# Patient Record
Sex: MALE | Race: WHITE | NOT HISPANIC OR LATINO | Employment: FULL TIME | URBAN - METROPOLITAN AREA
[De-identification: names, ages, dates, MRNs, and addresses within clinical notes are randomized per-mention and may not be internally consistent; named-entity substitution may affect disease eponyms.]

---

## 2018-04-10 ENCOUNTER — HISTORICAL (OUTPATIENT)
Dept: ADMINISTRATIVE | Facility: HOSPITAL | Age: 58
End: 2018-04-10

## 2018-04-10 LAB
ABS NEUT (OLG): 1.93 X10(3)/MCL (ref 2.1–9.2)
APTT PPP: 36.6 SECOND(S) (ref 24.8–36.9)
BASOPHILS # BLD AUTO: 0 X10(3)/MCL (ref 0–0.2)
BASOPHILS NFR BLD AUTO: 1 %
BUN SERPL-MCNC: 15 MG/DL (ref 7–18)
CALCIUM SERPL-MCNC: 9.2 MG/DL (ref 8.5–10.1)
CHLORIDE SERPL-SCNC: 108 MMOL/L (ref 98–107)
CO2 SERPL-SCNC: 27 MMOL/L (ref 21–32)
CREAT SERPL-MCNC: 1.03 MG/DL (ref 0.7–1.3)
CREAT/UREA NIT SERPL: 14.6
EOSINOPHIL # BLD AUTO: 0.2 X10(3)/MCL (ref 0–0.9)
EOSINOPHIL NFR BLD AUTO: 4 %
ERYTHROCYTE [DISTWIDTH] IN BLOOD BY AUTOMATED COUNT: 12.8 % (ref 11.5–17)
GLUCOSE SERPL-MCNC: 110 MG/DL (ref 74–106)
HCT VFR BLD AUTO: 37.3 % (ref 42–52)
HGB BLD-MCNC: 13.1 GM/DL (ref 14–18)
INR PPP: 0.98 (ref 0–1.27)
LYMPHOCYTES # BLD AUTO: 1.7 X10(3)/MCL (ref 0.6–4.6)
LYMPHOCYTES NFR BLD AUTO: 40 %
MCH RBC QN AUTO: 30.2 PG (ref 27–31)
MCHC RBC AUTO-ENTMCNC: 35.1 GM/DL (ref 33–36)
MCV RBC AUTO: 85.9 FL (ref 80–94)
MONOCYTES # BLD AUTO: 0.4 X10(3)/MCL (ref 0.1–1.3)
MONOCYTES NFR BLD AUTO: 8 %
NEUTROPHILS # BLD AUTO: 1.93 X10(3)/MCL (ref 2.1–9.2)
NEUTROPHILS NFR BLD AUTO: 46 %
PLATELET # BLD AUTO: 137 X10(3)/MCL (ref 130–400)
PMV BLD AUTO: 10.2 FL (ref 9.4–12.4)
POTASSIUM SERPL-SCNC: 4.2 MMOL/L (ref 3.5–5.1)
PROTHROMBIN TIME: 13.3 SECOND(S) (ref 12.2–14.7)
RBC # BLD AUTO: 4.34 X10(6)/MCL (ref 4.7–6.1)
SODIUM SERPL-SCNC: 140 MMOL/L (ref 136–145)
WBC # SPEC AUTO: 4.2 X10(3)/MCL (ref 4.5–11.5)

## 2018-05-17 ENCOUNTER — HISTORICAL (OUTPATIENT)
Dept: PREADMISSION TESTING | Facility: HOSPITAL | Age: 58
End: 2018-05-17

## 2018-05-17 LAB
ABS NEUT (OLG): 2.47 X10(3)/MCL (ref 2.1–9.2)
ALBUMIN SERPL-MCNC: 3.8 GM/DL (ref 3.4–5)
ALBUMIN/GLOB SERPL: 1.2 {RATIO}
ALP SERPL-CCNC: 80 UNIT/L (ref 50–136)
ALT SERPL-CCNC: 29 UNIT/L (ref 12–78)
APTT PPP: 39.1 SECOND(S) (ref 24.8–36.9)
AST SERPL-CCNC: 22 UNIT/L (ref 15–37)
BASOPHILS # BLD AUTO: 0 X10(3)/MCL (ref 0–0.2)
BASOPHILS NFR BLD AUTO: 1 %
BILIRUB SERPL-MCNC: 0.5 MG/DL (ref 0.2–1)
BILIRUBIN DIRECT+TOT PNL SERPL-MCNC: 0.1 MG/DL (ref 0–0.2)
BILIRUBIN DIRECT+TOT PNL SERPL-MCNC: 0.4 MG/DL (ref 0–0.8)
BUN SERPL-MCNC: 14 MG/DL (ref 7–18)
CALCIUM SERPL-MCNC: 9.5 MG/DL (ref 8.5–10.1)
CHLORIDE SERPL-SCNC: 104 MMOL/L (ref 98–107)
CO2 SERPL-SCNC: 30 MMOL/L (ref 21–32)
CREAT SERPL-MCNC: 0.94 MG/DL (ref 0.7–1.3)
EOSINOPHIL # BLD AUTO: 0.2 X10(3)/MCL (ref 0–0.9)
EOSINOPHIL NFR BLD AUTO: 4 %
ERYTHROCYTE [DISTWIDTH] IN BLOOD BY AUTOMATED COUNT: 13.4 % (ref 11.5–17)
GLOBULIN SER-MCNC: 3.2 GM/DL (ref 2.4–3.5)
GLUCOSE SERPL-MCNC: 76 MG/DL (ref 74–106)
HCT VFR BLD AUTO: 39.6 % (ref 42–52)
HGB BLD-MCNC: 13.2 GM/DL (ref 14–18)
INR PPP: 1.03 (ref 0–1.27)
LYMPHOCYTES # BLD AUTO: 1.7 X10(3)/MCL (ref 0.6–4.6)
LYMPHOCYTES NFR BLD AUTO: 35 %
MCH RBC QN AUTO: 29.7 PG (ref 27–31)
MCHC RBC AUTO-ENTMCNC: 33.3 GM/DL (ref 33–36)
MCV RBC AUTO: 89.2 FL (ref 80–94)
MONOCYTES # BLD AUTO: 0.4 X10(3)/MCL (ref 0.1–1.3)
MONOCYTES NFR BLD AUTO: 8 %
NEUTROPHILS # BLD AUTO: 2.47 X10(3)/MCL (ref 2.1–9.2)
NEUTROPHILS NFR BLD AUTO: 52 %
PLATELET # BLD AUTO: 136 X10(3)/MCL (ref 130–400)
PMV BLD AUTO: 10.2 FL (ref 9.4–12.4)
POTASSIUM SERPL-SCNC: 4.5 MMOL/L (ref 3.5–5.1)
PROT SERPL-MCNC: 7 GM/DL (ref 6.4–8.2)
PROTHROMBIN TIME: 13.8 SECOND(S) (ref 12.2–14.7)
RBC # BLD AUTO: 4.44 X10(6)/MCL (ref 4.7–6.1)
SODIUM SERPL-SCNC: 143 MMOL/L (ref 136–145)
WBC # SPEC AUTO: 4.8 X10(3)/MCL (ref 4.5–11.5)

## 2018-06-05 ENCOUNTER — HISTORICAL (OUTPATIENT)
Dept: SURGERY | Facility: HOSPITAL | Age: 58
End: 2018-06-05

## 2018-11-07 ENCOUNTER — HISTORICAL (OUTPATIENT)
Dept: RADIOLOGY | Facility: HOSPITAL | Age: 58
End: 2018-11-07

## 2019-04-05 ENCOUNTER — HISTORICAL (OUTPATIENT)
Dept: ADMINISTRATIVE | Facility: HOSPITAL | Age: 59
End: 2019-04-05

## 2020-09-29 ENCOUNTER — HISTORICAL (OUTPATIENT)
Dept: OCCUPATIONAL THERAPY | Facility: HOSPITAL | Age: 60
End: 2020-09-29

## 2020-09-30 ENCOUNTER — HISTORICAL (OUTPATIENT)
Dept: PHYSICAL THERAPY | Facility: HOSPITAL | Age: 60
End: 2020-09-30

## 2020-10-06 ENCOUNTER — HISTORICAL (OUTPATIENT)
Dept: OCCUPATIONAL THERAPY | Facility: HOSPITAL | Age: 60
End: 2020-10-06

## 2020-10-08 ENCOUNTER — HISTORICAL (OUTPATIENT)
Dept: OCCUPATIONAL THERAPY | Facility: HOSPITAL | Age: 60
End: 2020-10-08

## 2020-10-13 ENCOUNTER — HISTORICAL (OUTPATIENT)
Dept: OCCUPATIONAL THERAPY | Facility: HOSPITAL | Age: 60
End: 2020-10-13

## 2020-10-15 ENCOUNTER — HISTORICAL (OUTPATIENT)
Dept: OCCUPATIONAL THERAPY | Facility: HOSPITAL | Age: 60
End: 2020-10-15

## 2020-10-16 ENCOUNTER — HISTORICAL (OUTPATIENT)
Dept: OCCUPATIONAL THERAPY | Facility: HOSPITAL | Age: 60
End: 2020-10-16

## 2020-10-22 ENCOUNTER — HISTORICAL (OUTPATIENT)
Dept: OCCUPATIONAL THERAPY | Facility: HOSPITAL | Age: 60
End: 2020-10-22

## 2020-10-23 ENCOUNTER — HISTORICAL (OUTPATIENT)
Dept: OCCUPATIONAL THERAPY | Facility: HOSPITAL | Age: 60
End: 2020-10-23

## 2020-10-27 ENCOUNTER — HISTORICAL (OUTPATIENT)
Dept: OCCUPATIONAL THERAPY | Facility: HOSPITAL | Age: 60
End: 2020-10-27

## 2020-11-12 ENCOUNTER — HISTORICAL (OUTPATIENT)
Dept: SPEECH THERAPY | Facility: HOSPITAL | Age: 60
End: 2020-11-12

## 2020-11-13 ENCOUNTER — HISTORICAL (OUTPATIENT)
Dept: SPEECH THERAPY | Facility: HOSPITAL | Age: 60
End: 2020-11-13

## 2020-11-17 ENCOUNTER — HISTORICAL (OUTPATIENT)
Dept: SPEECH THERAPY | Facility: HOSPITAL | Age: 60
End: 2020-11-17

## 2020-11-19 ENCOUNTER — HISTORICAL (OUTPATIENT)
Dept: OCCUPATIONAL THERAPY | Facility: HOSPITAL | Age: 60
End: 2020-11-19

## 2020-11-20 ENCOUNTER — HISTORICAL (OUTPATIENT)
Dept: SPEECH THERAPY | Facility: HOSPITAL | Age: 60
End: 2020-11-20

## 2020-11-24 ENCOUNTER — HISTORICAL (OUTPATIENT)
Dept: PHYSICAL THERAPY | Facility: HOSPITAL | Age: 60
End: 2020-11-24

## 2020-11-27 ENCOUNTER — HISTORICAL (OUTPATIENT)
Dept: SPEECH THERAPY | Facility: HOSPITAL | Age: 60
End: 2020-11-27

## 2020-12-01 ENCOUNTER — HISTORICAL (OUTPATIENT)
Dept: PHYSICAL THERAPY | Facility: HOSPITAL | Age: 60
End: 2020-12-01

## 2020-12-04 ENCOUNTER — HISTORICAL (OUTPATIENT)
Dept: PHYSICAL THERAPY | Facility: HOSPITAL | Age: 60
End: 2020-12-04

## 2020-12-08 ENCOUNTER — HISTORICAL (OUTPATIENT)
Dept: SPEECH THERAPY | Facility: HOSPITAL | Age: 60
End: 2020-12-08

## 2020-12-11 ENCOUNTER — HISTORICAL (OUTPATIENT)
Dept: SPEECH THERAPY | Facility: HOSPITAL | Age: 60
End: 2020-12-11

## 2020-12-15 ENCOUNTER — HISTORICAL (OUTPATIENT)
Dept: SPEECH THERAPY | Facility: HOSPITAL | Age: 60
End: 2020-12-15

## 2020-12-18 ENCOUNTER — HISTORICAL (OUTPATIENT)
Dept: SPEECH THERAPY | Facility: HOSPITAL | Age: 60
End: 2020-12-18

## 2020-12-22 ENCOUNTER — HISTORICAL (OUTPATIENT)
Dept: SPEECH THERAPY | Facility: HOSPITAL | Age: 60
End: 2020-12-22

## 2020-12-29 ENCOUNTER — HISTORICAL (OUTPATIENT)
Dept: SPEECH THERAPY | Facility: HOSPITAL | Age: 60
End: 2020-12-29

## 2021-01-05 ENCOUNTER — HISTORICAL (OUTPATIENT)
Dept: SPEECH THERAPY | Facility: HOSPITAL | Age: 61
End: 2021-01-05

## 2021-01-08 ENCOUNTER — HISTORICAL (OUTPATIENT)
Dept: SPEECH THERAPY | Facility: HOSPITAL | Age: 61
End: 2021-01-08

## 2021-01-12 ENCOUNTER — HISTORICAL (OUTPATIENT)
Dept: OCCUPATIONAL THERAPY | Facility: HOSPITAL | Age: 61
End: 2021-01-12

## 2021-01-15 ENCOUNTER — HISTORICAL (OUTPATIENT)
Dept: OCCUPATIONAL THERAPY | Facility: HOSPITAL | Age: 61
End: 2021-01-15

## 2021-01-19 ENCOUNTER — HISTORICAL (OUTPATIENT)
Dept: SPEECH THERAPY | Facility: HOSPITAL | Age: 61
End: 2021-01-19

## 2021-01-22 ENCOUNTER — HISTORICAL (OUTPATIENT)
Dept: OCCUPATIONAL THERAPY | Facility: HOSPITAL | Age: 61
End: 2021-01-22

## 2021-01-26 ENCOUNTER — HISTORICAL (OUTPATIENT)
Dept: OCCUPATIONAL THERAPY | Facility: HOSPITAL | Age: 61
End: 2021-01-26

## 2021-01-29 ENCOUNTER — HISTORICAL (OUTPATIENT)
Dept: OCCUPATIONAL THERAPY | Facility: HOSPITAL | Age: 61
End: 2021-01-29

## 2021-02-05 ENCOUNTER — HISTORICAL (OUTPATIENT)
Dept: SPEECH THERAPY | Facility: HOSPITAL | Age: 61
End: 2021-02-05

## 2021-02-12 ENCOUNTER — HISTORICAL (OUTPATIENT)
Dept: SPEECH THERAPY | Facility: HOSPITAL | Age: 61
End: 2021-02-12

## 2021-02-19 ENCOUNTER — HISTORICAL (OUTPATIENT)
Dept: SPEECH THERAPY | Facility: HOSPITAL | Age: 61
End: 2021-02-19

## 2021-02-26 ENCOUNTER — HISTORICAL (OUTPATIENT)
Dept: OCCUPATIONAL THERAPY | Facility: HOSPITAL | Age: 61
End: 2021-02-26

## 2021-03-26 ENCOUNTER — HISTORICAL (OUTPATIENT)
Dept: OCCUPATIONAL THERAPY | Facility: HOSPITAL | Age: 61
End: 2021-03-26

## 2021-04-01 ENCOUNTER — HISTORICAL (OUTPATIENT)
Dept: OCCUPATIONAL THERAPY | Facility: HOSPITAL | Age: 61
End: 2021-04-01

## 2021-04-09 ENCOUNTER — HISTORICAL (OUTPATIENT)
Dept: OCCUPATIONAL THERAPY | Facility: HOSPITAL | Age: 61
End: 2021-04-09

## 2021-04-16 ENCOUNTER — HISTORICAL (OUTPATIENT)
Dept: OCCUPATIONAL THERAPY | Facility: HOSPITAL | Age: 61
End: 2021-04-16

## 2021-04-22 ENCOUNTER — HISTORICAL (OUTPATIENT)
Dept: OCCUPATIONAL THERAPY | Facility: HOSPITAL | Age: 61
End: 2021-04-22

## 2021-04-29 ENCOUNTER — HISTORICAL (OUTPATIENT)
Dept: OCCUPATIONAL THERAPY | Facility: HOSPITAL | Age: 61
End: 2021-04-29

## 2021-05-14 ENCOUNTER — HISTORICAL (OUTPATIENT)
Dept: OCCUPATIONAL THERAPY | Facility: HOSPITAL | Age: 61
End: 2021-05-14

## 2021-10-20 ENCOUNTER — HISTORICAL (OUTPATIENT)
Dept: OCCUPATIONAL THERAPY | Facility: HOSPITAL | Age: 61
End: 2021-10-20

## 2021-10-28 ENCOUNTER — HISTORICAL (OUTPATIENT)
Dept: OCCUPATIONAL THERAPY | Facility: HOSPITAL | Age: 61
End: 2021-10-28

## 2021-11-02 ENCOUNTER — HISTORICAL (OUTPATIENT)
Dept: OCCUPATIONAL THERAPY | Facility: HOSPITAL | Age: 61
End: 2021-11-02

## 2021-11-04 ENCOUNTER — HISTORICAL (OUTPATIENT)
Dept: OCCUPATIONAL THERAPY | Facility: HOSPITAL | Age: 61
End: 2021-11-04

## 2021-11-09 ENCOUNTER — HISTORICAL (OUTPATIENT)
Dept: OCCUPATIONAL THERAPY | Facility: HOSPITAL | Age: 61
End: 2021-11-09

## 2021-11-11 ENCOUNTER — HISTORICAL (OUTPATIENT)
Dept: OCCUPATIONAL THERAPY | Facility: HOSPITAL | Age: 61
End: 2021-11-11

## 2021-11-16 ENCOUNTER — HISTORICAL (OUTPATIENT)
Dept: OCCUPATIONAL THERAPY | Facility: HOSPITAL | Age: 61
End: 2021-11-16

## 2021-11-23 ENCOUNTER — HISTORICAL (OUTPATIENT)
Dept: OCCUPATIONAL THERAPY | Facility: HOSPITAL | Age: 61
End: 2021-11-23

## 2021-11-30 ENCOUNTER — HISTORICAL (OUTPATIENT)
Dept: PHYSICAL THERAPY | Facility: HOSPITAL | Age: 61
End: 2021-11-30

## 2021-12-06 ENCOUNTER — HISTORICAL (OUTPATIENT)
Dept: RADIOLOGY | Facility: HOSPITAL | Age: 61
End: 2021-12-06

## 2022-01-01 ENCOUNTER — HOSPITAL ENCOUNTER (OUTPATIENT)
Dept: RADIOLOGY | Facility: HOSPITAL | Age: 62
Discharge: HOME | End: 2022-12-07
Attending: PHYSICAL MEDICINE & REHABILITATION
Payer: MEDICARE

## 2022-01-01 ENCOUNTER — CLINICAL SUPPORT (OUTPATIENT)
Dept: REHABILITATION | Facility: HOSPITAL | Age: 62
End: 2022-01-01
Payer: MEDICARE

## 2022-01-01 ENCOUNTER — CLINICAL SUPPORT (OUTPATIENT)
Dept: REHABILITATION | Facility: HOSPITAL | Age: 62
End: 2022-01-01
Attending: PHYSICAL MEDICINE & REHABILITATION
Payer: MEDICARE

## 2022-01-01 ENCOUNTER — DOCUMENTATION ONLY (OUTPATIENT)
Dept: REHABILITATION | Facility: HOSPITAL | Age: 62
End: 2022-01-01

## 2022-01-01 DIAGNOSIS — I67.9 CEREBROVASCULAR DISEASE, UNSPECIFIED: ICD-10-CM

## 2022-01-01 DIAGNOSIS — G81.11 RIGHT SPASTIC HEMIPLEGIA: Primary | ICD-10-CM

## 2022-01-01 DIAGNOSIS — M53.3 SACROILIAC JOINT DYSFUNCTION: Primary | ICD-10-CM

## 2022-01-01 DIAGNOSIS — M70.61 TROCHANTERIC BURSITIS OF RIGHT HIP: ICD-10-CM

## 2022-01-01 DIAGNOSIS — M53.3 SACROILIAC JOINT DYSFUNCTION: ICD-10-CM

## 2022-01-01 PROCEDURE — 97110 THERAPEUTIC EXERCISES: CPT

## 2022-01-01 PROCEDURE — 97112 NEUROMUSCULAR REEDUCATION: CPT

## 2022-01-01 PROCEDURE — 72100 X-RAY EXAM L-S SPINE 2/3 VWS: CPT | Mod: TC

## 2022-01-21 ENCOUNTER — HISTORICAL (OUTPATIENT)
Dept: OCCUPATIONAL THERAPY | Facility: HOSPITAL | Age: 62
End: 2022-01-21

## 2022-01-28 ENCOUNTER — HISTORICAL (OUTPATIENT)
Dept: OCCUPATIONAL THERAPY | Facility: HOSPITAL | Age: 62
End: 2022-01-28

## 2022-02-17 ENCOUNTER — HISTORICAL (OUTPATIENT)
Dept: OCCUPATIONAL THERAPY | Facility: HOSPITAL | Age: 62
End: 2022-02-17

## 2022-02-22 ENCOUNTER — HISTORICAL (OUTPATIENT)
Dept: OCCUPATIONAL THERAPY | Facility: HOSPITAL | Age: 62
End: 2022-02-22

## 2022-02-23 ENCOUNTER — HISTORICAL (OUTPATIENT)
Dept: OCCUPATIONAL THERAPY | Facility: HOSPITAL | Age: 62
End: 2022-02-23

## 2022-03-03 ENCOUNTER — HISTORICAL (OUTPATIENT)
Dept: OCCUPATIONAL THERAPY | Facility: HOSPITAL | Age: 62
End: 2022-03-03

## 2022-03-08 ENCOUNTER — HISTORICAL (OUTPATIENT)
Dept: OCCUPATIONAL THERAPY | Facility: HOSPITAL | Age: 62
End: 2022-03-08

## 2022-03-10 ENCOUNTER — HISTORICAL (OUTPATIENT)
Dept: OCCUPATIONAL THERAPY | Facility: HOSPITAL | Age: 62
End: 2022-03-10

## 2022-03-24 ENCOUNTER — HISTORICAL (OUTPATIENT)
Dept: OCCUPATIONAL THERAPY | Facility: HOSPITAL | Age: 62
End: 2022-03-24

## 2022-03-29 ENCOUNTER — HISTORICAL (OUTPATIENT)
Dept: OCCUPATIONAL THERAPY | Facility: HOSPITAL | Age: 62
End: 2022-03-29

## 2022-03-31 ENCOUNTER — HISTORICAL (OUTPATIENT)
Dept: OCCUPATIONAL THERAPY | Facility: HOSPITAL | Age: 62
End: 2022-03-31

## 2022-04-05 ENCOUNTER — HISTORICAL (OUTPATIENT)
Dept: OCCUPATIONAL THERAPY | Facility: HOSPITAL | Age: 62
End: 2022-04-05

## 2022-04-07 ENCOUNTER — HISTORICAL (OUTPATIENT)
Dept: ADMINISTRATIVE | Facility: HOSPITAL | Age: 62
End: 2022-04-07
Payer: MEDICARE

## 2022-04-07 ENCOUNTER — HISTORICAL (OUTPATIENT)
Dept: OCCUPATIONAL THERAPY | Facility: HOSPITAL | Age: 62
End: 2022-04-07

## 2022-04-12 ENCOUNTER — HISTORICAL (OUTPATIENT)
Dept: OCCUPATIONAL THERAPY | Facility: HOSPITAL | Age: 62
End: 2022-04-12

## 2022-04-14 ENCOUNTER — HISTORICAL (OUTPATIENT)
Dept: OCCUPATIONAL THERAPY | Facility: HOSPITAL | Age: 62
End: 2022-04-14
Payer: MEDICARE

## 2022-04-19 ENCOUNTER — HISTORICAL (OUTPATIENT)
Dept: OCCUPATIONAL THERAPY | Facility: HOSPITAL | Age: 62
End: 2022-04-19
Payer: MEDICARE

## 2022-04-21 ENCOUNTER — HISTORICAL (OUTPATIENT)
Dept: OCCUPATIONAL THERAPY | Facility: HOSPITAL | Age: 62
End: 2022-04-21
Payer: MEDICARE

## 2022-04-24 VITALS
WEIGHT: 196.19 LBS | SYSTOLIC BLOOD PRESSURE: 136 MMHG | HEIGHT: 70 IN | BODY MASS INDEX: 28.09 KG/M2 | DIASTOLIC BLOOD PRESSURE: 88 MMHG

## 2022-04-26 ENCOUNTER — HISTORICAL (OUTPATIENT)
Dept: OCCUPATIONAL THERAPY | Facility: HOSPITAL | Age: 62
End: 2022-04-26
Payer: MEDICARE

## 2022-04-28 NOTE — OP NOTE
"        DATE OF SURGERY:    04/10/2018    SURGEON:  Ihsan Montez MD    PREOPERATIVE DIAGNOSIS:  Cervical and lumbar radiculopathies.    POSTOPERATIVE DIAGNOSIS:  Cervical and lumbar radiculopathies.    PROCEDURE:  Fluoroscopically guided placement of two spinal cord stimulator leads under anesthesia for trial.     Equipment used:  [employer] Bridger[employer] spinal cord stimulator kit.    PROCEDURE IN DETAIL:  Following informed consent, and with the patient under general endotracheal anesthesia, he was prepped and draped in the usual sterile fashion.  I infiltrated the tissue overlying L3-4 with local anesthetic.  Under fluoroscopic guidance, I advanced a 22-gauge 3.5 inch BD spinal needle into the epidural margin just right of midline at L2-3.  I administered more local anesthetic and then exchanged this needle for a 14 gauge Tuohy curved tip needle that I used to enter the epidural space at L2-3.  I inserted a spinal cord stimulator lead and advanced it to the bottom of C7.  I then infiltrated the tissue overlying L3-4 with local anesthetic and advanced a 22 gauge 3.5" BD spinal needle to the epidural margin of L2-3 just left of midline.  I then administered more local anesthetic and exchanged this needle for a 14 gauge Tuohy curved tip needle that I used to enter the epidural space at L2-3 and introduced a spinal cord stimulator lead, advancing it to the top of T9.  I then carefully removed the needles and guidewires while keeping the leads in place.  The leads were carefully secured with sterile dressings and connected to the generator.  The patient tolerated the procedure well without apparent complication.    IMPRESSION:  Successful fluoroscopically guided placement of two spinal cord stimulator leads under anesthesia for trial.        ______________________________  MD DAMARIS Delatorre/TITO  DD:  04/10/2018  Time:  09:03AM  DT:  04/10/2018  Time:  02:14PM  Job #:  [id number]     "

## 2022-04-28 NOTE — OP NOTE
DATE OF SURGERY:    06/05/2018    SURGEON:  Jonathan Jarrett MD    PREOPERATIVE DIAGNOSIS:    1. Chronic pain syndrome.  2. Post-laminectomy syndrome.  3. Lumbar radiculopathy.    POSTOPERATIVE DIAGNOSES:    1. Chronic pain syndrome.  2. Post-laminectomy syndrome.  3. Lumbar radiculopathy.    PROCEDURE:    1. T11-12 laminectomy for implantation of spinal cord stimulator paddle.  2. Implantation of pulse generator.  3. Operative microscopic dissection.    INDICATIONS FOR PROCEDURE:  The patient is a 58-year-old male who had previously undergone ACDF at 4-5 and 5-6 for treatment of central cord syndrome.  He also went underwent a microdiskectomy at L5-S1 and a lumbar laminectomy at L4.  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  He underwent a spinal cord stimulator trial, received greater than 50% relief of his pain complaints.  Most notably, he said he was able to get off all of his pain medications.  MRI of the thoracic spine showed no evidence of spinal canal stenosis.  Therefore, the patient was arranged for implantation.    PROCEDURE IN DETAIL:  After informed consent was obtained, he was brought to the operating room and placed under anesthesia by the anesthesia team, transferred to the operative table in the prone position, appropriately padded, prepped, and draped in usual sterile fashion.       I began the procedure by making a dorsal midline incision fluoroscopically located over the T11-12 vertebral body levels.  Dissection was carried down in a subperiosteal fashion to expose underlying spinous process lamina complex at T11 and the cranial-most portion of T12.  Fluoroscopy confirmed level.       A large-bite Leksell was used to move the spinous process of T11.  A high-speed pneumatic bur was used to perform a laminectomy up to the cranial edge of the yellow ligament.  I undermined the yellow ligament and excised it with a combination of 1 and 2 Kerrison punches.   I attached cranial plate fixation to the subjacent lamina on T12 with a silk tie attached.  I then passed the spinal cord stimulator paddle to the radiographic midline fluoroscopically located over the T10 vertebral body.  Secondary to significant canal abnormality, I was unable to push deep into the T9 space because during each pass it would go into the gutters.  Therefore, with safety in mind, I left the cranial-most portion of the paddle at the T9-10 space and just spanning it.  I then anchored the leads to the subjacent lamina.     I then made a right lower quadrant pocket using about a 5 cm incision, dissected down about 1 cm, 1.5 cm deep.  Used a finger sweep technique to create a 4 x 5 cm pocket.  I tunneled the electrodes to this pocket, connected to the spinal cord stimulator battery.  Pulse generator impedances were appropriate.  The leads were locked.  I then created a relaxing loop, placed it in this pocket and a relaxing loop in the suprafascial pocket.  The entire system was then implanted.  Copious irrigation used to wash out both incisions.  Complete hemostasis was achieved.  Standard layered closure was performed.  All counts correct x2.    ESTIMATED BLOOD LOSS:  50 cc.    BLOOD REPLACED:  None.    SPECIMENS SENT:  None.    IMPLANTS:  Nevro spinal cord stimulator system.       Neuromonitoring signals stayed stable throughout the entirety of the case.        ______________________________  MD MARCO AlvaradoT/UD  DD:  06/05/2018  Time:  02:33PM  DT:  06/06/2018  Time:  11:49AM  Job #:  [id number]

## 2022-05-02 DIAGNOSIS — I67.9 CEREBROVASCULAR DISEASE, UNSPECIFIED: ICD-10-CM

## 2022-05-02 DIAGNOSIS — G81.11 SPASTIC HEMIPARESIS OF RIGHT DOMINANT SIDE: Primary | ICD-10-CM

## 2022-05-03 ENCOUNTER — CLINICAL SUPPORT (OUTPATIENT)
Dept: REHABILITATION | Facility: HOSPITAL | Age: 62
End: 2022-05-03
Attending: FAMILY MEDICINE
Payer: MEDICARE

## 2022-05-03 DIAGNOSIS — G81.11 RIGHT SPASTIC HEMIPLEGIA: Primary | ICD-10-CM

## 2022-05-03 PROCEDURE — 97110 THERAPEUTIC EXERCISES: CPT

## 2022-05-03 PROCEDURE — 97112 NEUROMUSCULAR REEDUCATION: CPT

## 2022-05-03 NOTE — PROGRESS NOTES
[facility]  Occupational Therapy Treatment Note    Date: 5/3/2022  Name: Bhupendra Park  Clinic Number: [id number]    Therapy Diagnosis:   Encounter Diagnosis   Name Primary?    Right spastic hemiplegia Yes     Physician: Referring, Unknown      Time In: 0800  Time Out: 0830  Total Billable Time: 30 minutes    SUBJECTIVE     Pt reports: Tightness through R pec during supine stretching       OBJECTIVE     Objective Measures updated at progress report unless specified.    Treatment     Bhupendra received the treatments listed below:       Therapeutic exercises to develop ROM and flexibility for 20 minutes, including:  In supine, OT completed passive stretching for R shoulder flexion, shoulder abduction, and elbow flexion/extension.   In gravity eliminated, on table top, Pt completed forearm supination/pronation, wrist flexion/extension, and attempt for digit grasp/release.       Neuromuscular re-education activities to improve Kinesthetic for 10 minutes. The following activities were included:  In standing, Pt completed weightbearing and increased stretching of R shoulder on bosu ball, with ball roll forward for increased ROM.     Patient Education and Home Exercises      Education provided:   - Pt presented with indentations on R forearm, reporting he donned resting hand splint this morning. OT educated Pt on loosening of straps through forearm once positioning digits appropriately in splint.   - Progress towards goals        Assessment     Pt would continue to benefit from skilled OT.      Bhupendra is progressing well towards his goals and there are no updates to goals at this time. Pt prognosis is Fair.     Pt will continue to benefit from skilled outpatient occupational therapy to address the deficits listed in the problem list on initial evaluation provide pt/family education and to maximize pt's level of independence in the home and community environment.     Pt's spiritual, cultural  and educational needs considered and pt agreeable to plan of care and goals.    Anticipated barriers to occupational therapy: severity of impairments     Goals:  increased R shoulder flexion of ~15 degrees for increased independence ADL and functional use for daily routines in 4 weeks     increased R elbow extension of ~10 degrees for increased independence ADL and functional use for daily routines in 4 weeks      PLAN     Continue with POC, progressing goals and treatment as Pt continues to demonstrate progress.       Kris Lopez, OT

## 2022-05-05 ENCOUNTER — CLINICAL SUPPORT (OUTPATIENT)
Dept: REHABILITATION | Facility: HOSPITAL | Age: 62
End: 2022-05-05
Payer: MEDICARE

## 2022-05-05 DIAGNOSIS — G81.11 RIGHT SPASTIC HEMIPLEGIA: Primary | ICD-10-CM

## 2022-05-05 PROCEDURE — 97110 THERAPEUTIC EXERCISES: CPT

## 2022-05-05 NOTE — PROGRESS NOTES
[facility]  Occupational Therapy Treatment Note    Date: 5/5/2022  Name: Bhupendra Park  Clinic Number: [id number]    Therapy Diagnosis:   Encounter Diagnosis   Name Primary?    Right spastic hemiplegia Yes     Physician: Referring, Unknown      Time In: 0800  Time Out: 0830  Total Billable Time: 30 minutes    SUBJECTIVE     Pt reports: Mild pain in R wrist with OT providing increased flexion. Pt reports it feels like stretching but with discomfort.       OBJECTIVE     Objective Measures updated at progress report unless specified.    Treatment     Bhupendra received the treatments listed below:       Therapeutic exercises to develop ROM and flexibility for 30 minutes, including:  In supine, OT completed passive stretching for R shoulder flexion, shoulder abduction, and elbow flexion/extension.   In gravity eliminated, on table top, Pt completed forearm supination/pronation, and wrist flexion/extension.    Patient Education and Home Exercises      Education provided:   - Progress towards goals        Assessment     Pt would continue to benefit from skilled OT.      Bhupendra is progressing well towards his goals and there are no updates to goals at this time. Pt prognosis is Fair.     Pt will continue to benefit from skilled outpatient occupational therapy to address the deficits listed in the problem list on initial evaluation provide pt/family education and to maximize pt's level of independence in the home and community environment.     Pt's spiritual, cultural and educational needs considered and pt agreeable to plan of care and goals.    Anticipated barriers to occupational therapy: severity of impairments     Goals:  increased R shoulder flexion of ~15 degrees for increased independence ADL and functional use for daily routines in 4 weeks-- ongoing, progressing     increased R elbow extension of ~10 degrees for increased independence ADL and functional use for daily routines in 4 weeks  -- ongoing, progressing       PLAN     Continue with POC, progressing goals and treatment as Pt continues to demonstrate progress.       Kris Lopez, OT

## 2022-05-10 ENCOUNTER — CLINICAL SUPPORT (OUTPATIENT)
Dept: REHABILITATION | Facility: HOSPITAL | Age: 62
End: 2022-05-10
Payer: MEDICARE

## 2022-05-10 DIAGNOSIS — G81.11 RIGHT SPASTIC HEMIPLEGIA: Primary | ICD-10-CM

## 2022-05-10 PROCEDURE — 97112 NEUROMUSCULAR REEDUCATION: CPT

## 2022-05-10 PROCEDURE — 97110 THERAPEUTIC EXERCISES: CPT

## 2022-05-10 NOTE — PLAN OF CARE
Outpatient Therapy Updated Plan of Care     Visit Date: 5/10/2022  Name: Bhupendra Park  Clinic Number: [id number]    Therapy Diagnosis:   Encounter Diagnosis   Name Primary?    Right spastic hemiplegia Yes     Physician: Casey Maldonado MD  Medical Diagnosis: CVA     Total Visits Received: 12  Cancelled Visits: none  No Show Visits: none    Current Certification Period:  4/26/22 to 7/6/22  Functional Level Prior to Evaluation:  No use or ROM of RUE     Precautions: Standard    Subjective     Update: Pt reports throughout POC with addition of botox treatment, Pt has noted increased ROM and active movement of RUE.     Patient reports: improvement in symptoms since initial evaluation/last PT assessment.     Pain: 0 /10     Patient's spiritual, cultural and educational needs considered and patient agreeable to plan of care and goals.    Objective       Home Exercises and Patient Education   Education provided:   OT provided education on the benefits throughout POC of combining botox, OT, and continued HEP.     Written Home Exercise Program (HEP) Provided: Patient instructed to cont prior HEP.  Exercises were reviewed and Bhupendra was able to demonstrate them prior to the end of the session.  Bhupendra demonstrated good  understanding of the education provided.     Assessment     Update: Pt has made noted progress with shoulder flexion in supine and seated to 90 degrees of AROM and 130 degrees with AAROM.  Continued progress noted with elbow extension during weight bearing and in supine for AROM. Pt has also demonstrated good progress with forearm pronation, however challenges continue to present with supination. At this time, Pt is unable to complete digit extension for release however has made progress with relaxation following large grasp.     Reasons for Recertification of Therapy:   Pt has demonstrated continued progress and good maintenance of functional gains following previous botox injection. Pt is due for new  botox injection, therefore benefiting from continued OT services for increased functional ROM and use of RUE for ADL and daily routines     Patient prognosis is Good.     Patient's spiritual, cultural and educational needs considered and pt agreeable to plan of care and goals.    Anticipated barriers to physical therapy: severity of impairment     Goals:   - increased R shoulder flexion for increased independence ADL and functional use for daily routines-- progressing,ongoing    - increased R elbow extension for increased independence with ADL and functional use for daily routines-- progressing,ongoin    -decreased R hand tone for increased functional grasp/release during ADL and daily routines- progressing,ongoing       Plan     Updated Certification Period: 7/6/22 to 8/17/22  Recommended Treatment Plan: 2 times per week for 6 weeks: Neuromuscular Re-ed, Therapeutic Activities and Therapeutic Exercise  Other Recommendations: Continue with OT following Botox treatment    Kris Lopez OT  5/10/2022      I CERTIFY THE NEED FOR THESE SERVICES FURNISHED UNDER THIS PLAN OF TREATMENT AND WHILE UNDER MY CARE    Physician's comments:        Physician's Signature: ___________________________________________________

## 2022-05-10 NOTE — PROGRESS NOTES
[facility]  Occupational Therapy Treatment Note    Date: 5/10/2022  Name: Bhupendra Park  Clinic Number: [id number]    Therapy Diagnosis:   Encounter Diagnosis   Name Primary?    Right spastic hemiplegia Yes     Physician: Referring, Unknown        Precautions:  Standard    Time In: 0800  Time Out: 0830  Total Billable Time: 30 minutes    SUBJECTIVE     Pt reports: no pain during session  He was compliant with home exercise program given last session.         OBJECTIVE     Objective Measures updated at progress report unless specified.    Treatment     Bhupendra received the treatments listed below:     Therapeutic exercises to develop ROM for 15 minutes, including:  Supine AAROM/PROM with OT providing assistance for increased ROM at end range of active movement for shoulder flexion, abduction, elbow flexion/extension  On table top, Pt completed AAROM forearm pronation/supination, wrist flexion/extension, and grasp/release.      Neuromuscular re-education activities to improve Kinesthetic and Proprioception for 15 minutes. The following activities were included:  Onto R forearm Pt completed L across body reaching to grab resistive clip, returning to midline with weightbearing in RUE on mat for forward L reaching. Good pec stretch noted with L forward reaching.     Patient Education and Home Exercises      Education provided:     - Progress towards goals     Written Home Exercises Provided: yes.  Exercises were reviewed and Bhpuendra was able to demonstrate them prior to the end of the session.  Bhupendra demonstrated good  understanding of the HEP provided. See EMR under Patient Instructions for exercises provided during therapy sessions.       Assessment     Pt would continue to benefit from skilled OT.      Bhupendra is progressing well towards his goals and there are no updates to goals at this time. Pt prognosis is Good.     Pt will continue to benefit from skilled outpatient occupational  therapy to address the deficits listed in the problem list on initial evaluation provide pt/family education and to maximize pt's level of independence in the home and community environment.     Pt's spiritual, cultural and educational needs considered and pt agreeable to plan of care and goals.    Anticipated barriers to occupational therapy: severity of impairment       PLAN     Continue with current POC       Kris Lopez, OT

## 2022-05-12 ENCOUNTER — CLINICAL SUPPORT (OUTPATIENT)
Dept: REHABILITATION | Facility: HOSPITAL | Age: 62
End: 2022-05-12
Payer: MEDICARE

## 2022-05-12 DIAGNOSIS — G81.11 RIGHT SPASTIC HEMIPLEGIA: Primary | ICD-10-CM

## 2022-05-12 PROCEDURE — 97110 THERAPEUTIC EXERCISES: CPT

## 2022-05-12 NOTE — PROGRESS NOTES
[facility]  Occupational Therapy Treatment Note    Date: 5/12/2022  Name: Bhupendra Park  Clinic Number: [id number]    Therapy Diagnosis:   Encounter Diagnosis   Name Primary?    Right spastic hemiplegia Yes     Physician: Referring, Unknown    Time In: 0800  Time Out: 0830  Total Billable Time: 30 minutes    SUBJECTIVE     Pt reports: Mild pain through R pec during supine stretching, radiating through bicep however subsides when at rest.   He was compliant with home exercise program given last session.     OBJECTIVE     Objective Measures updated at progress report unless specified.    Treatment     Bhupendra received the treatments listed below:     Therapeutic exercises to develop strength and ROM for 30 minutes, including:  In supine, PROM of R shoulder flexion, abduction, and elbow flexion/extension-- increased stretch through pec during shoulder abduction.   In unsupported short sit, Pt completed AAROM of shoulder flexion/extension, abduction/adduction, and elbow flexion/extension-- Increased tone noted in all planes, resulting in significant limitation of movement today.   On table top, Pt completed AAROM forearm pronation/supination, wrist flexion/extension, and attempt for grasp/release-- increased challenge for wrist movement and no trace for release aspect in hand 2/2 significant tone.       Patient Education and Home Exercises      Education provided:   - Secondary to increased tone throughout RUE, OT recommends increased wear of resting hand splint today for increased stretch to R hand.   - Progress towards goals     Assessment     Pt would continue to benefit from skilled OT.      Bhupendra is progressing well towards his goals and there are no updates to goals at this time. Pt prognosis is Fair.     Pt will continue to benefit from skilled outpatient occupational therapy to address the deficits listed in the problem list on initial evaluation provide pt/family education and to  maximize pt's level of independence in the home and community environment.     Pt's spiritual, cultural and educational needs considered and pt agreeable to plan of care and goals.    Anticipated barriers to occupational therapy: severity of impairment     PLAN     Continue with current POC.     Kris Lopez, OT

## 2022-05-17 ENCOUNTER — CLINICAL SUPPORT (OUTPATIENT)
Dept: REHABILITATION | Facility: HOSPITAL | Age: 62
End: 2022-05-17
Payer: MEDICARE

## 2022-05-17 DIAGNOSIS — G81.11 RIGHT SPASTIC HEMIPLEGIA: Primary | ICD-10-CM

## 2022-05-17 PROCEDURE — 97110 THERAPEUTIC EXERCISES: CPT

## 2022-05-17 NOTE — PROGRESS NOTES
[facility]  Occupational Therapy Treatment Note    Date: 5/17/2022  Name: Bhupendra Park  Clinic Number: [id number]    Therapy Diagnosis:   Encounter Diagnosis   Name Primary?    Right spastic hemiplegia Yes     Physician: Referring, Unknown    Time In: 0800  Time Out: 0830  Total Billable Time: 30 minutes    SUBJECTIVE     Pt reports: He fell out of bed Friday with soreness to R side.   He was compliant with home exercise program given last session.     OBJECTIVE     Objective Measures updated at progress report unless specified.    Treatment     Bhupendra received the treatments listed below:     Therapeutic exercises to develop strength and ROM for 30 minutes, including:  In supine, PROM of R shoulder flexion, abduction, and elbow flexion/extension  In unsupported short sit, Pt completed AAROM of shoulder flexion/extension, abduction/adduction, and elbow flexion/extension-- Continues to present with limiting tone with OT providing increased stretch.     Patient Education and Home Exercises      Education provided:   - Progress towards goals     Assessment     Pt would continue to benefit from skilled OT.      Bhupendra is progressing well towards his goals and there are no updates to goals at this time. Pt prognosis is Fair.     Pt will continue to benefit from skilled outpatient occupational therapy to address the deficits listed in the problem list on initial evaluation provide pt/family education and to maximize pt's level of independence in the home and community environment.     Pt's spiritual, cultural and educational needs considered and pt agreeable to plan of care and goals.    Anticipated barriers to occupational therapy: severity of impairment     PLAN     Continue with current POC.     Kris Lopez, OT

## 2022-05-19 ENCOUNTER — CLINICAL SUPPORT (OUTPATIENT)
Dept: REHABILITATION | Facility: HOSPITAL | Age: 62
End: 2022-05-19
Payer: MEDICARE

## 2022-05-19 DIAGNOSIS — G81.11 RIGHT SPASTIC HEMIPLEGIA: Primary | ICD-10-CM

## 2022-05-19 PROCEDURE — 97110 THERAPEUTIC EXERCISES: CPT

## 2022-05-19 NOTE — PROGRESS NOTES
[facility]  Occupational Therapy Treatment Note    Date: 5/19/2022  Name: Bhupendra Park  Clinic Number: [id number]    Therapy Diagnosis:   Encounter Diagnosis   Name Primary?    Right spastic hemiplegia Yes     Physician: Referring, Unknown    Time In: 0800  Time Out: 0830  Total Billable Time: 30 minutes    SUBJECTIVE     Pt reports:No complaints of pain today  He was compliant with home exercise program given last session.     OBJECTIVE     Objective Measures updated at progress report unless specified.    Treatment     Bhupendra received the treatments listed below:     Therapeutic exercises to develop strength and ROM for 30 minutes, including:  In supine, PROM of R shoulder flexion, abduction, and elbow flexion/extension  On table top, Pt completed forearm pronation/supination, wrist flexion/extension, and grasp/release-- Secondary to increased tone Pt demonstrated challenges with pronation and wrist flexion. .     Patient Education and Home Exercises      Education provided:   - Progress towards goals     Assessment     Pt would continue to benefit from skilled OT.      Bhupendra is progressing well towards his goals and there are no updates to goals at this time. Pt prognosis is Fair.     Pt will continue to benefit from skilled outpatient occupational therapy to address the deficits listed in the problem list on initial evaluation provide pt/family education and to maximize pt's level of independence in the home and community environment.     Pt's spiritual, cultural and educational needs considered and pt agreeable to plan of care and goals.    Anticipated barriers to occupational therapy: severity of impairment     PLAN     Continue with current POC.     Kris Lopez, OT

## 2022-05-24 ENCOUNTER — CLINICAL SUPPORT (OUTPATIENT)
Dept: REHABILITATION | Facility: HOSPITAL | Age: 62
End: 2022-05-24
Payer: MEDICARE

## 2022-05-24 DIAGNOSIS — G81.11 RIGHT SPASTIC HEMIPLEGIA: Primary | ICD-10-CM

## 2022-05-24 PROCEDURE — 97112 NEUROMUSCULAR REEDUCATION: CPT

## 2022-05-24 PROCEDURE — 97110 THERAPEUTIC EXERCISES: CPT

## 2022-05-24 NOTE — PLAN OF CARE
Outpatient Therapy Updated Plan of Care     Visit Date: 5/24/2022  Name: Bhupendra Park  Clinic Number: [id number]    Therapy Diagnosis:   Encounter Diagnosis   Name Primary?    Right spastic hemiplegia Yes     Physician: Referring, Unknown     Medical Diagnosis: G81.11 and I67.9     Total Visits Received: 12  Cancelled Visits: 1  No Show Visits: 0  Current Certification Period:  4/26/22 to 7/6/22    Subjective     Update: Pt reports more recently feeling increased tone/tightness following increased time since botox treatment.    Patient reports: improvement in symptoms since initial evaluation/last OT assessment.     Patient's spiritual, cultural and educational needs considered and patient agreeable to plan of care and goals.    Objective     Update: Overall, Pt has maintained good ROM throughout this POC following botox injection. Pt has increased functional movement of ROM and has demonstrated increased control over tone. More recently Pt has presented with increased tightness and tone, needing new botox injection to assist with continued progress in OT.     Home Exercises and Patient Education   Education provided:   Pt continues to complete PROM and AAROM of RUE daily with alternating use of resting hand split and progressive stretch brace.       Assessment     Update: At this time Pt presents with RUE;   AROM:   -elbow flexion- 90 degrees   - elbow extension- 150 degrees   -shoulder flexion- 50 degrees   - shoulder abduction- 20 degrees   PROM:   - elbow flexion- 100 degrees   -elbow flexion- 180 degrees   Shoulder flexion- 90 degrees   Shoulder abduction- 80 degrees     Reasons for Recertification of Therapy:   At this time, Pt has maintained good increased ROM of RUE with increased active movement in R hand. Pt is et for botox injection on 5/24 with continued OT to follow. Previously Pt has demonstrated significant progress with the combination of botox and OT, increasing functional shoulder, elbow,  forearm, and hand movement. Pt would benefit from continued OT services for increased R forearm, wrist and hand movement for increased functional use.   OT has provided MD with recommendations for increased botox into flexor mm of the hand for decreased tone and potential for increased controlled grasp/release     Patient prognosis is Good.     Patient's spiritual, cultural and educational needs considered and pt agreeable to plan of care and goals.    Anticipated barriers to physical therapy: Severity of impairment     Goals:    increased R shoulder flexion to AROM of 100 degrees for increased independence ADL and functional use for daily routines-- progressing,ongoing      increased R elbow extension to AROM 95 degrees for increased independence with ADL and functional use for daily routines-- progressing,ongoing     -decreased R hand tone for increased functional grasp/release during ADL and daily routines- progressing,ongoing         Plan     Recommended Treatment Plan: 2 times per week for 6 weeks: Neuromuscular Re-ed, Therapeutic Activities and Therapeutic Exercise  Other Recommendations: Plan to continue OT treatment following botox injections to RUE on 5/24. Continue to implement stretching and splint wearing at home for increased ROM     Kris Lopez OT  5/24/2022      I CERTIFY THE NEED FOR THESE SERVICES FURNISHED UNDER THIS PLAN OF TREATMENT AND WHILE UNDER MY CARE    Physician's comments:        Physician's Signature: ___________________________________________________

## 2022-05-24 NOTE — PROGRESS NOTES
[facility]  Occupational Therapy Treatment Note    Date: 5/24/2022  Name: Bhupendra Park  Clinic Number: [id number]    Therapy Diagnosis:   Encounter Diagnosis   Name Primary?    Right spastic hemiplegia Yes     Physician: Referring, Unknown      Time In: 0800  Time Out: 0830  Total Billable Time: 30 minutes    SUBJECTIVE     Pt reports: Increased stretch sensation during shoulder abduction and elbow extension     OBJECTIVE     Objective Measures updated at progress report unless specified.    Treatment     Bhupendra received the treatments listed below:     Therapeutic exercises to develop ROM for 15 minutes, including:  In supine, Pt completed shoulder flexion/extension, shoulder abd/dduction, elbow flexion/extension, forearm pronation/supination, and digit extension     Neuromuscular re-education activities to improve Kinesthetic, Proprioception and Posture for 15 minutes. The following activities were included:  While seated upright, Pt leaned onto R forearm with functional reaching across body with LUE to resistive clips. Pt completed functional reaching to floor level to drew clips onto vertical bar.     Patient Education and Home Exercises      Education provided:   - Progress towards goals        Assessment     Pt would continue to benefit from skilled OT. Pt to receive botox in RUE for increased ROM and muscle control over functional movement      Bhupendra is progressing well towards his goals and there are no updates to goals at this time. Pt prognosis is Fair.     Pt will continue to benefit from skilled outpatient occupational therapy to address the deficits listed in the problem list on initial evaluation provide pt/family education and to maximize pt's level of independence in the home and community environment.     Pt's spiritual, cultural and educational needs considered and pt agreeable to plan of care and goals.    Anticipated barriers to occupational therapy: severity of  impairment       PLAN     Continue with current POC   Updates/Grading for next session: Continued stretching of RUE prior to activity.       Kris Lopez, OT

## 2022-05-26 ENCOUNTER — CLINICAL SUPPORT (OUTPATIENT)
Dept: REHABILITATION | Facility: HOSPITAL | Age: 62
End: 2022-05-26
Payer: MEDICARE

## 2022-05-26 DIAGNOSIS — G81.11 RIGHT SPASTIC HEMIPLEGIA: Primary | ICD-10-CM

## 2022-05-26 PROCEDURE — 97110 THERAPEUTIC EXERCISES: CPT

## 2022-05-26 NOTE — PROGRESS NOTES
[facility]  Occupational Therapy Treatment Note    Date: 5/26/2022  Name: Bhupendra Park  Clinic Number: [id number]    Therapy Diagnosis:   Encounter Diagnosis   Name Primary?    Right spastic hemiplegia Yes     Physician: Referring, Unknown    Time In: 0800  Time Out: 0830  Total Billable Time: 30 minutes    SUBJECTIVE     Pt reports: Pt received botox injections on 5/24. Reporting continued tightness.     OBJECTIVE     Objective Measures updated at progress report unless specified.    Treatment     Bhupendra received the treatments listed below:       Therapeutic exercises to develop endurance, ROM and flexibility for 30 minutes, including:  In supine, Pt completed AAROM/PROM of RUE for shoulder flexion, shoulder abduction, external rotation, elbow flexion/extension, wrist flexion/extension, and digit stretching for extension.   On tabletop, Pt completed weightbearing/stretching of increased shoulder flexion by completing roll outs on bosu ball.       Patient Education and Home Exercises      Education provided:   - OT educated Pt on completing continued stretching of RUE in all planes over weekend to encourage increased ROM following botox.  - Progress towards goals        Assessment     Pt would continue to benefit from skilled OT. Although Pt's RUE continues to present with increased tone, OT noted increased shoulder flexion and abduction today.      Bhupendra is progressing well towards his goals and there are no updates to goals at this time. Pt prognosis is Fair.     Pt will continue to benefit from skilled outpatient occupational therapy to address the deficits listed in the problem list on initial evaluation provide pt/family education and to maximize pt's level of independence in the home and community environment.     Pt's spiritual, cultural and educational needs considered and pt agreeable to plan of care and goals.    Anticipated barriers to occupational therapy: severity of  impairment       PLAN     Continue with current POC  Updates/Grading for next session: Include increased ROM stretching and weightbearing       Kris Lopez OT

## 2022-05-31 ENCOUNTER — CLINICAL SUPPORT (OUTPATIENT)
Dept: REHABILITATION | Facility: HOSPITAL | Age: 62
End: 2022-05-31
Payer: MEDICARE

## 2022-05-31 DIAGNOSIS — G81.11 RIGHT SPASTIC HEMIPLEGIA: Primary | ICD-10-CM

## 2022-05-31 PROCEDURE — 97110 THERAPEUTIC EXERCISES: CPT

## 2022-05-31 NOTE — PROGRESS NOTES
[facility]  Occupational Therapy Treatment Note    Date: 5/31/2022  Name: Bhupendra Park  Clinic Number: [id number]    Therapy Diagnosis:   Encounter Diagnosis   Name Primary?    Right spastic hemiplegia Yes     Physician: Referring, Unknown    Time In: 0800  Time Out: 0830  Total Billable Time: 30 minutes    SUBJECTIVE     Pt reports: No pain at this time     OBJECTIVE     Objective Measures updated at progress report unless specified.    Treatment     Bhupendra received the treatments listed below:       Therapeutic exercises to develop strength, endurance and ROM for 30 minutes, including:  In supine, Pt completed AAROM of the RUE for shoulder flexion, mild resistance for shoulder extension, shoulder abduction/adduction, external/internal rotation with elbow at side, and elbow flexion/extension.   On table top with cone in hand for decreased tone, Pt completed forearm pronation and supination and wrist flexion and extension.       Patient Education and Home Exercises      Education provided:   - OT assisted with donning of resting hand brace.   - Progress towards goals      Assessment     Pt would continue to benefit from skilled OT. Pt demonstrates good progress with active shoulder flexion, abduction, and elbow flexion. Increased PROM of shoulder abduction to ~80 degrees. At this time, Pt is able to complete 90 degrees of forearm supination from full pronation, however OT observes hard stop at 90 2/2 tone.      Bhupendra is progressing well towards his goals and there are no updates to goals at this time. Pt prognosis is Good.     Pt will continue to benefit from skilled outpatient occupational therapy to address the deficits listed in the problem list on initial evaluation provide pt/family education and to maximize pt's level of independence in the home and community environment.     Pt's spiritual, cultural and educational needs considered and pt agreeable to plan of care and  goals.    Anticipated barriers to occupational therapy: severity of impairment       PLAN     Continue with current POC.   Updates/Grading for next session: Increase extension of digits with blocking attempt during weight bearing activity       Kris Lopez, OT

## 2022-06-02 ENCOUNTER — CLINICAL SUPPORT (OUTPATIENT)
Dept: REHABILITATION | Facility: HOSPITAL | Age: 62
End: 2022-06-02
Payer: MEDICARE

## 2022-06-02 DIAGNOSIS — G81.11 RIGHT SPASTIC HEMIPLEGIA: Primary | ICD-10-CM

## 2022-06-02 PROCEDURE — 97110 THERAPEUTIC EXERCISES: CPT

## 2022-06-02 NOTE — PROGRESS NOTES
[facility]  Occupational Therapy Treatment Note    Date: 6/2/2022  Name: Bhupendra Park  Clinic Number: [id number]    Therapy Diagnosis:   Encounter Diagnosis   Name Primary?    Right spastic hemiplegia Yes     Physician: Referring, Unknown    Time In: 0800  Time Out: 0830  Total Billable Time: 30 minutes    SUBJECTIVE     Pt reports: RUE continues to feel tight. Pt is receiving ganglion block in two weeks for hope of increased movement with OT.     OBJECTIVE     Objective Measures updated at progress report unless specified.    Treatment     Bhupendra received the treatments listed below:     Therapeutic exercises to develop ROM for 30 minutes, including:  In supine, Pt completed AAROM of shoulder flexion with mild resistance for shoulder extension, shoulder abduction/adduction, elbow flexion/extension.   PROM for forearm supination/pronation, wrist flexion/extension and digit extension.       Patient Education and Home Exercises      Education provided:   - Progress towards goals          Assessment     Pt would continue to benefit from skilled OT. Pt demonstrated good progress with elbow extension from ~110 degrees of flexion to ~150 of extension. PT also was able to provide slight muscle activation (more than trace) when attempting to complete wrist flexion.     Bhupendra is progressing well towards his goals and there are no updates to goals at this time. Pt prognosis is Good .     Pt will continue to benefit from skilled outpatient occupational therapy to address the deficits listed in the problem list on initial evaluation provide pt/family education and to maximize pt's level of independence in the home and community environment.     Pt's spiritual, cultural and educational needs considered and pt agreeable to plan of care and goals.    Anticipated barriers to occupational therapy: severity of impairment-- limiting hypertonicity     PLAN     Continue with POC-- waiting for approval  from MD and insurance  Updates/Grading for next session: None at this time       Kris Lopez, OT

## 2022-06-08 DIAGNOSIS — G81.11 SPASTIC HEMIPLEGIA AFFECTING RIGHT DOMINANT SIDE: Primary | ICD-10-CM

## 2022-06-09 ENCOUNTER — CLINICAL SUPPORT (OUTPATIENT)
Dept: REHABILITATION | Facility: HOSPITAL | Age: 62
End: 2022-06-09
Payer: MEDICARE

## 2022-06-09 DIAGNOSIS — G81.11 RIGHT SPASTIC HEMIPLEGIA: Primary | ICD-10-CM

## 2022-06-09 PROCEDURE — 97530 THERAPEUTIC ACTIVITIES: CPT

## 2022-06-09 PROCEDURE — 97110 THERAPEUTIC EXERCISES: CPT

## 2022-06-09 NOTE — PROGRESS NOTES
"  [facility]  Occupational Therapy Treatment Note    Date: 6/9/2022  Name: Bhupendra Park  Clinic Number: [id number]    Therapy Diagnosis:   Encounter Diagnosis   Name Primary?    Right spastic hemiplegia Yes     Physician: Casey Maldonado MD    Time In: 0800  Time Out: 0830  Total Billable Time: 30 minutes    SUBJECTIVE     Pt reports: No issues to report at this time     OBJECTIVE     Objective Measures updated at progress report unless specified.    Treatment     Bhupendra received the treatments listed below:     Therapeutic exercises to develop ROM for 20 minutes, including:  In supine, Pt completed AAROM/PROM for RUE shoulder flexion, shoulder abduction, and elbow flexion/extension-- ~35degrees of AROM noted for shoulder flexion, 45 degrees of active shoulder abduction, 90 degrees of elbow flexion, and ~110 degrees of elbow extension. OT provided an increase of ~10-15 degrees in PROM for all planes.     Therapeutic activities to improve functional performance for 10  minutes, including:  Pt completed simulation of "coffee drinking" with RUE in upright sitting. Pt able to open R hand to grasp coffee handle and upgrade to grabbing full coffee cup, followed by bringing cup up to mouth. Pt actively completed half of excursion with OT providing support and passive assistance to complete lifting. No increase of tone noted during activity.       Patient Education and Home Exercises      Education provided:   - Progress towards goals       Assessment     Pt would continue to benefit from skilled OT. Good progress with AROM and ROM during PROM noted. Increase release in digits noted as well.  OT noted increased AROM noted when a target is at hand during more functional movement     Bhupendra is progressing well towards his goals and there are no updates to goals at this time. Pt prognosis is Good.     Pt will continue to benefit from skilled outpatient occupational therapy to address the " deficits listed in the problem list on initial evaluation provide pt/family education and to maximize pt's level of independence in the home and community environment.     Pt's spiritual, cultural and educational needs considered and pt agreeable to plan of care and goals.    Anticipated barriers to occupational therapy: severity of impairment       PLAN     Continue with current POC   Updates/Grading for next session: Upgrade challenge for targeting functional movement       Kris Lopez, OT

## 2022-06-14 ENCOUNTER — CLINICAL SUPPORT (OUTPATIENT)
Dept: REHABILITATION | Facility: HOSPITAL | Age: 62
End: 2022-06-14
Payer: MEDICARE

## 2022-06-14 DIAGNOSIS — G81.11 RIGHT SPASTIC HEMIPLEGIA: Primary | ICD-10-CM

## 2022-06-14 PROCEDURE — 97110 THERAPEUTIC EXERCISES: CPT

## 2022-06-14 NOTE — PROGRESS NOTES
[facility]  Occupational Therapy Treatment Note    Date: 6/14/2022  Name: Bhupendra Park  Clinic Number: [id number]    Therapy Diagnosis:   Encounter Diagnosis   Name Primary?    Right spastic hemiplegia Yes     Physician: Casey Maldonado MD      Precautions:  Standard    Time In: 0800  Time Out: 0830  Total Billable Time: 30 minutes    SUBJECTIVE     Pt reports: Wanting to stretch secondary to feeling tight in RUE     OBJECTIVE     Objective Measures updated at progress report unless specified.    Treatment     Bhupendra received the treatments listed below:       Therapeutic exercises to develop ROM for 30 minutes, including:  In supine, Pt completed PROM/AAROM of the RUE for shoulder flexion/extension, shoulder abduction to 90 degrees with external rotation push, and elbow flexion/extension.-- Notes of increased stretching with Pt reporting increased pull in position of shoulder abduction to 90 degrees with external rotation. Decreased sensation noted with elbow adducted to side.   In upright sitting, Pt completed elbow flexion and extension against moderate resistance provided by OT-- good strength noted for extension. Challenges noted for flexion with Pt actively completing 90 degrees.     Patient Education and Home Exercises      Education provided:   - OT encouraged resting hand brace during daily activity today   - Progress towards goals      Assessment     Pt would continue to benefit from skilled OT. Pt demonstrated increased ROM with decreased tone during AAROM and PROM today.      Bhupendra is progressing well towards his goals and there are no updates to goals at this time. Pt prognosis is Good.     Pt will continue to benefit from skilled outpatient occupational therapy to address the deficits listed in the problem list on initial evaluation provide pt/family education and to maximize pt's level of independence in the home and community environment.     Pt's spiritual,  cultural and educational needs considered and pt agreeable to plan of care and goals.    Anticipated barriers to occupational therapy: severity of impairment       PLAN     Continue with current POC   Updates/Grading for next session: weightbearing into RUE for next session      Kris Lopez, OT

## 2022-06-16 ENCOUNTER — CLINICAL SUPPORT (OUTPATIENT)
Dept: REHABILITATION | Facility: HOSPITAL | Age: 62
End: 2022-06-16
Payer: MEDICARE

## 2022-06-16 DIAGNOSIS — G81.11 RIGHT SPASTIC HEMIPLEGIA: Primary | ICD-10-CM

## 2022-06-16 PROCEDURE — 97110 THERAPEUTIC EXERCISES: CPT

## 2022-06-16 PROCEDURE — 97112 NEUROMUSCULAR REEDUCATION: CPT

## 2022-06-16 NOTE — PROGRESS NOTES
[facility]  Occupational Therapy Treatment Note    Date: 6/16/2022  Name: Bhupendra Park  Clinic Number: [id number]    Therapy Diagnosis:   Encounter Diagnosis   Name Primary?    Right spastic hemiplegia Yes     Physician: Casey Maldonado MD      Precautions:  Standard    Time In: 0800  Time Out: 0830  Total Billable Time: 30 minutes    SUBJECTIVE     Pt reports: no issues to report at this time     OBJECTIVE     Objective Measures updated at progress report unless specified.    Treatment     Bhupendra received the treatments listed below:     Therapeutic exercises to develop endurance and ROM for 15 minutes, including:  In supine, Pt completed AAROM/PROM of RUE shoulder flexion/extension, shoulder abduction/adduction, elbow extension/flexion, and external rotation with elbow adducted to side and with shoulder abducted to 90 degrees.-- Pt able to tolerate increased abduction to 90 degrees with ~15 degrees of external rotation.     Neuromuscular re-education activities to improve Kinesthetic for 15 minutes. The following activities were included:  With cone in hand, Pt completed repetition of PNF D1 pattern-- Good ROM noted for D1 flexion. Challenge presents with Pt attempting D1 extension secondary to inability to complete active external rotation.     Patient Education and Home Exercises      Education provided:  - Progress towards goals        Assessment     Pt would continue to benefit from skilled OT. Good progress for increased ROM and decreased tone for AAROM/PROM in all planes      Bhupendra is progressing well towards his goals and there are no updates to goals at this time. Pt prognosis is Good.     Pt will continue to benefit from skilled outpatient occupational therapy to address the deficits listed in the problem list on initial evaluation provide pt/family education and to maximize pt's level of independence in the home and community environment.     Pt's spiritual, cultural  and educational needs considered and pt agreeable to plan of care and goals.    Anticipated barriers to occupational therapy: severity of impairment     PLAN     Continue with current POC       Kris Lopez OT

## 2022-06-21 ENCOUNTER — CLINICAL SUPPORT (OUTPATIENT)
Dept: REHABILITATION | Facility: HOSPITAL | Age: 62
End: 2022-06-21
Payer: MEDICARE

## 2022-06-21 DIAGNOSIS — G81.11 RIGHT SPASTIC HEMIPLEGIA: Primary | ICD-10-CM

## 2022-06-21 PROCEDURE — 97110 THERAPEUTIC EXERCISES: CPT

## 2022-06-21 PROCEDURE — 97112 NEUROMUSCULAR REEDUCATION: CPT

## 2022-06-21 NOTE — PROGRESS NOTES
[facility]  Occupational Therapy Treatment Note    Date: 6/21/2022  Name: Bhupendra Park  Clinic Number: [id number]    Therapy Diagnosis:   Encounter Diagnosis   Name Primary?    Right spastic hemiplegia Yes     Physician: Casey Maldonado MD      Time In: 0800  Time Out: 0830  Total Billable Time: 30 minutes    SUBJECTIVE     Pt reports: Increased ROM as a result of continued stretching at home     OBJECTIVE     Objective Measures updated at progress report unless specified.    Treatment     Bhupendra received the treatments listed below:     Therapeutic exercises to develop strength, endurance and ROM for 15 minutes, including:  In upright sitting, Pt completed bicep curls with emphasis on elbow extension, internal and external rotation with elbow adducted to side, and PNF D1 pattern.-- Pt held 2# dumbbell with exercise. Challenges noted for external rotation to ~45 degrees active.     Neuromuscular re-education activities to improve Kinesthetic for 15 minutes. The following activities were included:  Pt completed weight bearing to R forearm to reach over midline with LUE to grab resistive clip, return to upright sitting midline and drew clip onto pole with R hand weightbearing into R leg.-- Challenges noted for pushing through RUE to return to midline.     Patient Education and Home Exercises      Education provided:   - Progress towards goals     Assessment     Pt would continue to benefit from skilled OT. Good progress noted for increased ROM for RUE in weightbearing and active movement      Bhupendra is progressing well towards his goals and there are no updates to goals at this time. Pt prognosis is Good.     Pt will continue to benefit from skilled outpatient occupational therapy to address the deficits listed in the problem list on initial evaluation provide pt/family education and to maximize pt's level of independence in the home and community environment.     Pt's spiritual,  cultural and educational needs considered and pt agreeable to plan of care and goals.    Anticipated barriers to occupational therapy: severity of hyper tonicity       PLAN     Continue with current POC   Updates/Grading for next session: continue to increase ROM and AROM of NOLAN Lopez, OT

## 2022-06-23 ENCOUNTER — CLINICAL SUPPORT (OUTPATIENT)
Dept: REHABILITATION | Facility: HOSPITAL | Age: 62
End: 2022-06-23
Payer: MEDICARE

## 2022-06-23 DIAGNOSIS — G81.11 RIGHT SPASTIC HEMIPLEGIA: Primary | ICD-10-CM

## 2022-06-23 PROCEDURE — 97112 NEUROMUSCULAR REEDUCATION: CPT

## 2022-06-23 PROCEDURE — 97110 THERAPEUTIC EXERCISES: CPT

## 2022-06-23 NOTE — PROGRESS NOTES
[facility]  Occupational Therapy Treatment Note    Date: 6/23/2022  Name: Bhupendra Park  Clinic Number: [id number]    Therapy Diagnosis:   Encounter Diagnosis   Name Primary?    Right spastic hemiplegia Yes     Physician: Casey Maldonado MD      Precautions:  Standard    Time In: 0800  Time Out: 0830  Total Billable Time: 30 minutes    SUBJECTIVE     Pt reports: Mild pain in shoulder region with external rotation     OBJECTIVE     Objective Measures updated at progress report unless specified.    Treatment     Bhupendra received the treatments listed below:     Therapeutic exercises to develop ROM for 15 minutes, including:  In supine, Pt completed PROM of shoulder flexion with elbow extension at end range, shoulder abduction with external and internal rotation, and elbow flexion/extension.-- Increased elbow flexion noted with 110 degrees active and 125 degrees passive.     Neuromuscular re-education activities to improve Balance and Kinesthetic for 15 minutes. The following activities were included:  In RUE weightbearing on table top, Pt completed functional reaching to L side with LUE for increased chest opening, return to midline with functional reaching forward for increased weightbear. OT blocked at elbow for increased extension.     Patient Education and Home Exercises      Education provided:  - Progress towards goals        Assessment     Pt would continue to benefit from skilled OT. Good progress noted for increased AROM of elbow.      Bhupendra is progressing well towards his goals and there are no updates to goals at this time. Pt prognosis is Good.     Pt will continue to benefit from skilled outpatient occupational therapy to address the deficits listed in the problem list on initial evaluation provide pt/family education and to maximize pt's level of independence in the home and community environment.     Pt's spiritual, cultural and educational needs considered and pt  agreeable to plan of care and goals.    Anticipated barriers to occupational therapy: severity of impairment     PLAN     Continue with current POC   Updates/Grading for next session: increased weightbearing to NOLAN Lopez, OT

## 2022-06-28 ENCOUNTER — CLINICAL SUPPORT (OUTPATIENT)
Dept: REHABILITATION | Facility: HOSPITAL | Age: 62
End: 2022-06-28
Payer: MEDICARE

## 2022-06-28 DIAGNOSIS — G81.11 RIGHT SPASTIC HEMIPLEGIA: Primary | ICD-10-CM

## 2022-06-28 PROCEDURE — 97110 THERAPEUTIC EXERCISES: CPT

## 2022-06-28 NOTE — PROGRESS NOTES
[facility]  Occupational Therapy Treatment Note    Date: 6/28/2022  Name: Bhupendra Park  Clinic Number: [id number]    Therapy Diagnosis:   Encounter Diagnosis   Name Primary?    Right spastic hemiplegia Yes     Physician: Casey Maldonado MD      Time In: 0800  Time Out: 0830  Total Billable Time: 30 minutes    SUBJECTIVE     Pt reports: No issues to report at this time     OBJECTIVE     Objective Measures updated at progress report unless specified.    Treatment     Bhupendra received the treatments listed below:     Therapeutic exercises to develop endurance and ROM for 30 minutes, including:  In standing, Pt completed 3x10 therapy ball roll outs for increased ROM of shoulder and elbow.   In upright sitting, Pt completed elbow flexion/extension and R hand grasp/release-- increased hand extension noted in PROM. Attempted active however, unable to get active motion through digits   For increased hand opening and wrist flexion--Pt placed R hand on semi ball for increased digit extension and 2x 8 reps with hold for stretching of R wrist.       Patient Education and Home Exercises      Education provided:  - Progress towards goals       Assessment     Pt would continue to benefit from skilled OT. Good progress with R hand release from constant increased tone positioning.      Bhupendra is progressing well towards his goals and there are no updates to goals at this time. Pt prognosis is Good.     Pt will continue to benefit from skilled outpatient occupational therapy to address the deficits listed in the problem list on initial evaluation provide pt/family education and to maximize pt's level of independence in the home and community environment.     Pt's spiritual, cultural and educational needs considered and pt agreeable to plan of care and goals.    Anticipated barriers to occupational therapy: severity of tone     PLAN     Continue with current POC   Updates/Grading for next session:  Upgrade R hand extension and wrist extension      Kris Lopez, OT

## 2022-06-30 ENCOUNTER — CLINICAL SUPPORT (OUTPATIENT)
Dept: REHABILITATION | Facility: HOSPITAL | Age: 62
End: 2022-06-30
Payer: MEDICARE

## 2022-06-30 DIAGNOSIS — G81.11 RIGHT SPASTIC HEMIPLEGIA: Primary | ICD-10-CM

## 2022-06-30 PROCEDURE — 97112 NEUROMUSCULAR REEDUCATION: CPT

## 2022-06-30 PROCEDURE — 97110 THERAPEUTIC EXERCISES: CPT

## 2022-06-30 NOTE — PROGRESS NOTES
[facility]  Occupational Therapy Treatment Note    Date: 6/30/2022  Name: Bhupendra Park  Clinic Number: [id number]    Therapy Diagnosis:   Encounter Diagnosis   Name Primary?    Right spastic hemiplegia Yes     Physician: Casey Maldonado MD    Time In: 0800  Time Out: 0830  Total Billable Time: 30 minutes    SUBJECTIVE     Pt reports: No issues at this time     OBJECTIVE     Objective Measures updated at progress report unless specified.    Treatment     Bhupendra received the treatments listed below:     Therapeutic exercises to develop strength and endurance for 15 minutes, including:  On table top, Pt completed 3x 8 reps of isolated bicep curls-- challenges noted for continued use of RUE.   Forearm pronation and supination stretching provided-- Challenges noted for supination with Pt completing an active 45 degrees with PROM provided by OT for full ROM.     Neuromuscular re-education activities to improve Balance and Kinesthetic for 15 minutes. The following activities were included:  On table top, Pt completed weightbearing into RUE with functional reaching to L side for resistive clip and forward reaching for increased weightbear into hand and wrist-- OT provided blocking at wrist and digits. Increased ROM at this wrist during activity.     Patient Education and Home Exercises      Education provided:  - Progress towards goals         Assessment     Pt would continue to benefit from skilled OT. Good ROM noted at wrist during weightbearing on small ball. Fatigue through R bicep during isolated bicep curls.      Bhupendra is progressing well towards his goals and there are no updates to goals at this time. Pt prognosis is Good.     Pt will continue to benefit from skilled outpatient occupational therapy to address the deficits listed in the problem list on initial evaluation provide pt/family education and to maximize pt's level of independence in the home and community environment.      Pt's spiritual, cultural and educational needs considered and pt agreeable to plan of care and goals.    Anticipated barriers to occupational therapy: Severity if hypertonicity     PLAN     Continue with current POC   Updates/Grading for next session: Increased weightbearing for increased ROM of NOLAN Lopez, OT

## 2022-07-05 ENCOUNTER — CLINICAL SUPPORT (OUTPATIENT)
Dept: REHABILITATION | Facility: HOSPITAL | Age: 62
End: 2022-07-05
Payer: MEDICARE

## 2022-07-05 DIAGNOSIS — G81.11 RIGHT SPASTIC HEMIPLEGIA: Primary | ICD-10-CM

## 2022-07-05 PROCEDURE — 97110 THERAPEUTIC EXERCISES: CPT

## 2022-07-05 NOTE — PROGRESS NOTES
[facility]  Occupational Therapy Treatment Note    Date: 7/5/2022  Name: Bhupendra Park  Clinic Number: [id number]    Therapy Diagnosis:   Encounter Diagnosis   Name Primary?    Right spastic hemiplegia Yes     Physician: Casey Maldonado MD      Precautions:  Standard    Time In: 0800  Time Out: 0830  Total Billable Time: 30 minutes    SUBJECTIVE     Pt reports:Pt requesting stretch of RUE 2/2 tightness from weekend     OBJECTIVE     Objective Measures updated at progress report unless specified.    Treatment     Bhupendra received the treatments listed below:     Therapeutic exercises to develop ROM for 15 minutes, including:  In supine, Pt completed PROM of shoulder flexion with elbow extension at end range, shoulder abduction, elbow adducted to complete external/internal rotation, elbow flexion/extension, wrist flexion/extension and digit extension.--Shoulder flexion presents with good ROM with tightness noted for shoulder abduction and external rotation.       Patient Education and Home Exercises      Education provided:  - Progress towards goals        Assessment     Pt would continue to benefit from skilled OT. Good progress noted for increased AROM of elbow.      Bhupendra is progressing well towards his goals and there are no updates to goals at this time. Pt prognosis is Good.     Pt will continue to benefit from skilled outpatient occupational therapy to address the deficits listed in the problem list on initial evaluation provide pt/family education and to maximize pt's level of independence in the home and community environment.     Pt's spiritual, cultural and educational needs considered and pt agreeable to plan of care and goals.    Anticipated barriers to occupational therapy: severity of impairment     PLAN     Continue with current POC   Updates/Grading for next session:no upgrades to implement at this time       Kris Lopez OT

## 2022-07-12 ENCOUNTER — CLINICAL SUPPORT (OUTPATIENT)
Dept: REHABILITATION | Facility: HOSPITAL | Age: 62
End: 2022-07-12
Attending: INTERNAL MEDICINE
Payer: MEDICARE

## 2022-07-12 DIAGNOSIS — G81.11 RIGHT SPASTIC HEMIPLEGIA: Primary | ICD-10-CM

## 2022-07-12 PROCEDURE — 97110 THERAPEUTIC EXERCISES: CPT

## 2022-07-12 NOTE — PLAN OF CARE
Outpatient Therapy Updated Plan of Care     Visit Date: 7/12/2022  Name: Bhupendra Park  Clinic Number: [id number]    Therapy Diagnosis:   Encounter Diagnosis   Name Primary?    Right spastic hemiplegia Yes     Physician: Casey Maldonado MD    Physician Orders: continued RUE ROM following botox treatment   Medical Diagnosis: spastic hemiparesis of RUE     Total Visits Received: 12  Cancelled Visits: 1  No Show Visits: 0    Current Certification Period:  6/7/22 to 7/19/22      Precautions: Standard    Subjective     Update: Throughout POC, Pt has demonstrated increased elbow extension, shoulder flexion, and control of R hand with functional use. Pt reports notable change in ROM of RUE.    Patient reports: improvement in symptoms since initial evaluation/last PT assessment.     Patient's spiritual, cultural and educational needs considered and patient agreeable to plan of care and goals.    Objective     Update: Throughout POC, Pt has demonstrated increased ROM of elbow extension, decreased pain and increased flexion of shoulder, and increased grasp/release of hand.    Treatment:   For warm-up, Pt completed 3x10 bosu ball roll outs for increased shoulder flexion to ~90 degrees, and full extension of elbow-- OT provided guidance with proper ROM through each roll out.   Pt completed RUE 3x10 slides on stair railing-- OT provided blocking at R hand to maintain open  around railing. OT also provided support with extension with Pt completing AROM of pull down for scap retraction and elbow flexion  To finish session, OT provided weightbearing into R hand in extension for increased stretching prior to 3x10 reps of ball squeeze with gross release. Following each set, OT and Pt completed full hand extension with weightbearing through palm for stretching following increased flexion    Assessment     Update: Pt's RUE  AROM:   -elbow flexion- 95 degrees   - elbow extension- 150 degrees   -shoulder flexion- 50 degrees    - shoulder abduction- 25 degrees   PROM:   - elbow flexion- 100 degrees   -elbow extension- 180 degrees   Shoulder flexion- 90 degrees   Shoulder abduction- 90 degrees     Reasons for Recertification of Therapy:   At this time, Pt has demonstrated good progress with increased active and passive ROM of RUE. Pt also has demonstrated good functional progress for grasp and release of R hand. Pt demonstrates new progress of increased extension of R hand with palm on table top. Pt demonstrates continued challenges with elbow flexion, forearm supination, and active extension of R hand. Pt would benefit from continued OT services to maintain current level of function and also demonstrated increased ROM and functional use of RUE. Without OT services, Pt at risk for increased contracture, decreased use of RUE, and decreased independence with ADL and daily routines due to above mentioned risks.     Patient prognosis is Good.     Patient's spiritual, cultural and educational needs considered and pt agreeable to plan of care and goals.    Anticipated barriers to physical therapy: severity of hypertonicity     Goals:   increased R shoulder flexion to AROM of 100 degrees for increased independence ADL and functional use for daily routines-- progressing,ongoing      increased R elbow extension to AROM 95 degrees for increased independence with ADL and functional use for daily routines-- GOAL MET     -decreased R hand tone for increased functional grasp/release during ADL and daily routines- progressing,ongoing    Maintenance of current functional use of RUE with good maintenance of hypertonicity for all ADL and daily routines-- progressing, ongoing    Plan       Recommended Treatment Plan: 2 times per week for 8 weeks: Neuromuscular Re-ed, Therapeutic Activities and Therapeutic Exercise  Other Recommendations: Continue at home splint program and stretching for good carryover     Kris Lopez OT  7/12/2022      I CERTIFY THE NEED  FOR THESE SERVICES FURNISHED UNDER THIS PLAN OF TREATMENT AND WHILE UNDER MY CARE    Physician's comments:        Physician's Signature: ___________________________________________________

## 2022-07-19 ENCOUNTER — CLINICAL SUPPORT (OUTPATIENT)
Dept: REHABILITATION | Facility: HOSPITAL | Age: 62
End: 2022-07-19
Payer: MEDICARE

## 2022-07-19 DIAGNOSIS — G81.11 RIGHT SPASTIC HEMIPLEGIA: Primary | ICD-10-CM

## 2022-07-19 PROCEDURE — 97110 THERAPEUTIC EXERCISES: CPT

## 2022-07-19 NOTE — PROGRESS NOTES
[facility]  Occupational Therapy Treatment Note    Date: 7/19/2022  Name: Bhupendra Park  Clinic Number: [id number]    Therapy Diagnosis:   Encounter Diagnosis   Name Primary?    Right spastic hemiplegia Yes     Physician: Casey Maldonado MD      Time In: 0800  Time Out: 0830  Total Billable Time: 30 minutes    SUBJECTIVE     Pt reports: Pt received nerve block at R proximal shoulder joint to relieve chronic pain. Pt reports he is having sharp pain at site of injection with numbness through deltoid, specifically following attempt to sleep on R side. Pt reports he does not recall having this pain prior to nerve block. OT provided insight that it could possibly be the surrounding musculature being sore secondary to increased use following block. OT encouraged Pt to call and report symptoms to MD who provided injection for possible follow-up.     OBJECTIVE     Objective Measures updated at progress report unless specified.    Treatment     Bhupendra received the treatments listed below:     Therapeutic exercises to develop strength and ROM for 30 minutes, including:  At table top, Pt completed  3x10 roll outs with BUE on bosu ball. Pt accomplished 150 degrees of shoulder flexion with 140 degrees of extension at max roll out in a pain free zone.   Pt completed 3x 30 second hand extension stretch on table top with OT providing weight bearing into MCP joint, following stretch Pt completed 3x10 ball squeeze with emphasis on release and attempt to actively perform extension-- Good progress noted on release/extension attempt.       Patient Education and Home Exercises      Education provided:   - OT educated Pt on continued exercise of R hand during down time of awaiting new authorization with Pt verbally reporting understanding.   - Progress towards goals         Assessment     Pt would continue to benefit from skilled OT. Good progress with increased ROM of RUE and increased activation of  extensor patterns in R hand.      Bhupendra is progressing well towards his goals and there are no updates to goals at this time. Pt prognosis is Good.     Pt will continue to benefit from skilled outpatient occupational therapy to address the deficits listed in the problem list on initial evaluation provide pt/family education and to maximize pt's level of independence in the home and community environment.     Pt's spiritual, cultural and educational needs considered and pt agreeable to plan of care and goals.    Anticipated barriers to occupational therapy: severity of impairment    PLAN     Continue with current POC   Updates/Grading for next session: Continue to increase stretching of R hand for possible increased activation of extensor patterns       Kris Lopez, OT

## 2022-07-27 DIAGNOSIS — G81.11 RIGHT SPASTIC HEMIPLEGIA: ICD-10-CM

## 2022-07-27 DIAGNOSIS — I67.9 CEREBROVASCULAR DISEASE, UNSPECIFIED: Primary | ICD-10-CM

## 2022-07-28 ENCOUNTER — CLINICAL SUPPORT (OUTPATIENT)
Dept: REHABILITATION | Facility: HOSPITAL | Age: 62
End: 2022-07-28
Payer: MEDICARE

## 2022-07-28 DIAGNOSIS — G81.11 RIGHT SPASTIC HEMIPLEGIA: Primary | ICD-10-CM

## 2022-07-28 PROCEDURE — 97110 THERAPEUTIC EXERCISES: CPT

## 2022-08-02 ENCOUNTER — CLINICAL SUPPORT (OUTPATIENT)
Dept: REHABILITATION | Facility: HOSPITAL | Age: 62
End: 2022-08-02
Payer: MEDICARE

## 2022-08-02 DIAGNOSIS — G81.11 RIGHT SPASTIC HEMIPLEGIA: Primary | ICD-10-CM

## 2022-08-02 PROCEDURE — 97112 NEUROMUSCULAR REEDUCATION: CPT

## 2022-08-02 PROCEDURE — 97110 THERAPEUTIC EXERCISES: CPT

## 2022-08-02 NOTE — PROGRESS NOTES
[facility]  Occupational Therapy Treatment Note    Date: 8/2/2022  Name: Bhupendra Park  Clinic Number: [id number]    Therapy Diagnosis:   Encounter Diagnosis   Name Primary?    Right spastic hemiplegia Yes     Physician: Casey Maldonado MD      Time In: 0800  Time Out: 0830  Total Billable Time: 30 minutes    SUBJECTIVE     Pt reports: Noting increased movement of R thumb following specified botox into extensor muscles.     OBJECTIVE     Objective Measures updated at progress report unless specified.    Treatment     Bhupendra received the treatments listed below:       Therapeutic exercises to develop ROM for 15 minutes, including:  In standing, Pt completed 3x10 reps of bosu ball roll outs with OT providing guidance of ROM for RUE with static hold at end range.     Neuromuscular re-education activities to improve Balance and Kinesthetic for 15 minutes. The following activities were included:  In standing Pt placed RUE on table with OT stabilizing the hand and elbow for Pt to complete L side functional reaching with LUE, back to midline for forward reaching-- OT noted good weight bearing into RUE with Pt tolerating increased Pec stretch with L side reaching and increased shift into RUE during forward reaching.   Pt then completed 3x10 reps of R hand ball squeezes with emphasis on three stages : squeeze, release, and extension-- OT noted increased movement of R thumb during extension attempt. Although movement was not extensor pattern, initiation was there.     Patient Education and Home Exercises      Education provided:  - Progress towards goals      Assessment     Pt would continue to benefit from skilled OT. Good progress noted for Pt's RUE ROM and increased R hand motion versus only increased tone with effort.      Bhupendra is progressing well towards his goals and there are no updates to goals at this time. Pt prognosis is Good.     Pt will continue to benefit from skilled  outpatient occupational therapy to address the deficits listed in the problem list on initial evaluation provide pt/family education and to maximize pt's level of independence in the home and community environment.     Pt's spiritual, cultural and educational needs considered and pt agreeable to plan of care and goals.    Anticipated barriers to occupational therapy: severity of impairment     PLAN     Continue with current POC   Updates/Grading for next session: continue to challenge Pt's R hand active movement with increased weightbearing.       Kris Lopez, OT

## 2022-08-04 ENCOUNTER — CLINICAL SUPPORT (OUTPATIENT)
Dept: REHABILITATION | Facility: HOSPITAL | Age: 62
End: 2022-08-04
Payer: MEDICARE

## 2022-08-04 DIAGNOSIS — G81.11 RIGHT SPASTIC HEMIPLEGIA: Primary | ICD-10-CM

## 2022-08-04 PROCEDURE — 97110 THERAPEUTIC EXERCISES: CPT

## 2022-08-04 NOTE — PROGRESS NOTES
[facility]  Occupational Therapy Treatment Note    Date: 8/4/2022  Name: Bhupendra Park  Clinic Number: [id number]    Therapy Diagnosis:   Encounter Diagnosis   Name Primary?    Right spastic hemiplegia Yes     Physician: Casey Maldonado MD    Time In: 0800  Time Out: 0830  Total Billable Time: 30 minutes    SUBJECTIVE     Pt reports: No issues to report at this time     OBJECTIVE     Objective Measures updated at progress report unless specified.    Treatment     Bhupendra received the treatments listed below:       Therapeutic exercises to develop ROM for 30 minutes, including:  In upright sitting, Pt completed AAROM of elbow flexion/extension-- Pt completed active elbow flexion to 80 degrees with PROM completing at 115 degrees.   Pt completed AAROM of D1 flexion and extension-- challenges with extension noted for limited external rotation, requiring assistance from OT for completion of movement   On table top with gravity eliminated plane, Pt completed table slides if external and internal rotation-- Pt completed full active internal rotation and ~20 degrees of active external rotation with OT providing PROM to ~75 degrees.      Patient Education and Home Exercises      Education provided:   - Progress towards goals      Assessment     Pt would continue to benefit from skilled OT.      Bhupendra is progressing well towards his goals and there are no updates to goals at this time. Pt prognosis is Good.     Pt will continue to benefit from skilled outpatient occupational therapy to address the deficits listed in the problem list on initial evaluation provide pt/family education and to maximize pt's level of independence in the home and community environment.     Pt's spiritual, cultural and educational needs considered and pt agreeable to plan of care and goals.    Anticipated barriers to occupational therapy: severity of impairment    PLAN     Continue with current BUZZ Ponce  Lopez, OT

## 2022-08-09 ENCOUNTER — CLINICAL SUPPORT (OUTPATIENT)
Dept: REHABILITATION | Facility: HOSPITAL | Age: 62
End: 2022-08-09
Payer: MEDICARE

## 2022-08-09 DIAGNOSIS — G81.11 RIGHT SPASTIC HEMIPLEGIA: Primary | ICD-10-CM

## 2022-08-09 PROCEDURE — 97112 NEUROMUSCULAR REEDUCATION: CPT

## 2022-08-09 PROCEDURE — 97110 THERAPEUTIC EXERCISES: CPT

## 2022-08-09 NOTE — PROGRESS NOTES
[facility]  Occupational Therapy Treatment Note    Date: 8/9/2022  Name: Bhupendra Park  Clinic Number: [id number]    Therapy Diagnosis:   Encounter Diagnosis   Name Primary?    Right spastic hemiplegia Yes     Physician: Casey Maldonado MD    Time In: 0800  Time Out: 0830  Total Billable Time: 30 minutes    SUBJECTIVE     Pt reports: No issues to report at this time.     OBJECTIVE     Objective Measures updated at progress report unless specified.    Treatment     Bhupendra received the treatments listed below:     Therapeutic exercises to develop ROM for 15 minutes, including:  In standing, Pt completed 3x10 reps of bosu ball roll outs with OT providing guidance of ROM for RUE with static hold at end range.     Neuromuscular re-education activities to improve Kinesthetic for 15 minutes. The following activities were included:  On table top, Pt completed weightbearing through R hand for increased extensor pattern-- OT noted increased challenge today to release flexor tone through the palm and MCP joint of hand.   With forearm rested on table top, Pt completed forearm supination/ pronation and wrist flexion/extension.     Patient Education and Home Exercises      Education provided:   - Progress towards goals      Assessment     Pt would continue to benefit from skilled OT.      Bhupendra is progressing well towards his goals and there are no updates to goals at this time. Pt prognosis is Good.     Pt will continue to benefit from skilled outpatient occupational therapy to address the deficits listed in the problem list on initial evaluation provide pt/family education and to maximize pt's level of independence in the home and community environment.     Pt's spiritual, cultural and educational needs considered and pt agreeable to plan of care and goals.    Anticipated barriers to occupational therapy: severity of impairment     PLAN     Continue with current POC   Updates/Grading for  next session: Continue to grade stretching of the R palm for increased functional use       Kris Lopez, OT

## 2022-08-11 ENCOUNTER — CLINICAL SUPPORT (OUTPATIENT)
Dept: REHABILITATION | Facility: HOSPITAL | Age: 62
End: 2022-08-11
Payer: MEDICARE

## 2022-08-11 DIAGNOSIS — G81.11 RIGHT SPASTIC HEMIPLEGIA: Primary | ICD-10-CM

## 2022-08-11 PROCEDURE — 97110 THERAPEUTIC EXERCISES: CPT

## 2022-08-11 NOTE — PROGRESS NOTES
[facility]  Occupational Therapy Treatment Note    Date: 8/11/2022  Name: Bhupendra Park  Clinic Number: [id number]    Therapy Diagnosis:   Encounter Diagnosis   Name Primary?    Right spastic hemiplegia Yes     Physician: Casey Maldonado MD    Time In: 0800  Time Out: 0830  Total Billable Time: 30 minutes    SUBJECTIVE     Pt reports: No issues to report at this time     OBJECTIVE     Objective Measures updated at progress report unless specified.    Treatment     Bhupendra received the treatments listed below:     Therapeutic exercises to develop ROM, strength, and endurance for 30 minutes, including:  In supine, Pt completed AAROM of shoulder flexion, elbow extension/flexion, shoulder abduction to 90 degrees with external rotation component-- Pt reported increased stretch with abduction and external rotation tolerating ~15 degree push into external plane.   In upright sitting, Pt completed AROM for elbow flexion with OT providing passive push at end range of active and external/internal rotation with elbow adducted to side.-- Pt demonstrated good internal rotation with mild resistance. Challenges noted for ~10 degrees of active external rotation with ~30 degrees of passive.     Patient Education and Home Exercises      Education provided:   - Progress towards goals          Assessment     Pt would continue to benefit from skilled OT. Good progress noted with increased AROM of RUE.      Bhupendra is progressing well towards his goals and there are no updates to goals at this time. Pt prognosis is Good.     Pt will continue to benefit from skilled outpatient occupational therapy to address the deficits listed in the problem list on initial evaluation provide pt/family education and to maximize pt's level of independence in the home and community environment.     Pt's spiritual, cultural and educational needs considered and pt agreeable to plan of care and goals.    Anticipated barriers  to occupational therapy: severity of impairment     PLAN     Continue with current POC      Kris Lopez, OT

## 2022-08-16 ENCOUNTER — CLINICAL SUPPORT (OUTPATIENT)
Dept: REHABILITATION | Facility: HOSPITAL | Age: 62
End: 2022-08-16
Payer: MEDICARE

## 2022-08-16 DIAGNOSIS — G81.11 RIGHT SPASTIC HEMIPLEGIA: Primary | ICD-10-CM

## 2022-08-16 PROCEDURE — 97110 THERAPEUTIC EXERCISES: CPT

## 2022-08-16 NOTE — PROGRESS NOTES
[facility]  Occupational Therapy Treatment Note    Date: 8/16/2022  Name: Bhupendra Park  Clinic Number: [id number]    Therapy Diagnosis:   Encounter Diagnosis   Name Primary?    Right spastic hemiplegia Yes     Physician: Casey Maldonado MD    Time In: 0800  Time Out: 0830  Total Billable Time: 30 minutes    SUBJECTIVE     Pt reports: No issues to report at this time     OBJECTIVE     Objective Measures updated at progress report unless specified.    Treatment     Bhupnedra received the treatments listed below:     Therapeutic exercises to develop strength, endurance and ROM for 30 minutes, including:  In upright sitting, Pt completed 3x 10 reps of AAROM shoulder shrugs, scap retraction, shoulder flexion, shoulder abduction, external/internal rotation with elbow adducted, and elbow flexion/extension.-- Ot noted challenges with shoulder abduction and external rotation against gravity. Tightness through pec noted during all ROM. Full elbow extension achieved with OT encouraging Pt to maintain during ambulation out to vehicle.      Patient Education and Home Exercises      Education provided:  - Progress towards goals          Assessment     Pt would continue to benefit from skilled OT.      Bhupendra is progressing well towards his goals and there are no updates to goals at this time. Pt prognosis is Good.     Pt will continue to benefit from skilled outpatient occupational therapy to address the deficits listed in the problem list on initial evaluation provide pt/family education and to maximize pt's level of independence in the home and community environment.     Pt's spiritual, cultural and educational needs considered and pt agreeable to plan of care and goals.    Anticipated barriers to occupational therapy: severity of impairment    PLAN     Continue with current POC         Kris Lopez, OT

## 2022-08-18 ENCOUNTER — CLINICAL SUPPORT (OUTPATIENT)
Dept: REHABILITATION | Facility: HOSPITAL | Age: 62
End: 2022-08-18
Payer: MEDICARE

## 2022-08-18 DIAGNOSIS — G81.11 RIGHT SPASTIC HEMIPLEGIA: Primary | ICD-10-CM

## 2022-08-18 PROCEDURE — 97112 NEUROMUSCULAR REEDUCATION: CPT

## 2022-08-18 PROCEDURE — 97110 THERAPEUTIC EXERCISES: CPT

## 2022-08-18 NOTE — PROGRESS NOTES
[facility]  Occupational Therapy Treatment Note    Date: 8/18/2022  Name: Bhupendra Park  Clinic Number: [id number]    Therapy Diagnosis:   Encounter Diagnosis   Name Primary?    Right spastic hemiplegia Yes     Physician: Casey Maldonado MD    Time In: 0800  Time Out: 0830  Total Billable Time: 30 minutes    SUBJECTIVE     Pt reports: No issues to report at this time     OBJECTIVE     Objective Measures updated at progress report unless specified.    Treatment     Bhupendra received the treatments listed below:     Therapeutic exercises to develop endurance and ROM for 20 minutes, including:  In supine, Pt completed AAROM shoulder flexion with push down for extension, shoulder abduction with push for adduction, internal/external rotation with elbow adducted, elbow flexion/extension.    Neuromuscular re-education activities to improve Balance and Kinesthetic for 10 minutes. The following activities were included:  With R hand in extension paddle, Pt completed weight bearing through RUE while completing functional reaching to L side to  resistive clips and return to midline for forward leaning for increased R wrist extension.     Patient Education and Home Exercises      Education provided:   - Progress towards goals        Assessment     Pt would continue to benefit from skilled OT. Pt tolerated hand paddle well with good stretch for palm of R hand.      Bhupendra is progressing well towards his goals and there are no updates to goals at this time. Pt prognosis is Good.     Pt will continue to benefit from skilled outpatient occupational therapy to address the deficits listed in the problem list on initial evaluation provide pt/family education and to maximize pt's level of independence in the home and community environment.     Pt's spiritual, cultural and educational needs considered and pt agreeable to plan of care and goals.    Anticipated barriers to occupational therapy:  severity of impairment    PLAN     Continue with current POC         Kris Lopez, OT

## 2022-08-23 ENCOUNTER — CLINICAL SUPPORT (OUTPATIENT)
Dept: REHABILITATION | Facility: HOSPITAL | Age: 62
End: 2022-08-23
Payer: MEDICARE

## 2022-08-23 DIAGNOSIS — G81.11 RIGHT SPASTIC HEMIPLEGIA: Primary | ICD-10-CM

## 2022-08-23 PROCEDURE — 97110 THERAPEUTIC EXERCISES: CPT

## 2022-08-23 NOTE — PROGRESS NOTES
[facility]  Occupational Therapy Treatment Note    Date: 8/23/2022  Name: Bhupendra Park  Clinic Number: [id number]    Therapy Diagnosis:   Encounter Diagnosis   Name Primary?    Right spastic hemiplegia Yes     Physician: Casey Maldonado MD    Time In: 0800  Time Out: 0830  Total Billable Time: 30 minutes    SUBJECTIVE     Pt reports: No issues to report at this time     OBJECTIVE     Objective Measures updated at progress report unless specified.    Treatment     Bhupendra received the treatments listed below:     Therapeutic exercises to develop ROM, strength, and endurance for 30 minutes, including:  In supine, Pt completed PROM of shoulder flexion, elbow extension/flexion, shoulder abduction, internal/external rotation with elbow adducted, wrist flexion/extension, and hand extension-- Challenges with external rotation, initiating ~10 degrees of active ROM.   .  Patient Education and Home Exercises      Education provided:   - Progress towards goals          Assessment     Pt would continue to benefit from skilled OT.     Bhupendra is progressing well towards his goals and there are no updates to goals at this time. Pt prognosis is Good.     Pt will continue to benefit from skilled outpatient occupational therapy to address the deficits listed in the problem list on initial evaluation provide pt/family education and to maximize pt's level of independence in the home and community environment.     Pt's spiritual, cultural and educational needs considered and pt agreeable to plan of care and goals.    Anticipated barriers to occupational therapy: severity of impairment     PLAN     Continue with current POC      Kris Lopez OT

## 2022-08-25 ENCOUNTER — CLINICAL SUPPORT (OUTPATIENT)
Dept: REHABILITATION | Facility: HOSPITAL | Age: 62
End: 2022-08-25
Payer: MEDICARE

## 2022-08-25 DIAGNOSIS — G81.11 RIGHT SPASTIC HEMIPLEGIA: Primary | ICD-10-CM

## 2022-08-25 PROCEDURE — 97112 NEUROMUSCULAR REEDUCATION: CPT

## 2022-08-25 PROCEDURE — 97110 THERAPEUTIC EXERCISES: CPT

## 2022-08-25 NOTE — PROGRESS NOTES
[facility]  Occupational Therapy Treatment Note    Date: 8/25/2022  Name: Bhupendra Park  Clinic Number: [id number]    Therapy Diagnosis:   Encounter Diagnosis   Name Primary?    Right spastic hemiplegia Yes     Physician: Casey Maldonado MD    Time In: 0800  Time Out: 0830  Total Billable Time: 30 minutes    SUBJECTIVE     Pt reports: No issues to report at this time     OBJECTIVE     Objective Measures updated at progress report unless specified.    Treatment     Bhupendra received the treatments listed below:     Therapeutic exercises to develop ROM for 15 minutes, including:  in supine, Pt completed PROM of shoulder flexion, elbow extension/flexion, shoulder abduction, internal/external rotation with elbow adducted, wrist flexion/extension, and hand extension    Neuromuscular re-education activities to improve Kinesthetic for 15 minutes. The following activities were included:  With use of hand paddle for increased blocking at joints, Pt completed 2x10 reps of table top pushups for increased weightbearing through R hand and increased wrist extension.     Patient Education and Home Exercises      Education provided:   - Progress towards goals     Assessment     Pt would continue to benefit from skilled OT. Increased tone noted around MCP joint of R hand requiring increased stretching to drew R hand into paddle.      Bhupendra is progressing well towards his goals and there are no updates to goals at this time. Pt prognosis is Good.     Pt will continue to benefit from skilled outpatient occupational therapy to address the deficits listed in the problem list on initial evaluation provide pt/family education and to maximize pt's level of independence in the home and community environment.     Pt's spiritual, cultural and educational needs considered and pt agreeable to plan of care and goals.    PLAN     Continue with current POC       Kris Lopez OT

## 2022-08-30 ENCOUNTER — CLINICAL SUPPORT (OUTPATIENT)
Dept: REHABILITATION | Facility: HOSPITAL | Age: 62
End: 2022-08-30
Payer: MEDICARE

## 2022-08-30 DIAGNOSIS — G81.11 RIGHT SPASTIC HEMIPLEGIA: Primary | ICD-10-CM

## 2022-08-30 PROCEDURE — 97112 NEUROMUSCULAR REEDUCATION: CPT

## 2022-08-30 PROCEDURE — 97110 THERAPEUTIC EXERCISES: CPT

## 2022-08-30 NOTE — PROGRESS NOTES
[facility]  Occupational Therapy Treatment Note    Date: 8/30/2022  Name: Bhupendra Park  Clinic Number: [id number]    Therapy Diagnosis:   Encounter Diagnosis   Name Primary?    Right spastic hemiplegia Yes     Physician: Casey Maldonado MD    Time In: 0800  Time Out: 0830  Total Billable Time: 30 minutes    SUBJECTIVE     Pt reports: No issues to report at this time     OBJECTIVE     Objective Measures updated at progress report unless specified.    Treatment     Bhupendra received the treatments listed below:     Therapeutic exercises to develop ROM for 20 minutes, including:  in supine, Pt completed PROM of shoulder flexion, elbow extension/flexion, shoulder abduction, internal/external rotation with elbow adducted, wrist flexion/extension, and hand extension-- Increased stretch reported with shoulder abduction and external rotation component added.     Neuromuscular re-education activities to improve Kinesthetic for 10 minutes. The following activities were included:  With R hand positioned in extension on surface with OT blocking, Pt completed LUE forward reaching for 3x10 reps. Good weightbearing through R hand noted with Pt tolerating increased wrist extension with block.     Patient Education and Home Exercises      Education provided:   - Progress towards goals     Assessment     Pt would continue to benefit from skilled OT. Good progress noted above with treatment      Bhupendra is progressing well towards his goals and there are no updates to goals at this time. Pt prognosis is Good.     Pt will continue to benefit from skilled outpatient occupational therapy to address the deficits listed in the problem list on initial evaluation provide pt/family education and to maximize pt's level of independence in the home and community environment.     Pt's spiritual, cultural and educational needs considered and pt agreeable to plan of care and goals.    PLAN     Continue with  current POC       Kris Lopez, OT

## 2022-09-01 ENCOUNTER — CLINICAL SUPPORT (OUTPATIENT)
Dept: REHABILITATION | Facility: HOSPITAL | Age: 62
End: 2022-09-01
Payer: MEDICARE

## 2022-09-01 DIAGNOSIS — G81.11 RIGHT SPASTIC HEMIPLEGIA: Primary | ICD-10-CM

## 2022-09-01 PROCEDURE — 97110 THERAPEUTIC EXERCISES: CPT

## 2022-09-01 NOTE — PROGRESS NOTES
[facility]  Occupational Therapy Treatment Note    Date: 9/1/2022  Name: Bhupendra Park  Clinic Number: [id number]    Therapy Diagnosis:   Encounter Diagnosis   Name Primary?    Right spastic hemiplegia Yes     Physician: Casey Maldonado MD    Time In: 0800  Time Out: 0830  Total Billable Time: 30 minutes    SUBJECTIVE     Pt reports: Pt received new botox injection on Tuesday. Pt reports Dr. Maldonado is requesting order for new ortho splint for RUE. OT and Pt discussed possible options for splint type with OT to call Md for further confirmation on what he had in mind for most benefit to Pt.     OBJECTIVE     Objective Measures updated at progress report unless specified.    Treatment     Bhupendra received the treatments listed below:     Therapeutic exercises to develop ROM, strength, and endurance for 30 minutes, including:  In supine, Pt completed PROM of shoulder flexion, elbow extension/flexion-- Pt able to tolerate full elbow extension with shoulder flexion to ~85 degrees.   On table top, Pt completed forearm pronation/supination, wrist flexion/extension, and ulnar/radial deviation.-- Pt continues to present with challenges for these movements, however passively OT noted increased ease for full ROM in each plane   .  Patient Education and Home Exercises      Education provided:   - Progress towards goals          Assessment     Pt would continue to benefit from skilled OT. Progress noted above in treatment section.     Bhupendra is progressing well towards his goals and there are no updates to goals at this time. Pt prognosis is Good.     Pt will continue to benefit from skilled outpatient occupational therapy to address the deficits listed in the problem list on initial evaluation provide pt/family education and to maximize pt's level of independence in the home and community environment.     Pt's spiritual, cultural and educational needs considered and pt agreeable to plan of care  and goals.    Anticipated barriers to occupational therapy: severity of impairment     PLAN     Continue with current POC      Kris Lopez, OT

## 2022-09-08 ENCOUNTER — CLINICAL SUPPORT (OUTPATIENT)
Dept: REHABILITATION | Facility: HOSPITAL | Age: 62
End: 2022-09-08
Payer: MEDICARE

## 2022-09-08 DIAGNOSIS — G81.11 RIGHT SPASTIC HEMIPLEGIA: Primary | ICD-10-CM

## 2022-09-08 PROCEDURE — 97110 THERAPEUTIC EXERCISES: CPT

## 2022-09-08 NOTE — PLAN OF CARE
Outpatient Therapy Updated Plan of Care     Visit Date: 9/8/2022  Name: Bhupendra Park  Clinic Number: [id number]    Therapy Diagnosis:   Encounter Diagnosis   Name Primary?    Right spastic hemiplegia Yes     Physician: Casey Maldonado MD    Medical Diagnosis: spastic hemiparesis of RUE     Total Visits Received: 16  Cancelled Visits: 1  No Show Visits: 0  Current Certification Period:  7/28/22 to 9/27/22      Subjective     Update: Pt reports good progress with overall RUE ROM at shoulder and elbow. Pt voicing concern with increased hand tightness at this time. Dr. Maldonado suggests new splint for static stretching of elbow. OT and Pt discussed an order for dynasplint with same function as the stretching hand splint he has at home.     Patient reports: no change in symptoms since initial evaluation/last PT assessment.     Patient's spiritual, cultural and educational needs considered and patient agreeable to plan of care and goals.      Assessment     Update: Overall, Pt has maintained good AAROM and PROM of the shoulder, elbow, and wrist joint. This morning following weightbearing into R hand with increased pressure added by OT for increased extension, Pt tolerated full extension of hand. However, once weight released Pt's hand automatically flexed back to ~60 degrees of flexion. Continued stretching provided for digit and finger extension with OT following up with second measurement for possible increased release of tone. Ot measured hand returning o ~30 degrees of natural flexion.     Reasons for Recertification of Therapy:   Pt would benefit from continued OT services secondary to new concern for decreased ROM of hand. OT and Pt have agreed to begin with 2x week for increased stretching of hand, however once progress is made or no change is noted, Pt would decrease frequency to 1x week for continued maintenance  stretching and activity following botox treatment. Without the combination of botox and OT  services, Pt would not maintain good ROM of RUE, therefore increasing limitation for functional ability.    Patient prognosis is Good.     Patient's spiritual, cultural and educational needs considered and pt agreeable to plan of care and goals.      Goals:   increased R shoulder flexion to AROM of 100 degrees for increased independence ADL and functional use for daily routines-- progressing,ongoing      -decreased R hand tone for increased functional grasp/release during ADL and daily routines- progressing,ongoing     Maintenance of current functional use of RUE with good maintenance of hypertonicity for all ADL and daily routines-- progressing, ongoing      Today's Treatment:   Pt completed 3x10 bosu ball rollouts for increased stretch of R shoulder joint  Deep weightbearing into R hand for increased palm and digit extension. Pt attempted extension of digits and hand with challenges noted due to hypertonicity     Plan     Recommended Treatment Plan: 2 times per week for 6 weeks: Neuromuscular Re-ed, Therapeutic Activities, and Therapeutic Exercise  Other Recommendations: begin POC with 2x week with possible downgrade to 1x week     Kris Lopez OT  9/8/2022      I CERTIFY THE NEED FOR THESE SERVICES FURNISHED UNDER THIS PLAN OF TREATMENT AND WHILE UNDER MY CARE    Physician's comments:        Physician's Signature: ___________________________________________________

## 2022-09-13 ENCOUNTER — DOCUMENTATION ONLY (OUTPATIENT)
Dept: REHABILITATION | Facility: HOSPITAL | Age: 62
End: 2022-09-13

## 2022-09-13 ENCOUNTER — CLINICAL SUPPORT (OUTPATIENT)
Dept: REHABILITATION | Facility: HOSPITAL | Age: 62
End: 2022-09-13
Payer: MEDICARE

## 2022-09-13 DIAGNOSIS — G81.11 RIGHT SPASTIC HEMIPLEGIA: Primary | ICD-10-CM

## 2022-09-13 PROCEDURE — 97110 THERAPEUTIC EXERCISES: CPT

## 2022-09-13 NOTE — PROGRESS NOTES
[facility]  Occupational Therapy Treatment Note    Date: 9/13/2022  Name: Bhupendra Park  Clinic Number: [id number]    Therapy Diagnosis:   Encounter Diagnosis   Name Primary?    Right spastic hemiplegia Yes     Physician: Casey Maldonado MD    Time In: 0800  Time Out: 0830  Total Billable Time: 30 minutes    SUBJECTIVE     Pt reports: Pt and OT further discussed Pt's desire with RUE splint. Concluding, Pt would like a stretching splint for elbow however would also be open to aq splint allowing more functional movement. OT reported she would communicate with dynasplint rep for further assistance and guidance for appropriate order.   OBJECTIVE     Objective Measures updated at progress report unless specified.    Treatment     Bhupendra received the treatments listed below:     Therapeutic exercises to develop ROM, strength, and endurance for 30 minutes, including:  In supine, Pt completed PROM of shoulder flexion, shoulder abduction to 90 degrees, and elbow extension/flexion-- Full extension noted for elbow.   .  Patient Education and Home Exercises      Education provided:   - Progress towards goals          Assessment     Pt would continue to benefit from skilled OT. Progress noted above in treatment section.     Bhupendra is progressing well towards his goals and there are no updates to goals at this time. Pt prognosis is Good.     Pt will continue to benefit from skilled outpatient occupational therapy to address the deficits listed in the problem list on initial evaluation provide pt/family education and to maximize pt's level of independence in the home and community environment.     Pt's spiritual, cultural and educational needs considered and pt agreeable to plan of care and goals.    Anticipated barriers to occupational therapy: severity of impairment     PLAN     Continue with current POC      Kris Lopez OT

## 2022-09-15 ENCOUNTER — CLINICAL SUPPORT (OUTPATIENT)
Dept: REHABILITATION | Facility: HOSPITAL | Age: 62
End: 2022-09-15
Payer: MEDICARE

## 2022-09-15 DIAGNOSIS — G81.11 RIGHT SPASTIC HEMIPLEGIA: Primary | ICD-10-CM

## 2022-09-15 PROCEDURE — 97110 THERAPEUTIC EXERCISES: CPT

## 2022-09-15 NOTE — PROGRESS NOTES
[facility]  Occupational Therapy Treatment Note    Date: 9/15/2022  Name: Bhupendra Park  Clinic Number: [id number]    Therapy Diagnosis:   Encounter Diagnosis   Name Primary?    Right spastic hemiplegia Yes     Physician: Casey Maldonado MD    Time In: 0800  Time Out: 0830  Total Billable Time: 30 minutes    SUBJECTIVE     Pt reports: Continued discussion on Dynasplint assessment. Waiting on further confirmation from rep.     OBJECTIVE     Objective Measures updated at progress report unless specified.    Treatment     Bhupendra received the treatments listed below:     Therapeutic exercises to develop ROM, strength, and endurance for 30 minutes, including:  In supine, Pt completed PROM of shoulder flexion with elbow extended for increased stretch, shoulder abduction to ~110 degrees, elbow flexion/extension, internal/external rotation with elbow adducted to 90 degrees, and wrist flexion/extension-- Pt demonstrated increased tolerance to shoulder stretching with elbow component   .  Patient Education and Home Exercises      Education provided:   - Progress towards goals          Assessment     Pt would continue to benefit from skilled OT. Progress noted above in treatment section.     Bhupendra is progressing well towards his goals and there are no updates to goals at this time. Pt prognosis is Good.     Pt will continue to benefit from skilled outpatient occupational therapy to address the deficits listed in the problem list on initial evaluation provide pt/family education and to maximize pt's level of independence in the home and community environment.     Pt's spiritual, cultural and educational needs considered and pt agreeable to plan of care and goals.    Anticipated barriers to occupational therapy: severity of impairment     PLAN     Continue with current POC      Kris Lopez OT

## 2022-09-20 NOTE — PROGRESS NOTES
[facility]  Occupational Therapy Treatment Note    Date: 9/20/2022  Name: Bhupendra Park  Clinic Number: [id number]    Therapy Diagnosis:   Encounter Diagnosis   Name Primary?    Right spastic hemiplegia Yes     Physician: Casey Maldonado MD    Time In: 0800  Time Out: 0830  Total Billable Time: 30 minutes    SUBJECTIVE     Pt reports: OT provided Pt with options reported by Dynasplint rep. OT and Pt discussed benefits and challenges with both. Pt requesting time to do his own research and make a decision for moving forward     OBJECTIVE     Objective Measures updated at progress report unless specified.    Treatment     Bhupendra received the treatments listed below:     Neuromuscular re-education activities to improve Balance and Kinesthetic for 30 minutes. The following activities were included:  With bosu ball placed on table top, Pt completed 3x10 roll outs for increased ROM and stretching of R shoulder and elbow.   R hand attempted to be placed in hand paddle, however increased tone through digits noted with Pt unable to tolerate hand paddle. OT provided weightbearing into MCP joint for increased extension-- Pt reported sensitivity at tips of both 3rd and  4th digits.     Patient Education and Home Exercises      Education provided:   - Progress towards goals     Assessment     Pt would continue to benefit from skilled OT. Progress noted above in treatment session. Overall Pt's concern at this time is which splint would best benefit him. Therefore continued discussion and conversation having for processing of decision with OT and Pt.      Bhupendra maintaining progressing well towards his goals and there are no updates to goals at this time. Pt prognosis is Good.     Pt will continue to benefit from skilled outpatient occupational therapy to address the deficits listed in the problem list on initial evaluation provide pt/family education and to maximize pt's level of independence in  the home and community environment.     Pt's spiritual, cultural and educational needs considered and pt agreeable to plan of care and goals.      PLAN     Continue with current POC     Kris Lopez, OT

## 2022-10-13 NOTE — PROGRESS NOTES
[facility]  Occupational Therapy Treatment Note    Date: 10/13/2022  Name: Bhupendra Park  Clinic Number: [id number]    Therapy Diagnosis:   Encounter Diagnosis   Name Primary?    Right spastic hemiplegia Yes     Physician: Casey Maldonado MD    Time In: 0800  Time Out: 0830  Total Billable Time: 30 minutes    SUBJECTIVE     Pt reports: No new issues to report     OBJECTIVE     Objective Measures updated at progress report unless specified.    Treatment     Bhupendra received the treatments listed below:       Neuromuscular re-education activities to improve Kinesthetic and ROM for 30 minutes. The following activities were included:  In supine, OT provided passive stretching to shoulder flexion, shoulder abduction, elbow extension/flexion, and supination-- Pt reports completing continued stretching of RUE at home while waiting for new authorization. OT noted decreased tone through elbow with ease to complete elbow extension  Edward Meyer present for RUE fitting of dynamic and static splints for elbow. OT and Pt's spouse present for education and assistance with positioning of RUE for proper fitting of splints.     Patient Education and Home Exercises      Education provided:   - Progress towards goals     Assessment     Pt would continue to benefit from skilled OT. Pt, Pt's spouse, and OT discussed further POC agreeing on two more weeks of OT until follow up with Jermaine Engel. Once follow-up complete, Pt's spouse will be educated on proper stretching of RUE to complete at home in preparation for dsicharge.      Bhupendra is progressing well towards his goals and there are no updates to goals at this time. Pt prognosis is Good.     Pt will continue to benefit from skilled outpatient occupational therapy to address the deficits listed in the problem list on initial evaluation provide pt/family education and to maximize pt's level of independence in the home and community  environment.     Pt's spiritual, cultural and educational needs considered and pt agreeable to plan of care and goals.      PLAN     Continue with current POC       Kris Lopez OT

## 2022-10-18 NOTE — PROGRESS NOTES
[facility]  Occupational Therapy Treatment Note    Date: 10/18/2022  Name: Bhupendra Park  Clinic Number: [id number]    Therapy Diagnosis:   Encounter Diagnosis   Name Primary?    Right spastic hemiplegia Yes     Physician: Casey Maldonado MD    Time In: 0800  Time Out: 0830  Total Billable Time: 30 minutes    SUBJECTIVE     Pt reports: No issues to report at this time     OBJECTIVE     Objective Measures updated at progress report unless specified.    Treatment     Bhupendra received the treatments listed below:     Therapeutic exercises to develop ROM for 30 minutes, including:  In supine, OT completed PROM of shoulder flexion, shoulder abduction with elbow in 90 degrees of flexion, and elbow flexion/extension.  On table top, OT completed PROM of forearm pronation/supination, wrist extension/flexion, and weightbearing into hand for palm and digit extension.     Patient Education and Home Exercises      Education provided:   - Pt's wife present for session for OT to provide guidance with stretching to be completed at home independently   - Progress towards goals     Assessment     Pt would continue to benefit from skilled OT. Education provided to Pt and Pt' spouse for self-stretching to be completed at home daily once discharged from OT services.      Bhupendra is progressing well towards his goals and there are no updates to goals at this time. Pt prognosis is Good.     Pt will continue to benefit from skilled outpatient occupational therapy to address the deficits listed in the problem list on initial evaluation provide pt/family education and to maximize pt's level of independence in the home and community environment.     Pt's spiritual, cultural and educational needs considered and pt agreeable to plan of care and goals.    PLAN     Kris Lopez, OT

## 2022-10-20 NOTE — PROGRESS NOTES
[facility]  Occupational Therapy Treatment Note    Date: 10/20/2022  Name: Bhupendra Park  Clinic Number: [id number]    Therapy Diagnosis:   Encounter Diagnosis   Name Primary?    Right spastic hemiplegia Yes     Physician: Casey Maldonado MD    Time In: 0800  Time Out: 0830  Total Billable Time: 30 minutes    SUBJECTIVE     Pt reports: No new issues to report     OBJECTIVE     Objective Measures updated at progress report unless specified.    Treatment     Bhupendra received the treatments listed below:       Neuromuscular re-education activities to improve Kinesthetic and ROM for 30 minutes. The following activities were included:  In supine, OT provided passive stretching to shoulder flexion, shoulder abduction, elbow extension/flexion, forearm supination/pronation, and hand extension-- Pt presented to session with dyansplint donned, providing increased elbow extension during shoulder stretching.   Following stretching, OT donned Dynasplint back onto elbow with Pt completing ambulation around therapy gym while maintaining grasp in R hand around an item and picking up objects in L hand. Pt reports better positioning in splint with no pain noted in tricep area      Patient Education and Home Exercises      Education provided:   - Progress towards goals     Assessment     Pt would continue to benefit from skilled OT.     Bhupendra is progressing well towards his goals and there are no updates to goals at this time. Pt prognosis is Good.     Pt will continue to benefit from skilled outpatient occupational therapy to address the deficits listed in the problem list on initial evaluation provide pt/family education and to maximize pt's level of independence in the home and community environment.     Pt's spiritual, cultural and educational needs considered and pt agreeable to plan of care and goals.      PLAN     Continue with current POC-- follow up with DynaSplint Rep next week, plan for  D/C if all stretches and splints going well at home.       Kris Lopez, OT

## 2022-10-25 NOTE — PROGRESS NOTES
[facility]  Occupational Therapy Treatment Note    Date: 10/25/2022  Name: Bhupendra Park  Clinic Number: [id number]    Therapy Diagnosis:   Encounter Diagnosis   Name Primary?    Right spastic hemiplegia Yes     Physician: Casey Maldonado MD    Time In: 0800  Time Out: 0830  Total Billable Time: 30 minutes    SUBJECTIVE     Pt reports: No new issues to report at this time.     OBJECTIVE     Objective Measures updated at progress report unless specified.    Treatment     Bhupendra received the treatments listed below:     Neuromuscular re-education activities to improve Kinesthetic, Sense, and Proprioception for 30 minutes. The following activities were included:  In supine, OT provided passive stretching to shoulder flexion, shoulder abduction, elbow extension/flexion, forearm supination/pronation, and hand extension-- Kept dynasplint on for shoulder flexion and abduction for increased stretch in extension. Pt able to tolerate increased shoulder abduction to ~100 degrees.   In upright sitting, Pt completed weightbearing into RUE with hand placed on 4inch step completing forward functional reaching with LUE.. 3x10 reps completed for cone taps. Following hand weightbearing, Pt completed 2x10 reps weightbearing into R elbow with push up through shoulder.       Patient Education and Home Exercises      Education provided:  - Progress towards goals      Assessment     Pt would continue to benefit from skilled OT. Progress noted in above treatment session.      Bhupendra is progressing well towards his goals and there are no updates to goals at this time. Pt prognosis is Good.     Pt will continue to benefit from skilled outpatient occupational therapy to address the deficits listed in the problem list on initial evaluation provide pt/family education and to maximize pt's level of independence in the home and community environment.     Pt's spiritual, cultural and educational needs considered  and pt agreeable to plan of care and goals.      PLAN     Continue with current POC       Kris Lopez, OT

## 2022-11-01 NOTE — PROGRESS NOTES
See POC for update     In supine, OT provided passive stretching to shoulder flexion, shoulder abduction, elbow extension/flexion, forearm supination/pronation, and hand extension

## 2022-11-01 NOTE — PLAN OF CARE
Outpatient Therapy Updated Plan of Care     Visit Date: 11/1/2022  Name: Bhupendra Park  Clinic Number: [id number]    Therapy Diagnosis:   Encounter Diagnosis   Name Primary?    Right spastic hemiplegia Yes     Physician: Casey Maldonado MD    Medical Diagnosis: G81.11 and I67.9  Total Visits Received: 12  Cancelled Visits: 0  No Show Visits: 0  Current Certification Period:  10/10/22 to 11/21/22      Subjective     Update: Pt reports RUE to maintain same level of function as a stabilizer for all functional activity.    Patient reports: no change in symptoms since initial evaluation/last PT assessment.     Patient's spiritual, cultural and educational needs considered and patient agreeable to plan of care and goals.    Objective     Update:   PROM:  MCP extension: ~60-70 extension degrees across joint line   Thumb: WNL  PIP and DIP: 60 degrees extension across joint line for all digits    Hand in natural toned position:   MCP:  70 degrees of flexion   PIP: remains at 85 degrees of flexion  DIP: 50 degrees of flexion      Home Exercises and Patient Education   Education provided:   Pt is being set-up with DynaSplints for stretching of the elbow, wrist and hand. Pt's wife has been educated and provided demonstrate on stretches to complete with Pt daily to maintain the ROM he currently has to continue using RUE functionally. Pt verbalizes they have begun stretching at home with no issues to report       Assessment     Update: Overall, Pt continues to maintain good ROM throughout RUE with the severity of tone throughout. Pt continues to be significantly limited in the R hand due to sever tonicity, keeping his hand in a tight fist of flexion. Measures above report that Pt is unable to actively extend hand, requiring significant stretching from OT for extension through digits. Pt would benefit from hand stretching DynaSplint to promote increased extension throughout R hand. With this promoted stretching, Pt could  benefit from increased ROM of the hand for functional stabilization, as well as possible increased AROM of the digits for increased overall functional use resulting in increased independence with daily routines. At this time, Pt is unable to functional use hand without using L hand to stretch out the digits due to significant flexion.     Patient prognosis is Fair.     Patient's spiritual, cultural and educational needs considered and pt agreeable to plan of care and goals.    Anticipated barriers to physical therapy: severity of tone throughout RUE     Goals:   increased R shoulder flexion to AROM of 100 degrees for increased independence ADL and functional use for daily routines-- Goal not met: Pt is unable to actively flex R shoulder beyond ~60 degrees.       -decreased R hand tone for increased functional grasp/release during ADL and daily routines- progressing,ongoing: with DyanSplint Pt would be able to increase functional grasp/release for increased independence     Maintenance of current functional use of RUE with good maintenance of hypertonicity for all ADL and daily routines-- Goal Met with wife     Plan     Other Recommendations: Pt is planning for d/c at end of POC with good set-up of stretching program with spouse as well as cyn Lopez OT  11/1/2022      I CERTIFY THE NEED FOR THESE SERVICES FURNISHED UNDER THIS PLAN OF TREATMENT AND WHILE UNDER MY CARE    Physician's comments:        Physician's Signature: ___________________________________________________

## 2022-11-03 NOTE — PROGRESS NOTES
[facility]  Occupational Therapy Treatment Note    Date: 11/3/2022  Name: Bhupendra Park  Clinic Number: [id number]    Therapy Diagnosis:   Encounter Diagnosis   Name Primary?    Right spastic hemiplegia Yes     Physician: Casey Maldonado MD      Time In: 0800  Time Out: 0830  Total Billable Time: 30 minutes    SUBJECTIVE     Pt reports: No new issues to report at this time     OBJECTIVE     Objective Measures updated at progress report unless specified.    Treatment     Bhupendra received the treatments listed below:     Neuromuscular re-education activities to improve Balance and Kinesthetic for 30 minutes. The following activities were included:  In supine, OT provided passive stretching to shoulder flexion, shoulder abduction, elbow extension/flexion, forearm supination/pronation, and hand extension-- Increased ROM noted through pec region, resulting in increased shoulder abduction with external rotation.   In upright sitting, Pt completed weightbearing into RUE on 4inch step while completing LUE reaching to cone for 3x10 reps-- OT provided blocking at R wrist.   On table top, Pt completed 3x10 reps of modified pushups on 4 inch step for increased weight bearing into RUE-- OT provided blocking at wrist and guidance of R elbow.       Patient Education and Home Exercises      Education provided  - Progress towards goals     Assessment     Pt would continue to benefit from skilled OT. Progress noted in above treatment section      Bhupendra is progressing well towards his goals and there are no updates to goals at this time. Pt prognosis is Good.     Pt will continue to benefit from skilled outpatient occupational therapy to address the deficits listed in the problem list on initial evaluation provide pt/family education and to maximize pt's level of independence in the home and community environment.     Pt's spiritual, cultural and educational needs considered and pt agreeable to plan  of care and goals.      PLAN     Continue with current POC         Kris Lopez, OT

## 2022-11-10 NOTE — PROGRESS NOTES
[facility]  Occupational Therapy Treatment Note    Date: 11/10/2022  Name: Bhupendra Park  Clinic Number: [id number]    Therapy Diagnosis:   Encounter Diagnosis   Name Primary?    Right spastic hemiplegia Yes     Physician: Casey Maldonado MD      Time In: 0800  Time Out: 0830  Total Billable Time: 30 minutes    SUBJECTIVE     Pt reports: No new issues to report at this time     OBJECTIVE     Objective Measures updated at progress report unless specified.    Treatment     Bhupendra received the treatments listed below:     Neuromuscular re-education activities to improve Balance and Kinesthetic for 30 minutes. The following activities were included:  In supine, OT provided passive stretching to shoulder flexion, shoulder abduction, elbow extension/flexion, forearm supination/pronation, and hand extension-- Pt wore dynasplint for shoulder flexion and abduction , providing increased extension for stretching.  In upright sitting, Pt completed weightbearing into RUE on 4inch step while completing LUE reaching to cone for 3x10 reps-- OT provided blocking at R wrist.   On table top, Pt completed 3x10 reps of modified pushups on 4 inch step for increased weight bearing into RUE-- OT provided blocking at wrist and guidance of R elbow.     Patient Education and Home Exercises      Education provided  - Progress towards goals     Assessment     Pt would continue to benefit from skilled OT. Progress noted in above treatment section      Bhupendra is progressing well towards his goals and there are no updates to goals at this time. Pt prognosis is Good.     Pt will continue to benefit from skilled outpatient occupational therapy to address the deficits listed in the problem list on initial evaluation provide pt/family education and to maximize pt's level of independence in the home and community environment.     Pt's spiritual, cultural and educational needs considered and pt agreeable to plan of  care and goals.      PLAN     Continue with current POC         Kris Lopez, OT

## 2022-11-15 NOTE — PROGRESS NOTES
See POC for discharge summary     Treatment completed today:   In supine, OT provided passive stretching to shoulder flexion, shoulder abduction, elbow extension/flexion, forearm supination/pronation, and hand extension-- Increased tightness noted in pec and shoulder area   On table top, Pt completed 3x10 reps of modified pushups on 4 inch step for increased weight bearing into RUE-- OT provided blocking at wrist and guidance of R elbow.

## 2022-11-15 NOTE — PLAN OF CARE
[facility]  Occupational Therapy Discharge Summary     Name: Bhupendra Park  Clinic Number: [id number]    Therapy Diagnosis:   Encounter Diagnosis   Name Primary?    Right spastic hemiplegia Yes     Physician: Casey Maldonado MD    Physician Orders: eval and treat   Medical Diagnosis: CVA     Time In:  0800  Time Out:  0830  Total Billable minutes  30  Date of Last visit: 11/15/22    Past medical history: No past medical history on file.    Subjective   Bhupendra Park has been seen by Occupational Therapy for increased tone and decreased functional use of RUE. Treatments have consisted of therapeutic exercise and functional activity training/ADLs.    Functional change since beginning Occupational Therapy: Pt has reported decreased tone throughout RUE and increased use for stabilizer in functional use.    Bhupendra Park rates pain at discharge  0 /10.    Patient's spiritual, cultural and educational needs considered and patient agreeable to plan of care and goals.    Assessment    Patient has made good progress toward goals since initial evaluation.   2/3 goals have been met.  Remaining goals have not been met due to severity of Pt's hypertonicity.     Goals:  increased R shoulder flexion to AROM of 100 degrees for increased independence ADL and functional use for daily routines-- Goal not met: Pt is unable to actively flex R shoulder beyond ~60 degrees.       -decreased R hand tone for increased functional grasp/release during ADL and daily routines- progressing,ongoing: with DyanSplint Pt would be able to increase functional grasp/release for increased independence     Maintenance of current functional use of RUE with good maintenance of hypertonicity for all ADL and daily routines-- Goal Met with wife     Patient has been discharged with home exercise program.     Discharge reason: Patient has reached the maximum rehab potential for the present time. Pt is et-up  with Dynasplint rep for continued stretching with use of splints and has been well educated on the use of parts.    Plan   This patient is discharged from Occupational Therapy. Patient instructed to follow up with referring provider as needed.     Kris Lopez OT  11/15/2022

## 2023-01-01 ENCOUNTER — CLINICAL SUPPORT (OUTPATIENT)
Dept: REHABILITATION | Facility: HOSPITAL | Age: 63
End: 2023-01-01
Payer: MEDICARE

## 2023-01-01 ENCOUNTER — CLINICAL SUPPORT (OUTPATIENT)
Dept: REHABILITATION | Facility: HOSPITAL | Age: 63
End: 2023-01-01
Attending: INTERNAL MEDICINE
Payer: MEDICARE

## 2023-01-01 ENCOUNTER — PATIENT OUTREACH (OUTPATIENT)
Dept: ADMINISTRATIVE | Facility: CLINIC | Age: 63
End: 2023-01-01
Payer: MEDICARE

## 2023-01-01 ENCOUNTER — CLINICAL SUPPORT (OUTPATIENT)
Dept: REHABILITATION | Facility: HOSPITAL | Age: 63
End: 2023-01-01
Attending: PHYSICAL MEDICINE & REHABILITATION
Payer: MEDICARE

## 2023-01-01 ENCOUNTER — HOSPITAL ENCOUNTER (INPATIENT)
Facility: HOSPITAL | Age: 63
LOS: 4 days | Discharge: HOSPICE | DRG: 064 | End: 2023-09-13
Attending: STUDENT IN AN ORGANIZED HEALTH CARE EDUCATION/TRAINING PROGRAM | Admitting: INTERNAL MEDICINE
Payer: MEDICARE

## 2023-01-01 ENCOUNTER — DOCUMENTATION ONLY (OUTPATIENT)
Dept: REHABILITATION | Facility: HOSPITAL | Age: 63
End: 2023-01-01

## 2023-01-01 ENCOUNTER — HOSPITAL ENCOUNTER (INPATIENT)
Facility: HOSPITAL | Age: 63
LOS: 25 days | Discharge: HOME | DRG: 065 | End: 2023-03-06
Attending: STUDENT IN AN ORGANIZED HEALTH CARE EDUCATION/TRAINING PROGRAM | Admitting: STUDENT IN AN ORGANIZED HEALTH CARE EDUCATION/TRAINING PROGRAM
Payer: MEDICARE

## 2023-01-01 ENCOUNTER — DOCUMENTATION ONLY (OUTPATIENT)
Dept: REHABILITATION | Facility: HOSPITAL | Age: 63
End: 2023-01-01
Payer: MEDICARE

## 2023-01-01 ENCOUNTER — PATIENT MESSAGE (OUTPATIENT)
Dept: ADMINISTRATIVE | Facility: CLINIC | Age: 63
End: 2023-01-01
Payer: MEDICARE

## 2023-01-01 VITALS
OXYGEN SATURATION: 100 % | SYSTOLIC BLOOD PRESSURE: 96 MMHG | DIASTOLIC BLOOD PRESSURE: 61 MMHG | HEIGHT: 71 IN | HEART RATE: 57 BPM | RESPIRATION RATE: 18 BRPM | BODY MASS INDEX: 22.01 KG/M2 | TEMPERATURE: 98 F | WEIGHT: 157.19 LBS

## 2023-01-01 VITALS
HEIGHT: 71 IN | DIASTOLIC BLOOD PRESSURE: 78 MMHG | BODY MASS INDEX: 22.01 KG/M2 | HEART RATE: 69 BPM | RESPIRATION RATE: 18 BRPM | WEIGHT: 157.19 LBS | TEMPERATURE: 98 F | SYSTOLIC BLOOD PRESSURE: 128 MMHG | OXYGEN SATURATION: 98 %

## 2023-01-01 DIAGNOSIS — R47.01 COMBINED RECEPTIVE AND EXPRESSIVE APHASIA: Primary | ICD-10-CM

## 2023-01-01 DIAGNOSIS — R26.9 ABNORMALITY OF GAIT FOLLOWING CEREBROVASCULAR ACCIDENT: ICD-10-CM

## 2023-01-01 DIAGNOSIS — M25.611 DECREASED RIGHT SHOULDER RANGE OF MOTION: Primary | ICD-10-CM

## 2023-01-01 DIAGNOSIS — I69.398 ABNORMALITY OF GAIT FOLLOWING CEREBROVASCULAR ACCIDENT: ICD-10-CM

## 2023-01-01 DIAGNOSIS — R26.89 IMPAIRMENT OF BALANCE: ICD-10-CM

## 2023-01-01 DIAGNOSIS — R29.810 FACIAL DROOP: ICD-10-CM

## 2023-01-01 DIAGNOSIS — R29.898 IMPAIRED STRENGTH OF LOWER EXTREMITY: Primary | ICD-10-CM

## 2023-01-01 DIAGNOSIS — I61.9 NONTRAUMATIC INTRACEREBRAL HEMORRHAGE, UNSPECIFIED: Primary | ICD-10-CM

## 2023-01-01 DIAGNOSIS — G81.11 RIGHT SPASTIC HEMIPLEGIA: ICD-10-CM

## 2023-01-01 DIAGNOSIS — G81.11 SPASTIC HEMIPLEGIA AFFECTING RIGHT DOMINANT SIDE: Primary | ICD-10-CM

## 2023-01-01 DIAGNOSIS — I63.9 STROKE: ICD-10-CM

## 2023-01-01 DIAGNOSIS — G81.11 RIGHT SPASTIC HEMIPLEGIA: Primary | ICD-10-CM

## 2023-01-01 DIAGNOSIS — I61.9 INTRAPARENCHYMAL HEMORRHAGE OF BRAIN: ICD-10-CM

## 2023-01-01 DIAGNOSIS — M25.611 DECREASED RIGHT SHOULDER RANGE OF MOTION: ICD-10-CM

## 2023-01-01 DIAGNOSIS — I63.9 CVA (CEREBRAL VASCULAR ACCIDENT): Primary | ICD-10-CM

## 2023-01-01 DIAGNOSIS — I63.89 OTHER CEREBRAL INFARCTION: ICD-10-CM

## 2023-01-01 DIAGNOSIS — M62.89 MUSCLE HYPERTONICITY: Primary | ICD-10-CM

## 2023-01-01 DIAGNOSIS — R29.898 IMPAIRED STRENGTH OF LOWER EXTREMITY: ICD-10-CM

## 2023-01-01 DIAGNOSIS — I62.9 INTRACRANIAL HEMORRHAGE: Primary | ICD-10-CM

## 2023-01-01 DIAGNOSIS — R41.82 ALTERED MENTAL STATUS, UNSPECIFIED ALTERED MENTAL STATUS TYPE: ICD-10-CM

## 2023-01-01 DIAGNOSIS — R47.01 COMBINED RECEPTIVE AND EXPRESSIVE APHASIA: ICD-10-CM

## 2023-01-01 DIAGNOSIS — R07.9 CHEST PAIN: ICD-10-CM

## 2023-01-01 DIAGNOSIS — M62.89 MUSCLE HYPERTONICITY: ICD-10-CM

## 2023-01-01 LAB
ALBUMIN SERPL-MCNC: 3.5 G/DL (ref 3.4–4.8)
ALBUMIN SERPL-MCNC: 3.5 G/DL (ref 3.4–4.8)
ALBUMIN SERPL-MCNC: 3.6 G/DL (ref 3.4–4.8)
ALBUMIN SERPL-MCNC: 3.9 G/DL (ref 3.4–4.8)
ALBUMIN/GLOB SERPL: 1.1 RATIO (ref 1.1–2)
ALBUMIN/GLOB SERPL: 1.2 RATIO (ref 1.1–2)
ALBUMIN/GLOB SERPL: 1.3 RATIO (ref 1.1–2)
ALBUMIN/GLOB SERPL: 1.3 RATIO (ref 1.1–2)
ALP SERPL-CCNC: 48 UNIT/L (ref 40–150)
ALP SERPL-CCNC: 50 UNIT/L (ref 40–150)
ALP SERPL-CCNC: 60 UNIT/L (ref 40–150)
ALP SERPL-CCNC: 65 UNIT/L (ref 40–150)
ALT SERPL-CCNC: 14 UNIT/L (ref 0–55)
ALT SERPL-CCNC: 16 UNIT/L (ref 0–55)
ALT SERPL-CCNC: 18 UNIT/L (ref 0–55)
ALT SERPL-CCNC: 75 UNIT/L (ref 0–55)
ANION GAP SERPL CALC-SCNC: 10 MEQ/L
ANION GAP SERPL CALC-SCNC: 15 MMOL/L (ref 8–16)
ANION GAP SERPL CALC-SCNC: 7 MEQ/L
ANION GAP SERPL CALC-SCNC: 8 MEQ/L
ANION GAP SERPL CALC-SCNC: 8 MEQ/L
ANION GAP SERPL CALC-SCNC: 9 MEQ/L
APPEARANCE UR: CLEAR
AST SERPL-CCNC: 14 UNIT/L (ref 5–34)
AST SERPL-CCNC: 16 UNIT/L (ref 5–34)
AST SERPL-CCNC: 21 UNIT/L (ref 5–34)
AST SERPL-CCNC: 34 UNIT/L (ref 5–34)
BACTERIA #/AREA URNS AUTO: NORMAL /HPF
BASOPHILS # BLD AUTO: 0.02 X10(3)/MCL (ref 0–0.2)
BASOPHILS # BLD AUTO: 0.03 X10(3)/MCL
BASOPHILS # BLD AUTO: 0.03 X10(3)/MCL (ref 0–0.2)
BASOPHILS NFR BLD AUTO: 0.5 %
BASOPHILS NFR BLD AUTO: 0.6 %
BASOPHILS NFR BLD AUTO: 0.6 %
BILIRUB SERPL-MCNC: 0.6 MG/DL
BILIRUB SERPL-MCNC: 0.6 MG/DL
BILIRUB SERPL-MCNC: 1.1 MG/DL
BILIRUB UR QL STRIP.AUTO: NEGATIVE MG/DL
BILIRUBIN DIRECT+TOT PNL SERPL-MCNC: 1 MG/DL
BUN SERPL-MCNC: 14.6 MG/DL (ref 8.4–25.7)
BUN SERPL-MCNC: 14.7 MG/DL (ref 8.4–25.7)
BUN SERPL-MCNC: 15.5 MG/DL (ref 8.4–25.7)
BUN SERPL-MCNC: 15.6 MG/DL (ref 8.4–25.7)
BUN SERPL-MCNC: 15.7 MG/DL (ref 8.4–25.7)
BUN SERPL-MCNC: 18 MG/DL (ref 8.4–25.7)
BUN SERPL-MCNC: 18.3 MG/DL (ref 8.4–25.7)
BUN SERPL-MCNC: 18.7 MG/DL (ref 8.4–25.7)
BUN SERPL-MCNC: 19.2 MG/DL (ref 8.4–25.7)
BUN SERPL-MCNC: 19.6 MG/DL (ref 8.4–25.7)
BUN SERPL-MCNC: 20.7 MG/DL (ref 8.4–25.7)
BUN SERPL-MCNC: 22 MG/DL (ref 6–30)
CALCIUM SERPL-MCNC: 10.2 MG/DL (ref 8.8–10)
CALCIUM SERPL-MCNC: 8.8 MG/DL (ref 8.8–10)
CALCIUM SERPL-MCNC: 9 MG/DL (ref 8.8–10)
CALCIUM SERPL-MCNC: 9.2 MG/DL (ref 8.8–10)
CALCIUM SERPL-MCNC: 9.2 MG/DL (ref 8.8–10)
CALCIUM SERPL-MCNC: 9.3 MG/DL (ref 8.8–10)
CALCIUM SERPL-MCNC: 9.5 MG/DL (ref 8.8–10)
CALCIUM SERPL-MCNC: 9.7 MG/DL (ref 8.8–10)
CALCIUM SERPL-MCNC: 9.8 MG/DL (ref 8.8–10)
CHLORIDE SERPL-SCNC: 100 MMOL/L (ref 98–107)
CHLORIDE SERPL-SCNC: 101 MMOL/L (ref 98–107)
CHLORIDE SERPL-SCNC: 103 MMOL/L (ref 98–107)
CHLORIDE SERPL-SCNC: 105 MMOL/L (ref 98–107)
CHLORIDE SERPL-SCNC: 113 MMOL/L (ref 98–107)
CHLORIDE SERPL-SCNC: 114 MMOL/L (ref 95–110)
CHLORIDE SERPL-SCNC: 118 MMOL/L (ref 98–107)
CHLORIDE SERPL-SCNC: 118 MMOL/L (ref 98–107)
CHLORIDE SERPL-SCNC: 119 MMOL/L (ref 98–107)
CHLORIDE SERPL-SCNC: 121 MMOL/L (ref 98–107)
CHOLEST SERPL-MCNC: 109 MG/DL
CHOLEST/HDLC SERPL: 3 {RATIO} (ref 0–5)
CO2 SERPL-SCNC: 21 MMOL/L (ref 23–31)
CO2 SERPL-SCNC: 22 MMOL/L (ref 23–31)
CO2 SERPL-SCNC: 22 MMOL/L (ref 23–31)
CO2 SERPL-SCNC: 24 MMOL/L (ref 23–31)
CO2 SERPL-SCNC: 24 MMOL/L (ref 23–31)
CO2 SERPL-SCNC: 25 MMOL/L (ref 23–31)
CO2 SERPL-SCNC: 28 MMOL/L (ref 23–31)
CO2 SERPL-SCNC: 28 MMOL/L (ref 23–31)
CO2 SERPL-SCNC: 30 MMOL/L (ref 23–31)
CO2 SERPL-SCNC: 30 MMOL/L (ref 23–31)
CO2 SERPL-SCNC: 31 MMOL/L (ref 23–31)
COLOR UR AUTO: YELLOW
CREAT SERPL-MCNC: 0.79 MG/DL (ref 0.73–1.18)
CREAT SERPL-MCNC: 0.8 MG/DL (ref 0.73–1.18)
CREAT SERPL-MCNC: 0.81 MG/DL (ref 0.73–1.18)
CREAT SERPL-MCNC: 0.84 MG/DL (ref 0.73–1.18)
CREAT SERPL-MCNC: 1.04 MG/DL (ref 0.73–1.18)
CREAT SERPL-MCNC: 1.1 MG/DL (ref 0.73–1.18)
CREAT SERPL-MCNC: 1.11 MG/DL (ref 0.73–1.18)
CREAT SERPL-MCNC: 1.25 MG/DL (ref 0.73–1.18)
CREAT SERPL-MCNC: 1.62 MG/DL (ref 0.73–1.18)
CREAT SERPL-MCNC: 1.8 MG/DL (ref 0.5–1.4)
CREAT/UREA NIT SERPL: 17
CREAT/UREA NIT SERPL: 18
CREAT/UREA NIT SERPL: 18
CREAT/UREA NIT SERPL: 19
CREAT/UREA NIT SERPL: 20
EOSINOPHIL # BLD AUTO: 0.02 X10(3)/MCL (ref 0–0.9)
EOSINOPHIL # BLD AUTO: 0.11 X10(3)/MCL (ref 0–0.9)
EOSINOPHIL # BLD AUTO: 0.15 X10(3)/MCL (ref 0–0.9)
EOSINOPHIL # BLD AUTO: 0.17 X10(3)/MCL (ref 0–0.9)
EOSINOPHIL # BLD AUTO: 0.17 X10(3)/MCL (ref 0–0.9)
EOSINOPHIL # BLD AUTO: 0.19 X10(3)/MCL (ref 0–0.9)
EOSINOPHIL # BLD AUTO: 0.21 X10(3)/MCL (ref 0–0.9)
EOSINOPHIL # BLD AUTO: 0.27 X10(3)/MCL (ref 0–0.9)
EOSINOPHIL # BLD AUTO: 0.34 X10(3)/MCL (ref 0–0.9)
EOSINOPHIL NFR BLD AUTO: 0.3 %
EOSINOPHIL NFR BLD AUTO: 1.7 %
EOSINOPHIL NFR BLD AUTO: 2.6 %
EOSINOPHIL NFR BLD AUTO: 3.4 %
EOSINOPHIL NFR BLD AUTO: 3.7 %
EOSINOPHIL NFR BLD AUTO: 3.8 %
EOSINOPHIL NFR BLD AUTO: 4.2 %
EOSINOPHIL NFR BLD AUTO: 4.4 %
EOSINOPHIL NFR BLD AUTO: 5.4 %
ERYTHROCYTE [DISTWIDTH] IN BLOOD BY AUTOMATED COUNT: 12.1 % (ref 11.5–17)
ERYTHROCYTE [DISTWIDTH] IN BLOOD BY AUTOMATED COUNT: 12.3 % (ref 11.5–17)
ERYTHROCYTE [DISTWIDTH] IN BLOOD BY AUTOMATED COUNT: 12.4 % (ref 11.5–17)
ERYTHROCYTE [DISTWIDTH] IN BLOOD BY AUTOMATED COUNT: 12.4 % (ref 11.5–17)
ERYTHROCYTE [DISTWIDTH] IN BLOOD BY AUTOMATED COUNT: 12.5 % (ref 11.5–17)
ERYTHROCYTE [DISTWIDTH] IN BLOOD BY AUTOMATED COUNT: 12.9 % (ref 11.5–17)
ERYTHROCYTE [DISTWIDTH] IN BLOOD BY AUTOMATED COUNT: 13.2 % (ref 11.5–17)
ERYTHROCYTE [DISTWIDTH] IN BLOOD BY AUTOMATED COUNT: 13.2 % (ref 11.5–17)
ERYTHROCYTE [DISTWIDTH] IN BLOOD BY AUTOMATED COUNT: 13.4 % (ref 11.5–17)
GFR SERPLBLD CREATININE-BSD FMLA CKD-EPI: 47 MLS/MIN/1.73/M2
GFR SERPLBLD CREATININE-BSD FMLA CKD-EPI: >60 MLS/MIN/1.73/M2
GLOBULIN SER-MCNC: 2.7 GM/DL (ref 2.4–3.5)
GLOBULIN SER-MCNC: 2.8 GM/DL (ref 2.4–3.5)
GLOBULIN SER-MCNC: 2.9 GM/DL (ref 2.4–3.5)
GLOBULIN SER-MCNC: 3.6 GM/DL (ref 2.4–3.5)
GLUCOSE SERPL-MCNC: 101 MG/DL (ref 82–115)
GLUCOSE SERPL-MCNC: 102 MG/DL (ref 70–110)
GLUCOSE SERPL-MCNC: 103 MG/DL (ref 82–115)
GLUCOSE SERPL-MCNC: 111 MG/DL (ref 70–110)
GLUCOSE SERPL-MCNC: 113 MG/DL (ref 82–115)
GLUCOSE SERPL-MCNC: 126 MG/DL (ref 82–115)
GLUCOSE SERPL-MCNC: 82 MG/DL (ref 82–115)
GLUCOSE SERPL-MCNC: 83 MG/DL (ref 82–115)
GLUCOSE SERPL-MCNC: 85 MG/DL (ref 82–115)
GLUCOSE SERPL-MCNC: 88 MG/DL (ref 70–110)
GLUCOSE SERPL-MCNC: 89 MG/DL (ref 82–115)
GLUCOSE SERPL-MCNC: 91 MG/DL (ref 82–115)
GLUCOSE SERPL-MCNC: 94 MG/DL (ref 82–115)
GLUCOSE SERPL-MCNC: 96 MG/DL (ref 82–115)
GLUCOSE UR QL STRIP.AUTO: NEGATIVE MG/DL
HCT VFR BLD AUTO: 34.9 % (ref 42–52)
HCT VFR BLD AUTO: 35 % (ref 42–52)
HCT VFR BLD AUTO: 37.4 % (ref 42–52)
HCT VFR BLD AUTO: 37.7 % (ref 42–52)
HCT VFR BLD AUTO: 38.1 % (ref 42–52)
HCT VFR BLD AUTO: 39.4 % (ref 42–52)
HCT VFR BLD AUTO: 40 % (ref 42–52)
HCT VFR BLD AUTO: 40 % (ref 42–52)
HCT VFR BLD AUTO: 40.1 % (ref 42–52)
HCT VFR BLD CALC: 41 %PCV (ref 36–54)
HDLC SERPL-MCNC: 32 MG/DL (ref 35–60)
HGB BLD-MCNC: 11.9 G/DL (ref 14–18)
HGB BLD-MCNC: 12.2 G/DL (ref 14–18)
HGB BLD-MCNC: 12.6 G/DL (ref 14–18)
HGB BLD-MCNC: 13 GM/DL (ref 14–18)
HGB BLD-MCNC: 13.5 G/DL (ref 14–18)
HGB BLD-MCNC: 13.5 G/DL (ref 14–18)
HGB BLD-MCNC: 13.5 GM/DL (ref 14–18)
HGB BLD-MCNC: 13.6 G/DL (ref 14–18)
HGB BLD-MCNC: 13.7 G/DL (ref 14–18)
HGB BLD-MCNC: 14 G/DL
IMM GRANULOCYTES # BLD AUTO: 0 X10(3)/MCL (ref 0–0.04)
IMM GRANULOCYTES # BLD AUTO: 0.01 X10(3)/MCL (ref 0–0.04)
IMM GRANULOCYTES # BLD AUTO: 0.02 X10(3)/MCL (ref 0–0.04)
IMM GRANULOCYTES # BLD AUTO: 0.02 X10(3)/MCL (ref 0–0.04)
IMM GRANULOCYTES NFR BLD AUTO: 0 %
IMM GRANULOCYTES NFR BLD AUTO: 0.2 %
IMM GRANULOCYTES NFR BLD AUTO: 0.3 %
IMM GRANULOCYTES NFR BLD AUTO: 0.3 %
INR PPP: 1.1
KETONES UR QL STRIP.AUTO: NEGATIVE MG/DL
LDLC SERPL CALC-MCNC: 47 MG/DL (ref 50–140)
LEUKOCYTE ESTERASE UR QL STRIP.AUTO: NEGATIVE UNIT/L
LYMPHOCYTES # BLD AUTO: 0.71 X10(3)/MCL (ref 0.6–4.6)
LYMPHOCYTES # BLD AUTO: 0.83 X10(3)/MCL (ref 0.6–4.6)
LYMPHOCYTES # BLD AUTO: 0.84 X10(3)/MCL (ref 0.6–4.6)
LYMPHOCYTES # BLD AUTO: 0.88 X10(3)/MCL (ref 0.6–4.6)
LYMPHOCYTES # BLD AUTO: 1.26 X10(3)/MCL (ref 0.6–4.6)
LYMPHOCYTES # BLD AUTO: 1.28 X10(3)/MCL (ref 0.6–4.6)
LYMPHOCYTES # BLD AUTO: 1.38 X10(3)/MCL (ref 0.6–4.6)
LYMPHOCYTES # BLD AUTO: 1.39 X10(3)/MCL (ref 0.6–4.6)
LYMPHOCYTES # BLD AUTO: 1.49 X10(3)/MCL (ref 0.6–4.6)
LYMPHOCYTES NFR BLD AUTO: 11.4 %
LYMPHOCYTES NFR BLD AUTO: 13.9 %
LYMPHOCYTES NFR BLD AUTO: 14.2 %
LYMPHOCYTES NFR BLD AUTO: 15.1 %
LYMPHOCYTES NFR BLD AUTO: 20.1 %
LYMPHOCYTES NFR BLD AUTO: 24.3 %
LYMPHOCYTES NFR BLD AUTO: 27.8 %
LYMPHOCYTES NFR BLD AUTO: 28.8 %
LYMPHOCYTES NFR BLD AUTO: 29.7 %
MAGNESIUM SERPL-MCNC: 2 MG/DL (ref 1.6–2.6)
MAGNESIUM SERPL-MCNC: 2.1 MG/DL (ref 1.6–2.6)
MAGNESIUM SERPL-MCNC: 2.1 MG/DL (ref 1.6–2.6)
MAGNESIUM SERPL-MCNC: 2.2 MG/DL (ref 1.6–2.6)
MCH RBC QN AUTO: 28.6 PG
MCH RBC QN AUTO: 28.7 PG (ref 27–31)
MCH RBC QN AUTO: 28.8 PG
MCH RBC QN AUTO: 28.9 PG
MCH RBC QN AUTO: 28.9 PG
MCH RBC QN AUTO: 29.2 PG
MCH RBC QN AUTO: 29.5 PG (ref 27–31)
MCH RBC QN AUTO: 29.6 PG (ref 27–31)
MCH RBC QN AUTO: 29.7 PG (ref 27–31)
MCHC RBC AUTO-ENTMCNC: 33.7 G/DL (ref 33–36)
MCHC RBC AUTO-ENTMCNC: 33.7 MG/DL (ref 33–36)
MCHC RBC AUTO-ENTMCNC: 33.8 G/DL (ref 33–36)
MCHC RBC AUTO-ENTMCNC: 34 G/DL (ref 33–36)
MCHC RBC AUTO-ENTMCNC: 34.1 G/DL (ref 33–36)
MCHC RBC AUTO-ENTMCNC: 34.1 MG/DL (ref 33–36)
MCHC RBC AUTO-ENTMCNC: 34.8 G/DL (ref 33–36)
MCHC RBC AUTO-ENTMCNC: 34.9 G/DL (ref 33–36)
MCHC RBC AUTO-ENTMCNC: 35.8 G/DL (ref 33–36)
MCV RBC AUTO: 82.9 FL (ref 80–94)
MCV RBC AUTO: 84.3 FL (ref 80–94)
MCV RBC AUTO: 84.5 FL (ref 80–94)
MCV RBC AUTO: 85 FL (ref 80–94)
MCV RBC AUTO: 85.1 FL (ref 80–94)
MCV RBC AUTO: 85.1 FL (ref 80–94)
MCV RBC AUTO: 85.4 FL (ref 80–94)
MCV RBC AUTO: 85.6 FL (ref 80–94)
MCV RBC AUTO: 85.7 FL (ref 80–94)
MONOCYTES # BLD AUTO: 0.36 X10(3)/MCL (ref 0.1–1.3)
MONOCYTES # BLD AUTO: 0.38 X10(3)/MCL (ref 0.1–1.3)
MONOCYTES # BLD AUTO: 0.42 X10(3)/MCL (ref 0.1–1.3)
MONOCYTES # BLD AUTO: 0.43 X10(3)/MCL (ref 0.1–1.3)
MONOCYTES # BLD AUTO: 0.45 X10(3)/MCL (ref 0.1–1.3)
MONOCYTES # BLD AUTO: 0.47 X10(3)/MCL (ref 0.1–1.3)
MONOCYTES # BLD AUTO: 0.51 X10(3)/MCL (ref 0.1–1.3)
MONOCYTES # BLD AUTO: 0.51 X10(3)/MCL (ref 0.1–1.3)
MONOCYTES # BLD AUTO: 0.53 X10(3)/MCL (ref 0.1–1.3)
MONOCYTES NFR BLD AUTO: 6.5 %
MONOCYTES NFR BLD AUTO: 7.6 %
MONOCYTES NFR BLD AUTO: 7.7 %
MONOCYTES NFR BLD AUTO: 7.7 %
MONOCYTES NFR BLD AUTO: 8.1 %
MONOCYTES NFR BLD AUTO: 8.1 %
MONOCYTES NFR BLD AUTO: 8.5 %
MONOCYTES NFR BLD AUTO: 9.2 %
MONOCYTES NFR BLD AUTO: 9.5 %
NEUTROPHILS # BLD AUTO: 2.55 X10(3)/MCL (ref 2.1–9.2)
NEUTROPHILS # BLD AUTO: 2.64 X10(3)/MCL (ref 2.1–9.2)
NEUTROPHILS # BLD AUTO: 2.99 X10(3)/MCL (ref 2.1–9.2)
NEUTROPHILS # BLD AUTO: 3.85 X10(3)/MCL (ref 2.1–9.2)
NEUTROPHILS # BLD AUTO: 4.12 X10(3)/MCL (ref 2.1–9.2)
NEUTROPHILS # BLD AUTO: 4.13 X10(3)/MCL (ref 2.1–9.2)
NEUTROPHILS # BLD AUTO: 4.36 X10(3)/MCL (ref 2.1–9.2)
NEUTROPHILS # BLD AUTO: 4.78 X10(3)/MCL (ref 2.1–9.2)
NEUTROPHILS # BLD AUTO: 4.93 X10(3)/MCL (ref 2.1–9.2)
NEUTROPHILS NFR BLD AUTO: 56.8 %
NEUTROPHILS NFR BLD AUTO: 57.4 %
NEUTROPHILS NFR BLD AUTO: 59.8 %
NEUTROPHILS NFR BLD AUTO: 62.9 %
NEUTROPHILS NFR BLD AUTO: 65.7 %
NEUTROPHILS NFR BLD AUTO: 74.3 %
NEUTROPHILS NFR BLD AUTO: 74.7 %
NEUTROPHILS NFR BLD AUTO: 75.6 %
NEUTROPHILS NFR BLD AUTO: 79 %
NITRITE UR QL STRIP.AUTO: NEGATIVE
NRBC BLD AUTO-RTO: 0 %
PH UR STRIP.AUTO: 5.5 [PH]
PHOSPHATE SERPL-MCNC: 2.1 MG/DL (ref 2.3–4.7)
PHOSPHATE SERPL-MCNC: 2.5 MG/DL (ref 2.3–4.7)
PHOSPHATE SERPL-MCNC: 2.6 MG/DL (ref 2.3–4.7)
PLATELET # BLD AUTO: 103 X10(3)/MCL (ref 130–400)
PLATELET # BLD AUTO: 113 X10(3)/MCL (ref 130–400)
PLATELET # BLD AUTO: 117 X10(3)/MCL (ref 130–400)
PLATELET # BLD AUTO: 128 X10(3)/MCL (ref 130–400)
PLATELET # BLD AUTO: 128 X10(3)/MCL (ref 130–400)
PLATELET # BLD AUTO: 132 X10(3)/MCL (ref 130–400)
PLATELET # BLD AUTO: 175 X10(3)/MCL (ref 130–400)
PLATELET # BLD AUTO: 253 X10(3)/MCL (ref 130–400)
PLATELET # BLD AUTO: 255 X10(3)/MCL (ref 130–400)
PMV BLD AUTO: 10 FL (ref 7.4–10.4)
PMV BLD AUTO: 10 FL (ref 7.4–10.4)
PMV BLD AUTO: 10.1 FL (ref 7.4–10.4)
PMV BLD AUTO: 10.2 FL (ref 7.4–10.4)
PMV BLD AUTO: 10.4 FL (ref 7.4–10.4)
PMV BLD AUTO: 10.6 FL (ref 7.4–10.4)
PMV BLD AUTO: 10.7 FL (ref 7.4–10.4)
PMV BLD AUTO: 9.6 FL (ref 7.4–10.4)
PMV BLD AUTO: 9.8 FL (ref 7.4–10.4)
POC IONIZED CALCIUM: 1.35 MMOL/L (ref 1.06–1.42)
POC PTINR: 1.1 (ref 0.9–1.2)
POC PTWBT: 13.6 SEC (ref 9.7–14.3)
POC TCO2 (MEASURED): 27 MMOL/L (ref 23–29)
POCT GLUCOSE: 102 MG/DL (ref 70–110)
POCT GLUCOSE: 107 MG/DL (ref 70–110)
POCT GLUCOSE: 108 MG/DL (ref 70–110)
POCT GLUCOSE: 109 MG/DL (ref 70–110)
POCT GLUCOSE: 110 MG/DL (ref 70–110)
POCT GLUCOSE: 111 MG/DL (ref 70–110)
POCT GLUCOSE: 73 MG/DL (ref 70–110)
POCT GLUCOSE: 88 MG/DL (ref 70–110)
POCT GLUCOSE: 89 MG/DL (ref 70–110)
POCT GLUCOSE: 91 MG/DL (ref 70–110)
POCT GLUCOSE: 91 MG/DL (ref 70–110)
POCT GLUCOSE: 93 MG/DL (ref 70–110)
POTASSIUM BLD-SCNC: 4.3 MMOL/L (ref 3.5–5.1)
POTASSIUM SERPL-SCNC: 3.2 MMOL/L (ref 3.5–5.1)
POTASSIUM SERPL-SCNC: 3.3 MMOL/L (ref 3.5–5.1)
POTASSIUM SERPL-SCNC: 3.4 MMOL/L (ref 3.5–5.1)
POTASSIUM SERPL-SCNC: 3.5 MMOL/L (ref 3.5–5.1)
POTASSIUM SERPL-SCNC: 3.8 MMOL/L (ref 3.5–5.1)
POTASSIUM SERPL-SCNC: 3.9 MMOL/L (ref 3.5–5.1)
POTASSIUM SERPL-SCNC: 4.1 MMOL/L (ref 3.5–5.1)
POTASSIUM SERPL-SCNC: 4.2 MMOL/L (ref 3.5–5.1)
POTASSIUM SERPL-SCNC: 4.2 MMOL/L (ref 3.5–5.1)
POTASSIUM SERPL-SCNC: 4.3 MMOL/L (ref 3.5–5.1)
POTASSIUM SERPL-SCNC: 4.3 MMOL/L (ref 3.5–5.1)
PROT SERPL-MCNC: 6.2 GM/DL (ref 5.8–7.6)
PROT SERPL-MCNC: 6.4 GM/DL (ref 5.8–7.6)
PROT SERPL-MCNC: 6.4 GM/DL (ref 5.8–7.6)
PROT SERPL-MCNC: 7.5 GM/DL (ref 5.8–7.6)
PROT UR QL STRIP.AUTO: NEGATIVE MG/DL
PROTHROMBIN TIME: 13.9 SECONDS (ref 12.5–14.5)
RBC # BLD AUTO: 4.14 X10(6)/MCL (ref 4.7–6.1)
RBC # BLD AUTO: 4.14 X10(6)/MCL (ref 4.7–6.1)
RBC # BLD AUTO: 4.38 X10(6)/MCL (ref 4.7–6.1)
RBC # BLD AUTO: 4.45 X10(6)/MCL (ref 4.7–6.1)
RBC # BLD AUTO: 4.55 X10(6)/MCL (ref 4.7–6.1)
RBC # BLD AUTO: 4.63 X10(6)/MCL (ref 4.7–6.1)
RBC # BLD AUTO: 4.67 X10(6)/MCL (ref 4.7–6.1)
RBC # BLD AUTO: 4.7 X10(6)/MCL (ref 4.7–6.1)
RBC # BLD AUTO: 4.72 X10(6)/MCL (ref 4.7–6.1)
RBC #/AREA URNS AUTO: NORMAL /HPF
RBC UR QL AUTO: NEGATIVE UNIT/L
SAMPLE: ABNORMAL
SAMPLE: NORMAL
SODIUM BLD-SCNC: 151 MMOL/L (ref 136–145)
SODIUM SERPL-SCNC: 134 MMOL/L (ref 136–145)
SODIUM SERPL-SCNC: 138 MMOL/L (ref 136–145)
SODIUM SERPL-SCNC: 139 MMOL/L (ref 136–145)
SODIUM SERPL-SCNC: 141 MMOL/L (ref 136–145)
SODIUM SERPL-SCNC: 141 MMOL/L (ref 136–145)
SODIUM SERPL-SCNC: 142 MMOL/L (ref 136–145)
SODIUM SERPL-SCNC: 145 MMOL/L (ref 136–145)
SODIUM SERPL-SCNC: 147 MMOL/L (ref 136–145)
SODIUM SERPL-SCNC: 148 MMOL/L (ref 136–145)
SODIUM SERPL-SCNC: 151 MMOL/L (ref 136–145)
SODIUM SERPL-SCNC: 152 MMOL/L (ref 136–145)
SP GR UR STRIP.AUTO: 1.01
SQUAMOUS #/AREA URNS AUTO: NORMAL /HPF
TRIGL SERPL-MCNC: 152 MG/DL (ref 34–140)
TSH SERPL-ACNC: 0.58 UIU/ML (ref 0.35–4.94)
TSH SERPL-ACNC: 2.06 UIU/ML (ref 0.35–4.94)
UROBILINOGEN UR STRIP-ACNC: 0.2 MG/DL
VLDLC SERPL CALC-MCNC: 30 MG/DL
WBC # SPEC AUTO: 4.4 X10(3)/MCL (ref 4.5–11.5)
WBC # SPEC AUTO: 4.7 X10(3)/MCL (ref 4.5–11.5)
WBC # SPEC AUTO: 5 X10(3)/MCL (ref 4.5–11.5)
WBC # SPEC AUTO: 5.55 X10(3)/MCL (ref 4.5–11.5)
WBC # SPEC AUTO: 5.84 X10(3)/MCL (ref 4.5–11.5)
WBC # SPEC AUTO: 6.1 X10(3)/MCL (ref 4.5–11.5)
WBC # SPEC AUTO: 6.24 X10(3)/MCL (ref 4.5–11.5)
WBC # SPEC AUTO: 6.3 X10(3)/MCL (ref 4.5–11.5)
WBC # SPEC AUTO: 6.32 X10(3)/MCL (ref 4.5–11.5)
WBC #/AREA URNS AUTO: NORMAL /HPF

## 2023-01-01 PROCEDURE — 97530 THERAPEUTIC ACTIVITIES: CPT

## 2023-01-01 PROCEDURE — 97110 THERAPEUTIC EXERCISES: CPT

## 2023-01-01 PROCEDURE — 92507 TX SP LANG VOICE COMM INDIV: CPT

## 2023-01-01 PROCEDURE — 11000004 HC SNF PRIVATE

## 2023-01-01 PROCEDURE — 80053 COMPREHEN METABOLIC PANEL: CPT | Performed by: STUDENT IN AN ORGANIZED HEALTH CARE EDUCATION/TRAINING PROGRAM

## 2023-01-01 PROCEDURE — 92507 TX SP LANG VOICE COMM INDIV: CPT | Mod: KX

## 2023-01-01 PROCEDURE — 25000003 PHARM REV CODE 250: Performed by: INTERNAL MEDICINE

## 2023-01-01 PROCEDURE — 11000001 HC ACUTE MED/SURG PRIVATE ROOM

## 2023-01-01 PROCEDURE — 97116 GAIT TRAINING THERAPY: CPT | Mod: CQ

## 2023-01-01 PROCEDURE — 97530 THERAPEUTIC ACTIVITIES: CPT | Mod: 59

## 2023-01-01 PROCEDURE — 97163 PT EVAL HIGH COMPLEX 45 MIN: CPT

## 2023-01-01 PROCEDURE — 99223 1ST HOSP IP/OBS HIGH 75: CPT | Mod: ,,, | Performed by: PSYCHIATRY & NEUROLOGY

## 2023-01-01 PROCEDURE — 94760 N-INVAS EAR/PLS OXIMETRY 1: CPT

## 2023-01-01 PROCEDURE — 97112 NEUROMUSCULAR REEDUCATION: CPT

## 2023-01-01 PROCEDURE — 97110 THERAPEUTIC EXERCISES: CPT | Mod: CQ

## 2023-01-01 PROCEDURE — 20000000 HC ICU ROOM

## 2023-01-01 PROCEDURE — 25000003 PHARM REV CODE 250: Performed by: STUDENT IN AN ORGANIZED HEALTH CARE EDUCATION/TRAINING PROGRAM

## 2023-01-01 PROCEDURE — 92523 SPEECH SOUND LANG COMPREHEN: CPT

## 2023-01-01 PROCEDURE — 92526 ORAL FUNCTION THERAPY: CPT

## 2023-01-01 PROCEDURE — 85025 COMPLETE CBC W/AUTO DIFF WBC: CPT | Performed by: STUDENT IN AN ORGANIZED HEALTH CARE EDUCATION/TRAINING PROGRAM

## 2023-01-01 PROCEDURE — 94761 N-INVAS EAR/PLS OXIMETRY MLT: CPT

## 2023-01-01 PROCEDURE — 97166 OT EVAL MOD COMPLEX 45 MIN: CPT

## 2023-01-01 PROCEDURE — 84443 ASSAY THYROID STIM HORMONE: CPT | Performed by: STUDENT IN AN ORGANIZED HEALTH CARE EDUCATION/TRAINING PROGRAM

## 2023-01-01 PROCEDURE — 97535 SELF CARE MNGMENT TRAINING: CPT

## 2023-01-01 PROCEDURE — 25500020 PHARM REV CODE 255: Performed by: STUDENT IN AN ORGANIZED HEALTH CARE EDUCATION/TRAINING PROGRAM

## 2023-01-01 PROCEDURE — 99223 1ST HOSP IP/OBS HIGH 75: CPT | Mod: ,,, | Performed by: NEUROLOGICAL SURGERY

## 2023-01-01 PROCEDURE — 63600175 PHARM REV CODE 636 W HCPCS: Performed by: STUDENT IN AN ORGANIZED HEALTH CARE EDUCATION/TRAINING PROGRAM

## 2023-01-01 PROCEDURE — 97161 PT EVAL LOW COMPLEX 20 MIN: CPT

## 2023-01-01 PROCEDURE — C9254 INJECTION, LACOSAMIDE: HCPCS

## 2023-01-01 PROCEDURE — 83735 ASSAY OF MAGNESIUM: CPT | Performed by: INTERNAL MEDICINE

## 2023-01-01 PROCEDURE — A4216 STERILE WATER/SALINE, 10 ML: HCPCS | Performed by: STUDENT IN AN ORGANIZED HEALTH CARE EDUCATION/TRAINING PROGRAM

## 2023-01-01 PROCEDURE — S5010 5% DEXTROSE AND 0.45% SALINE: HCPCS | Performed by: INTERNAL MEDICINE

## 2023-01-01 PROCEDURE — 93010 EKG 12-LEAD: ICD-10-PCS | Mod: ,,, | Performed by: INTERNAL MEDICINE

## 2023-01-01 PROCEDURE — 99223 PR INITIAL HOSPITAL CARE,LEVL III: ICD-10-PCS | Mod: ,,, | Performed by: PSYCHIATRY & NEUROLOGY

## 2023-01-01 PROCEDURE — 84100 ASSAY OF PHOSPHORUS: CPT | Performed by: INTERNAL MEDICINE

## 2023-01-01 PROCEDURE — A9698 NON-RAD CONTRAST MATERIALNOC: HCPCS | Performed by: INTERNAL MEDICINE

## 2023-01-01 PROCEDURE — 97116 GAIT TRAINING THERAPY: CPT

## 2023-01-01 PROCEDURE — 99291 CRITICAL CARE FIRST HOUR: CPT

## 2023-01-01 PROCEDURE — 97110 THERAPEUTIC EXERCISES: CPT | Mod: 59

## 2023-01-01 PROCEDURE — 99232 PR SUBSEQUENT HOSPITAL CARE,LEVL II: ICD-10-PCS | Mod: ,,, | Performed by: NEUROLOGICAL SURGERY

## 2023-01-01 PROCEDURE — 96374 THER/PROPH/DIAG INJ IV PUSH: CPT

## 2023-01-01 PROCEDURE — 81001 URINALYSIS AUTO W/SCOPE: CPT | Performed by: STUDENT IN AN ORGANIZED HEALTH CARE EDUCATION/TRAINING PROGRAM

## 2023-01-01 PROCEDURE — 83735 ASSAY OF MAGNESIUM: CPT | Performed by: STUDENT IN AN ORGANIZED HEALTH CARE EDUCATION/TRAINING PROGRAM

## 2023-01-01 PROCEDURE — 97535 SELF CARE MNGMENT TRAINING: CPT | Mod: CQ

## 2023-01-01 PROCEDURE — 97110 THERAPEUTIC EXERCISES: CPT | Mod: KX

## 2023-01-01 PROCEDURE — 97110 THERAPEUTIC EXERCISES: CPT | Mod: CQ,59

## 2023-01-01 PROCEDURE — 80047 BASIC METABLC PNL IONIZED CA: CPT

## 2023-01-01 PROCEDURE — 83735 ASSAY OF MAGNESIUM: CPT

## 2023-01-01 PROCEDURE — 94799 UNLISTED PULMONARY SVC/PX: CPT

## 2023-01-01 PROCEDURE — 80048 BASIC METABOLIC PNL TOTAL CA: CPT | Performed by: STUDENT IN AN ORGANIZED HEALTH CARE EDUCATION/TRAINING PROGRAM

## 2023-01-01 PROCEDURE — 99232 SBSQ HOSP IP/OBS MODERATE 35: CPT | Mod: ,,, | Performed by: NEUROLOGICAL SURGERY

## 2023-01-01 PROCEDURE — 97162 PT EVAL MOD COMPLEX 30 MIN: CPT

## 2023-01-01 PROCEDURE — 25000003 PHARM REV CODE 250

## 2023-01-01 PROCEDURE — 97530 THERAPEUTIC ACTIVITIES: CPT | Mod: CQ

## 2023-01-01 PROCEDURE — 99233 PR SUBSEQUENT HOSPITAL CARE,LEVL III: ICD-10-PCS | Mod: ,,, | Performed by: PSYCHIATRY & NEUROLOGY

## 2023-01-01 PROCEDURE — 80048 BASIC METABOLIC PNL TOTAL CA: CPT | Performed by: INTERNAL MEDICINE

## 2023-01-01 PROCEDURE — 99900035 HC TECH TIME PER 15 MIN (STAT)

## 2023-01-01 PROCEDURE — 97112 NEUROMUSCULAR REEDUCATION: CPT | Mod: CQ

## 2023-01-01 PROCEDURE — 85610 PROTHROMBIN TIME: CPT | Performed by: STUDENT IN AN ORGANIZED HEALTH CARE EDUCATION/TRAINING PROGRAM

## 2023-01-01 PROCEDURE — 63600175 PHARM REV CODE 636 W HCPCS

## 2023-01-01 PROCEDURE — 80053 COMPREHEN METABOLIC PANEL: CPT

## 2023-01-01 PROCEDURE — 99223 PR INITIAL HOSPITAL CARE,LEVL III: ICD-10-PCS | Mod: ,,, | Performed by: NEUROLOGICAL SURGERY

## 2023-01-01 PROCEDURE — 93010 ELECTROCARDIOGRAM REPORT: CPT | Mod: ,,, | Performed by: INTERNAL MEDICINE

## 2023-01-01 PROCEDURE — 21400001 HC TELEMETRY ROOM

## 2023-01-01 PROCEDURE — 85025 COMPLETE CBC W/AUTO DIFF WBC: CPT

## 2023-01-01 PROCEDURE — 99233 SBSQ HOSP IP/OBS HIGH 50: CPT | Mod: ,,, | Performed by: PSYCHIATRY & NEUROLOGY

## 2023-01-01 PROCEDURE — 84100 ASSAY OF PHOSPHORUS: CPT

## 2023-01-01 PROCEDURE — 51701 INSERT BLADDER CATHETER: CPT

## 2023-01-01 PROCEDURE — 92610 EVALUATE SWALLOWING FUNCTION: CPT

## 2023-01-01 PROCEDURE — 25500020 PHARM REV CODE 255: Performed by: INTERNAL MEDICINE

## 2023-01-01 PROCEDURE — 80061 LIPID PANEL: CPT | Performed by: STUDENT IN AN ORGANIZED HEALTH CARE EDUCATION/TRAINING PROGRAM

## 2023-01-01 PROCEDURE — 82962 GLUCOSE BLOOD TEST: CPT

## 2023-01-01 PROCEDURE — 93005 ELECTROCARDIOGRAM TRACING: CPT

## 2023-01-01 PROCEDURE — 85025 COMPLETE CBC W/AUTO DIFF WBC: CPT | Performed by: INTERNAL MEDICINE

## 2023-01-01 PROCEDURE — 92611 MOTION FLUOROSCOPY/SWALLOW: CPT

## 2023-01-01 RX ORDER — LACOSAMIDE 50 MG/1
50 TABLET ORAL EVERY MORNING
COMMUNITY

## 2023-01-01 RX ORDER — SODIUM CHLORIDE, SODIUM LACTATE, POTASSIUM CHLORIDE, CALCIUM CHLORIDE 600; 310; 30; 20 MG/100ML; MG/100ML; MG/100ML; MG/100ML
INJECTION, SOLUTION INTRAVENOUS CONTINUOUS
Status: DISCONTINUED | OUTPATIENT
Start: 2023-01-01 | End: 2023-01-01

## 2023-01-01 RX ORDER — HYDRALAZINE HYDROCHLORIDE 25 MG/1
25 TABLET, FILM COATED ORAL EVERY 8 HOURS
Status: DISCONTINUED | OUTPATIENT
Start: 2023-01-01 | End: 2023-01-01 | Stop reason: HOSPADM

## 2023-01-01 RX ORDER — OLOPATADINE HYDROCHLORIDE 1 MG/ML
1 SOLUTION/ DROPS OPHTHALMIC 2 TIMES DAILY
Status: DISPENSED | OUTPATIENT
Start: 2023-01-01 | End: 2023-01-01

## 2023-01-01 RX ORDER — SODIUM CHLORIDE 0.9 % (FLUSH) 0.9 %
10 SYRINGE (ML) INJECTION
Status: DISCONTINUED | OUTPATIENT
Start: 2023-01-01 | End: 2023-01-01 | Stop reason: HOSPADM

## 2023-01-01 RX ORDER — DICLOFENAC SODIUM 10 MG/G
4 GEL TOPICAL EVERY 8 HOURS PRN
Status: ON HOLD | COMMUNITY
Start: 2023-01-01 | End: 2023-01-01 | Stop reason: HOSPADM

## 2023-01-01 RX ORDER — BISACODYL 10 MG
10 SUPPOSITORY, RECTAL RECTAL DAILY PRN
Status: DISCONTINUED | OUTPATIENT
Start: 2023-01-01 | End: 2023-01-01 | Stop reason: HOSPADM

## 2023-01-01 RX ORDER — AMLODIPINE BESYLATE 5 MG/1
5 TABLET ORAL 2 TIMES DAILY
Status: DISCONTINUED | OUTPATIENT
Start: 2023-01-01 | End: 2023-01-01

## 2023-01-01 RX ORDER — IPRATROPIUM BROMIDE 0.5 MG/2.5ML
0.5 SOLUTION RESPIRATORY (INHALATION) EVERY 6 HOURS PRN
Status: DISCONTINUED | OUTPATIENT
Start: 2023-01-01 | End: 2023-01-01 | Stop reason: HOSPADM

## 2023-01-01 RX ORDER — SIMETHICONE 80 MG
1 TABLET,CHEWABLE ORAL 4 TIMES DAILY PRN
Status: DISCONTINUED | OUTPATIENT
Start: 2023-01-01 | End: 2023-01-01 | Stop reason: HOSPADM

## 2023-01-01 RX ORDER — HYDRALAZINE HYDROCHLORIDE 20 MG/ML
10 INJECTION INTRAMUSCULAR; INTRAVENOUS
Status: COMPLETED | OUTPATIENT
Start: 2023-01-01 | End: 2023-01-01

## 2023-01-01 RX ORDER — DEXTROSE MONOHYDRATE AND SODIUM CHLORIDE 5; .45 G/100ML; G/100ML
INJECTION, SOLUTION INTRAVENOUS CONTINUOUS
Status: ACTIVE | OUTPATIENT
Start: 2023-01-01 | End: 2023-01-01

## 2023-01-01 RX ORDER — LACOSAMIDE 50 MG/1
150 TABLET ORAL NIGHTLY
Status: DISCONTINUED | OUTPATIENT
Start: 2023-01-01 | End: 2023-01-01 | Stop reason: HOSPADM

## 2023-01-01 RX ORDER — MUPIROCIN 20 MG/G
OINTMENT TOPICAL 2 TIMES DAILY
Status: DISCONTINUED | OUTPATIENT
Start: 2023-01-01 | End: 2023-01-01 | Stop reason: HOSPADM

## 2023-01-01 RX ORDER — ATORVASTATIN CALCIUM 40 MG/1
40 TABLET, FILM COATED ORAL NIGHTLY
Status: CANCELLED | OUTPATIENT
Start: 2023-01-01

## 2023-01-01 RX ORDER — FAMOTIDINE 20 MG/1
20 TABLET, FILM COATED ORAL 2 TIMES DAILY
Status: DISCONTINUED | OUTPATIENT
Start: 2023-01-01 | End: 2023-01-01 | Stop reason: HOSPADM

## 2023-01-01 RX ORDER — BACLOFEN 10 MG/1
10 TABLET ORAL 3 TIMES DAILY
Status: DISCONTINUED | OUTPATIENT
Start: 2023-01-01 | End: 2023-01-01 | Stop reason: HOSPADM

## 2023-01-01 RX ORDER — LEVETIRACETAM 10 MG/ML
1000 INJECTION INTRAVASCULAR
Status: COMPLETED | OUTPATIENT
Start: 2023-01-01 | End: 2023-01-01

## 2023-01-01 RX ORDER — IBUPROFEN 200 MG
16 TABLET ORAL
Status: DISCONTINUED | OUTPATIENT
Start: 2023-01-01 | End: 2023-01-01 | Stop reason: HOSPADM

## 2023-01-01 RX ORDER — ONDANSETRON 4 MG/1
8 TABLET, ORALLY DISINTEGRATING ORAL EVERY 8 HOURS PRN
Status: DISCONTINUED | OUTPATIENT
Start: 2023-01-01 | End: 2023-01-01 | Stop reason: HOSPADM

## 2023-01-01 RX ORDER — LEVOTHYROXINE SODIUM 88 UG/1
88 TABLET ORAL EVERY MORNING
Status: DISCONTINUED | OUTPATIENT
Start: 2023-01-01 | End: 2023-01-01 | Stop reason: HOSPADM

## 2023-01-01 RX ORDER — FAMOTIDINE 10 MG/ML
20 INJECTION INTRAVENOUS 2 TIMES DAILY
Status: DISCONTINUED | OUTPATIENT
Start: 2023-01-01 | End: 2023-01-01

## 2023-01-01 RX ORDER — AMLODIPINE BESYLATE 5 MG/1
10 TABLET ORAL DAILY
Status: DISCONTINUED | OUTPATIENT
Start: 2023-01-01 | End: 2023-01-01 | Stop reason: HOSPADM

## 2023-01-01 RX ORDER — BACLOFEN 10 MG/1
10 TABLET ORAL 3 TIMES DAILY
Status: ON HOLD | COMMUNITY
Start: 2023-01-01 | End: 2023-01-01 | Stop reason: HOSPADM

## 2023-01-01 RX ORDER — LEVOTHYROXINE SODIUM 88 UG/1
88 TABLET ORAL EVERY MORNING
Qty: 30 TABLET | Refills: 0 | Status: SHIPPED | OUTPATIENT
Start: 2023-01-01 | End: 2023-01-01

## 2023-01-01 RX ORDER — LACOSAMIDE 50 MG/1
50 TABLET ORAL EVERY MORNING
Status: DISCONTINUED | OUTPATIENT
Start: 2023-01-01 | End: 2023-01-01 | Stop reason: HOSPADM

## 2023-01-01 RX ORDER — HYDRALAZINE HYDROCHLORIDE 20 MG/ML
10 INJECTION INTRAMUSCULAR; INTRAVENOUS EVERY 6 HOURS PRN
Status: DISCONTINUED | OUTPATIENT
Start: 2023-01-01 | End: 2023-01-01 | Stop reason: HOSPADM

## 2023-01-01 RX ORDER — DEXTROSE MONOHYDRATE 100 MG/ML
25 INJECTION, SOLUTION INTRAVENOUS
Status: DISCONTINUED | OUTPATIENT
Start: 2023-01-01 | End: 2023-01-01 | Stop reason: HOSPADM

## 2023-01-01 RX ORDER — GLUCAGON 1 MG
1 KIT INJECTION
Status: DISCONTINUED | OUTPATIENT
Start: 2023-01-01 | End: 2023-01-01 | Stop reason: HOSPADM

## 2023-01-01 RX ORDER — HYDROCODONE BITARTRATE AND ACETAMINOPHEN 5; 325 MG/1; MG/1
1 TABLET ORAL EVERY 6 HOURS PRN
Status: DISCONTINUED | OUTPATIENT
Start: 2023-01-01 | End: 2023-01-01 | Stop reason: HOSPADM

## 2023-01-01 RX ORDER — TAMSULOSIN HYDROCHLORIDE 0.4 MG/1
0.4 CAPSULE ORAL DAILY
Status: DISCONTINUED | OUTPATIENT
Start: 2023-01-01 | End: 2023-01-01 | Stop reason: HOSPADM

## 2023-01-01 RX ORDER — IPRATROPIUM BROMIDE AND ALBUTEROL SULFATE 2.5; .5 MG/3ML; MG/3ML
3 SOLUTION RESPIRATORY (INHALATION) EVERY 6 HOURS PRN
Status: DISCONTINUED | OUTPATIENT
Start: 2023-01-01 | End: 2023-01-01

## 2023-01-01 RX ORDER — FLUOXETINE HYDROCHLORIDE 20 MG/1
20 CAPSULE ORAL DAILY
Status: ON HOLD | COMMUNITY
Start: 2022-01-01 | End: 2023-01-01 | Stop reason: HOSPADM

## 2023-01-01 RX ORDER — ACETAMINOPHEN 500 MG
500 TABLET ORAL EVERY 6 HOURS PRN
Status: ON HOLD | COMMUNITY
End: 2023-01-01 | Stop reason: HOSPADM

## 2023-01-01 RX ORDER — IBUPROFEN 200 MG
24 TABLET ORAL
Status: DISCONTINUED | OUTPATIENT
Start: 2023-01-01 | End: 2023-01-01 | Stop reason: HOSPADM

## 2023-01-01 RX ORDER — ROSUVASTATIN CALCIUM 40 MG/1
40 TABLET, COATED ORAL NIGHTLY
Qty: 30 TABLET | Refills: 0 | Status: SHIPPED | OUTPATIENT
Start: 2023-01-01 | End: 2023-01-01

## 2023-01-01 RX ORDER — AMLODIPINE BESYLATE 10 MG/1
10 TABLET ORAL DAILY
Qty: 30 TABLET | Refills: 11 | Status: SHIPPED | OUTPATIENT
Start: 2023-01-01 | End: 2024-09-13

## 2023-01-01 RX ORDER — LACOSAMIDE 150 MG/1
150 TABLET ORAL NIGHTLY
COMMUNITY

## 2023-01-01 RX ORDER — ACETAMINOPHEN 325 MG/1
650 TABLET ORAL EVERY 6 HOURS PRN
Status: DISCONTINUED | OUTPATIENT
Start: 2023-01-01 | End: 2023-01-01 | Stop reason: HOSPADM

## 2023-01-01 RX ORDER — NALOXONE HCL 0.4 MG/ML
0.02 VIAL (ML) INJECTION
Status: DISCONTINUED | OUTPATIENT
Start: 2023-01-01 | End: 2023-01-01 | Stop reason: HOSPADM

## 2023-01-01 RX ORDER — NAPROXEN 250 MG/1
250 TABLET ORAL 2 TIMES DAILY
Status: ON HOLD | COMMUNITY
End: 2023-01-01 | Stop reason: HOSPADM

## 2023-01-01 RX ORDER — TAMSULOSIN HYDROCHLORIDE 0.4 MG/1
0.4 CAPSULE ORAL DAILY
Qty: 30 CAPSULE | Refills: 0 | Status: SHIPPED | OUTPATIENT
Start: 2023-01-01 | End: 2023-01-01

## 2023-01-01 RX ORDER — AMLODIPINE BESYLATE 5 MG/1
5 TABLET ORAL DAILY
Status: DISCONTINUED | OUTPATIENT
Start: 2023-01-01 | End: 2023-01-01

## 2023-01-01 RX ORDER — LACOSAMIDE 100 MG/1
100 TABLET ORAL NIGHTLY
Status: ON HOLD | COMMUNITY
Start: 2022-01-01 | End: 2023-01-01 | Stop reason: HOSPADM

## 2023-01-01 RX ORDER — TALC
9 POWDER (GRAM) TOPICAL NIGHTLY PRN
Status: DISCONTINUED | OUTPATIENT
Start: 2023-01-01 | End: 2023-01-01 | Stop reason: HOSPADM

## 2023-01-01 RX ORDER — LACOSAMIDE 50 MG/1
25 TABLET ORAL 2 TIMES DAILY
Status: ON HOLD | COMMUNITY
Start: 2022-01-01 | End: 2023-01-01 | Stop reason: HOSPADM

## 2023-01-01 RX ORDER — POLYETHYLENE GLYCOL 3350 17 G/17G
17 POWDER, FOR SOLUTION ORAL 3 TIMES DAILY PRN
Status: DISCONTINUED | OUTPATIENT
Start: 2023-01-01 | End: 2023-01-01 | Stop reason: HOSPADM

## 2023-01-01 RX ORDER — LEVETIRACETAM 500 MG/1
500 TABLET ORAL 2 TIMES DAILY
Status: DISCONTINUED | OUTPATIENT
Start: 2023-01-01 | End: 2023-01-01 | Stop reason: HOSPADM

## 2023-01-01 RX ORDER — HYDRALAZINE HYDROCHLORIDE 25 MG/1
25 TABLET, FILM COATED ORAL EVERY 8 HOURS
Qty: 90 TABLET | Refills: 11 | Status: SHIPPED | OUTPATIENT
Start: 2023-01-01 | End: 2024-09-12

## 2023-01-01 RX ORDER — MORPHINE SULFATE 4 MG/ML
2 INJECTION, SOLUTION INTRAMUSCULAR; INTRAVENOUS EVERY 6 HOURS PRN
Status: DISCONTINUED | OUTPATIENT
Start: 2023-01-01 | End: 2023-01-01 | Stop reason: HOSPADM

## 2023-01-01 RX ORDER — ATORVASTATIN CALCIUM 40 MG/1
40 TABLET, FILM COATED ORAL DAILY
Status: DISCONTINUED | OUTPATIENT
Start: 2023-01-01 | End: 2023-01-01 | Stop reason: HOSPADM

## 2023-01-01 RX ORDER — BACLOFEN 10 MG/1
10 TABLET ORAL 3 TIMES DAILY
COMMUNITY

## 2023-01-01 RX ORDER — ONDANSETRON 2 MG/ML
4 INJECTION INTRAMUSCULAR; INTRAVENOUS EVERY 8 HOURS PRN
Status: DISCONTINUED | OUTPATIENT
Start: 2023-01-01 | End: 2023-01-01 | Stop reason: HOSPADM

## 2023-01-01 RX ORDER — FAMOTIDINE 10 MG/ML
20 INJECTION INTRAVENOUS DAILY
Status: DISCONTINUED | OUTPATIENT
Start: 2023-01-01 | End: 2023-01-01

## 2023-01-01 RX ORDER — ACETAMINOPHEN 325 MG/1
650 TABLET ORAL EVERY 4 HOURS PRN
Status: DISCONTINUED | OUTPATIENT
Start: 2023-01-01 | End: 2023-01-01 | Stop reason: HOSPADM

## 2023-01-01 RX ORDER — FAMOTIDINE 20 MG/1
1 TABLET, FILM COATED ORAL NIGHTLY
Status: ON HOLD | COMMUNITY
End: 2023-01-01 | Stop reason: HOSPADM

## 2023-01-01 RX ORDER — DEXTROSE MONOHYDRATE 100 MG/ML
12.5 INJECTION, SOLUTION INTRAVENOUS
Status: DISCONTINUED | OUTPATIENT
Start: 2023-01-01 | End: 2023-01-01 | Stop reason: HOSPADM

## 2023-01-01 RX ORDER — FAMOTIDINE 20 MG/1
20 TABLET, FILM COATED ORAL 2 TIMES DAILY
Qty: 60 TABLET | Refills: 0 | Status: SHIPPED | OUTPATIENT
Start: 2023-01-01 | End: 2023-01-01

## 2023-01-01 RX ORDER — LEVETIRACETAM 500 MG/1
500 TABLET ORAL 2 TIMES DAILY
Qty: 60 TABLET | Refills: 0 | Status: SHIPPED | OUTPATIENT
Start: 2023-01-01 | End: 2023-01-01 | Stop reason: ALTCHOICE

## 2023-01-01 RX ORDER — ALBUTEROL SULFATE 0.83 MG/ML
2.5 SOLUTION RESPIRATORY (INHALATION) EVERY 6 HOURS PRN
Status: DISCONTINUED | OUTPATIENT
Start: 2023-01-01 | End: 2023-01-01 | Stop reason: HOSPADM

## 2023-01-01 RX ORDER — ROSUVASTATIN CALCIUM 40 MG/1
40 TABLET, COATED ORAL
Status: ON HOLD | COMMUNITY
Start: 2023-01-01 | End: 2023-01-01 | Stop reason: SDUPTHER

## 2023-01-01 RX ORDER — MAG HYDROX/ALUMINUM HYD/SIMETH 200-200-20
30 SUSPENSION, ORAL (FINAL DOSE FORM) ORAL 4 TIMES DAILY PRN
Status: DISCONTINUED | OUTPATIENT
Start: 2023-01-01 | End: 2023-01-01 | Stop reason: HOSPADM

## 2023-01-01 RX ORDER — LEVOTHYROXINE SODIUM 88 UG/1
1 TABLET ORAL EVERY MORNING
Status: ON HOLD | COMMUNITY
End: 2023-01-01 | Stop reason: SDUPTHER

## 2023-01-01 RX ADMIN — POLYETHYLENE GLYCOL 3350 17 G: 17 POWDER, FOR SOLUTION ORAL at 05:02

## 2023-01-01 RX ADMIN — LEVETIRACETAM 500 MG: 500 TABLET, FILM COATED ORAL at 09:02

## 2023-01-01 RX ADMIN — FAMOTIDINE 20 MG: 20 TABLET, FILM COATED ORAL at 09:02

## 2023-01-01 RX ADMIN — LEVETIRACETAM 500 MG: 500 TABLET, FILM COATED ORAL at 08:02

## 2023-01-01 RX ADMIN — LEVOTHYROXINE SODIUM 88 MCG: 88 TABLET ORAL at 06:02

## 2023-01-01 RX ADMIN — LEVETIRACETAM 500 MG: 500 TABLET, FILM COATED ORAL at 08:03

## 2023-01-01 RX ADMIN — FAMOTIDINE 20 MG: 20 TABLET, FILM COATED ORAL at 09:03

## 2023-01-01 RX ADMIN — FAMOTIDINE 20 MG: 20 TABLET, FILM COATED ORAL at 08:02

## 2023-01-01 RX ADMIN — BACLOFEN 10 MG: 10 TABLET ORAL at 09:09

## 2023-01-01 RX ADMIN — BACLOFEN 10 MG: 10 TABLET ORAL at 04:09

## 2023-01-01 RX ADMIN — LACOSAMIDE 150 MG: 50 TABLET, FILM COATED ORAL at 09:09

## 2023-01-01 RX ADMIN — ATORVASTATIN CALCIUM 40 MG: 40 TABLET, FILM COATED ORAL at 09:02

## 2023-01-01 RX ADMIN — HYDRALAZINE HYDROCHLORIDE 10 MG: 20 INJECTION INTRAMUSCULAR; INTRAVENOUS at 08:09

## 2023-01-01 RX ADMIN — BACLOFEN 10 MG: 10 TABLET ORAL at 08:09

## 2023-01-01 RX ADMIN — HYDRALAZINE HYDROCHLORIDE 10 MG: 20 INJECTION INTRAMUSCULAR; INTRAVENOUS at 06:09

## 2023-01-01 RX ADMIN — TAMSULOSIN HYDROCHLORIDE 0.4 MG: 0.4 CAPSULE ORAL at 08:03

## 2023-01-01 RX ADMIN — LEVOTHYROXINE SODIUM 88 MCG: 88 TABLET ORAL at 07:02

## 2023-01-01 RX ADMIN — SODIUM CHLORIDE, POTASSIUM CHLORIDE, SODIUM LACTATE AND CALCIUM CHLORIDE 1000 ML: 600; 310; 30; 20 INJECTION, SOLUTION INTRAVENOUS at 07:02

## 2023-01-01 RX ADMIN — POLYETHYLENE GLYCOL 3350 17 G: 17 POWDER, FOR SOLUTION ORAL at 09:02

## 2023-01-01 RX ADMIN — ATORVASTATIN CALCIUM 40 MG: 40 TABLET, FILM COATED ORAL at 08:03

## 2023-01-01 RX ADMIN — ATORVASTATIN CALCIUM 40 MG: 40 TABLET, FILM COATED ORAL at 08:02

## 2023-01-01 RX ADMIN — TAMSULOSIN HYDROCHLORIDE 0.4 MG: 0.4 CAPSULE ORAL at 08:09

## 2023-01-01 RX ADMIN — LEVETIRACETAM 500 MG: 500 TABLET, FILM COATED ORAL at 09:03

## 2023-01-01 RX ADMIN — SODIUM CHLORIDE 150 MG: 9 INJECTION, SOLUTION INTRAVENOUS at 08:09

## 2023-01-01 RX ADMIN — LEVOTHYROXINE SODIUM 88 MCG: 88 TABLET ORAL at 05:03

## 2023-01-01 RX ADMIN — LEVOTHYROXINE SODIUM 88 MCG: 88 TABLET ORAL at 07:03

## 2023-01-01 RX ADMIN — FAMOTIDINE 20 MG: 20 TABLET, FILM COATED ORAL at 08:03

## 2023-01-01 RX ADMIN — OLOPATADINE HYDROCHLORIDE 1 DROP: 1 SOLUTION OPHTHALMIC at 09:02

## 2023-01-01 RX ADMIN — Medication 10 ML: at 09:03

## 2023-01-01 RX ADMIN — TAMSULOSIN HYDROCHLORIDE 0.4 MG: 0.4 CAPSULE ORAL at 09:02

## 2023-01-01 RX ADMIN — OLOPATADINE HYDROCHLORIDE 1 DROP: 1 SOLUTION OPHTHALMIC at 09:03

## 2023-01-01 RX ADMIN — ATORVASTATIN CALCIUM 40 MG: 40 TABLET, FILM COATED ORAL at 09:09

## 2023-01-01 RX ADMIN — Medication 10 ML: at 09:02

## 2023-01-01 RX ADMIN — LEVOTHYROXINE SODIUM 88 MCG: 88 TABLET ORAL at 05:09

## 2023-01-01 RX ADMIN — TAMSULOSIN HYDROCHLORIDE 0.4 MG: 0.4 CAPSULE ORAL at 09:03

## 2023-01-01 RX ADMIN — POLYETHYLENE GLYCOL 3350 17 G: 17 POWDER, FOR SOLUTION ORAL at 10:03

## 2023-01-01 RX ADMIN — FAMOTIDINE 20 MG: 10 INJECTION, SOLUTION INTRAVENOUS at 08:09

## 2023-01-01 RX ADMIN — LEVOTHYROXINE SODIUM 88 MCG: 88 TABLET ORAL at 09:03

## 2023-01-01 RX ADMIN — TAMSULOSIN HYDROCHLORIDE 0.4 MG: 0.4 CAPSULE ORAL at 08:02

## 2023-01-01 RX ADMIN — AMLODIPINE BESYLATE 5 MG: 5 TABLET ORAL at 09:09

## 2023-01-01 RX ADMIN — LEVOTHYROXINE SODIUM 88 MCG: 88 TABLET ORAL at 09:02

## 2023-01-01 RX ADMIN — FAMOTIDINE 20 MG: 20 TABLET, FILM COATED ORAL at 08:09

## 2023-01-01 RX ADMIN — MUPIROCIN: 20 OINTMENT TOPICAL at 08:09

## 2023-01-01 RX ADMIN — POTASSIUM BICARBONATE 50 MEQ: 977.5 TABLET, EFFERVESCENT ORAL at 08:09

## 2023-01-01 RX ADMIN — ATORVASTATIN CALCIUM 40 MG: 40 TABLET, FILM COATED ORAL at 08:09

## 2023-01-01 RX ADMIN — AMLODIPINE BESYLATE 5 MG: 5 TABLET ORAL at 08:09

## 2023-01-01 RX ADMIN — SODIUM CHLORIDE, POTASSIUM CHLORIDE, SODIUM LACTATE AND CALCIUM CHLORIDE: 600; 310; 30; 20 INJECTION, SOLUTION INTRAVENOUS at 03:09

## 2023-01-01 RX ADMIN — MELATONIN TAB 3 MG 9 MG: 3 TAB at 10:02

## 2023-01-01 RX ADMIN — SODIUM CHLORIDE 50 MG: 9 INJECTION, SOLUTION INTRAVENOUS at 06:09

## 2023-01-01 RX ADMIN — POLYETHYLENE GLYCOL 3350 17 G: 17 POWDER, FOR SOLUTION ORAL at 03:03

## 2023-01-01 RX ADMIN — BISACODYL 10 MG: 10 SUPPOSITORY RECTAL at 10:02

## 2023-01-01 RX ADMIN — ATORVASTATIN CALCIUM 40 MG: 40 TABLET, FILM COATED ORAL at 09:03

## 2023-01-01 RX ADMIN — POTASSIUM BICARBONATE 50 MEQ: 977.5 TABLET, EFFERVESCENT ORAL at 09:09

## 2023-01-01 RX ADMIN — FAMOTIDINE 20 MG: 20 TABLET, FILM COATED ORAL at 10:02

## 2023-01-01 RX ADMIN — MELATONIN TAB 3 MG 9 MG: 3 TAB at 09:02

## 2023-01-01 RX ADMIN — MELATONIN TAB 3 MG 9 MG: 3 TAB at 08:02

## 2023-01-01 RX ADMIN — FAMOTIDINE 20 MG: 20 TABLET, FILM COATED ORAL at 09:09

## 2023-01-01 RX ADMIN — FAMOTIDINE 20 MG: 20 TABLET, FILM COATED ORAL at 10:03

## 2023-01-01 RX ADMIN — LEVOTHYROXINE SODIUM 88 MCG: 88 TABLET ORAL at 06:03

## 2023-01-01 RX ADMIN — ATORVASTATIN CALCIUM 40 MG: 40 TABLET, FILM COATED ORAL at 10:02

## 2023-01-01 RX ADMIN — ACETAMINOPHEN 650 MG: 325 TABLET, FILM COATED ORAL at 12:02

## 2023-01-01 RX ADMIN — OLOPATADINE HYDROCHLORIDE 1 DROP: 1 SOLUTION OPHTHALMIC at 08:02

## 2023-01-01 RX ADMIN — POLYETHYLENE GLYCOL 3350 17 G: 17 POWDER, FOR SOLUTION ORAL at 04:03

## 2023-01-01 RX ADMIN — MUPIROCIN: 20 OINTMENT TOPICAL at 09:09

## 2023-01-01 RX ADMIN — POLYETHYLENE GLYCOL 3350 17 G: 17 POWDER, FOR SOLUTION ORAL at 08:02

## 2023-01-01 RX ADMIN — ACETAMINOPHEN 650 MG: 325 TABLET, FILM COATED ORAL at 01:02

## 2023-01-01 RX ADMIN — Medication 10 ML: at 08:02

## 2023-01-01 RX ADMIN — LEVOTHYROXINE SODIUM 88 MCG: 88 TABLET ORAL at 06:09

## 2023-01-01 RX ADMIN — HYDRALAZINE HYDROCHLORIDE 10 MG: 20 INJECTION INTRAMUSCULAR; INTRAVENOUS at 09:09

## 2023-01-01 RX ADMIN — TAMSULOSIN HYDROCHLORIDE 0.4 MG: 0.4 CAPSULE ORAL at 10:02

## 2023-01-01 RX ADMIN — TAMSULOSIN HYDROCHLORIDE 0.4 MG: 0.4 CAPSULE ORAL at 09:09

## 2023-01-01 RX ADMIN — IOHEXOL 90 ML: 350 INJECTION, SOLUTION INTRAVENOUS at 12:09

## 2023-01-01 RX ADMIN — POTASSIUM PHOSPHATE, MONOBASIC AND POTASSIUM PHOSPHATE, DIBASIC 30 MMOL: 224; 236 INJECTION, SOLUTION, CONCENTRATE INTRAVENOUS at 11:09

## 2023-01-01 RX ADMIN — BISACODYL 10 MG: 10 SUPPOSITORY RECTAL at 03:02

## 2023-01-01 RX ADMIN — LEVOTHYROXINE SODIUM 88 MCG: 88 TABLET ORAL at 05:02

## 2023-01-01 RX ADMIN — LEVETIRACETAM INJECTION 1000 MG: 10 INJECTION INTRAVENOUS at 12:09

## 2023-01-01 RX ADMIN — OLOPATADINE HYDROCHLORIDE 1 DROP: 1 SOLUTION OPHTHALMIC at 02:02

## 2023-01-01 RX ADMIN — ACETAMINOPHEN 650 MG: 325 TABLET, FILM COATED ORAL at 10:02

## 2023-01-01 RX ADMIN — ACETAMINOPHEN 650 MG: 325 TABLET ORAL at 08:09

## 2023-01-01 RX ADMIN — HYDRALAZINE HYDROCHLORIDE 10 MG: 20 INJECTION INTRAMUSCULAR; INTRAVENOUS at 12:09

## 2023-01-01 RX ADMIN — LEVETIRACETAM 500 MG: 500 TABLET, FILM COATED ORAL at 10:03

## 2023-01-01 RX ADMIN — SODIUM CHLORIDE, POTASSIUM CHLORIDE, SODIUM LACTATE AND CALCIUM CHLORIDE 1000 ML: 600; 310; 30; 20 INJECTION, SOLUTION INTRAVENOUS at 01:02

## 2023-01-01 RX ADMIN — ACETAMINOPHEN 650 MG: 325 TABLET, FILM COATED ORAL at 01:03

## 2023-01-01 RX ADMIN — LEVETIRACETAM 500 MG: 500 TABLET, FILM COATED ORAL at 10:02

## 2023-01-01 RX ADMIN — BARIUM SULFATE 30 ML: 0.81 POWDER, FOR SUSPENSION ORAL at 01:09

## 2023-01-01 RX ADMIN — ACETAMINOPHEN 650 MG: 325 TABLET, FILM COATED ORAL at 04:02

## 2023-01-01 RX ADMIN — BISACODYL 10 MG: 10 SUPPOSITORY RECTAL at 06:02

## 2023-01-01 RX ADMIN — LEVOTHYROXINE SODIUM 88 MCG: 88 TABLET ORAL at 08:03

## 2023-01-01 RX ADMIN — DEXTROSE AND SODIUM CHLORIDE: 5; 450 INJECTION, SOLUTION INTRAVENOUS at 01:09

## 2023-01-01 RX ADMIN — LEVETIRACETAM 500 MG: 500 TABLET, FILM COATED ORAL at 11:02

## 2023-01-01 RX ADMIN — LACOSAMIDE 50 MG: 50 TABLET, FILM COATED ORAL at 05:09

## 2023-02-09 PROBLEM — E03.9 HYPOTHYROIDISM: Status: ACTIVE | Noted: 2023-01-01

## 2023-02-09 PROBLEM — I61.9 INTRAPARENCHYMAL HEMORRHAGE OF BRAIN: Status: ACTIVE | Noted: 2023-01-01

## 2023-02-09 PROBLEM — N40.0 BPH (BENIGN PROSTATIC HYPERPLASIA): Status: ACTIVE | Noted: 2023-01-01

## 2023-02-10 NOTE — SUBJECTIVE & OBJECTIVE
No past medical history on file.    No past surgical history on file.    Review of patient's allergies indicates:  No Known Allergies    No current facility-administered medications on file prior to encounter.     Current Outpatient Medications on File Prior to Encounter   Medication Sig    diclofenac sodium (VOLTAREN) 1 % Gel Apply 4 g topically every 8 (eight) hours as needed.    acetaminophen (TYLENOL) 500 MG tablet Take 500 mg by mouth every 6 (six) hours as needed for Pain.    baclofen (LIORESAL) 10 MG tablet Take 10 mg by mouth 3 (three) times daily.    famotidine (PEPCID) 20 MG tablet Take 1 tablet by mouth every evening.    FLUoxetine 20 MG capsule Take 20 mg by mouth once daily.    lacosamide (VIMPAT) 100 mg Tab Take 100 mg by mouth every evening.    lacosamide (VIMPAT) 50 mg Tab Take 25 mg by mouth 2 (two) times daily.    levothyroxine (SYNTHROID) 88 MCG tablet Take 1 tablet by mouth every morning.    rosuvastatin (CRESTOR) 40 MG Tab Take 40 mg by mouth.     Family History    None       Tobacco Use    Smoking status: Not on file    Smokeless tobacco: Not on file   Substance and Sexual Activity    Alcohol use: Not on file    Drug use: Not on file    Sexual activity: Not on file     Review of Systems   Reason unable to perform ROS: Patient can only answer with yes or no, does not appear to be in any significant distress.   Objective:     Vital Signs (Most Recent):  Temp: 97.6 °F (36.4 °C) (02/10/23 1132)  Pulse: 61 (02/10/23 1132)  Resp: 18 (02/10/23 0325)  BP: 105/73 (02/10/23 1132)  SpO2: 97 % (02/10/23 1132) Vital Signs (24h Range):  Temp:  [97.5 °F (36.4 °C)-98.4 °F (36.9 °C)] 97.6 °F (36.4 °C)  Pulse:  [56-82] 61  Resp:  [18-20] 18  SpO2:  [95 %-98 %] 97 %  BP: ()/(55-73) 105/73     Weight: 74.7 kg (164 lb 10.9 oz)  Body mass index is 22.97 kg/m².    Physical Exam  Constitutional:       General: He is not in acute distress.     Appearance: Normal appearance. He is normal weight.   HENT:       Mouth/Throat:      Mouth: Mucous membranes are moist.      Pharynx: Oropharynx is clear. No oropharyngeal exudate or posterior oropharyngeal erythema.   Eyes:      Extraocular Movements: Extraocular movements intact.      Conjunctiva/sclera: Conjunctivae normal.      Pupils: Pupils are equal, round, and reactive to light.   Neck:      Thyroid: No thyromegaly.   Cardiovascular:      Rate and Rhythm: Normal rate and regular rhythm.      Heart sounds: Murmur heard.     No friction rub. No gallop.   Pulmonary:      Breath sounds: Normal breath sounds.   Abdominal:      General: Bowel sounds are normal. There is no distension.      Palpations: Abdomen is soft.      Tenderness: There is no abdominal tenderness.   Lymphadenopathy:      Cervical: No cervical adenopathy.   Skin:     General: Skin is warm.      Findings: No rash.   Neurological:      General: No focal deficit present.      Mental Status: He is oriented to person, place, and time.      Cranial Nerves: Dysarthria present. No cranial nerve deficit.      Motor: Weakness (RUEX) and abnormal muscle tone present.   Psychiatric:         Mood and Affect: Mood is not anxious or depressed. Affect is not inappropriate.         CRANIAL NERVES     CN III, IV, VI   Pupils are equal, round, and reactive to light.     Significant Labs: All pertinent labs within the past 24 hours have been reviewed.    Significant Imaging: I have reviewed all pertinent imaging results/findings within the past 24 hours.

## 2023-02-10 NOTE — PT/OT/SLP EVAL
Physical Therapy Swingbed Evaluation      Patient Name:  Bhupendra Park   MRN:  [id number]    History:     No past medical history on file.    No past surgical history on file.      Subjective     Chief Complaint: patient with no complaints; per wife: impaired mobility  Patient/Family Comments/goals: To regain functional independence for safe dc home  Pain/Comfort:  Pain Rating 1: 0/10      Living Environment:  Lives with: Spouse  Home Environment: Single level home; walk-in shower  Previous level of function: Independent with functional mobility without use of AD; Modified Independent with ADLs and daily routines. Patient still taking care of farm animals, taking care of land using tractors, and driving to OP therapy. Pt has hx of CVA with R sided impairments  Equipment used at home: none.      Objective:     Communicated with nursing prior to session.  Patient found supine with bed alarm  upon PT entry to room.    General Precautions: Standard, fall, aphasia   Orthopedic Precautions:    Braces:    Respiratory Status: Room air     Vitals Value    Room air      Oxygen (L)     Blood pressure     Heart rate         Exams:  RLE increased tone noted; MAS score of 1+  RLE ROM: WFL  RLE Strength: WFL  LLE ROM: WFL  LLE Strength: WFL    Functional Mobility:  Bed Mobility:     Supine to Sit: stand by assistance  Transfers:     Sit to Stand:  minimum assistance with quad cane  Bed to Chair: moderate assistance with  quad cane  using  Step Transfer  Gait: 5ft bed > bedside chair using LBQC on LUE; Patient able to initiate swing phase, but required physical assistance to advance RLE and block during stance phase  Balance: Standing = Fair with 1UE support; Poor with no UE support; Sitting = Good     Therapeutic Activities and Exercises:       Additional information: Patient with aphasia and slow processing; allow pt time to respond and provide with simple yes/no questions    Patient left up in chair with all lines intact, call  button in reach, chair alarm on, restraints reapplied at end of session, and nurse and CNA notified.      Assessment:     Bhupendra Park is a 63 y.o. male admitted with a medical diagnosis of Intraparenchymal hemorrhage of brain.  He presents with the following impairments/functional limitations:  weakness, gait instability, impaired endurance, impaired balance, decreased lower extremity function, decreased safety awareness, impaired functional mobility, abnormal tone.    Rehab Prognosis: Good; patient would benefit from acute skilled PT services to address these deficits and reach maximum level of function.    Recent Surgery: * No surgery found *        Recommendations:     Discharge Recommendations:  outpatient PT   Discharge Equipment Recommendations: bedside commode, cane, quad       Plan:     During this hospitalization, patient to be seen 6 x/week to address the identified rehab impairments via gait training, therapeutic activities, therapeutic exercises, neuromuscular re-education and progress toward the following goals:    GOALS:   Multidisciplinary Problems       Physical Therapy Goals          Problem: Physical Therapy    Goal Priority Disciplines Outcome Goal Variances Interventions   Physical Therapy Goal     PT, PT/OT Ongoing, Progressing     Description: Goals to be met by: Discharge     Patient will increase functional independence with mobility by performin. Supine to sit with Modified [place]  2. Sit to supine with Modified [place]  3. Sit to stand transfer with Supervision  4. Gait  x 150 feet with Contact Guard Assistance using Quad Cane vs LRAD.                          Time Tracking:       PT Start Time: 0900     PT Stop Time: 0930  PT Total Time (min): 30 min     Billable Minutes: Evaluation 30 minutes MIN complexity      02/10/2023  phys

## 2023-02-10 NOTE — PT/OT/SLP EVAL
Speech Language Pathology Evaluation  Cognitive/Bedside Swallow    Patient Name:  Bhupendra Park   MRN:  [id number]  Admitting Diagnosis: Intraparenchymal hemorrhage of brain    Recommendations:                  General Recommendations:  Dysphagia therapy and Speech/language therapy  Diet recommendations:  Minced & Moist Diet - IDDSI Level 5, Thin liquids - IDDSI Level 0   Aspiration Precautions: 1 bite/sip at a time, Alternating bites/sips, Assistance with meals, Check for pocketing/oral residue, Eliminate distractions, Feed only when awake/alert, Frequent oral care, HOB to 90 degrees, Meds whole 1 at a time, Small bites/sips, and Standard aspiration precautions   General Precautions: Standard, aphasia, aspiration  Communication strategies:  yes/no questions only, provide increased time to answer, and go to room if call light pushed    History:     No past medical history on file.    No past surgical history on file.    Social History: Patient lives with wife.    Prior Intubation HX:      Modified Barium Swallow: completed on 2/7/23 at [facility] [address] [facility]    Chest X-Rays: none on file    Prior diet: soft and bite sized/thin on admit    Occupation/hobbies/homemaking:     Subjective     Pt sitting in gerichair upon SLP arrival. Pt pleasant and alert.    Patient goals:      Pain/Comfort:       Respiratory Status: Room air    Objective:     Cognitive Status:    Orientation Person Pt able to state his first, middle and last name.     Receptive Language:   Comprehension:      Questions Simple yes/no 75% acc  Complex yes/no 0% acc  Commands  One step 0% acc  Object identifications 25%  in Fo 4  Pt selected item on far L side across 4 trials presented.    Pragmatics:    Flat affect    Expressive Language:  Verbal:    Automatic Speech  Counting 0%, Days of the week 0%, and Phrase completion 75% acc  Naming Confrontation 0% acc    Nonverbal:   Gestures no response to utilizing thumbs up/down       Motor Speech:  Unable to fully assess due  to limited verbalizations, though pt's speech deemed greater thatn 90% intelligible when stating full name.    Voice:   WFL via simple responses    Visual-Spatial:  Right Neglect suspected due to pt selecting items on L side across 4 trials of object ID    Reading:   Unable to read a letter, number or CVC word      Written Expression:   Pt wrote his first and last name using LUE w/ ~50% legibility    Oral Musculature Evaluation  Oral Musculature: general weakness  Dentition: present and adequate  Mucosal Quality: good  Voice Prior to PO Intake: WNL via one word response    Bedside Swallow Eval:   Consistencies Assessed:  Thin liquids via single and sequential straw sips  Puree pudding  Soft solids cookie      Oral Phase:   Excess chewing  Prolonged mastication  Oral residue  Slow oral transit time    Pharyngeal Phase:   no overt clinical signs/symptoms of pharyngeal dysphagia    Compensatory Strategies  None    Treatment: skilled SLP services warranted to address language and dysphagia.    Assessment:     Bhupendra Park is a 63 y.o. male with an SLP diagnosis of Aphasia and Dysphagia.  He presents with impaired expressive and receptive language skills and requires a modified diet to ensure safety w/ PO.  Pt's diet downgraded to minced and moist. Continue thin liquids. Meds whole 1 AAT.  Provide pt w/ simple YNQs and provide increased time to respond.    Goals:   Multidisciplinary Problems       SLP Goals          Problem: SLP    Goal Priority Disciplines Outcome   SLP Goal     SLP Ongoing, Progressing   Description: LTG:   Pt will tolerate LRD w/o overt s/s of aspiration.  Pt will improve expressive and receptive language skills in order to effectively communicate wants and needs Manish.    STG:  Pt will tolerate minced and moist w/ good oral clearance and w/o overt s/s of aspiration.  Pt will participate in trials of soft and bite sized w/ good oral clearance and w/o overt s/s of aspiration.  Pt will answer 2U  YNQs w/ 80% acc Manish.  Pt will follow 1-step commands w/ 80% acc Manish.  Pt will ID objects in Fo3 w/ 80% acc Manish.  Pt will complete auto speech tasks w/ 80% acc Manish.  Pt will name common objects w/ 80% acc Manish.                         Plan:     Patient to be seen:   (PRN)   Plan of Care expires:  03/03/23  Plan of Care reviewed with:  patient   SLP Follow-Up:  Yes       Discharge recommendations:      Barriers to Discharge:  Level of Skilled Assistance Needed modA x2    Time Tracking:     SLP Treatment Date:      Speech Start Time:  0940  Speech Stop Time:  1022     Speech Total Time (min):  42 min    Billable Minutes: Eval 30 min speech/language and 12 min swallowing       Adamaris Carrion M.S., CCC-SLP   02/10/2023

## 2023-02-10 NOTE — PLAN OF CARE
Problem: Occupational Therapy  Goal: Occupational Therapy Goal  Description: Goals to be met by: 3/10/23     Patient will increase functional independence with ADLs by performing:    UE Dressing with Modified [place].  LE Dressing with Modified [place].  Toileting from toilet with Stand-by Assistance for hygiene and clothing management.   Toilet transfer to toilet with Supervision.    Outcome: Ongoing, Progressing

## 2023-02-10 NOTE — PLAN OF CARE
Problem: SLP  Goal: SLP Goal  Description: LTG:   Pt will tolerate LRD w/o overt s/s of aspiration.  Pt will improve expressive and receptive language skills in order to effectively communicate wants and needs Manish.    STG:  Pt will tolerate minced and moist w/ good oral clearance and w/o overt s/s of aspiration.  Pt will participate in trials of soft and bite sized w/ good oral clearance and w/o overt s/s of aspiration.  Pt will answer 2U YNQs w/ 80% acc Manish.  Pt will follow 1-step commands w/ 80% acc Manish.  Pt will ID objects in Fo3 w/ 80% acc Manish.  Pt will complete auto speech tasks w/ 80% acc Manish.  Pt will name common objects w/ 80% acc Manish.    Outcome: Ongoing, Progressing

## 2023-02-10 NOTE — ASSESSMENT & PLAN NOTE
-Continue with Keppra, atorvastatin, blood pressure goal less than 140/90  -on 02/06/2023 recommendation was documented that patient will need an MRI brain with and without contrast in 4-6 weeks to rule out hemorrhagic infarct or mass and patient is to be followed by Dr. Figueroa in [place]

## 2023-02-10 NOTE — PLAN OF CARE
Problem: Adult Inpatient Plan of Care  Goal: Plan of Care Review  Outcome: Ongoing, Progressing  Flowsheets (Taken 2/9/2023 1955)  Plan of Care Reviewed With:   patient   spouse  Goal: Patient-Specific Goal (Individualized)  Outcome: Ongoing, Progressing  Flowsheets (Taken 2/9/2023 1955)  Anxieties, Fears or Concerns: Hospitalization  Individualized Care Needs: Improve speech, strength, and mobility by DC  Goal: Absence of Hospital-Acquired Illness or Injury  Outcome: Ongoing, Progressing  Intervention: Identify and Manage Fall Risk  Flowsheets (Taken 2/9/2023 1955)  Safety Promotion/Fall Prevention:   assistive device/personal item within reach   bed alarm set   instructed to call staff for mobility   nonskid shoes/socks when out of bed  Intervention: Prevent Skin Injury  Flowsheets (Taken 2/9/2023 1955)  Body Position: side-lying  Skin Protection:   adhesive use limited   incontinence pads utilized  Intervention: Prevent and Manage VTE (Venous Thromboembolism) Risk  Flowsheets (Taken 2/9/2023 1955)  Activity Management: Rolling - L1  Range of Motion: active ROM (range of motion) encouraged  Goal: Optimal Comfort and Wellbeing  Outcome: Ongoing, Progressing  Goal: Readiness for Transition of Care  Outcome: Ongoing, Progressing     Problem: Skin Injury Risk Increased  Goal: Skin Health and Integrity  Outcome: Ongoing, Progressing     Problem: Fall Injury Risk  Goal: Absence of Fall and Fall-Related Injury  Outcome: Ongoing, Progressing     Problem: Infection  Goal: Absence of Infection Signs and Symptoms  Outcome: Ongoing, Progressing

## 2023-02-10 NOTE — PLAN OF CARE
Problem: Adult Inpatient Plan of Care  Goal: Plan of Care Review  Outcome: Ongoing, Progressing  Flowsheets (Taken 2/10/2023 1735)  Plan of Care Reviewed With: patient  Goal: Patient-Specific Goal (Individualized)  Outcome: Ongoing, Progressing  Goal: Absence of Hospital-Acquired Illness or Injury  Outcome: Ongoing, Progressing  Intervention: Identify and Manage Fall Risk  Flowsheets (Taken 2/10/2023 1735)  Safety Promotion/Fall Prevention:   assistive device/personal item within reach   bed alarm set   chair alarm set   nonskid shoes/socks when out of bed   instructed to call staff for mobility  Intervention: Prevent Skin Injury  Flowsheets (Taken 2/10/2023 1735)  Body Position: position changed independently  Skin Protection:   adhesive use limited   incontinence pads utilized  Intervention: Prevent and Manage VTE (Venous Thromboembolism) Risk  Flowsheets (Taken 2/10/2023 1735)  Activity Management: Up in chair - L3  VTE Prevention/Management:   bleeding precations maintained   bleeding risk assessed  Range of Motion: active ROM (range of motion) encouraged  Intervention: Prevent Infection  Flowsheets (Taken 2/10/2023 1735)  Infection Prevention:   equipment surfaces disinfected   hand hygiene promoted   rest/sleep promoted  Goal: Optimal Comfort and Wellbeing  Outcome: Ongoing, Progressing  Intervention: Monitor Pain and Promote Comfort  Flowsheets (Taken 2/10/2023 1735)  Pain Management Interventions:   quiet environment facilitated   pillow support provided  Intervention: Provide Person-Centered Care  Flowsheets (Taken 2/10/2023 1735)  Trust Relationship/Rapport:   care explained   choices provided   emotional support provided   empathic listening provided   questions answered   questions encouraged   reassurance provided   thoughts/feelings acknowledged  Goal: Readiness for Transition of Care  Outcome: Ongoing, Progressing     Problem: Skin Injury Risk Increased  Goal: Skin Health and Integrity  Outcome:  Ongoing, Progressing     Problem: Fall Injury Risk  Goal: Absence of Fall and Fall-Related Injury  Outcome: Ongoing, Progressing     Problem: Infection  Goal: Absence of Infection Signs and Symptoms  Outcome: Ongoing, Progressing

## 2023-02-10 NOTE — PLAN OF CARE
[facility]  Initial Discharge Assessment       Primary Care Provider: Kelly Meadows MD    Admission Diagnosis: Intraparenchymal hemorrhage of brain [I61.9]    Admission Date: 2/9/2023  Expected Discharge Date:     Discharge Barriers Identified: None    Payor: HU[employer] MANAGED MEDICARE / Plan: HU[employer] MEDICARE PPO / Product Type: Medicare Advantage /     Extended Emergency Contact Information  Primary Emergency Contact: Sharon Park  Address: [address] [place] [address] [id number] [place] [address]  Home Phone: [phone]  Mobile Phone: [phone]  Relation: Spouse    Discharge Plan A: Home with family, Home Health         [address] [address]  Phone: [phone] Fax: [phone]      Initial Assessment (most recent)       Adult Discharge Assessment - 02/10/23 1554          Discharge Assessment    Assessment Type Discharge Planning Assessment     Confirmed/corrected address, phone number and insurance Yes     Confirmed Demographics Correct on Facesheet     Source of Information family     If unable to respond/provide information was family/caregiver contacted? Yes     Contact Name/Number Sharon spouse [phone]     Communicated ABRAHAM with patient/caregiver Date not available/Unable to determine     Reason For Admission CVA     People in Home spouse     Facility Arrived From: [facility]     Do you expect to return to your current living situation? Yes     Do you have help at home or someone to help you manage your care at home? Yes     Who are your caregiver(s) and their phone number(s)? Sharon spouse [phone]     Prior to hospitilization cognitive status: Not Oriented to Time     Current cognitive status: Not Oriented to Place     Walking or Climbing Stairs ambulation difficulty, dependent     Home Accessibility wheelchair accessible     Home Layout Able to live on 1st floor     Equipment  Currently Used at Home none     Readmission within 30 days? No     Patient currently being followed by outpatient case management? No     Do you currently have service(s) that help you manage your care at home? No     Do you take prescription medications? Yes     Do you have prescription coverage? Yes     Coverage [employer]     Do you have any problems affording any of your prescribed medications? TBD     Is the patient taking medications as prescribed? yes     Who is going to help you get home at discharge? Sharon spouse [phone]     How do you get to doctors appointments? family or friend will provide     Are you on dialysis? No     Do you take coumadin? No     Discharge Plan A Home with family;Home Health     DME Needed Upon Discharge  walker, rolling;wheelchair     Discharge Plan discussed with: Spouse/sig other     Name(s) and Number(s) Sharon spouse [phone]     Discharge Barriers Identified None        Physical Activity    On average, how many days per week do you engage in moderate to strenuous exercise (like a brisk walk)? 0 days     On average, how many minutes do you engage in exercise at this level? 0 min        Financial Resource Strain    How hard is it for you to pay for the very basics like food, housing, medical care, and heating? Not hard at all        Housing Stability    In the last 12 months, was there a time when you were not able to pay the mortgage or rent on time? No     In the last 12 months, how many places have you lived? 1     In the last 12 months, was there a time when you did not have a steady place to sleep or slept in a shelter (including now)? No        Transportation Needs    In the past 12 months, has lack of transportation kept you from medical appointments or from getting medications? No        Food Insecurity    Within the past 12 months, you worried that your food would run out before you got the money to buy more. Never true        Stress    Do you feel stress -  tense, restless, nervous, or anxious, or unable to sleep at night because your mind is troubled all the time - these days? Only a little        Social Connections    In a typical week, how many times do you talk on the phone with family, friends, or neighbors? More than three times a week     How often do you get together with friends or relatives? More than three times a week     How often do you attend Orthodox or [employer]aritan services? 1 to 4 times per year     Do you belong to any clubs or organizations such as Orthodox groups, unions, fraternal or athletic groups, or school groups? No     How often do you attend meetings of the clubs or organizations you belong to? 1 to 4 times per year     Are you , , , , never , or living with a partner?         Alcohol Use    Q1: How often do you have a drink containing alcohol? Never     Q2: How many drinks containing alcohol do you have on a typical day when you are drinking? Patient does not drink     Q3: How often do you have six or more drinks on one occasion? Never        OTHER    Name(s) of People in Home Sharon spouse [phone]

## 2023-02-10 NOTE — HPI
63-year-old male with HTN, left frontal IPH, prior cervical spine surgery status post spinal cord stimulator who presented to ED with acute encephalopathy and was subsequently found to have recurrent right frontal intraparenchymal hemorrhage. Neurosurgery recommended medical management. Serial CTs showed stability of bleed. Transferred to our facility for continued rehabilitation.   Majority of patient's information obtained from medical records as patient has receptive and expressive aphasia and is able to answer only with yes / no.

## 2023-02-10 NOTE — PLAN OF CARE
Problem: Physical Therapy  Goal: Physical Therapy Goal  Description: Goals to be met by: Discharge     Patient will increase functional independence with mobility by performin. Supine to sit with Modified [place]  2. Sit to supine with Modified [place]  3. Sit to stand transfer with Supervision  4. Gait  x 150 feet with Contact Guard Assistance using Quad Cane vs LRAD.     Outcome: Ongoing, Progressing

## 2023-02-10 NOTE — PT/OT/SLP EVAL
Occupational Therapy   [facility] Evaluation    Name: Bhupendra Park  MRN: [id number]  Admitting Diagnosis:  Intraparenchymal hemorrhage of brain      History:   Bhupendra Park is a 63 y.o. male with a medical diagnosis of Intraparenchymal hemorrhage of brain.    No past medical history on file.    No past surgical history on file.    Subjective     Chief Complaint: Pt reported no complaints of dizziness or pain   Patient/Family Comments/goals: Safe return to PLOF     Occupational Profile:  Lives with: wife   Home Environment: Single level home with walk in shower   Previous level of function: Pt was Mod I with all ADL and daily routines. Pt was not using AD for functional mobility, having minimal functional use of RUE due to previous CVA. Pt was driving, taking care of farm animals, and taking care of land using tractors.   Equipment Used at Home:  none      Pain/Comfort:  Pain Rating 1: 0/10    Objective:     Communicated with: Margaret prior to session.  Patient found supine with bed alarm, peripheral IV upon OT entry to room.    General Precautions: Standard, fall, aphasia   Orthopedic Precautions:    Braces: N/A  Respiratory Status: Room air    Occupational Performance:    Mobility  Assist level Comments    Bed mobility supervision    Transfer moderate assist With use of quad cane   Functional mobility moderate assist With use of quad cane, assistance to advance RLE with Pt initiating    Sit to stand transitions contact guard    Other:          Activities of Daily Living Assist level Comments    Feeding supervision    Grooming/hygiene supervision    Bathing moderate assist    Upper body dressing contact guard    Lower body dressing minimal assist    Toileting maximal assist OT handed Pt wipe to complete perineal hygiene, however Pt began cleaning his mouth versus perineal    Toilet transfer moderate assist To BSC   Adaptive equipment training     Other:       Physical Exam:  Upper Extremity Strength:    -        Right Upper Extremity: Pt's RUE presents with significant hypertonicity due to previous stroke with minimal functional use  -       Left Upper Extremity: WFL    Cognitive/Visual Perceptual:  Cognitive/Psychosocial Skills:     -       Follows Commands/attention:Follows one-step commands  -       Communication: expressive aphasia and receptive aphasia    Patient left up in chair with call button in reach, chair alarm on, restraints reapplied at end of session, and ANNALEE Centeno notified    Assessment:     . Performance deficits affecting function: weakness, impaired self care skills, impaired functional mobility, gait instability, impaired balance, impaired cognition, decreased safety awareness.      Rehab Prognosis: Good; patient would benefit from acute skilled OT services to address these deficits and reach maximum level of function.       Plan:     Patient to be seen 6 x/week, 5 x/week to address the above listed problems via self-care/home management, therapeutic exercises, therapeutic activities  Plan of Care Reviewed with: patient, spouse      GOALS:   Multidisciplinary Problems       Occupational Therapy Goals          Problem: Occupational Therapy    Goal Priority Disciplines Outcome Interventions   Occupational Therapy Goal     OT, PT/OT Ongoing, Progressing    Description: Goals to be met by: 3/10/23     Patient will increase functional independence with ADLs by performing:    UE Dressing with Modified [place].  LE Dressing with Modified [place].  Toileting from toilet with Stand-by Assistance for hygiene and clothing management.   Toilet transfer to toilet with Supervision.                           Recommendations:     Discharge Recommendations: home with home health  Discharge Equipment Recommendations:  cane, quad, bedside commode      Time Tracking:     OT Date of Treatment: 02/10/23  OT Start Time: 0900  OT Stop Time: 0935  OT Total Time (min): 35 min    Billable Minutes:Evaluation mod  complexity for 35 minutes with PT Andrei     2/10/2023

## 2023-02-10 NOTE — PT/OT/SLP PROGRESS
Occupational Therapy  Treatment    Name: Bhupendra Park    : 1960 (63 y.o.)  MRN: [id number]           TREATMENT SUMMARY AND RECOMMENDATIONS:      OT Date of Treatment: 02/10/23  OT Start Time: 1305  OT Stop Time: 1325  OT Total Time (min): 20 min      Subjective Assessment:   No complaints  Lethargic   x Awake, alert, cooperative  Impulsive    Uncooperative   Flat affect    Agitated  c/o pain   x Appropriate  c/o fatigue    Confused x Treated at bedside     Emotionally labile  Treated in gym/dept.      Other:        Therapy Precautions:    Cognitive deficits  Spinal precautions    Collar - hard  Sternal precautions    Collar - soft   TLSO   x Fall risk  LSO    Hip precautions - posterior  Knee immobilizer    Hip precautions - anterior  WBAT   x Impaired communication  Partial weightbearing    Oxygen  TTWB    PEG tube  NWB    Visual deficits      Hearing deficits   Other:        Treatment Objectives:     Mobility Training:    Mobility task Assist level Comments    Bed mobility     Transfer     Sit to stands transitions Min A Recliner<>standing using quad cane and GB   Functional mobility     Sitting balance     Standing balance      Other:        ADL Training:    ADL Assist level Comments    Feeding     Grooming/hygiene     Bathing     Upper body dressing Min A Do hospital gown and don pull-over shirt; pt addressed non-affected UE first and therefore required assistance to pull affected UE through sleeve.    Lower body dressing Min A   Min A   Mod A Don pants; assistance to pull up in back    Don B socks   Don shoes; assist to don on affected side   Toileting     Toilet transfer     Adaptive equipment training     Other:           Therapeutic Exercise:   Exercise Sets Reps Comments                               Additional Comments:      Assessment: Patient tolerated session well. Pt noted with fair carryover of learned aravind-dressing techniques from previous therapy sessions.     OT Plan: Continue with  POC  Revisions made to plan of care: No    GOALS:   Multidisciplinary Problems       Occupational Therapy Goals          Problem: Occupational Therapy    Goal Priority Disciplines Outcome Interventions   Occupational Therapy Goal     OT, PT/OT Ongoing, Progressing    Description: Goals to be met by: 3/10/23     Patient will increase functional independence with ADLs by performing:    UE Dressing with Modified [place].  LE Dressing with Modified [place].  Toileting from toilet with Stand-by Assistance for hygiene and clothing management.   Toilet transfer to toilet with Supervision.                         Skilled OT Minutes Provided: 20  Communication with Treatment Team:     Equipment recommendations:       At end of treatment, patient remained:   Up in chair     Up in wheelchair in room    In bed    With alarm activated    Bed rails up    Call bell in reach    x Family/friends present    Restraints secured properly    In bathroom with CNA/RN notified   x In room with PT/PTA/tech    Nurse aware    Other:

## 2023-02-10 NOTE — PT/OT/SLP PROGRESS
Physical Therapy Treatment Note           Name: Bhupendra Park    : 1960 (63 y.o.)  MRN: [id number]           TREATMENT SUMMARY AND RECOMMENDATIONS:    PT Received On: 02/10/23  PT Start Time: 1325     PT Stop Time: 1340  PT Total Time (min): 15 min     Subjective Assessment:   No complaints  Lethargic   x Awake, alert, cooperative  Uncooperative    Agitated  c/o pain    Appropriate  c/o fatigue    Confused  Treated at bedside     Emotionally labile  Treated in gym/dept.    Impulsive  Other:    Flat affect       Therapy Precautions:    Cognitive deficits  Spinal precautions    Collar - hard  Sternal precautions    Collar - soft   TLSO   x Fall risk  LSO    Hip precautions - posterior  Knee immobilizer    Hip precautions - anterior  WBAT    Impaired communication  Partial weightbearing    Oxygen  TTWB    PEG tube  NWB    Visual deficits x Other: CVA with R deficits    Hearing deficits          Treatment Objectives:     Mobility Training:   Assist level Comments    Bed mobility CGA Sit > supine   Transfer MIN A Stand step transfer bedside chair > bed; assistance to advance RLE   Gait MIN A x2 X20ft with use of LBQC on LUE; Assistance to advance RLE forward and blocking at RLE during stance phase; pt able to initiate swing phase; verbal cues for coordination with cane   Sit to stand transitions MIN A    Sitting balance     Standing balance      Wheelchair mobility     Car transfer     Other:          Therapeutic Exercise:   Exercise Sets Reps Comments                               Additional Comments:      Assessment: Patient tolerated session well. Patient's wife present during session to assist with history questions.    PT Plan: continue POC  Revisions made to plan of care: No    GOALS:   Multidisciplinary Problems       Physical Therapy Goals          Problem: Physical Therapy    Goal Priority Disciplines Outcome Goal Variances Interventions   Physical Therapy Goal     PT, PT/OT Ongoing,  Progressing     Description: Goals to be met by: Discharge     Patient will increase functional independence with mobility by performin. Supine to sit with Modified [place]  2. Sit to supine with Modified [place]  3. Sit to stand transfer with Supervision  4. Gait  x 150 feet with Contact Guard Assistance using Quad Cane vs LRAD.                          Skilled PT Minutes Provided: 15 minutes   Communication with Treatment Team:     Equipment recommendations:       At end of treatment, patient remained:   Up in chair     Up in wheelchair in room   x In bed   x With alarm activated   x Bed rails up   x Call bell in reach    x Family/friends present    Restraints secured properly    In bathroom with CNA/RN notified    Nurse aware    In gym with therapist/tech    Other:

## 2023-02-10 NOTE — PROGRESS NOTES
Inpatient Nutrition Evaluation    Admit Date: 2/9/2023   Total duration of encounter: 1 day    Nutrition Recommendation/Prescription     Continue mince and moist diet.     Nutrition Assessment     Chart Review    Reason Seen: continuous nutrition monitoring and length of stay    Malnutrition Screening Tool Results   Have you recently lost weight without trying?: No  Have you been eating poorly because of a decreased appetite?: No   MST Score: 0     Diagnosis:  Intraparenchymal hemorrhage of brain 2/9/2023      Right spastic hemiplegia 5/3/2022      Hypothyroidism 2/9/2023      BPH (benign prostatic hyperplasia)        Relevant Medical History:     Nutrition-Related Medications: atorvastatin, levothyroxine,     Nutrition-Related Labs:   Latest Reference Range & Units 02/10/23 03:55   Sodium 136 - 145 mmol/L 138   Potassium 3.5 - 5.1 mmol/L 3.9   Chloride 98 - 107 mmol/L 101   CO2 23 - 31 mmol/L 28   BUN 8.4 - 25.7 mg/dL 19.2   Creatinine 0.73 - 1.18 mg/dL 0.80   eGFR mls/min/1.73/m2 >60   Glucose 82 - 115 mg/dL 91   Calcium 8.8 - 10.0 mg/dL 9.0   Magnesium 1.60 - 2.60 mg/dL 2.20   Alkaline Phosphatase 40 - 150 unit/L 60   PROTEIN TOTAL 5.8 - 7.6 gm/dL 6.4   Albumin 3.4 - 4.8 g/dL 3.5   Albumin/Globulin Ratio 1.1 - 2.0 ratio 1.2   BILIRUBIN TOTAL <=1.5 mg/dL 1.0   AST 5 - 34 unit/L 34   ALT 0 - 55 unit/L 75 (H)   Globulin, Total 2.4 - 3.5 gm/dL 2.9   (H): Data is abnormally high    Diet Order: Diet Minced & Moist  Oral Supplement Order: none  Appetite/Oral Intake: poor/25-50% of meals  Factors Affecting Nutritional Intake: impaired cognitive status/motor control, chewing difficulty, decreased appetite, and difficulty/impaired swallowing  Food/Orthodox/Cultural Preferences: none reported  Food Allergies: none reported    Skin Integrity: bruised (ecchymotic), other (see comments) (Left lower abdominal quadrant)  Wound(s):       Comments    Pt intake is poor. PO intake 25%-50% of meals. Will add trial boost plus. Spoke  "with family. Discussed food preferences. Spoke with SLP downgraded to mince and moist diet. Will monitor.     Anthropometrics    Height: 5' 11" (180.3 cm)    Last Weight: 74.7 kg (164 lb 10.9 oz) (02/09/23 1948) Weight Method: Bed Scale  BMI (Calculated): 23  BMI Classification: normal (BMI 18.5-24.9)        Ideal Body Weight (IBW), Male: 172 lb     % Ideal Body Weight, Male (lb): 95.75 %                          Usual Weight Provided By: unable to obtain usual weight    Wt Readings from Last 3 Encounters:   02/09/23 1948 74.7 kg (164 lb 10.9 oz)   02/09/23 1946 74.7 kg (164 lb 10.9 oz)   04/05/19 0950 89 kg (196 lb 3.4 oz)   11/05/18 1116 89 kg (196 lb 3.4 oz)   07/18/18 0906 92.9 kg (204 lb 12.9 oz)   04/16/18 1020 93 kg (205 lb 0.4 oz)   03/23/18 0833 91.1 kg (200 lb 13.4 oz)   01/26/18 0933 88.7 kg (195 lb 8.8 oz)      Weight Change(s) Since Admission:  Admit Weight: 74.7 kg (164 lb 10.9 oz) (02/09/23 1946)      Patient Education    Not applicable.    Monitoring & Evaluation     Dietitian will monitor food and beverage intake, weight, weight change, glucose/endocrine profile, and gastrointestinal profile.  Nutrition Risk/Follow-Up: low (follow-up in 5-7 days)  Patients assigned 'low nutrition risk' status do not qualify for a full nutritional assessment but will be monitored and re-evaluated in a 5-7 day time period. Please consult if re-evaluation needed sooner.   "

## 2023-02-10 NOTE — H&P
[facility] Medicine  History & Physical    Patient Name: Bhupendra Park  MRN: [id number]  Patient Class: IP- Swing  Admission Date: 2/9/2023  Attending Physician: Thairy G Reyes, DO   Primary Care Provider: Kelly Meadows MD         Patient information was obtained from past medical records and ER records.     Subjective:     Principal Problem:Intraparenchymal hemorrhage of brain    Chief Complaint: No chief complaint on file.       HPI: 63-year-old male with HTN, left frontal IPH, prior cervical spine surgery status post spinal cord stimulator who presented to ED with acute encephalopathy and was subsequently found to have recurrent right frontal intraparenchymal hemorrhage. Neurosurgery recommended medical management. Serial CTs showed stability of bleed. Transferred to our facility for continued rehabilitation.   Majority of patient's information obtained from medical records as patient has receptive and expressive aphasia and is able to answer only with yes / no.        No past medical history on file.    No past surgical history on file.    Review of patient's allergies indicates:  No Known Allergies    No current facility-administered medications on file prior to encounter.     Current Outpatient Medications on File Prior to Encounter   Medication Sig    diclofenac sodium (VOLTAREN) 1 % Gel Apply 4 g topically every 8 (eight) hours as needed.    acetaminophen (TYLENOL) 500 MG tablet Take 500 mg by mouth every 6 (six) hours as needed for Pain.    baclofen (LIORESAL) 10 MG tablet Take 10 mg by mouth 3 (three) times daily.    famotidine (PEPCID) 20 MG tablet Take 1 tablet by mouth every evening.    FLUoxetine 20 MG capsule Take 20 mg by mouth once daily.    lacosamide (VIMPAT) 100 mg Tab Take 100 mg by mouth every evening.    lacosamide (VIMPAT) 50 mg Tab Take 25 mg by mouth 2 (two) times daily.    levothyroxine (SYNTHROID) 88 MCG tablet Take 1 tablet by mouth  every morning.    rosuvastatin (CRESTOR) 40 MG Tab Take 40 mg by mouth.     Family History    None       Tobacco Use    Smoking status: Not on file    Smokeless tobacco: Not on file   Substance and Sexual Activity    Alcohol use: Not on file    Drug use: Not on file    Sexual activity: Not on file     Review of Systems   Reason unable to perform ROS: Patient can only answer with yes or no, does not appear to be in any significant distress.   Objective:     Vital Signs (Most Recent):  Temp: 97.6 °F (36.4 °C) (02/10/23 1132)  Pulse: 61 (02/10/23 1132)  Resp: 18 (02/10/23 0325)  BP: 105/73 (02/10/23 1132)  SpO2: 97 % (02/10/23 1132) Vital Signs (24h Range):  Temp:  [97.5 °F (36.4 °C)-98.4 °F (36.9 °C)] 97.6 °F (36.4 °C)  Pulse:  [56-82] 61  Resp:  [18-20] 18  SpO2:  [95 %-98 %] 97 %  BP: ()/(55-73) 105/73     Weight: 74.7 kg (164 lb 10.9 oz)  Body mass index is 22.97 kg/m².    Physical Exam  Constitutional:       General: He is not in acute distress.     Appearance: Normal appearance. He is normal weight.   HENT:      Mouth/Throat:      Mouth: Mucous membranes are moist.      Pharynx: Oropharynx is clear. No oropharyngeal exudate or posterior oropharyngeal erythema.   Eyes:      Extraocular Movements: Extraocular movements intact.      Conjunctiva/sclera: Conjunctivae normal.      Pupils: Pupils are equal, round, and reactive to light.   Neck:      Thyroid: No thyromegaly.   Cardiovascular:      Rate and Rhythm: Normal rate and regular rhythm.      Heart sounds: Murmur heard.     No friction rub. No gallop.   Pulmonary:      Breath sounds: Normal breath sounds.   Abdominal:      General: Bowel sounds are normal. There is no distension.      Palpations: Abdomen is soft.      Tenderness: There is no abdominal tenderness.   Lymphadenopathy:      Cervical: No cervical adenopathy.   Skin:     General: Skin is warm.      Findings: No rash.   Neurological:      General: No focal deficit present.      Mental  Status: He is oriented to person, place, and time.      Cranial Nerves: Dysarthria present. No cranial nerve deficit.      Motor: Weakness (RUEX) and abnormal muscle tone present.   Psychiatric:         Mood and Affect: Mood is not anxious or depressed. Affect is not inappropriate.         CRANIAL NERVES     CN III, IV, VI   Pupils are equal, round, and reactive to light.     Significant Labs: All pertinent labs within the past 24 hours have been reviewed.    Significant Imaging: I have reviewed all pertinent imaging results/findings within the past 24 hours.    Assessment/Plan:     * Intraparenchymal hemorrhage of brain  -Continue with Keppra, atorvastatin, blood pressure goal less than 140/90  -on 02/06/2023 recommendation was documented that patient will need an MRI brain with and without contrast in 4-6 weeks to rule out hemorrhagic infarct or mass and patient is to be followed by Dr. Figueroa in [place]    Right spastic hemiplegia  -2/2 history of ICH and CVA  -PT/OT      Hypothyroidism  - continue with levothyroxine 88mcg      BPH (benign prostatic hyperplasia)  - continue with tamsulosin        VTE Risk Mitigation (From admission, onward)         Ordered     IP VTE LOW RISK PATIENT  Once         02/09/23 1931     Place sequential compression device  Until discontinued         02/09/23 1931                   Thairy G Reyes, DO  Department of Hospital Medicine   [facility]

## 2023-02-11 NOTE — PLAN OF CARE
Problem: Adult Inpatient Plan of Care  Goal: Plan of Care Review  2/11/2023 1648 by Estefania Cain RN  Outcome: Ongoing, Progressing  Flowsheets (Taken 2/11/2023 1648)  Plan of Care Reviewed With: patient  2/11/2023 1556 by Estefania Cain RN  Outcome: Ongoing, Progressing  Flowsheets (Taken 2/11/2023 1556)  Plan of Care Reviewed With: patient  Goal: Patient-Specific Goal (Individualized)  2/11/2023 1648 by Estefania Cain RN  Outcome: Ongoing, Progressing  2/11/2023 1556 by Estefania Cain RN  Outcome: Ongoing, Progressing  Flowsheets (Taken 2/11/2023 1556)  Anxieties, Fears or Concerns: no anxieties noted  Individualized Care Needs: more strength to go home  Goal: Absence of Hospital-Acquired Illness or Injury  2/11/2023 1648 by Estefania Cain RN  Outcome: Ongoing, Progressing  2/11/2023 1556 by Estefania Cain RN  Outcome: Ongoing, Progressing  Intervention: Identify and Manage Fall Risk  Flowsheets (Taken 2/11/2023 1556)  Safety Promotion/Fall Prevention:   assistive device/personal item within reach   bed alarm set   side rails raised x 2   instructed to call staff for mobility  Intervention: Prevent Skin Injury  Flowsheets (Taken 2/11/2023 1556)  Body Position: 30 degrees  Intervention: Prevent and Manage VTE (Venous Thromboembolism) Risk  Flowsheets (Taken 2/11/2023 1556)  Activity Management:   Ankle pumps - L1   Arm raise - L1  VTE Prevention/Management: (applied knee high herminio hose, pt refused SCD)   remove, assess skin, and reapply sequential compression device   bleeding risk assessed   remove, assess skin, and reapply compression stockings   fluids promoted   other (see comments)  Intervention: Prevent Infection  Flowsheets (Taken 2/11/2023 1556)  Infection Prevention:   cohorting utilized   hand hygiene promoted  Goal: Optimal Comfort and Wellbeing  2/11/2023 1648 by Estefania Cain RN  Outcome: Ongoing, Progressing  2/11/2023 1556 by Estefania Cain RN  Outcome: Ongoing,  Progressing  Intervention: Monitor Pain and Promote Comfort  Flowsheets (Taken 2/11/2023 1556)  Pain Management Interventions:   quiet environment facilitated   pillow support provided  Intervention: Provide Person-Centered Care  Flowsheets (Taken 2/11/2023 1556)  Trust Relationship/Rapport: care explained  Goal: Readiness for Transition of Care  2/11/2023 1648 by Estefania Cain RN  Outcome: Ongoing, Progressing  2/11/2023 1556 by Estefania Cain RN  Outcome: Ongoing, Progressing     Problem: Skin Injury Risk Increased  Goal: Skin Health and Integrity  2/11/2023 1648 by Estefania Cain RN  Outcome: Ongoing, Progressing  2/11/2023 1556 by Estefania Cain RN  Outcome: Ongoing, Progressing  Intervention: Optimize Skin Protection  2/11/2023 1648 by Estefania Cain RN  Flowsheets (Taken 2/11/2023 1648)  Pressure Reduction Techniques: weight shift assistance provided  Head of Bed (HOB) Positioning: HOB at 20 degrees  2/11/2023 1556 by Estefania Cain RN  Flowsheets (Taken 2/11/2023 1556)  Pressure Reduction Techniques: rest period provided between sit times  Pressure Reduction Devices: positioning supports utilized  Head of Bed (HOB) Positioning: HOB at 30 degrees     Problem: Fall Injury Risk  Goal: Absence of Fall and Fall-Related Injury  2/11/2023 1648 by Estefania Cain RN  Outcome: Ongoing, Progressing  2/11/2023 1556 by Estefania Cain RN  Outcome: Ongoing, Progressing  Intervention: Identify and Manage Contributors  2/11/2023 1648 by Estefania Cain RN  Flowsheets (Taken 2/11/2023 1648)  Medication Review/Management: medications reviewed  2/11/2023 1556 by Estefania Cain RN  Flowsheets (Taken 2/11/2023 1556)  Medication Review/Management: medications reviewed  Intervention: Promote Injury-Free Environment  2/11/2023 1648 by Estefania Cain RN  Flowsheets (Taken 2/11/2023 1648)  Safety Promotion/Fall Prevention:   assistive device/personal item within reach   bed alarm set   side rails raised x  3  2/11/2023 1556 by Estefania Cain RN  Flowsheets (Taken 2/11/2023 1556)  Safety Promotion/Fall Prevention:   assistive device/personal item within reach   bed alarm set   side rails raised x 2   instructed to call staff for mobility     Problem: Infection  Goal: Absence of Infection Signs and Symptoms  2/11/2023 1648 by Estefania Cain RN  Outcome: Ongoing, Progressing  2/11/2023 1556 by Estefania Cain RN  Outcome: Ongoing, Progressing  Intervention: Prevent or Manage Infection  Flowsheets (Taken 2/11/2023 1648)  Isolation Precautions:   precautions initiated   precautions maintained

## 2023-02-11 NOTE — HOSPITAL COURSE
Patient admitted for rehabilitation with speech therapy, occupational therapy and physical therapy after intracranial hemorrhage. Patient's wife brought  from home for spinal stimulator.  Patient eating with supervision with his left hand.  Somnolence appears to be resolved and pt back to his baseline demeanor. On 2/26 patient had an unwitnessed fall and he was found kneeling on his left knee.  He denies any pain on evaluation, no acute osseous abnormality on x-ray of left knee.

## 2023-02-11 NOTE — PLAN OF CARE
Problem: Adult Inpatient Plan of Care  Goal: Plan of Care Review  Outcome: Ongoing, Progressing  Goal: Patient-Specific Goal (Individualized)  Outcome: Ongoing, Progressing  Flowsheets (Taken 2/11/2023 0130)  Anxieties, Fears or Concerns: Concerned about getting back to his prior functioning level.  Individualized Care Needs: Increased strength and mobility  Goal: Absence of Hospital-Acquired Illness or Injury  Outcome: Ongoing, Progressing  Goal: Optimal Comfort and Wellbeing  Outcome: Ongoing, Progressing  Goal: Readiness for Transition of Care  Outcome: Ongoing, Progressing

## 2023-02-11 NOTE — PROGRESS NOTES
[facility] Medicine  Progress Note    Patient Name: Bhupendra Park  MRN: [id number]  Patient Class: IP- Swing   Admission Date: 2/9/2023  Length of Stay: 2 days  Attending Physician: Thairy G Reyes, DO  Primary Care Provider: Kelly Meadows MD        Subjective:     Principal Problem:Intraparenchymal hemorrhage of brain        HPI:  63-year-old male with HTN, left frontal IPH, prior cervical spine surgery status post spinal cord stimulator who presented to ED with acute encephalopathy and was subsequently found to have recurrent right frontal intraparenchymal hemorrhage. Neurosurgery recommended medical management. Serial CTs showed stability of bleed. Transferred to our facility for continued rehabilitation.   Majority of patient's information obtained from medical records as patient has receptive and expressive aphasia and is able to answer only with yes / no.        Overview/Hospital Course:  Patient admitted for rehabilitation with speech therapy, occupational therapy and physical therapy after intracranial hemorrhage.      Interval History:  Physical therapy reports patient ambulated 20 ft with a cane and minimal assistance    Review of Systems   Reason unable to perform ROS: Expressive aphasia.   Objective:     Vital Signs (Most Recent):  Temp: 98 °F (36.7 °C) (02/11/23 0716)  Pulse: 78 (02/11/23 0903)  Resp: 20 (02/11/23 0716)  BP: 102/65 (02/11/23 0903)  SpO2: 99 % (02/11/23 0716) Vital Signs (24h Range):  Temp:  [97.4 °F (36.3 °C)-98.4 °F (36.9 °C)] 98 °F (36.7 °C)  Pulse:  [61-78] 78  Resp:  [18-20] 20  SpO2:  [95 %-99 %] 99 %  BP: ()/(58-73) 102/65     Weight: 74.7 kg (164 lb 10.9 oz)  Body mass index is 22.97 kg/m².    Intake/Output Summary (Last 24 hours) at 2/11/2023 1122  Last data filed at 2/11/2023 0831  Gross per 24 hour   Intake 1210 ml   Output --   Net 1210 ml      Physical Exam  Constitutional:       General: He is not in acute distress.      Appearance: Normal appearance.   HENT:      Head: Normocephalic and atraumatic.      Nose: No congestion or rhinorrhea.      Mouth/Throat:      Mouth: Mucous membranes are moist.   Eyes:      Conjunctiva/sclera: Conjunctivae normal.      Pupils: Pupils are equal, round, and reactive to light.   Cardiovascular:      Rate and Rhythm: Normal rate and regular rhythm.      Heart sounds: No murmur heard.  Pulmonary:      Effort: Pulmonary effort is normal.      Breath sounds: Normal breath sounds. No wheezing.   Abdominal:      General: Bowel sounds are normal. There is no distension.      Palpations: Abdomen is soft.      Tenderness: There is no abdominal tenderness.   Musculoskeletal:         General: No swelling. Normal range of motion.      Cervical back: Normal range of motion and neck supple.      Right lower leg: No edema.      Left lower leg: No edema.   Skin:     General: Skin is warm and dry.      Findings: No rash.   Neurological:      General: No focal deficit present.      Mental Status: He is alert and oriented to person, place, and time.      Cranial Nerves: Dysarthria present.      Motor: Weakness and abnormal muscle tone present.   Psychiatric:         Mood and Affect: Mood normal.         Behavior: Behavior normal.       Significant Labs: All pertinent labs within the past 24 hours have been reviewed.    Significant Imaging: I have reviewed all pertinent imaging results/findings within the past 24 hours.      Assessment/Plan:      * Intraparenchymal hemorrhage of brain  -Continue with Keppra, atorvastatin, blood pressure goal less than 140/90  -on 02/06/2023 recommendation was documented that patient will need an MRI brain with and without contrast in 4-6 weeks to rule out hemorrhagic infarct or mass and patient is to be followed by Dr. Figueroa in [place]    Right spastic hemiplegia  -2/2 history of ICH and CVA  -PT/OT      Hypothyroidism  -continue with levothyroxine 88mcg      BPH (benign prostatic  hyperplasia)  - continue with tamsulosin        VTE Risk Mitigation (From admission, onward)         Ordered     IP VTE LOW RISK PATIENT  Once         02/09/23 1931     Place sequential compression device  Until discontinued         02/09/23 1931                Discharge Planning   ABRAHAM:      Code Status: Full Code   Is the patient medically ready for discharge?:     Reason for patient still in hospital (select all that apply): Treatment and PT / OT recommendations  Discharge Plan A: Home with family, Home Health                  Thairy G Reyes, DO  Department of Hospital Medicine   [facility]

## 2023-02-11 NOTE — ASSESSMENT & PLAN NOTE
-Continue with Keppra, atorvastatin, blood pressure goal less than 140/90  -on 02/06/2023 recommendation was documented that patient will need an MRI brain with and without contrast in 4-6 weeks to rule out hemorrhagic infarct or mass and patient is to be followed by Dr. Figueroa in [place]

## 2023-02-11 NOTE — SUBJECTIVE & OBJECTIVE
Interval History:  Physical therapy reports patient ambulated 20 ft with a cane and minimal assistance    Review of Systems   Reason unable to perform ROS: Expressive aphasia.   Objective:     Vital Signs (Most Recent):  Temp: 98 °F (36.7 °C) (02/11/23 0716)  Pulse: 78 (02/11/23 0903)  Resp: 20 (02/11/23 0716)  BP: 102/65 (02/11/23 0903)  SpO2: 99 % (02/11/23 0716) Vital Signs (24h Range):  Temp:  [97.4 °F (36.3 °C)-98.4 °F (36.9 °C)] 98 °F (36.7 °C)  Pulse:  [61-78] 78  Resp:  [18-20] 20  SpO2:  [95 %-99 %] 99 %  BP: ()/(58-73) 102/65     Weight: 74.7 kg (164 lb 10.9 oz)  Body mass index is 22.97 kg/m².    Intake/Output Summary (Last 24 hours) at 2/11/2023 1122  Last data filed at 2/11/2023 0831  Gross per 24 hour   Intake 1210 ml   Output --   Net 1210 ml      Physical Exam  Constitutional:       General: He is not in acute distress.     Appearance: Normal appearance.   HENT:      Head: Normocephalic and atraumatic.      Nose: No congestion or rhinorrhea.      Mouth/Throat:      Mouth: Mucous membranes are moist.   Eyes:      Conjunctiva/sclera: Conjunctivae normal.      Pupils: Pupils are equal, round, and reactive to light.   Cardiovascular:      Rate and Rhythm: Normal rate and regular rhythm.      Heart sounds: No murmur heard.  Pulmonary:      Effort: Pulmonary effort is normal.      Breath sounds: Normal breath sounds. No wheezing.   Abdominal:      General: Bowel sounds are normal. There is no distension.      Palpations: Abdomen is soft.      Tenderness: There is no abdominal tenderness.   Musculoskeletal:         General: No swelling. Normal range of motion.      Cervical back: Normal range of motion and neck supple.      Right lower leg: No edema.      Left lower leg: No edema.   Skin:     General: Skin is warm and dry.      Findings: No rash.   Neurological:      General: No focal deficit present.      Mental Status: He is alert and oriented to person, place, and time.      Cranial Nerves:  Dysarthria present.      Motor: Weakness and abnormal muscle tone present.   Psychiatric:         Mood and Affect: Mood normal.         Behavior: Behavior normal.       Significant Labs: All pertinent labs within the past 24 hours have been reviewed.    Significant Imaging: I have reviewed all pertinent imaging results/findings within the past 24 hours.

## 2023-02-11 NOTE — PT/OT/SLP PROGRESS
Occupational Therapy  Treatment    Name: Bhupendra Park    : 1960 (63 y.o.)  MRN: [id number]          TREATMENT SUMMARY AND RECOMMENDATIONS:      Subjective Assessment:   No complaints  Lethargic   x Awake, alert, cooperative  Uncooperative    Agitated  Flat affect    Appropriate  c/o fatigue    Confused x Treated at bedside     Emotionally liable  Treated in gym/dept.   x Impulsive x Other: Expressive aphasia, apraxia       Pain/Comfort:  Pain Rating 1:  (expressive aphasia)      Therapy Precautions:   x Cognitive deficits  Spinal precautions    Collar - hard  Sternal precautions    Collar - soft   TLSO   x Fall risk  LSO    Hip precautions - posterior  Knee immobilizer    Hip precautions - anterior  WBAT    Impaired communication  Partial weightbearing    Oxygen  TTWB    PEG tube  NWB    Visual deficits x Other:Rollbelt when up in chair    Hearing deficits           Vitals Value   x Room air      Oxygen (L)     Blood pressure     Heart rate         Treatment Objectives:     Mobility Training:    Mobility task Assist level Comments    Bed mobility Min Transfer training supine to sit min assist from left, then min assist sit to supine with RLE   Balance training     Transfer Mod Transfer training bed <> recliner Mod assist with bal and to advance RLE with SBQC   Functional mobility     Sit to stand transitions Min From bed and from recliner   Other:       ADL Training:    ADL Assist level Comments    Feeding     Grooming/hygiene     Bathing     Upper body dressing     Lower body dressing Mod ADL dressing training from EOB donning bilat slip on shoes, pt able to don left assist with right   Toileting     Toilet transfer     Adaptive equipment training     Other:           Additional Treatment:  Began session with supine right scapular mobs followed by slow gentle passive stretch all joints all planes.    Assessment: Patient tolerated session fair.  Pt continues to demonstrate very dense hypertonicity in RUE.      OT Plan: Cont POC      GOALS:   Multidisciplinary Problems       Occupational Therapy Goals          Problem: Occupational Therapy    Goal Priority Disciplines Outcome Interventions   Occupational Therapy Goal     OT, PT/OT Ongoing, Progressing    Description: Goals to be met by: 3/10/23     Patient will increase functional independence with ADLs by performing:    UE Dressing with Modified [place].  LE Dressing with Modified [place].  Toileting from toilet with Stand-by Assistance for hygiene and clothing management.   Toilet transfer to toilet with Supervision.                         Communication with Treatment Team:     Discharge Recommendations: home with home health  Discharge Equipment Recommendations:  cane, quad, bedside commode  Barriers to discharge:         At end of treatment, patient remained:  x Up in chair     In bed   x With alarm activated    Bed rails up   x Call bell in reach     Family/friends present   x Restraints secured properly    In bathroom with CNA/RN notified    In gym with PT/PTA/tech   x Nurse aware    Other:         OT Date of Treatment: 02/11/23  OT Start Time: 1000  OT Stop Time: 1035  OT Total Time (min): 35 min    Billable Minutes:Therapeutic Activity 15  Neuromuscular Re-education 15      2/11/2023

## 2023-02-12 NOTE — NURSING
Patient pulled out his peripheral IV.  Dr. Reyes informed.  Order received to discontinue IV.  Patient informed as well.  No distress noted.

## 2023-02-12 NOTE — PROGRESS NOTES
[facility] Medicine  Progress Note    Patient Name: Bhupendra Park  MRN: [id number]  Patient Class: IP- Swing   Admission Date: 2/9/2023  Length of Stay: 3 days  Attending Physician: Thairy G Reyes, DO  Primary Care Provider: Kelly Meadows MD        Subjective:     Principal Problem:Intraparenchymal hemorrhage of brain        HPI:  63-year-old male with HTN, left frontal IPH, prior cervical spine surgery status post spinal cord stimulator who presented to ED with acute encephalopathy and was subsequently found to have recurrent right frontal intraparenchymal hemorrhage. Neurosurgery recommended medical management. Serial CTs showed stability of bleed. Transferred to our facility for continued rehabilitation.   Majority of patient's information obtained from medical records as patient has receptive and expressive aphasia and is able to answer only with yes / no.        Overview/Hospital Course:  Patient admitted for rehabilitation with speech therapy, occupational therapy and physical therapy after intracranial hemorrhage.  Patient's wife reports his spinal stimulator needs to be recharged, she will be bringing in  from home.      Interval History:  Physical therapy reports patient ambulated 20 ft with a cane and minimal assistance    Review of Systems   Reason unable to perform ROS: Expressive aphasia.   Objective:     Vital Signs (Most Recent):  Temp: 97.5 °F (36.4 °C) (02/12/23 0720)  Pulse: (!) 59 (02/12/23 0720)  Resp: 18 (02/12/23 0340)  BP: (!) 92/58 (02/12/23 0720)  SpO2: 100 % (02/12/23 0720) Vital Signs (24h Range):  Temp:  [97.5 °F (36.4 °C)-98.2 °F (36.8 °C)] 97.5 °F (36.4 °C)  Pulse:  [54-70] 59  Resp:  [18] 18  SpO2:  [95 %-100 %] 100 %  BP: (91-95)/(54-58) 92/58     Weight: 74.7 kg (164 lb 10.9 oz)  Body mass index is 22.97 kg/m².    Intake/Output Summary (Last 24 hours) at 2/12/2023 1048  Last data filed at 2/12/2023 0916  Gross per 24 hour   Intake  1570 ml   Output 220 ml   Net 1350 ml        Physical Exam  Constitutional:       General: He is not in acute distress.     Appearance: Normal appearance.   HENT:      Head: Normocephalic and atraumatic.      Nose: No congestion or rhinorrhea.      Mouth/Throat:      Mouth: Mucous membranes are moist.   Eyes:      Conjunctiva/sclera: Conjunctivae normal.      Pupils: Pupils are equal, round, and reactive to light.   Cardiovascular:      Rate and Rhythm: Normal rate and regular rhythm.      Heart sounds: No murmur heard.  Pulmonary:      Effort: Pulmonary effort is normal.      Breath sounds: Normal breath sounds. No wheezing.   Abdominal:      General: Bowel sounds are normal. There is no distension.      Palpations: Abdomen is soft.      Tenderness: There is no abdominal tenderness.   Musculoskeletal:         General: No swelling. Normal range of motion.      Cervical back: Normal range of motion and neck supple.      Right lower leg: No edema.      Left lower leg: No edema.   Skin:     General: Skin is warm and dry.      Findings: No rash.   Neurological:      General: No focal deficit present.      Mental Status: He is alert and oriented to person, place, and time.      Cranial Nerves: Dysarthria present.      Motor: Weakness and abnormal muscle tone present.   Psychiatric:         Mood and Affect: Mood normal.         Behavior: Behavior normal.       Significant Labs: All pertinent labs within the past 24 hours have been reviewed.    Significant Imaging: I have reviewed all pertinent imaging results/findings within the past 24 hours.      Assessment/Plan:      * Intraparenchymal hemorrhage of brain  -Continue with Keppra, atorvastatin, blood pressure goal less than 140/90  -on 02/06/2023 recommendation was documented that patient will need an MRI brain with and without contrast in 4-6 weeks to rule out hemorrhagic infarct or mass and patient is to be followed by Dr. Figueroa in [facility]  hemiplegia  -2/2 history of ICH and CVA  -PT/OT      Hypothyroidism  -continue with levothyroxine 88mcg      BPH (benign prostatic hyperplasia)  - continue with tamsulosin        VTE Risk Mitigation (From admission, onward)         Ordered     IP VTE LOW RISK PATIENT  Once         02/09/23 1931     Place sequential compression device  Until discontinued         02/09/23 1931                Discharge Planning   ABRAHAM:      Code Status: Full Code   Is the patient medically ready for discharge?:     Reason for patient still in hospital (select all that apply): Treatment and PT / OT recommendations  Discharge Plan A: Home with family, Home Health                  Thairy G Reyes, DO  Department of Hospital Medicine   [facility]

## 2023-02-12 NOTE — ASSESSMENT & PLAN NOTE
-Continue with Keppra, atorvastatin, blood pressure goal less than 140/90  -on 02/06/2023 recommendation was documented that patient will need an MRI brain with and without contrast in 4-6 weeks to rule out hemorrhagic infarct or mass and patient is to be followed by Dr. Figueroa in [place]

## 2023-02-12 NOTE — PLAN OF CARE
Problem: Adult Inpatient Plan of Care  Goal: Patient-Specific Goal (Individualized)  Flowsheets (Taken 2/12/2023 0156)  Anxieties, Fears or Concerns: No concerns or anxieties noted  Individualized Care Needs: Increased strength and mobility  Goal: Optimal Comfort and Wellbeing  Intervention: Monitor Pain and Promote Comfort  Flowsheets (Taken 2/11/2023 2000)  Pain Management Interventions:   care clustered   diversional activity provided   pillow support provided   position adjusted

## 2023-02-12 NOTE — SUBJECTIVE & OBJECTIVE
Interval History:  Physical therapy reports patient ambulated 20 ft with a cane and minimal assistance    Review of Systems   Reason unable to perform ROS: Expressive aphasia.   Objective:     Vital Signs (Most Recent):  Temp: 97.5 °F (36.4 °C) (02/12/23 0720)  Pulse: (!) 59 (02/12/23 0720)  Resp: 18 (02/12/23 0340)  BP: (!) 92/58 (02/12/23 0720)  SpO2: 100 % (02/12/23 0720) Vital Signs (24h Range):  Temp:  [97.5 °F (36.4 °C)-98.2 °F (36.8 °C)] 97.5 °F (36.4 °C)  Pulse:  [54-70] 59  Resp:  [18] 18  SpO2:  [95 %-100 %] 100 %  BP: (91-95)/(54-58) 92/58     Weight: 74.7 kg (164 lb 10.9 oz)  Body mass index is 22.97 kg/m².    Intake/Output Summary (Last 24 hours) at 2/12/2023 1048  Last data filed at 2/12/2023 0916  Gross per 24 hour   Intake 1570 ml   Output 220 ml   Net 1350 ml        Physical Exam  Constitutional:       General: He is not in acute distress.     Appearance: Normal appearance.   HENT:      Head: Normocephalic and atraumatic.      Nose: No congestion or rhinorrhea.      Mouth/Throat:      Mouth: Mucous membranes are moist.   Eyes:      Conjunctiva/sclera: Conjunctivae normal.      Pupils: Pupils are equal, round, and reactive to light.   Cardiovascular:      Rate and Rhythm: Normal rate and regular rhythm.      Heart sounds: No murmur heard.  Pulmonary:      Effort: Pulmonary effort is normal.      Breath sounds: Normal breath sounds. No wheezing.   Abdominal:      General: Bowel sounds are normal. There is no distension.      Palpations: Abdomen is soft.      Tenderness: There is no abdominal tenderness.   Musculoskeletal:         General: No swelling. Normal range of motion.      Cervical back: Normal range of motion and neck supple.      Right lower leg: No edema.      Left lower leg: No edema.   Skin:     General: Skin is warm and dry.      Findings: No rash.   Neurological:      General: No focal deficit present.      Mental Status: He is alert and oriented to person, place, and time.      Cranial  Please keep your appointment with gastroenterology.  This medication was sent. Nerves: Dysarthria present.      Motor: Weakness and abnormal muscle tone present.   Psychiatric:         Mood and Affect: Mood normal.         Behavior: Behavior normal.       Significant Labs: All pertinent labs within the past 24 hours have been reviewed.    Significant Imaging: I have reviewed all pertinent imaging results/findings within the past 24 hours.

## 2023-02-12 NOTE — PLAN OF CARE
Problem: Adult Inpatient Plan of Care  Goal: Plan of Care Review  2/12/2023 1530 by Shonna Beckford RN  Outcome: Ongoing, Progressing  2/12/2023 1457 by Shonna Beckford RN  Outcome: Ongoing, Progressing  Goal: Patient-Specific Goal (Individualized)  2/12/2023 1530 by Shonna Beckford RN  Outcome: Ongoing, Progressing  2/12/2023 1457 by Shonna Beckford RN  Flowsheets (Taken 2/12/2023 0800)  Anxieties, Fears or Concerns: None noted at this time  Individualized Care Needs: Mobility  Goal: Absence of Hospital-Acquired Illness or Injury  Outcome: Ongoing, Progressing  Intervention: Prevent Skin Injury  Flowsheets (Taken 2/12/2023 0800)  Skin Protection:   incontinence pads utilized   adhesive use limited  Intervention: Prevent Infection  Flowsheets (Taken 2/12/2023 0800)  Infection Prevention:   cohorting utilized   equipment surfaces disinfected   hand hygiene promoted  Goal: Optimal Comfort and Wellbeing  Outcome: Ongoing, Progressing  Intervention: Monitor Pain and Promote Comfort  Flowsheets (Taken 2/12/2023 0800)  Pain Management Interventions:   care clustered   position adjusted   pillow support provided  Goal: Readiness for Transition of Care  Outcome: Ongoing, Progressing     Problem: Skin Injury Risk Increased  Goal: Skin Health and Integrity  Outcome: Ongoing, Progressing  Intervention: Optimize Skin Protection  Flowsheets (Taken 2/12/2023 0800)  Skin Protection:   incontinence pads utilized   adhesive use limited  Intervention: Promote and Optimize Oral Intake  Flowsheets (Taken 2/12/2023 0800)  Oral Nutrition Promotion: (1:1 Feed)   rest periods promoted   safe use of adaptive equipment encouraged   other (see comments)     Problem: Fall Injury Risk  Goal: Absence of Fall and Fall-Related Injury  Outcome: Ongoing, Progressing  Intervention: Identify and Manage Contributors  Flowsheets (Taken 2/12/2023 0800)  Self-Care Promotion: (Meal assistance provided)   independence encouraged   BADL  personal objects within reach   BADL personal routines maintained   meal set-up provided   safe use of adaptive equipment encouraged   other (see comments)     Problem: Infection  Goal: Absence of Infection Signs and Symptoms  Outcome: Ongoing, Progressing  Intervention: Prevent or Manage Infection  2/12/2023 1530 by Shonna Beckford RN  Flowsheets (Taken 2/12/2023 0800)  Isolation Precautions:   precautions maintained   protective  2/12/2023 1457 by Shonna Beckford RN  Flowsheets (Taken 2/12/2023 0800)  Fever Reduction/Comfort Measures: lightweight bedding  Infection Management: aseptic technique maintained  Isolation Precautions: precautions maintained

## 2023-02-13 NOTE — PLAN OF CARE
[facility]  Discharge Reassessment    Primary Care Provider: Kelly Meadows MD    Expected Discharge Date:     Reassessment (most recent)       Discharge Reassessment - 02/13/23 0723          Discharge Reassessment    Assessment Type Discharge Planning Reassessment     Did the patient's condition or plan change since previous assessment? No     Discharge Plan discussed with: Spouse/sig other     Name(s) and Number(s) Sharon spouse [phone]     Communicated ABRAHAM with patient/caregiver Date not available/Unable to determine     Discharge Plan A Home with family;Home Health     DME Needed Upon Discharge  walker, rolling     Discharge Barriers Identified None     Why the patient remains in the hospital Requires continued medical care        Post-Acute Status    Post-Acute Authorization Home Health     Home Health Status Pending medical clearance/testing     Hospital Resources/Appts/Education Provided Provided patient/caregiver with written discharge plan information     Discharge Delays None known at this time

## 2023-02-13 NOTE — PT/OT/SLP PROGRESS
Occupational Therapy  Treatment    Name: Bhupendra Park    : 1960 (63 y.o.)  MRN: [id number]           TREATMENT SUMMARY AND RECOMMENDATIONS:      OT Date of Treatment: 23  OT Start Time: 945  OT Stop Time:   OT Total Time (min): 35 min      Subjective Assessment:   No complaints  Lethargic   X Awake, alert, cooperative  Impulsive    Uncooperative   Flat affect    Agitated  c/o pain    Appropriate  c/o fatigue    Confused  Treated at bedside     Emotionally labile  Treated in gym/dept.      Other:        Therapy Precautions:    Cognitive deficits  Spinal precautions    Collar - hard  Sternal precautions    Collar - soft   TLSO   X Fall risk  LSO    Hip precautions - posterior  Knee immobilizer    Hip precautions - anterior  WBAT   X Impaired communication  Partial weightbearing    Oxygen  TTWB    PEG tube  NWB    Visual deficits      Hearing deficits   Other:        Treatment Objectives:     Mobility Training:    Mobility task Assist level Comments    Bed mobility Supervision  Supine to sit transition    Transfer Min A  From EOB to w/c with stand pivot transfer    Sit to stands transitions CGA  From EOB using LUE to push up    Functional mobility     Sitting balance     Standing balance      Other:        ADL Training:    ADL Assist level Comments    Feeding     Grooming/hygiene     Bathing     Upper body dressing Supervision  While seated unsupported EOB    Lower body dressing Min A  Assistance with managing pants once in standing to pull up due to poor standing balance   Increased assistance with shoes and AFO to RLE    Toileting     Toilet transfer     Adaptive equipment training     Other:           Therapeutic Exercise:   Weightbearing into RUE while completing functional reaching with LUE. Pt reported pain in RUE following few reps.     Additional Comments:  Pt presented lethargic this AM, needing cueing to remain awake throughout session     Assessment: Patient tolerated session well.    OT  Plan: Continue with current POC   Revisions made to plan of care: No    GOALS:   Multidisciplinary Problems       Occupational Therapy Goals          Problem: Occupational Therapy    Goal Priority Disciplines Outcome Interventions   Occupational Therapy Goal     OT, PT/OT Ongoing, Progressing    Description: Goals to be met by: 3/10/23     Patient will increase functional independence with ADLs by performing:    UE Dressing with Modified [place].  LE Dressing with Modified [place].  Toileting from toilet with Stand-by Assistance for hygiene and clothing management.   Toilet transfer to toilet with Supervision.                         Skilled OT Minutes Provided: 35  Communication with Treatment Team:     Equipment recommendations:       At end of treatment, patient remained:   Up in chair     Up in wheelchair in room    In bed    With alarm activated    Bed rails up    Call bell in reach     Family/friends present    Restraints secured properly    In bathroom with CNA/RN notified   X In gym with PT/PTA/tech    Nurse aware    Other:

## 2023-02-13 NOTE — PROGRESS NOTES
Name: Bhupendra Park    : 1960 (63 y.o.)  MRN: [id number]           Patient Unavailable      Patient unable to be seen at this time secondary to: Upon arrival to room pt on IV. IV is located on proximal part of humerus and  nurse advised waiting to do therapy until after IV is finished d/t it's location and getting occluded. Will attempt to continue with POC in AM.

## 2023-02-13 NOTE — PLAN OF CARE
Problem: Adult Inpatient Plan of Care  Goal: Plan of Care Review  Outcome: Ongoing, Progressing  Goal: Patient-Specific Goal (Individualized)  Outcome: Ongoing, Progressing  Flowsheets (Taken 2/13/2023 0800)  Anxieties, Fears or Concerns: No concerns noted at this time.  Individualized Care Needs: Mobility improvement  Goal: Absence of Hospital-Acquired Illness or Injury  Outcome: Ongoing, Progressing  Intervention: Prevent Infection  Flowsheets (Taken 2/13/2023 0800)  Infection Prevention:   cohorting utilized   environmental surveillance performed   equipment surfaces disinfected   hand hygiene promoted   personal protective equipment utilized   rest/sleep promoted  Goal: Optimal Comfort and Wellbeing  Outcome: Ongoing, Progressing  Goal: Readiness for Transition of Care  Outcome: Ongoing, Progressing     Problem: Skin Injury Risk Increased  Goal: Skin Health and Integrity  Outcome: Ongoing, Progressing     Problem: Fall Injury Risk  Goal: Absence of Fall and Fall-Related Injury  Outcome: Ongoing, Progressing     Problem: Infection  Goal: Absence of Infection Signs and Symptoms  Outcome: Ongoing, Progressing  Intervention: Prevent or Manage Infection  Flowsheets (Taken 2/13/2023 0800)  Isolation Precautions: protective

## 2023-02-13 NOTE — PT/OT/SLP PROGRESS
Speech Language Pathology Treatment    Patient Name:  Bhupendra Park   MRN:  [id number]  Admitting Diagnosis: Intraparenchymal hemorrhage of brain    Recommendations:                 General Recommendations:  Dysphagia therapy and Speech/language therapy  Diet recommendations:  Minced & Moist Diet - IDDSI Level 5, Liquid Diet Level: Thin liquids - IDDSI Level 0   Aspiration Precautions: 1 bite/sip at a time, Alternating bites/sips, Assistance with meals, Check for pocketing/oral residue, HOB to 90 degrees, Meds crushed in puree, Small bites/sips, and Standard aspiration precautions   General Precautions: Standard, aphasia, aspiration  Communication strategies:  eye glasses, yes/no questions only, provide increased time to answer, and go to room if call light pushed    Subjective     Pt supine in bed upon SLP arrival. Pt awoken easily to verbal stim, though presented sleepy across session and required frequent cueing.     Patient goals: pt unable to state at this time    Pain/Comfort:       Respiratory Status: Room air    Objective:     Has the patient been evaluated by SLP for swallowing?      Keep patient NPO?     Current Respiratory Status:        2U YNQs completed w/ 67% acc SBA. Unable to increase accuracy despite max cueing.  Object naming completed w/ 33% acc I'ly, increasing to 67% acc given semantic and phonemic cueing.  SLP provided pt w/ a comb to target object function. SLP provided hand over hand prompting to complete grooming task initially. Pt then able to complete grooming on 2nd attempt I'ly.  Pt able to name his granddaughter's name while looking at her picture.    Cont SLP POC.    Assessment:     Bhupendra Park is a 63 y.o. male with an SLP diagnosis of Aphasia and Dysphagia.  He presents with expressive and receptive aphasia. Diet modification warranted at this time to ensure safety w/ PO.    Goals:   Multidisciplinary Problems       SLP Goals          Problem: SLP    Goal Priority  Disciplines Outcome   SLP Goal     SLP Ongoing, Progressing   Description: LTG:   Pt will tolerate LRD w/o overt s/s of aspiration.  Pt will improve expressive and receptive language skills in order to effectively communicate wants and needs Manish.    STG:  Pt will tolerate minced and moist w/ good oral clearance and w/o overt s/s of aspiration.  Pt will participate in trials of soft and bite sized w/ good oral clearance and w/o overt s/s of aspiration.  Pt will answer 2U YNQs w/ 80% acc Manish.  Pt will follow 1-step commands w/ 80% acc Man[place].  Pt will ID objects in Fo3 w/ 80% acc Manish.  Pt will complete auto speech tasks w/ 80% acc Man[place].  Pt will name common objects w/ 80% acc Manish.                         Plan:     Patient to be seen:   (PRN)   Plan of Care expires:  03/03/23  Plan of Care reviewed with:  patient   SLP Follow-Up:  Yes       Discharge recommendations:      Barriers to Discharge:  Level of Skilled Assistance Needed see PT/OT notes    Time Tracking:     SLP Treatment Date:  2/13/23  Speech Start Time:  9:05  Speech Stop Time:  9:35     Speech Total Time (min):  30 min    Billable Minutes: Speech Therapy Individual 30 min language    Adamaris Carrion M.S., CCC-SLP   02/13/2023

## 2023-02-13 NOTE — ASSESSMENT & PLAN NOTE
-Continue with Keppra, atorvastatin, blood pressure goal less than 140/90  -on 02/06/2023 recommendation was documented that patient will need an MRI brain with and without contrast in 4-6 weeks to rule out hemorrhagic infarct or mass and patient is to be followed by Dr. Figueroa in [place]

## 2023-02-13 NOTE — PLAN OF CARE
Problem: Adult Inpatient Plan of Care  Goal: Plan of Care Review  Outcome: Ongoing, Progressing  Goal: Patient-Specific Goal (Individualized)  Outcome: Ongoing, Progressing  Flowsheets (Taken 2/12/2023 2040)  Anxieties, Fears or Concerns: None noted at this time  Individualized Care Needs: Improved mobility and indpendence.  Goal: Absence of Hospital-Acquired Illness or Injury  Outcome: Ongoing, Progressing  Goal: Optimal Comfort and Wellbeing  Outcome: Ongoing, Progressing  Goal: Readiness for Transition of Care  Outcome: Ongoing, Progressing

## 2023-02-13 NOTE — PROGRESS NOTES
"Inpatient Nutrition Evaluation    Admit Date: 2/9/2023   Total duration of encounter: 4 days    Nutrition Recommendation/Prescription     Continue mince and moist with 1:1 supervision with meals. Upright position.  Nutrition Assessment     Chart Review    Reason Seen: continuous nutrition monitoring, length of stay, and follow-up    Malnutrition Screening Tool Results   Have you recently lost weight without trying?: No  Have you been eating poorly because of a decreased appetite?: No   MST Score: 0     Diagnosis:  Intraparenchymal hemorrhage of brain 2/9/2023      Right spastic hemiplegia 5/3/2022      Hypothyroidism 2/9/2023      BPH (benign prostatic hyperplasia)        Relevant Medical History:     Nutrition-Related Medications:   Atorvastatin, levothyroxine  Nutrition-Related Labs:      Diet Order: Diet Minced & Moist  Oral Supplement Order: none  Appetite/Oral Intake: fair/50-75% of meals  Factors Affecting Nutritional Intake: decreased appetite  Food/Hinduism/Cultural Preferences: none reported  Food Allergies: none reported    Skin Integrity: bruised (ecchymotic)  Wound(s):         Comments:    Made rounds. Pt in therapy during rounds.  Intake 50% of meals. Will try again. Will do trial boost plus.     Anthropometrics    Height: 5' 11" (180.3 cm)    Last Weight: 69.9 kg (154 lb 1.6 oz) (02/13/23 0500) Weight Method: Bed Scale  BMI (Calculated): 21.5  BMI Classification: normal (BMI 18.5-24.9)        Ideal Body Weight (IBW), Male: 172 lb     % Ideal Body Weight, Male (lb): 95.75 %                          Usual Weight Provided By: unable to obtain usual weight    Wt Readings from Last 3 Encounters:   02/13/23 0500 69.9 kg (154 lb 1.6 oz)   02/09/23 1948 74.7 kg (164 lb 10.9 oz)   02/09/23 1946 74.7 kg (164 lb 10.9 oz)   04/05/19 0950 89 kg (196 lb 3.4 oz)   11/05/18 1116 89 kg (196 lb 3.4 oz)   07/18/18 0906 92.9 kg (204 lb 12.9 oz)   04/16/18 1020 93 kg (205 lb 0.4 oz)   03/23/18 0833 91.1 kg (200 lb 13.4 " oz)   01/26/18 0933 88.7 kg (195 lb 8.8 oz)      Weight Change(s) Since Admission:  Admit Weight: 74.7 kg (164 lb 10.9 oz) (02/09/23 1946)  -4.8 kg wt loss.     Patient Education    Not applicable.    Monitoring & Evaluation     Dietitian will monitor food and beverage intake, weight, weight change, electrolyte/renal panel, glucose/endocrine profile, and gastrointestinal profile.  Nutrition Risk/Follow-Up: low (follow-up in 5-7 days)  Patients assigned 'low nutrition risk' status do not qualify for a full nutritional assessment but will be monitored and re-evaluated in a 5-7 day time period. Please consult if re-evaluation needed sooner.

## 2023-02-13 NOTE — PROGRESS NOTES
[facility] called requesting a refill of the below medication which has been pended for you:     Requested Prescriptions     Pending Prescriptions Disp Refills    ARIPiprazole (ABILIFY) 5 MG tablet 45 tablet 5     Sig: Take 1.5 tablets by mouth nightly       Last Appointment Date: 3/15/2022  Next Appointment Date: Visit date not found    Allergies   Allergen Reactions    Advil [Ibuprofen] Hives     Facial swelling. Tolerated Mobic during hospital admission for chest pain without any issues 1/19/2023. [facility] Medicine  Progress Note    Patient Name: Bhupendra Park  MRN: [id number]  Patient Class: IP- Swing   Admission Date: 2/9/2023  Length of Stay: 4 days  Attending Physician: Thairy G Reyes, DO  Primary Care Provider: Kelly Meadows MD        Subjective:     Principal Problem:Intraparenchymal hemorrhage of brain        HPI:  63-year-old male with HTN, left frontal IPH, prior cervical spine surgery status post spinal cord stimulator who presented to ED with acute encephalopathy and was subsequently found to have recurrent right frontal intraparenchymal hemorrhage. Neurosurgery recommended medical management. Serial CTs showed stability of bleed. Transferred to our facility for continued rehabilitation.   Majority of patient's information obtained from medical records as patient has receptive and expressive aphasia and is able to answer only with yes / no.        Overview/Hospital Course:  Patient admitted for rehabilitation with speech therapy, occupational therapy and physical therapy after intracranial hemorrhage. Patient's wife reports his spinal stimulator needs to be recharged, she will be bringing in  from home.      Interval History:  Physical therapy reports patient ambulated 20 ft with a cane and modified independence  Review of Systems   Reason unable to perform ROS: Expressive aphasia.   Objective:     Vital Signs (Most Recent):  Temp: 97.9 °F (36.6 °C) (02/13/23 0335)  Pulse: (!) 59 (02/13/23 0728)  Resp: 18 (02/13/23 0335)  BP: (!) 78/52 (02/13/23 0728)  SpO2: 98 % (02/13/23 0728) Vital Signs (24h Range):  Temp:  [97.7 °F (36.5 °C)-98.3 °F (36.8 °C)] 97.9 °F (36.6 °C)  Pulse:  [55-59] 59  Resp:  [18-19] 18  SpO2:  [96 %-99 %] 98 %  BP: ()/(51-60) 78/52     Weight: 69.9 kg (154 lb 1.6 oz)  Body mass index is 21.49 kg/m².    Intake/Output Summary (Last 24 hours) at 2/13/2023 1127  Last data filed at 2/12/2023 2038  Gross per 24 hour   Intake 600  ml   Output --   Net 600 ml        Physical Exam  Constitutional:       General: He is not in acute distress.     Appearance: Normal appearance.   HENT:      Head: Normocephalic and atraumatic.      Nose: No congestion or rhinorrhea.      Mouth/Throat:      Mouth: Mucous membranes are moist.   Eyes:      Conjunctiva/sclera: Conjunctivae normal.      Pupils: Pupils are equal, round, and reactive to light.   Cardiovascular:      Rate and Rhythm: Normal rate and regular rhythm.      Heart sounds: No murmur heard.  Pulmonary:      Effort: Pulmonary effort is normal.      Breath sounds: Normal breath sounds. No wheezing.   Abdominal:      General: Bowel sounds are normal. There is no distension.      Palpations: Abdomen is soft.      Tenderness: There is no abdominal tenderness.   Musculoskeletal:         General: No swelling. Normal range of motion.      Cervical back: Normal range of motion and neck supple.      Right lower leg: No edema.      Left lower leg: No edema.   Skin:     General: Skin is warm and dry.      Findings: No rash.   Neurological:      General: No focal deficit present.      Mental Status: He is alert and oriented to person, place, and time.      Cranial Nerves: Dysarthria present.      Motor: Weakness and abnormal muscle tone present.   Psychiatric:         Mood and Affect: Mood normal.         Behavior: Behavior normal.       Significant Labs: All pertinent labs within the past 24 hours have been reviewed.    Significant Imaging: I have reviewed all pertinent imaging results/findings within the past 24 hours.      Assessment/Plan:      * Intraparenchymal hemorrhage of brain  -Continue with Keppra, atorvastatin, blood pressure goal less than 140/90  -on 02/06/2023 recommendation was documented that patient will need an MRI brain with and without contrast in 4-6 weeks to rule out hemorrhagic infarct or mass and patient is to be followed by Dr. Figueroa in [place]    Right spastic hemiplegia  -2/2  history of ICH and CVA  -PT/OT      Hypothyroidism  -continue with levothyroxine 88mcg      BPH (benign prostatic hyperplasia)  - continue with tamsulosin        VTE Risk Mitigation (From admission, onward)         Ordered     IP VTE LOW RISK PATIENT  Once         02/09/23 1931     Place sequential compression device  Until discontinued         02/09/23 1931                Discharge Planning   ABRAHAM:      Code Status: Full Code   Is the patient medically ready for discharge?:     Reason for patient still in hospital (select all that apply): Treatment and PT / OT recommendations  Discharge Plan A: Home with family, Home Health   Discharge Delays: None known at this time              Thairy G Reyes, DO  Department of Hospital Medicine   [facility]

## 2023-02-13 NOTE — PROGRESS NOTES
Upon arrival to room pt on IV. IV is located on proximal part of humerus and  nurse advised waiting to do therapy until after IV is finished d/t it's location and getting occluded. Will attempt to continue with POC in AM.

## 2023-02-13 NOTE — PT/OT/SLP PROGRESS
Physical Therapy Treatment Note           Name: Bhupendra Park    : 1960 (63 y.o.)  MRN: [id number]           TREATMENT SUMMARY AND RECOMMENDATIONS:    PT Received On: 23  PT Start Time: 1030     PT Stop Time: 1055  PT Total Time (min): 25 min     Subjective Assessment:   No complaints  Lethargic   x Awake, alert, cooperative  Uncooperative    Agitated  c/o pain    Appropriate  c/o fatigue    Confused  Treated at bedside     Emotionally labile x Treated in gym/dept.    Impulsive x Other: sleepy    Flat affect       Therapy Precautions:    Cognitive deficits  Spinal precautions    Collar - hard  Sternal precautions    Collar - soft   TLSO   x Fall risk  LSO    Hip precautions - posterior  Knee immobilizer    Hip precautions - anterior  WBAT    Impaired communication  Partial weightbearing    Oxygen  TTWB    PEG tube  NWB    Visual deficits  Other:    Hearing deficits          Treatment Objectives:     Mobility Training:   Assist level Comments    Bed mobility MIN A  Sit > supine  MIN A with RLE   Transfer MIN A Stand pivot transfer wc > bed towards pt L side   Gait MIN A x2 X10ft using LBQC on RUE; PT helping advance and block RLE while someone stood next to pt R side for stability    Sit to stand transitions MIN -MOD A Multiple sit to stands from wc surface   Sitting balance     Standing balance      Wheelchair mobility     Car transfer     Other:          Therapeutic Exercise:   Exercise Sets Reps Comments                               Additional Comments:      Assessment: Patient tolerated session fair. Pt very sleepy and not tolerating too much this AM.     PT Plan: continue POC  Revisions made to plan of care: No    GOALS:   Multidisciplinary Problems       Physical Therapy Goals          Problem: Physical Therapy    Goal Priority Disciplines Outcome Goal Variances Interventions   Physical Therapy Goal     PT, PT/OT Ongoing, Progressing     Description: Goals to be met by: Discharge      Patient will increase functional independence with mobility by performin. Supine to sit with Modified [place]  2. Sit to supine with Modified [place]  3. Sit to stand transfer with Supervision  4. Gait  x 150 feet with Contact Guard Assistance using Quad Cane vs LRAD.                          Skilled PT Minutes Provided: 25 minutes   Communication with Treatment Team:     Equipment recommendations:       At end of treatment, patient remained:   Up in chair     Up in wheelchair in room   x In bed   x With alarm activated   x Bed rails up   x Call bell in reach    x Family/friends present    Restraints secured properly    In bathroom with CNA/RN notified    Nurse aware    In gym with therapist/tech    Other:

## 2023-02-13 NOTE — SUBJECTIVE & OBJECTIVE
Interval History:  Physical therapy reports patient ambulated 20 ft with a cane and modified independence  Review of Systems   Reason unable to perform ROS: Expressive aphasia.   Objective:     Vital Signs (Most Recent):  Temp: 97.9 °F (36.6 °C) (02/13/23 0335)  Pulse: (!) 59 (02/13/23 0728)  Resp: 18 (02/13/23 0335)  BP: (!) 78/52 (02/13/23 0728)  SpO2: 98 % (02/13/23 0728) Vital Signs (24h Range):  Temp:  [97.7 °F (36.5 °C)-98.3 °F (36.8 °C)] 97.9 °F (36.6 °C)  Pulse:  [55-59] 59  Resp:  [18-19] 18  SpO2:  [96 %-99 %] 98 %  BP: ()/(51-60) 78/52     Weight: 69.9 kg (154 lb 1.6 oz)  Body mass index is 21.49 kg/m².    Intake/Output Summary (Last 24 hours) at 2/13/2023 1127  Last data filed at 2/12/2023 2038  Gross per 24 hour   Intake 600 ml   Output --   Net 600 ml        Physical Exam  Constitutional:       General: He is not in acute distress.     Appearance: Normal appearance.   HENT:      Head: Normocephalic and atraumatic.      Nose: No congestion or rhinorrhea.      Mouth/Throat:      Mouth: Mucous membranes are moist.   Eyes:      Conjunctiva/sclera: Conjunctivae normal.      Pupils: Pupils are equal, round, and reactive to light.   Cardiovascular:      Rate and Rhythm: Normal rate and regular rhythm.      Heart sounds: No murmur heard.  Pulmonary:      Effort: Pulmonary effort is normal.      Breath sounds: Normal breath sounds. No wheezing.   Abdominal:      General: Bowel sounds are normal. There is no distension.      Palpations: Abdomen is soft.      Tenderness: There is no abdominal tenderness.   Musculoskeletal:         General: No swelling. Normal range of motion.      Cervical back: Normal range of motion and neck supple.      Right lower leg: No edema.      Left lower leg: No edema.   Skin:     General: Skin is warm and dry.      Findings: No rash.   Neurological:      General: No focal deficit present.      Mental Status: He is alert and oriented to person, place, and time.      Cranial  Nerves: Dysarthria present.      Motor: Weakness and abnormal muscle tone present.   Psychiatric:         Mood and Affect: Mood normal.         Behavior: Behavior normal.       Significant Labs: All pertinent labs within the past 24 hours have been reviewed.    Significant Imaging: I have reviewed all pertinent imaging results/findings within the past 24 hours.

## 2023-02-14 NOTE — PT/OT/SLP PROGRESS
Physical Therapy Treatment Note           Name: Bhupendra Park    : 1960 (63 y.o.)  MRN: [id number]           TREATMENT SUMMARY AND RECOMMENDATIONS:    PT Received On: 23  PT Start Time: 1345     PT Stop Time: 1405  PT Total Time (min): 20 min     Subjective Assessment:   No complaints  Lethargic   x Awake, alert, cooperative  Uncooperative    Agitated  c/o pain    Appropriate  c/o fatigue    Confused  Treated at bedside     Emotionally labile x Treated in gym/dept.    Impulsive x Other: sleepy    Flat affect       Therapy Precautions:    Cognitive deficits  Spinal precautions    Collar - hard  Sternal precautions    Collar - soft   TLSO    Fall risk  LSO    Hip precautions - posterior  Knee immobilizer    Hip precautions - anterior  WBAT    Impaired communication  Partial weightbearing    Oxygen  TTWB    PEG tube  NWB    Visual deficits  Other:    Hearing deficits          Treatment Objectives:     Mobility Training:   Assist level Comments    Bed mobility     Transfer CGA Stand pivot transfer bedside chair > wc towards pt L side   Gait MIN A X10ft using hallway handrail on LUE; assistance to advance RLE and block during stance phase   Sit to stand transitions CGA-MIN A Multiple sit to stands during session   Sitting balance     Standing balance      Wheelchair mobility     Car transfer     Other:          Therapeutic Exercise:   Exercise Sets Reps Comments                               Additional Comments:      Assessment: Patient tolerated session fair. Patient very sleepy this afternoon and only agreeable for x1 trial of gait    PT Plan: continue POC  Revisions made to plan of care: No    GOALS:   Multidisciplinary Problems       Physical Therapy Goals          Problem: Physical Therapy    Goal Priority Disciplines Outcome Goal Variances Interventions   Physical Therapy Goal     PT, PT/OT Ongoing, Progressing     Description: Goals to be met by: Discharge     Patient will increase  functional independence with mobility by performin. Supine to sit with Modified [place]  2. Sit to supine with Modified [place]  3. Sit to stand transfer with Supervision  4. Gait  x 150 feet with Contact Guard Assistance using Quad Cane vs LRAD.                          Skilled PT Minutes Provided: 20 minutes   Communication with Treatment Team:     Equipment recommendations:       At end of treatment, patient remained:   Up in chair     Up in wheelchair in room    In bed    With alarm activated    Bed rails up    Call bell in reach     Family/friends present    Restraints secured properly    In bathroom with CNA/RN notified    Nurse aware   x In gym with therapist/tech    Other:

## 2023-02-14 NOTE — PT/OT/SLP PROGRESS
Physical Therapy Treatment Note           Name: Bhupendra Park    : 1960 (63 y.o.)  MRN: [id number]           TREATMENT SUMMARY AND RECOMMENDATIONS:    PT Received On: 23  PT Start Time: 945     PT Stop Time: 1015  PT Total Time (min): 30 min     Subjective Assessment:   No complaints  Lethargic   x Awake, alert, cooperative  Uncooperative    Agitated  c/o pain    Appropriate  c/o fatigue    Confused x Treated at bedside     Emotionally labile x Treated in gym/dept.    Impulsive  Other:    Flat affect       Therapy Precautions:    Cognitive deficits  Spinal precautions    Collar - hard  Sternal precautions    Collar - soft   TLSO   x Fall risk  LSO    Hip precautions - posterior  Knee immobilizer    Hip precautions - anterior  WBAT    Impaired communication  Partial weightbearing    Oxygen  TTWB    PEG tube  NWB    Visual deficits  Other:    Hearing deficits          Treatment Objectives:     Mobility Training:   Assist level Comments    Bed mobility     Transfer     Gait MIN A x2 X7ft using LBQC on LUE  Assistance to advance RLE   Sit to stand transitions CGA X5 sit to stands from wc surface pulling with LUE on hallway handrail   Sitting balance     Standing balance  Fair to Poor Static standing on firm surface without UE support reaching for bean bags with LUE and tossing into bucket anterior; pt started off ok and gradually needing more assistance for stability the longer he stood up d/t fatigue   Wheelchair mobility     Car transfer     Other:          Therapeutic Exercise:   Exercise Sets Reps Comments                               Additional Comments:      Assessment: Patient tolerated session well. Patient with some difficulty following cues for certain tasks despite simple commands, visual cues, and tactile cues.    PT Plan: continue POC  Revisions made to plan of care: No    GOALS:   Multidisciplinary Problems       Physical Therapy Goals          Problem: Physical Therapy    Goal  Priority Disciplines Outcome Goal Variances Interventions   Physical Therapy Goal     PT, PT/OT Ongoing, Progressing     Description: Goals to be met by: Discharge     Patient will increase functional independence with mobility by performin. Supine to sit with Modified [place]  2. Sit to supine with Modified [place]  3. Sit to stand transfer with Supervision  4. Gait  x 150 feet with Contact Guard Assistance using Quad Cane vs LRAD.                          Skilled PT Minutes Provided: 30 minutes  Communication with Treatment Team:     Equipment recommendations:       At end of treatment, patient remained:  x Up in chair     Up in wheelchair in room    In bed   x With alarm activated    Bed rails up   x Call bell in reach     Family/friends present   x Restraints secured properly    In bathroom with CNA/RN notified   x Nurse aware    In gym with therapist/tech    Other:

## 2023-02-14 NOTE — PROGRESS NOTES
"Inpatient Nutrition Evaluation    Admit Date: 2/9/2023   Total duration of encounter: 5 days    Nutrition Recommendation/Prescription     Continue mince and moist 2. Trial boost plus   Nutrition Assessment     Chart Review    Reason Seen: continuous nutrition monitoring, length of stay, and follow-up    Malnutrition Screening Tool Results   Have you recently lost weight without trying?: No  Have you been eating poorly because of a decreased appetite?: No   MST Score: 0     Diagnosis:   Intraparenchymal hemorrhage of brain 2/9/2023      Right spastic hemiplegia 5/3/2022      Hypothyroidism 2/9/2023      BPH (benign prostatic hyperplasia)        Relevant Medical History:     Nutrition-Related Medications: atorvastatin, levothyroxine,     Nutrition-Related Labs:   Latest Reference Range & Units 02/14/23 06:44   Sodium 136 - 145 mmol/L 134 (L)   Potassium 3.5 - 5.1 mmol/L 4.3   Chloride 98 - 107 mmol/L 101   CO2 23 - 31 mmol/L 24   Anion Gap mEq/L 9.0   BUN 8.4 - 25.7 mg/dL 15.5   Creatinine 0.73 - 1.18 mg/dL 0.81   BUN/CREAT RATIO  19   eGFR mls/min/1.73/m2 >60   Glucose 82 - 115 mg/dL 82   Calcium 8.8 - 10.0 mg/dL 9.5   (L): Data is abnormally low    Diet Order: Diet Minced & Moist  Oral Supplement Order: none  Appetite/Oral Intake: good/% of meals  Factors Affecting Nutritional Intake: impaired cognitive status/motor control and difficulty/impaired swallowing  Food/Judaism/Cultural Preferences: none reported  Food Allergies: none reported    Skin Integrity: bruised (ecchymotic)  Wound(s):       Comments    Pt alert and has cognitive issues. No family present. Attempted to discussed food preferences. Pt just want to go back to sleep. PO intake % of meals. Pt on minced and moist diet.   Anthropometrics    Height: 5' 11" (180.3 cm)    Last Weight: 69.9 kg (154 lb 1.6 oz) (02/13/23 0500) Weight Method: Bed Scale  BMI (Calculated): 21.5  BMI Classification: normal (BMI 18.5-24.9)        Ideal Body Weight " (IBW), Male: 172 lb     % Ideal Body Weight, Male (lb): 95.75 %                          Usual Weight Provided By: unable to obtain usual weight    Wt Readings from Last 3 Encounters:   02/13/23 0500 69.9 kg (154 lb 1.6 oz)   02/09/23 1948 74.7 kg (164 lb 10.9 oz)   02/09/23 1946 74.7 kg (164 lb 10.9 oz)   04/05/19 0950 89 kg (196 lb 3.4 oz)   11/05/18 1116 89 kg (196 lb 3.4 oz)   07/18/18 0906 92.9 kg (204 lb 12.9 oz)   04/16/18 1020 93 kg (205 lb 0.4 oz)   03/23/18 0833 91.1 kg (200 lb 13.4 oz)   01/26/18 0933 88.7 kg (195 lb 8.8 oz)      Weight Change(s) Since Admission:  Admit Weight: 74.7 kg (164 lb 10.9 oz) (02/09/23 1946)      Patient Education    Not applicable.    Monitoring & Evaluation     Dietitian will monitor food and beverage intake, glucose/endocrine profile, and gastrointestinal profile.  Nutrition Risk/Follow-Up: low (follow-up in 5-7 days)  Patients assigned 'low nutrition risk' status do not qualify for a full nutritional assessment but will be monitored and re-evaluated in a 5-7 day time period. Please consult if re-evaluation needed sooner.

## 2023-02-14 NOTE — PT/OT/SLP PROGRESS
"Speech Language Pathology Treatment    Patient Name:  Bhupendra Park   MRN:  [id number]  Admitting Diagnosis: Intraparenchymal hemorrhage of brain    Recommendations:                 General Recommendations:  Dysphagia therapy and Speech/language therapy  Diet recommendations:  Minced & Moist Diet - IDDSI Level 5, Liquid Diet Level: Thin liquids - IDDSI Level 0   Aspiration Precautions: 1 bite/sip at a time, Alternating bites/sips, Assistance with meals, Check for pocketing/oral residue, HOB to 90 degrees, Meds crushed in puree, Small bites/sips, and Standard aspiration precautions   General Precautions: Standard, aphasia, aspiration  Communication strategies:  eye glasses, yes/no questions only, provide increased time to answer, and go to room if call light pushed    Subjective     Pt in bed, alert and awake. Pt stated,"Good Morning" following SLP entry.    Patient goals: pt unable to state at this time    Pain/Comfort:       Respiratory Status: Room air    Objective:     Has the patient been evaluated by SLP for swallowing?      Keep patient NPO?     Current Respiratory Status:        SLP provided supervision and 1:1 assist as needed over breakfast. Pt consumed ~85% of minced and moist breakfast (oatmeal, eggs and sausage). Pt consumed sequential straw sips of liquid w/o overt s/s of aspiration. Tactile and verbal cueing for small bites and alternating bites and sips to clear residue. Mild residue following bites of minced and moist, though cleared w/ liquid wash.  Pt demonstrated good toleration of meal w/o overt s/s of aspiration.    Cont SLP POC.    Assessment:     Bhupendra Park is a 63 y.o. male with an SLP diagnosis of Aphasia and Dysphagia.  He presents with expressive and receptive aphasia. Diet modification warranted at this time to ensure safety w/ PO.    Goals:   Multidisciplinary Problems       SLP Goals          Problem: SLP    Goal Priority Disciplines Outcome   SLP Goal     SLP Ongoing, " Progressing   Description: LTG:   Pt will tolerate LRD w/o overt s/s of aspiration.  Pt will improve expressive and receptive language skills in order to effectively communicate wants and needs Manish.    STG:  Pt will tolerate minced and moist w/ good oral clearance and w/o overt s/s of aspiration.  Pt will participate in trials of soft and bite sized w/ good oral clearance and w/o overt s/s of aspiration.  Pt will answer 2U YNQs w/ 80% acc Manish.  Pt will follow 1-step commands w/ 80% acc Manish.  Pt will ID objects in Fo3 w/ 80% acc Manish.  Pt will complete auto speech tasks w/ 80% acc Manish.  Pt will name common objects w/ 80% acc Manish.                         Plan:     Patient to be seen:   (PRN)   Plan of Care expires:  03/03/23  Plan of Care reviewed with:  patient   SLP Follow-Up:  Yes       Discharge recommendations:      Barriers to Discharge:  Level of Skilled Assistance Needed see PT/OT notes    Time Tracking:     SLP Treatment Date:  2/14/23  Speech Start Time:  7:50  Speech Stop Time:  8:20     Speech Total Time (min):  30 min    Billable Minutes: Treatment Swallowing Dysfunction 30    Adamairs Carrion M.S., CCC-SLP   02/14/2023

## 2023-02-14 NOTE — PT/OT/SLP PROGRESS
Occupational Therapy  Treatment    Name: Bhupendra Park    : 1960 (63 y.o.)  MRN: [id number]           TREATMENT SUMMARY AND RECOMMENDATIONS:      OT Date of Treatment: 23  OT Start Time: 905  OT Stop Time: 940  OT Total Time (min): 35 min      Subjective Assessment:   No complaints  Lethargic   X Awake, alert, cooperative  Impulsive    Uncooperative  X Flat affect    Agitated  c/o pain    Appropriate  c/o fatigue    Confused  Treated at bedside     Emotionally labile  Treated in gym/dept.      Other:        Therapy Precautions:   X Cognitive deficits  Spinal precautions    Collar - hard  Sternal precautions    Collar - soft   TLSO   X Fall risk  LSO    Hip precautions - posterior  Knee immobilizer    Hip precautions - anterior  WBAT    Impaired communication  Partial weightbearing    Oxygen  TTWB    PEG tube  NWB    Visual deficits      Hearing deficits   Other:        Treatment Objectives:     Mobility Training:    Mobility task Assist level Comments    Bed mobility Supervision  Supine to sit transition    Transfer     Sit to stands transitions CGA/Min A  From EOB    Functional mobility Mod A  With PT present, Pt ambulated ~5ft using LBQC and tactile cueing for swing phase for RLE   Sitting balance Supervision  Unsupported sitting while EOB for dressing    Standing balance      Other:        ADL Training:    ADL Assist level Comments    Feeding     Grooming/hygiene     Bathing     Upper body dressing Set-up     Lower body dressing Min/ Mod A  With standing balance and managing pants up once in standing  Pt able to thread BLE and drew socks with supervision    Toileting Mod A  OT provided perineal hygiene following (+)output in brief. Pt able to drew brief with Min A    Toilet transfer     Adaptive equipment training     Other:       Assessment: Patient tolerated session well. Pt required increased time to complete dressing activities due to delayed processing, however physical assistance was kept  to a minimum.     OT Plan: Continue with current POC   Revisions made to plan of care: No    GOALS:   Multidisciplinary Problems       Occupational Therapy Goals          Problem: Occupational Therapy    Goal Priority Disciplines Outcome Interventions   Occupational Therapy Goal     OT, PT/OT Ongoing, Progressing    Description: Goals to be met by: 3/10/23     Patient will increase functional independence with ADLs by performing:    UE Dressing with Modified [place].  LE Dressing with Modified [place].  Toileting from toilet with Stand-by Assistance for hygiene and clothing management.   Toilet transfer to toilet with Supervision.                         Skilled OT Minutes Provided: 35  Communication with Treatment Team:     Equipment recommendations:       At end of treatment, patient remained:   Up in chair     Up in wheelchair in room    In bed    With alarm activated    Bed rails up    Call bell in reach     Family/friends present    Restraints secured properly    In bathroom with CNA/RN notified   X In gym with PT/PTA/tech    Nurse aware    Other:

## 2023-02-14 NOTE — PROGRESS NOTES
[facility] Medicine  Progress Note    Patient Name: Bhupendra Park  MRN: [id number]  Patient Class: IP- Swing   Admission Date: 2/9/2023  Length of Stay: 5 days  Attending Physician: Thairy G Reyes, DO  Primary Care Provider: Kelly Meadows MD        Subjective:     Principal Problem:Intraparenchymal hemorrhage of brain        HPI:  63-year-old male with HTN, left frontal IPH, prior cervical spine surgery status post spinal cord stimulator who presented to ED with acute encephalopathy and was subsequently found to have recurrent right frontal intraparenchymal hemorrhage. Neurosurgery recommended medical management. Serial CTs showed stability of bleed. Transferred to our facility for continued rehabilitation.   Majority of patient's information obtained from medical records as patient has receptive and expressive aphasia and is able to answer only with yes / no.        Overview/Hospital Course:  Patient admitted for rehabilitation with speech therapy, occupational therapy and physical therapy after intracranial hemorrhage. Patient's wife brought  from home for spinal stimulator.  Patient eating with supervision with his left hand.      Interval History:  Physical therapy reports patient ambulated 20 ft with a cane and modified independence  Review of Systems   Reason unable to perform ROS: Expressive aphasia.   Objective:     Vital Signs (Most Recent):  Temp: 98.1 °F (36.7 °C) (02/14/23 1100)  Pulse: 61 (02/14/23 1100)  Resp: 18 (02/14/23 1100)  BP: 95/60 (02/14/23 1100)  SpO2: 95 % (02/14/23 1100) Vital Signs (24h Range):  Temp:  [97.5 °F (36.4 °C)-98.1 °F (36.7 °C)] 98.1 °F (36.7 °C)  Pulse:  [59-67] 61  Resp:  [18] 18  SpO2:  [88 %-98 %] 95 %  BP: ()/(50-73) 95/60     Weight: 69.9 kg (154 lb 1.6 oz)  Body mass index is 21.49 kg/m².    Intake/Output Summary (Last 24 hours) at 2/14/2023 1510  Last data filed at 2/14/2023 0824  Gross per 24 hour   Intake 240 ml    Output --   Net 240 ml        Physical Exam  Constitutional:       General: He is not in acute distress.     Appearance: Normal appearance.   HENT:      Head: Normocephalic and atraumatic.      Nose: No congestion or rhinorrhea.      Mouth/Throat:      Mouth: Mucous membranes are moist.   Eyes:      Conjunctiva/sclera: Conjunctivae normal.      Pupils: Pupils are equal, round, and reactive to light.   Cardiovascular:      Rate and Rhythm: Normal rate and regular rhythm.      Heart sounds: No murmur heard.  Pulmonary:      Effort: Pulmonary effort is normal.      Breath sounds: Normal breath sounds. No wheezing.   Abdominal:      General: Bowel sounds are normal. There is no distension.      Palpations: Abdomen is soft.      Tenderness: There is no abdominal tenderness.   Musculoskeletal:         General: No swelling. Normal range of motion.      Cervical back: Normal range of motion and neck supple.      Right lower leg: No edema.      Left lower leg: No edema.   Skin:     General: Skin is warm and dry.      Findings: No rash.   Neurological:      General: No focal deficit present.      Mental Status: He is alert and oriented to person, place, and time.      Cranial Nerves: Dysarthria present.      Motor: Weakness and abnormal muscle tone present.   Psychiatric:         Mood and Affect: Mood normal.         Behavior: Behavior normal.       Significant Labs: All pertinent labs within the past 24 hours have been reviewed.    Significant Imaging: I have reviewed all pertinent imaging results/findings within the past 24 hours.      Assessment/Plan:      * Intraparenchymal hemorrhage of brain  -Continue with Keppra, atorvastatin, blood pressure goal less than 140/90  -on 02/06/2023 recommendation was documented that patient will need an MRI brain with and without contrast in 4-6 weeks to rule out hemorrhagic infarct or mass and patient is to be followed by Dr. Figueroa in [place]    Right spastic hemiplegia  -2/2  history of ICH and CVA  -PT/OT      Hypothyroidism  -continue with levothyroxine 88mcg      BPH (benign prostatic hyperplasia)  - continue with tamsulosin        VTE Risk Mitigation (From admission, onward)         Ordered     IP VTE LOW RISK PATIENT  Once         02/09/23 1931     Place sequential compression device  Until discontinued         02/09/23 1931                Discharge Planning   ABRAHAM:      Code Status: Full Code   Is the patient medically ready for discharge?:     Reason for patient still in hospital (select all that apply): Treatment and PT / OT recommendations  Discharge Plan A: Home with family, Home Health   Discharge Delays: None known at this time              Thairy G Reyes, DO  Department of Hospital Medicine   [facility]

## 2023-02-14 NOTE — PLAN OF CARE
Problem: Adult Inpatient Plan of Care  Goal: Plan of Care Review  Outcome: Ongoing, Progressing  Flowsheets (Taken 2/14/2023 1248)  Plan of Care Reviewed With:   patient   spouse  Goal: Optimal Comfort and Wellbeing  Outcome: Ongoing, Progressing  Intervention: Monitor Pain and Promote Comfort  Flowsheets (Taken 2/14/2023 1248)  Pain Management Interventions:   relaxation techniques promoted   quiet environment facilitated   warm blanket provided  Intervention: Provide Person-Centered Care  Flowsheets (Taken 2/14/2023 1248)  Trust Relationship/Rapport:   care explained   thoughts/feelings acknowledged   empathic listening provided   reassurance provided   emotional support provided     Problem: Fall Injury Risk  Goal: Absence of Fall and Fall-Related Injury  Outcome: Ongoing, Progressing  Intervention: Promote Injury-Free Environment  Flowsheets (Taken 2/14/2023 1248)  Safety Promotion/Fall Prevention:   assistive device/personal item within reach   bed alarm set   room near unit station   instructed to call staff for mobility   gait belt with ambulation   nonskid shoes/socks when out of bed   diversional activities provided   Fall Risk reviewed with patient/family     Problem: Infection  Goal: Absence of Infection Signs and Symptoms  Outcome: Ongoing, Progressing  Intervention: Prevent or Manage Infection  Flowsheets (Taken 2/14/2023 1248)  Fever Reduction/Comfort Measures:   lightweight clothing   lightweight bedding

## 2023-02-14 NOTE — ASSESSMENT & PLAN NOTE
-Continue with Keppra, atorvastatin, blood pressure goal less than 140/90  -on 02/06/2023 recommendation was documented that patient will need an MRI brain with and without contrast in 4-6 weeks to rule out hemorrhagic infarct or mass and patient is to be followed by Dr. Figueroa in [place]

## 2023-02-14 NOTE — PLAN OF CARE
Problem: Adult Inpatient Plan of Care  Goal: Plan of Care Review  Outcome: Ongoing, Progressing  Flowsheets (Taken 2/14/2023 0451)  Plan of Care Reviewed With: patient  Goal: Patient-Specific Goal (Individualized)  Outcome: Ongoing, Progressing  Flowsheets (Taken 2/14/2023 0451)  Anxieties, Fears or Concerns: no concerns at this time  Individualized Care Needs: improve mobility  Goal: Absence of Hospital-Acquired Illness or Injury  Outcome: Ongoing, Progressing  Intervention: Prevent Skin Injury  Flowsheets (Taken 2/13/2023 2000)  Skin Protection:   adhesive use limited   incontinence pads utilized   transparent dressing maintained  Intervention: Prevent Infection  Flowsheets (Taken 2/14/2023 0451)  Infection Prevention:   rest/sleep promoted   single patient room provided     Problem: Skin Injury Risk Increased  Goal: Skin Health and Integrity  Outcome: Ongoing, Progressing  Intervention: Optimize Skin Protection  Flowsheets (Taken 2/13/2023 2000)  Pressure Reduction Techniques:   frequent weight shift encouraged   weight shift assistance provided  Skin Protection:   adhesive use limited   incontinence pads utilized   transparent dressing maintained  Intervention: Promote and Optimize Oral Intake  Flowsheets (Taken 2/13/2023 2000)  Oral Nutrition Promotion:   rest periods promoted   safe use of adaptive equipment encouraged     Problem: Fall Injury Risk  Goal: Absence of Fall and Fall-Related Injury  Outcome: Ongoing, Progressing  Intervention: Identify and Manage Contributors  Flowsheets (Taken 2/14/2023 0451)  Self-Care Promotion:   independence encouraged   BADL personal objects within reach   safe use of adaptive equipment encouraged  Medication Review/Management:   medications reviewed   high-risk medications identified     Problem: Infection  Goal: Absence of Infection Signs and Symptoms  Outcome: Ongoing, Progressing  Intervention: Prevent or Manage Infection  Flowsheets (Taken 2/14/2023 0451)  Fever  Reduction/Comfort Measures:   lightweight bedding   lightweight clothing

## 2023-02-14 NOTE — SUBJECTIVE & OBJECTIVE
Interval History:  Physical therapy reports patient ambulated 20 ft with a cane and modified independence  Review of Systems   Reason unable to perform ROS: Expressive aphasia.   Objective:     Vital Signs (Most Recent):  Temp: 98.1 °F (36.7 °C) (02/14/23 1100)  Pulse: 61 (02/14/23 1100)  Resp: 18 (02/14/23 1100)  BP: 95/60 (02/14/23 1100)  SpO2: 95 % (02/14/23 1100) Vital Signs (24h Range):  Temp:  [97.5 °F (36.4 °C)-98.1 °F (36.7 °C)] 98.1 °F (36.7 °C)  Pulse:  [59-67] 61  Resp:  [18] 18  SpO2:  [88 %-98 %] 95 %  BP: ()/(50-73) 95/60     Weight: 69.9 kg (154 lb 1.6 oz)  Body mass index is 21.49 kg/m².    Intake/Output Summary (Last 24 hours) at 2/14/2023 1510  Last data filed at 2/14/2023 0824  Gross per 24 hour   Intake 240 ml   Output --   Net 240 ml        Physical Exam  Constitutional:       General: He is not in acute distress.     Appearance: Normal appearance.   HENT:      Head: Normocephalic and atraumatic.      Nose: No congestion or rhinorrhea.      Mouth/Throat:      Mouth: Mucous membranes are moist.   Eyes:      Conjunctiva/sclera: Conjunctivae normal.      Pupils: Pupils are equal, round, and reactive to light.   Cardiovascular:      Rate and Rhythm: Normal rate and regular rhythm.      Heart sounds: No murmur heard.  Pulmonary:      Effort: Pulmonary effort is normal.      Breath sounds: Normal breath sounds. No wheezing.   Abdominal:      General: Bowel sounds are normal. There is no distension.      Palpations: Abdomen is soft.      Tenderness: There is no abdominal tenderness.   Musculoskeletal:         General: No swelling. Normal range of motion.      Cervical back: Normal range of motion and neck supple.      Right lower leg: No edema.      Left lower leg: No edema.   Skin:     General: Skin is warm and dry.      Findings: No rash.   Neurological:      General: No focal deficit present.      Mental Status: He is alert and oriented to person, place, and time.      Cranial Nerves:  Dysarthria present.      Motor: Weakness and abnormal muscle tone present.   Psychiatric:         Mood and Affect: Mood normal.         Behavior: Behavior normal.       Significant Labs: All pertinent labs within the past 24 hours have been reviewed.    Significant Imaging: I have reviewed all pertinent imaging results/findings within the past 24 hours.

## 2023-02-14 NOTE — PT/OT/SLP PROGRESS
Occupational Therapy  Treatment    Name: Bhupendra Park    : 1960 (63 y.o.)  MRN: [id number]           TREATMENT SUMMARY AND RECOMMENDATIONS:      OT Date of Treatment: 23  OT Start Time: 1400  OT Stop Time: 141  OT Total Time (min): 12 min      Subjective Assessment:   No complaints  Lethargic   x Awake, alert, cooperative  Impulsive    Uncooperative   Flat affect    Agitated  c/o pain    Appropriate  c/o fatigue    Confused x Treated at bedside     Emotionally labile  Treated in gym/dept.      Other:        Therapy Precautions:    Cognitive deficits  Spinal precautions    Collar - hard  Sternal precautions    Collar - soft   TLSO   x Fall risk  LSO    Hip precautions - posterior  Knee immobilizer    Hip precautions - anterior  WBAT    Impaired communication  Partial weightbearing    Oxygen  TTWB    PEG tube  NWB    Visual deficits      Hearing deficits   Other:        Treatment Objectives:     Mobility Training:    Mobility task Assist level Comments    Bed mobility Min A  EOB>supine   Transfer Min A Stand/pivot t/f w/c>EOB to L side   Sit to stands transitions     Functional mobility     Sitting balance     Standing balance      Other:        Additional Comments:  Pt unable to tolerate PM session d/t fatigue and falling asleep. Pt gets keppra dose in the afternoon which causes drowsiness therefore may need to do longer treatments in the morning that way Pt is able to participate better.     Assessment: Patient tolerated session poor.    OT Plan: Continue POC  Revisions made to plan of care: No    GOALS:   Multidisciplinary Problems       Occupational Therapy Goals          Problem: Occupational Therapy    Goal Priority Disciplines Outcome Interventions   Occupational Therapy Goal     OT, PT/OT Ongoing, Progressing    Description: Goals to be met by: 3/10/23     Patient will increase functional independence with ADLs by performing:    UE Dressing with Modified [place].  LE Dressing with  Modified [place].  Toileting from toilet with Stand-by Assistance for hygiene and clothing management.   Toilet transfer to toilet with Supervision.                         Skilled OT Minutes Provided: 12  Communication with Treatment Team: will discuss with primary OT regarding scheduling to ensure best tx plan     Equipment recommendations:       At end of treatment, patient remained:   Up in chair     Up in wheelchair in room   x In bed   x With alarm activated   x Bed rails up   x Call bell in reach     Family/friends present    Restraints secured properly    In bathroom with CNA/RN notified    In gym with PT/PTA/tech    Nurse aware    Other:

## 2023-02-15 NOTE — PT/OT/SLP PROGRESS
Occupational Therapy  Treatment    Name: Bhupendra Park    : 1960 (63 y.o.)  MRN: [id number]           TREATMENT SUMMARY AND RECOMMENDATIONS:      OT Date of Treatment: 02/15/23  OT Start Time: 1055  OT Stop Time: 1145  OT Total Time (min): 50 min      Subjective Assessment:   No complaints X Lethargic    Awake, alert, cooperative  Impulsive    Uncooperative  X Flat affect    Agitated  c/o pain    Appropriate  c/o fatigue    Confused  Treated at bedside     Emotionally labile X Treated in gym/dept.      Other:        Therapy Precautions:    Cognitive deficits  Spinal precautions    Collar - hard  Sternal precautions    Collar - soft   TLSO   X Fall risk  LSO    Hip precautions - posterior  Knee immobilizer    Hip precautions - anterior  WBAT   X Impaired communication  Partial weightbearing    Oxygen  TTWB    PEG tube  NWB    Visual deficits      Hearing deficits   Other:        Treatment Objectives:     Mobility Training:    Mobility task Assist level Comments    Bed mobility Min A  Sit to supine with Pt assisting RLE into bed    Transfer CGA  From w/c to EOB with use of bed rail towards L side    Sit to stands transitions CGA  From all surfaces    Functional mobility     Sitting balance     Standing balance  Mod A  Pt attempted dynamic functional reaching with LUE from low surface to shoulder level.    Other:        Neuro Re-Ed:    OT completed PROM of the RUE for shoulder flexion, abduction,elbow flexion/extension, and wrist flexion/extension.-- Initially OT attempted AAROM however Pt unable to follow cues to participate in ROM.       Additional Comments:  Pt presented lethargic throughout session with difficulty to follow basic commands once fatigued.     Assessment: Patient tolerated session well. For w/c to EOB transfer, OT positioned Pt to transfer towards L side allowing Pt to use bed rail and pivot himself with Pt reporting he preferred that transfer to allow increased independence     OT Plan:  continue with current POC   Revisions made to plan of care: No    GOALS:   Multidisciplinary Problems       Occupational Therapy Goals          Problem: Occupational Therapy    Goal Priority Disciplines Outcome Interventions   Occupational Therapy Goal     OT, PT/OT Ongoing, Progressing    Description: Goals to be met by: 3/10/23     Patient will increase functional independence with ADLs by performing:    UE Dressing with Modified [place].  LE Dressing with Modified [place].  Toileting from toilet with Stand-by Assistance for hygiene and clothing management.   Toilet transfer to toilet with Supervision.                         Skilled OT Minutes Provided: 50  Communication with Treatment Team:     Equipment recommendations:       At end of treatment, patient remained:   Up in chair     Up in wheelchair in room   X In bed   X With alarm activated   X Bed rails up   x Call bell in reach     Family/friends present    Restraints secured properly    In bathroom with CNA/RN notified    In gym with PT/PTA/tech   X Nurse aware    Other:

## 2023-02-15 NOTE — PT/OT/SLP PROGRESS
Name: Bhupendra Park    : 1960 (63 y.o.)  MRN: [id number]            Interdisciplinary Team Conference     Case conference held with patient/family and care team to discuss progress, plan of care, barriers to be addressed for safe return home, equipment recommendations, and discharge planning. Communicated therapy progress with MD, RN, therapy clinicians and case management. All questions/concerns answered.

## 2023-02-15 NOTE — PLAN OF CARE
Problem: Adult Inpatient Plan of Care  Goal: Plan of Care Review  Outcome: Ongoing, Progressing  Flowsheets (Taken 2/15/2023 0422)  Plan of Care Reviewed With: patient  Goal: Patient-Specific Goal (Individualized)  Outcome: Ongoing, Progressing  Flowsheets (Taken 2/15/2023 0422)  Anxieties, Fears or Concerns: none at this time  Individualized Care Needs: improve mobility and communication  Goal: Absence of Hospital-Acquired Illness or Injury  Outcome: Ongoing, Progressing  Intervention: Prevent Skin Injury  Flowsheets (Taken 2/14/2023 2000)  Skin Protection:   adhesive use limited   incontinence pads utilized   transparent dressing maintained  Intervention: Prevent Infection  Flowsheets (Taken 2/15/2023 0422)  Infection Prevention:   hand hygiene promoted   personal protective equipment utilized   rest/sleep promoted   single patient room provided     Problem: Skin Injury Risk Increased  Goal: Skin Health and Integrity  Outcome: Ongoing, Progressing  Intervention: Optimize Skin Protection  Flowsheets (Taken 2/14/2023 2000)  Pressure Reduction Techniques:   frequent weight shift encouraged   weight shift assistance provided  Pressure Reduction Devices: positioning supports utilized  Skin Protection:   adhesive use limited   incontinence pads utilized   transparent dressing maintained  Intervention: Promote and Optimize Oral Intake  Flowsheets (Taken 2/15/2023 0422)  Oral Nutrition Promotion:   physical activity promoted   rest periods promoted   safe use of adaptive equipment encouraged     Problem: Fall Injury Risk  Goal: Absence of Fall and Fall-Related Injury  Outcome: Ongoing, Progressing  Intervention: Identify and Manage Contributors  Flowsheets (Taken 2/15/2023 0422)  Self-Care Promotion:   independence encouraged   BADL personal objects within reach   BADL personal routines maintained   safe use of adaptive equipment encouraged   meal set-up provided  Medication Review/Management:   medications reviewed    high-risk medications identified     Problem: Infection  Goal: Absence of Infection Signs and Symptoms  Outcome: Ongoing, Progressing  Intervention: Prevent or Manage Infection  Flowsheets (Taken 2/14/2023 2000)  Fever Reduction/Comfort Measures:   lightweight bedding   lightweight clothing

## 2023-02-15 NOTE — PT/OT/SLP PROGRESS
Physical Therapy Treatment Note           Name: Bhupendra Park    : 1960 (63 y.o.)  MRN: [id number]           TREATMENT SUMMARY AND RECOMMENDATIONS:    PT Received On: 02/15/23  PT Start Time: 1030     PT Stop Time: 1045  PT Total Time (min): 15 min     Subjective Assessment:   No complaints  Lethargic    Awake, alert, cooperative  Uncooperative    Agitated  c/o pain    Appropriate x c/o fatigue    Confused  Treated at bedside     Emotionally labile  Treated in gym/dept.    Impulsive  Other:    Flat affect       Therapy Precautions:    Cognitive deficits  Spinal precautions    Collar - hard  Sternal precautions    Collar - soft   TLSO   x Fall risk  LSO    Hip precautions - posterior  Knee immobilizer    Hip precautions - anterior  WBAT    Impaired communication  Partial weightbearing    Oxygen  TTWB    PEG tube  NWB    Visual deficits  Other:    Hearing deficits          Treatment Objectives:     Mobility Training:   Assist level Comments    Bed mobility     Transfer     Gait Manish Amb on firm surface with hallway handrail (L) 20 ft x 2   Sit to stand transitions     Sitting balance     Standing balance      Wheelchair mobility     Car transfer     Other:          Therapeutic Exercise:   Exercise Sets Reps Comments                               Additional Comments:  A needed to advance R LE     Assessment: Patient tolerated session pt appears very sleepy .    PT Plan: continue  Revisions made to plan of care: No    GOALS:   Multidisciplinary Problems       Physical Therapy Goals          Problem: Physical Therapy    Goal Priority Disciplines Outcome Goal Variances Interventions   Physical Therapy Goal     PT, PT/OT Ongoing, Progressing     Description: Goals to be met by: Discharge     Patient will increase functional independence with mobility by performin. Supine to sit with Modified [place]  2. Sit to supine with Modified [place]  3. Sit to stand transfer with Supervision  4.  Gait  x 150 feet with Contact Guard Assistance using Quad Cane vs LRAD.                          Skilled PT Minutes Provided: 15   Communication with Treatment Team:     Equipment recommendations:       At end of treatment, patient remained:   Up in chair     Up in wheelchair in room    In bed    With alarm activated    Bed rails up    Call bell in reach     Family/friends present    Restraints secured properly    In bathroom with CNA/RN notified    Nurse aware   x In gym with therapist/tech    Other:

## 2023-02-15 NOTE — PT/OT/SLP PROGRESS
Occupational Therapy  Treatment    Name: Bhupendra Park    : 1960 (63 y.o.)  MRN: [id number]           TREATMENT SUMMARY AND RECOMMENDATIONS:      OT Date of Treatment: 02/15/23  OT Start Time: 1500  OT Stop Time: 1515  OT Total Time (min): 15 min      Subjective Assessment:   No complaints X Lethargic    Awake, alert, cooperative  Impulsive    Uncooperative  X Flat affect    Agitated  c/o pain    Appropriate  c/o fatigue    Confused  Treated at bedside     Emotionally labile  Treated in gym/dept.      Other:        Therapy Precautions:    Cognitive deficits  Spinal precautions    Collar - hard  Sternal precautions    Collar - soft   TLSO     x Fall risk  LSO    Hip precautions - posterior  Knee immobilizer    Hip precautions - anterior  WBAT   X Impaired communication  Partial weightbearing    Oxygen  TTWB    PEG tube  NWB    Visual deficits      Hearing deficits   Other:        Treatment Objectives:     Mobility Training:    Mobility task Assist level Comments    Bed mobility Min A  From sit to supine with OT assisting RLE into bed    Transfer CGA  With use of bed rail, Pt completed w/c to EOB transfer towards L side    Sit to stands transitions     Functional mobility     Sitting balance     Standing balance      Other:        Neuro Re-Ed:   OT attempted reaching with RUE for active shoulder flexion and scap protraction/retraction, however Pt unable to follow cues resulting in passive ROM to the RUE.       Additional Comments:  OT noted Pt to present with saddened mood during PM session. Further discuss with MD and nursing     Assessment: Patient tolerated session fair.    OT Plan: Continue with current POC   Revisions made to plan of care: No    GOALS:   Multidisciplinary Problems       Occupational Therapy Goals          Problem: Occupational Therapy    Goal Priority Disciplines Outcome Interventions   Occupational Therapy Goal     OT, PT/OT Ongoing, Progressing    Description: Goals to be met by:  3/10/23     Patient will increase functional independence with ADLs by performing:    UE Dressing with Modified [place].  LE Dressing with Modified [place].  Toileting from toilet with Stand-by Assistance for hygiene and clothing management.   Toilet transfer to toilet with Supervision.                         Skilled OT Minutes Provided: 15  Communication with Treatment Team:     Equipment recommendations:       At end of treatment, patient remained:   Up in chair     Up in wheelchair in room   X In bed     X With alarm activated   X Bed rails up   X Call bell in reach    x Family/friends present    Restraints secured properly    In bathroom with CNA/RN notified    In gym with PT/PTA/tech    Nurse aware    Other:

## 2023-02-15 NOTE — PT/OT/SLP PROGRESS
Speech Language Pathology Treatment    Patient Name:  Bhupendra Park   MRN:  [id number]  Admitting Diagnosis: Intraparenchymal hemorrhage of brain    Recommendations:                 General Recommendations:  Dysphagia therapy and Speech/language therapy  Diet recommendations:  Minced & Moist Diet - IDDSI Level 5, Liquid Diet Level: Thin liquids - IDDSI Level 0   Aspiration Precautions: 1 bite/sip at a time, Alternating bites/sips, Assistance with meals, Check for pocketing/oral residue, HOB to 90 degrees, Meds crushed in puree, Small bites/sips, and Standard aspiration precautions   General Precautions: Standard, aphasia, aspiration  Communication strategies:  eye glasses, yes/no questions only, provide increased time to answer, and go to room if call light pushed    Subjective     Pt supine in bed upon SLP arrival w/ wife present. Pt's wife left at initiation of session. Pt presented sleepy across session and required frequent cueing to participate in language tasks. Session ended early due to pt closing eyes and falling asleep despite maxA.    Patient goals: pt unable to state at this time    Pain/Comfort:       Respiratory Status: Room air    Objective:     Has the patient been evaluated by SLP for swallowing?      Keep patient NPO?     Current Respiratory Status:        2 trials of YNQs presented w/ 0% acc (1 trial answered incorrectly and no response to the other Q despite maxA and repetitions).  Object namin% acc (1 trial answered correctly and the other w/ no response despite semantic and phonemic cueing).  Pt stated his wife's name I'ly.    Cont SLP POC.    Assessment:     Bhupendra Park is a 63 y.o. male with an SLP diagnosis of Aphasia and Dysphagia.  He presents with expressive and receptive aphasia. Diet modification warranted at this time to ensure safety w/ PO.    Goals:   Multidisciplinary Problems       SLP Goals          Problem: SLP    Goal Priority Disciplines Outcome   SLP Goal     SLP  Ongoing, Progressing   Description: LTG:   Pt will tolerate LRD w/o overt s/s of aspiration.  Pt will improve expressive and receptive language skills in order to effectively communicate wants and needs Manish.    STG:  Pt will tolerate minced and moist w/ good oral clearance and w/o overt s/s of aspiration.  Pt will participate in trials of soft and bite sized w/ good oral clearance and w/o overt s/s of aspiration.  Pt will answer 2U YNQs w/ 80% acc Manish.  Pt will follow 1-step commands w/ 80% acc Manish.  Pt will ID objects in Fo3 w/ 80% acc Manish.  Pt will complete auto speech tasks w/ 80% acc Manish.  Pt will name common objects w/ 80% acc Manish.                         Plan:     Patient to be seen:   (PRN)   Plan of Care expires:  03/03/23  Plan of Care reviewed with:  patient   SLP Follow-Up:  Yes       Discharge recommendations:      Barriers to Discharge:  Level of Skilled Assistance Needed see PT/OT notes    Time Tracking:     SLP Treatment Date:  2/15/23  Speech Start Time:  9:07  Speech Stop Time:  9:24     Speech Total Time (min): 17 min    Billable Minutes: Speech Therapy Individual 17 min language    Adamaris Carrion M.S., CCC-SLP   02/15/2023

## 2023-02-15 NOTE — PT/OT/SLP PROGRESS
Name: Bhupendra Park    : 1960 (63 y.o.)  MRN: [id number]            Interdisciplinary Team Conference     Case conference held with patient/family and care team to discuss progress, plan of care, barriers to be addressed for safe return home, equipment recommendations, and discharge planning. Communicated therapy progress with MD, RN, therapy clinicians and case management. All questions/concerns answered.

## 2023-02-15 NOTE — PLAN OF CARE
Problem: Adult Inpatient Plan of Care  Goal: Plan of Care Review  Outcome: Ongoing, Progressing  Flowsheets (Taken 2/15/2023 1548)  Plan of Care Reviewed With:   patient   spouse  Goal: Patient-Specific Goal (Individualized)  Outcome: Ongoing, Progressing  Goal: Absence of Hospital-Acquired Illness or Injury  Outcome: Ongoing, Progressing  Intervention: Prevent Skin Injury  Flowsheets (Taken 2/15/2023 1548)  Body Position: position changed independently  Skin Protection:   adhesive use limited   tubing/devices free from skin contact  Intervention: Prevent and Manage VTE (Venous Thromboembolism) Risk  Flowsheets (Taken 2/15/2023 1548)  Activity Management: Walk with assistive devise and /or staff member - L3  VTE Prevention/Management:   bleeding risk assessed   fluids promoted   ROM (active) performed  Goal: Optimal Comfort and Wellbeing  Outcome: Ongoing, Progressing  Intervention: Provide Person-Centered Care  Flowsheets (Taken 2/15/2023 1548)  Trust Relationship/Rapport:   care explained   questions answered   emotional support provided   thoughts/feelings acknowledged   reassurance provided   empathic listening provided  Goal: Readiness for Transition of Care  Outcome: Ongoing, Progressing     Problem: Fall Injury Risk  Goal: Absence of Fall and Fall-Related Injury  Outcome: Ongoing, Progressing  Intervention: Promote Injury-Free Environment  Flowsheets (Taken 2/15/2023 1548)  Safety Promotion/Fall Prevention:   assistive device/personal item within reach   gait belt with ambulation   Fall Risk reviewed with patient/family   nonskid shoes/socks when out of bed   instructed to call staff for mobility   lighting adjusted   diversional activities provided

## 2023-02-15 NOTE — PATIENT CARE CONFERENCE
Name: Bhupendra Park    : 1960 (63 y.o.)  MRN: [id number]            Interdisciplinary Team Conference     Case conference held with patient/family and care team to discuss progress, plan of care, barriers to be addressed for safe return home, equipment recommendations, and discharge planning. Communicated therapy progress with MD, RN, therapy clinicians and case management. All questions/concerns answered.

## 2023-02-16 NOTE — PLAN OF CARE
Problem: Adult Inpatient Plan of Care  Goal: Plan of Care Review  Outcome: Ongoing, Progressing  Flowsheets (Taken 2/15/2023 2000)  Plan of Care Reviewed With: patient  Goal: Patient-Specific Goal (Individualized)  Outcome: Ongoing, Progressing  Flowsheets (Taken 2/15/2023 2000)  Individualized Care Needs: Improve mobility and communication  Goal: Absence of Hospital-Acquired Illness or Injury  Outcome: Ongoing, Progressing  Intervention: Prevent Skin Injury  Flowsheets (Taken 2/15/2023 2000)  Skin Protection: adhesive use limited  Intervention: Prevent and Manage VTE (Venous Thromboembolism) Risk  Flowsheets (Taken 2/15/2023 2000)  Range of Motion: ROM (range of motion) performed

## 2023-02-16 NOTE — SUBJECTIVE & OBJECTIVE
Interval History:  Patient is ambulating 8-10 feet with quad cane. Contact guard assistance for ADLs.  He require significant amount of cuing for meals but is able to eat on his own and is consuming approximately 50% of his meals.  Concern for depression by staff, will need to monitor.  Review of Systems   Reason unable to perform ROS: Expressive aphasia.   Objective:     Vital Signs (Most Recent):  Temp: 96.2 °F (35.7 °C) (axillary) (02/16/23 1150)  Pulse: 62 (02/16/23 1150)  Resp: 16 (02/16/23 1150)  BP: (!) 116/59 (02/16/23 1150)  SpO2: 97 % (02/16/23 1150) Vital Signs (24h Range):  Temp:  [96.2 °F (35.7 °C)-98.1 °F (36.7 °C)] 96.2 °F (35.7 °C)  Pulse:  [58-67] 62  Resp:  [14-19] 16  SpO2:  [95 %-98 %] 97 %  BP: ()/(55-68) 116/59     Weight: 69.9 kg (154 lb 1.6 oz)  Body mass index is 21.49 kg/m².    Intake/Output Summary (Last 24 hours) at 2/16/2023 1511  Last data filed at 2/16/2023 1347  Gross per 24 hour   Intake 1410 ml   Output --   Net 1410 ml        Physical Exam  Constitutional:       General: He is not in acute distress.     Appearance: Normal appearance.   HENT:      Head: Normocephalic and atraumatic.      Nose: No congestion or rhinorrhea.      Mouth/Throat:      Mouth: Mucous membranes are moist.   Eyes:      Conjunctiva/sclera: Conjunctivae normal.      Pupils: Pupils are equal, round, and reactive to light.   Cardiovascular:      Rate and Rhythm: Normal rate and regular rhythm.      Heart sounds: No murmur heard.  Pulmonary:      Effort: Pulmonary effort is normal.      Breath sounds: Normal breath sounds. No wheezing.   Abdominal:      General: Bowel sounds are normal. There is no distension.      Palpations: Abdomen is soft.      Tenderness: There is no abdominal tenderness.   Musculoskeletal:         General: No swelling. Normal range of motion.      Cervical back: Normal range of motion and neck supple.      Right lower leg: No edema.      Left lower leg: No edema.   Skin:     General:  Skin is warm and dry.      Findings: No rash.   Neurological:      General: No focal deficit present.      Mental Status: He is alert and oriented to person, place, and time.      Cranial Nerves: Dysarthria present.      Motor: Weakness and abnormal muscle tone present.   Psychiatric:         Mood and Affect: Mood normal.         Behavior: Behavior normal.       Significant Labs: All pertinent labs within the past 24 hours have been reviewed.    Significant Imaging: I have reviewed all pertinent imaging results/findings within the past 24 hours.

## 2023-02-16 NOTE — ASSESSMENT & PLAN NOTE
-Continue with Keppra, atorvastatin, blood pressure goal less than 140/90  -on 02/06/2023 recommendation was documented that patient will need an MRI brain with and without contrast in 4-6 weeks to rule out hemorrhagic infarct or mass and patient is to be followed by Dr. Figueroa in [place]

## 2023-02-16 NOTE — PROGRESS NOTES
"Inpatient Nutrition Evaluation    Admit Date: 2/9/2023   Total duration of encounter: 7 days    Nutrition Recommendation/Prescription     Continue mince and moist diet.     Nutrition Assessment     Chart Review    Reason Seen: continuous nutrition monitoring, length of stay, and follow-up    Malnutrition Screening Tool Results   Have you recently lost weight without trying?: No  Have you been eating poorly because of a decreased appetite?: No   MST Score: 0     Diagnosis:   Noted      Intraparenchymal hemorrhage of brain 2/9/2023      Right spastic hemiplegia 5/3/2022      Hypothyroidism 2/9/2023      BPH (benign prostatic hyperplasia)        Relevant Medical History:     Nutrition-Related Medications: atorvastatin, levothyroxine,     Nutrition-Related Labs:   Latest Reference Range & Units 02/14/23 06:44   Sodium 136 - 145 mmol/L 134 (L)   Potassium 3.5 - 5.1 mmol/L 4.3   Chloride 98 - 107 mmol/L 101   CO2 23 - 31 mmol/L 24   Anion Gap mEq/L 9.0   BUN 8.4 - 25.7 mg/dL 15.5   Creatinine 0.73 - 1.18 mg/dL 0.81   BUN/CREAT RATIO  19   eGFR mls/min/1.73/m2 >60   Glucose 82 - 115 mg/dL 82   Calcium 8.8 - 10.0 mg/dL 9.5   (L): Data is abnormally low    Diet Order: Diet Minced & Moist  Oral Supplement Order: none  Appetite/Oral Intake: good/% of meals  Factors Affecting Nutritional Intake: impaired cognitive status/motor control and difficulty/impaired swallowing  Food/Restorationism/Cultural Preferences: none reported  Food Allergies: none reported    Skin Integrity: bruised (ecchymotic)  Wound(s):       Comments    Pt sleeping during rounds. No family present. In rounds yesterday, family requested boost. Will adjust menus. Nursing student spoke with aid. Pt ate 75% of lunch. Will monitor.     Anthropometrics    Height: 5' 11" (180.3 cm)    Last Weight: 69.9 kg (154 lb 1.6 oz) (02/13/23 0500) Weight Method: Bed Scale  BMI (Calculated): 21.5  BMI Classification: normal (BMI 18.5-24.9)        Ideal Body Weight (IBW), " Male: 172 lb     % Ideal Body Weight, Male (lb): 95.75 %                          Usual Weight Provided By: unable to obtain usual weight    Wt Readings from Last 3 Encounters:   02/13/23 0500 69.9 kg (154 lb 1.6 oz)   02/09/23 1948 74.7 kg (164 lb 10.9 oz)   02/09/23 1946 74.7 kg (164 lb 10.9 oz)   04/05/19 0950 89 kg (196 lb 3.4 oz)   11/05/18 1116 89 kg (196 lb 3.4 oz)   07/18/18 0906 92.9 kg (204 lb 12.9 oz)   04/16/18 1020 93 kg (205 lb 0.4 oz)   03/23/18 0833 91.1 kg (200 lb 13.4 oz)   01/26/18 0933 88.7 kg (195 lb 8.8 oz)      Weight Change(s) Since Admission:  Admit Weight: 74.7 kg (164 lb 10.9 oz) (02/09/23 1946)      Patient Education    Provide diet information on current diet order, mince and moist. No family present. Pt has cognitive issues , which will needs supervision with meals and assistance.  Education materials in discharged folder container.     Monitoring & Evaluation     Dietitian will monitor food and beverage intake, electrolyte/renal panel, and glucose/endocrine profile.  Nutrition Risk/Follow-Up: low (follow-up in 5-7 days)  Patients assigned 'low nutrition risk' status do not qualify for a full nutritional assessment but will be monitored and re-evaluated in a 5-7 day time period. Please consult if re-evaluation needed sooner.

## 2023-02-16 NOTE — PLAN OF CARE
Problem: Physical Therapy  Goal: Physical Therapy Goal  Description: Goals to be met by: Discharge     Patient will increase functional independence with mobility by performin. Supine to sit with Modified [place]  2. Sit to supine with Modified [place]  3. Sit to stand transfer with Supervision  4. Gait  x 150 feet with Contact Guard Assistance using Quad Cane vs LRAD.     Outcome: Ongoing, Progressing

## 2023-02-16 NOTE — PT/OT/SLP PROGRESS
Physical Therapy Treatment Note           Name: Bhupendra Park    : 1960 (63 y.o.)  MRN: [id number]           TREATMENT SUMMARY AND RECOMMENDATIONS:    PT Received On: 02/15/23  PT Start Time: 1430     PT Stop Time: 1500  PT Total Time (min): 30 min     Subjective Assessment:   No complaints  Lethargic    Awake, alert, cooperative  Uncooperative    Agitated  c/o pain    Appropriate  c/o fatigue    Confused  Treated at bedside     Emotionally labile x Treated in gym/dept.    Impulsive  Other:    Flat affect       Therapy Precautions:    Cognitive deficits  Spinal precautions    Collar - hard  Sternal precautions    Collar - soft   TLSO    Fall risk  LSO    Hip precautions - posterior  Knee immobilizer    Hip precautions - anterior  WBAT   x Impaired communication  Partial weightbearing    Oxygen  TTWB    PEG tube  NWB    Visual deficits  Other:    Hearing deficits          Treatment Objectives:     Mobility Training:   Assist level Comments    Bed mobility Manish Supine-sit   Transfer Manish Bed-WC   Gait Manish Amb on firm surface 20 ftx  2 with hallway handrail (L)   Sit to stand transitions Manish Sit-stand 5 reps x 2 with L UE use   Sitting balance     Standing balance      Wheelchair mobility     Car transfer     Other:          Therapeutic Exercise:   Exercise Sets Reps Comments   R LE exer AARM/PROM in supine 10 reps x 2  In all planes to promote muscle strength and ROM                          Additional Comments:  Pt not communicating much this PM    Assessment: Patient tolerated session well.    PT Plan: continue  Revisions made to plan of care: No    GOALS:   Multidisciplinary Problems       Physical Therapy Goals          Problem: Physical Therapy    Goal Priority Disciplines Outcome Goal Variances Interventions   Physical Therapy Goal     PT, PT/OT Ongoing, Progressing     Description: Goals to be met by: Discharge     Patient will increase functional independence with mobility by  performin. Supine to sit with Modified [place]  2. Sit to supine with Modified [place]  3. Sit to stand transfer with Supervision  4. Gait  x 150 feet with Contact Guard Assistance using Quad Cane vs LRAD.                          Skilled PT Minutes Provided: 30   Communication with Treatment Team:     Equipment recommendations:       At end of treatment, patient remained:   Up in chair     Up in wheelchair in room    In bed    With alarm activated    Bed rails up    Call bell in reach     Family/friends present    Restraints secured properly    In bathroom with CNA/RN notified    Nurse aware   x In gym with therapist/tech    Other:

## 2023-02-16 NOTE — PLAN OF CARE
Problem: Adult Inpatient Plan of Care  Goal: Plan of Care Review  Outcome: Ongoing, Progressing  Goal: Patient-Specific Goal (Individualized)  Outcome: Ongoing, Progressing  Goal: Absence of Hospital-Acquired Illness or Injury  Outcome: Ongoing, Progressing  Intervention: Identify and Manage Fall Risk  Flowsheets (Taken 2/16/2023 1622)  Safety Promotion/Fall Prevention:   assistive device/personal item within reach   bed alarm set   in recliner, wheels locked   chair alarm set   medications reviewed   nonskid shoes/socks when out of bed   instructed to call staff for mobility   room near unit station  Intervention: Prevent Skin Injury  Flowsheets (Taken 2/16/2023 1622)  Body Position:   weight shifting   sitting up in bed   turned   heels elevated  Skin Protection:   adhesive use limited   incontinence pads utilized  Intervention: Prevent and Manage VTE (Venous Thromboembolism) Risk  Flowsheets (Taken 2/16/2023 1622)  Activity Management:   Standing - L3   Up in chair - L3  VTE Prevention/Management:   bleeding risk assessed   ambulation promoted   dorsiflexion/plantar flexion performed   remove, assess skin, and reapply compression stockings  Range of Motion: active ROM (range of motion) encouraged  Intervention: Prevent Infection  Flowsheets (Taken 2/16/2023 1622)  Infection Prevention:   cohorting utilized   hand hygiene promoted   single patient room provided  Goal: Optimal Comfort and Wellbeing  Outcome: Ongoing, Progressing  Intervention: Monitor Pain and Promote Comfort  Flowsheets (Taken 2/16/2023 1622)  Pain Management Interventions:   care clustered   position adjusted   pillow support provided   quiet environment facilitated  Intervention: Provide Person-Centered Care  Flowsheets (Taken 2/16/2023 1622)  Trust Relationship/Rapport:   care explained   reassurance provided  Goal: Readiness for Transition of Care  Outcome: Ongoing, Progressing     Problem: Skin Injury Risk Increased  Goal: Skin Health and  Integrity  Outcome: Ongoing, Progressing  Intervention: Optimize Skin Protection  Flowsheets (Taken 2/16/2023 1622)  Pressure Reduction Techniques:   weight shift assistance provided   pressure points protected  Pressure Reduction Devices: positioning supports utilized  Skin Protection:   adhesive use limited   incontinence pads utilized  Head of Bed (HOB) Positioning: HOB at 30 degrees  Intervention: Promote and Optimize Oral Intake  Flowsheets (Taken 2/16/2023 1622)  Oral Nutrition Promotion:   calorie-dense liquids provided   calorie-dense foods provided     Problem: Fall Injury Risk  Goal: Absence of Fall and Fall-Related Injury  Outcome: Ongoing, Progressing  Intervention: Identify and Manage Contributors  Flowsheets (Taken 2/16/2023 1622)  Self-Care Promotion: independence encouraged  Medication Review/Management: medications reviewed  Intervention: Promote Injury-Free Environment  Flowsheets (Taken 2/16/2023 1622)  Safety Promotion/Fall Prevention:   assistive device/personal item within reach   bed alarm set   in recliner, wheels locked   chair alarm set   medications reviewed   nonskid shoes/socks when out of bed   instructed to call staff for mobility   room near unit station     Problem: Infection  Goal: Absence of Infection Signs and Symptoms  Outcome: Ongoing, Progressing

## 2023-02-16 NOTE — SUBJECTIVE & OBJECTIVE
Interval History:  Patient is ambulating 8-10 feet with quad cane. Contact guard assistance for ADLs.  He require significant amount of cuing for meals but is able to eat on his own and is consuming approximately 50% of his meals.  Concern for depression by staff, will need to monitor.  Review of Systems   Reason unable to perform ROS: Expressive aphasia.   Objective:     Vital Signs (Most Recent):  Temp: 97.9 °F (36.6 °C) (02/15/23 1522)  Pulse: 67 (02/15/23 1522)  Resp: 16 (02/15/23 1100)  BP: 103/60 (02/15/23 1522)  SpO2: 96 % (02/15/23 1522) Vital Signs (24h Range):  Temp:  [97.6 °F (36.4 °C)-98.2 °F (36.8 °C)] 97.9 °F (36.6 °C)  Pulse:  [57-68] 67  Resp:  [16-18] 16  SpO2:  [95 %-100 %] 96 %  BP: ()/(42-68) 103/60     Weight: 69.9 kg (154 lb 1.6 oz)  Body mass index is 21.49 kg/m².    Intake/Output Summary (Last 24 hours) at 2/15/2023 1929  Last data filed at 2/15/2023 1826  Gross per 24 hour   Intake 1197 ml   Output --   Net 1197 ml        Physical Exam  Constitutional:       General: He is not in acute distress.     Appearance: Normal appearance.   HENT:      Head: Normocephalic and atraumatic.      Nose: No congestion or rhinorrhea.      Mouth/Throat:      Mouth: Mucous membranes are moist.   Eyes:      Conjunctiva/sclera: Conjunctivae normal.      Pupils: Pupils are equal, round, and reactive to light.   Cardiovascular:      Rate and Rhythm: Normal rate and regular rhythm.      Heart sounds: No murmur heard.  Pulmonary:      Effort: Pulmonary effort is normal.      Breath sounds: Normal breath sounds. No wheezing.   Abdominal:      General: Bowel sounds are normal. There is no distension.      Palpations: Abdomen is soft.      Tenderness: There is no abdominal tenderness.   Musculoskeletal:         General: No swelling. Normal range of motion.      Cervical back: Normal range of motion and neck supple.      Right lower leg: No edema.      Left lower leg: No edema.   Skin:     General: Skin is warm  and dry.      Findings: No rash.   Neurological:      General: No focal deficit present.      Mental Status: He is alert and oriented to person, place, and time.      Cranial Nerves: Dysarthria present.      Motor: Weakness and abnormal muscle tone present.   Psychiatric:         Mood and Affect: Mood normal.         Behavior: Behavior normal.       Significant Labs: All pertinent labs within the past 24 hours have been reviewed.    Significant Imaging: I have reviewed all pertinent imaging results/findings within the past 24 hours.

## 2023-02-16 NOTE — PLAN OF CARE
Weekly Staffing Report      Date Admitted: 2/9/2023 :   Staffing Date: 2/15/2023     Patient Active Problem List   Diagnosis    Right spastic hemiplegia    Intraparenchymal hemorrhage of brain    Hypothyroidism    BPH (benign prostatic hyperplasia)          Team Members Present:       Nursing Present     Yes [x]    No []    Physical Therapy Present    Yes [x]    No []    Occupational Therapy Present     Yes [x]    No []    Speech Therapy Present    Yes [x]    No []    NA []    Dietary Present    Yes [x]    No []        Physician Present   [] Duane Carney    [x] Thairy Reyes    [] KEVIN Tian    [] JAHAIRA Duarte     [] GENEVA Ramachandran      Family Present    [] Adult Children    [x] Spouse    [] POA    [] Friend/ Caregiver    [] Other       Interdisciplinary Meeting Notes:      Doing well with PT OT ST continue the same till next week            Please see Documented PT/OT/ST, Dietary, Nursing, and Physician notes for detailed treatment information.

## 2023-02-16 NOTE — PROGRESS NOTES
Name: Bhupendra Park    : 1960 (63 y.o.)  MRN: [id number]           Patient Unavailable      Patient unable to be seen at this time secondary to: PM - unable to be seen as patient is in bed and in and out of sleepiness.

## 2023-02-16 NOTE — PT/OT/SLP PROGRESS
Speech Language Pathology Treatment    Patient Name:  Bhupendra Park   MRN:  [id number]  Admitting Diagnosis: Intraparenchymal hemorrhage of brain    Recommendations:                 General Recommendations:  Dysphagia therapy and Speech/language therapy  Diet recommendations:  Minced & Moist Diet - IDDSI Level 5, Liquid Diet Level: Thin liquids - IDDSI Level 0   Aspiration Precautions: 1 bite/sip at a time, Alternating bites/sips, Assistance with meals, Check for pocketing/oral residue, HOB to 90 degrees, Meds crushed in puree, Small bites/sips, and Standard aspiration precautions   General Precautions: Standard, aphasia, aspiration  Communication strategies:  eye glasses, yes/no questions only, provide increased time to answer, and go to room if call light pushed    Subjective     Handoff from PT in therapy gym. Pt seen in gym and return to bed following session w/ bed alarm on and call light on L side.    Patient goals: pt unable to state at this time    Pain/Comfort:       Respiratory Status: Room air    Objective:     Has the patient been evaluated by SLP for swallowing?      Keep patient NPO?     Current Respiratory Status:        Object namin% acc I'ly, unable to increase acc despite semantic and phonemic cueing provided.  2U YNQs: pt w/ no response despite repetition and extra time provided. Pt sleepy and closing eyes occasionally.  Auto speech:counting 1-10. Pt able to stated 5/10 numbers given visual cueing.  Object ID Fo4 completed w/ 25% acc. Hand over hand prompting required for 3/4 trials.    Overall fair session. Limiting factor continues to be drowsiness across sessions.    Cont SLP POC.    Assessment:     Bhupendra Park is a 63 y.o. male with an SLP diagnosis of Aphasia and Dysphagia.  He presents with expressive and receptive aphasia. Diet modification warranted at this time to ensure safety w/ PO.    Goals:   Multidisciplinary Problems       SLP Goals          Problem: SLP    Goal  Priority Disciplines Outcome   SLP Goal     SLP Ongoing, Progressing   Description: LTG:   Pt will tolerate LRD w/o overt s/s of aspiration.  Pt will improve expressive and receptive language skills in order to effectively communicate wants and needs Manish.    STG:  Pt will tolerate minced and moist w/ good oral clearance and w/o overt s/s of aspiration.  Pt will participate in trials of soft and bite sized w/ good oral clearance and w/o overt s/s of aspiration.  Pt will answer 2U YNQs w/ 80% acc Manish.  Pt will follow 1-step commands w/ 80% acc Manish.  Pt will ID objects in Fo3 w/ 80% acc Manish.  Pt will complete auto speech tasks w/ 80% acc Manish.  Pt will name common objects w/ 80% acc Manish.                         Plan:     Patient to be seen:   (PRN)   Plan of Care expires:  03/03/23  Plan of Care reviewed with:  patient   SLP Follow-Up:  Yes       Discharge recommendations:      Barriers to Discharge:  Level of Skilled Assistance Needed see PT/OT notes    Time Tracking:     SLP Treatment Date:  2/16/23  Speech Start Time:  10:00  Speech Stop Time:  10:34   Speech Total Time (min): 34 min    Billable Minutes: Speech Therapy Individual 34 min language    Adamaris Carrion M.S., CCC-SLP   02/16/2023

## 2023-02-16 NOTE — ASSESSMENT & PLAN NOTE
-Continue with Keppra, atorvastatin, blood pressure goal less than 140/90  -on 02/06/2023 recommendation was documented that patient will need an MRI brain with and without contrast in 4-6 weeks to rule out hemorrhagic infarct or mass and patient is to be followed by Dr. Figueroa in [place]

## 2023-02-16 NOTE — PROGRESS NOTES
[facility] Medicine  Progress Note    Patient Name: Bhupendra Park  MRN: [id number]  Patient Class: IP- Swing   Admission Date: 2/9/2023  Length of Stay: 6 days  Attending Physician: Thairy G Reyes, DO  Primary Care Provider: Kelly Meadows MD        Subjective:     Principal Problem:Intraparenchymal hemorrhage of brain        HPI:  63-year-old male with HTN, left frontal IPH, prior cervical spine surgery status post spinal cord stimulator who presented to ED with acute encephalopathy and was subsequently found to have recurrent right frontal intraparenchymal hemorrhage. Neurosurgery recommended medical management. Serial CTs showed stability of bleed. Transferred to our facility for continued rehabilitation.   Majority of patient's information obtained from medical records as patient has receptive and expressive aphasia and is able to answer only with yes / no.        Overview/Hospital Course:  Patient admitted for rehabilitation with speech therapy, occupational therapy and physical therapy after intracranial hemorrhage. Patient's wife brought  from home for spinal stimulator.  Patient eating with supervision with his left hand.      Interval History:  Patient is ambulating 8-10 feet with quad cane. Contact guard assistance for ADLs.  He require significant amount of cuing for meals but is able to eat on his own and is consuming approximately 50% of his meals.  Concern for depression by staff, will need to monitor.  Review of Systems   Reason unable to perform ROS: Expressive aphasia.   Objective:     Vital Signs (Most Recent):  Temp: 97.9 °F (36.6 °C) (02/15/23 1522)  Pulse: 67 (02/15/23 1522)  Resp: 16 (02/15/23 1100)  BP: 103/60 (02/15/23 1522)  SpO2: 96 % (02/15/23 1522) Vital Signs (24h Range):  Temp:  [97.6 °F (36.4 °C)-98.2 °F (36.8 °C)] 97.9 °F (36.6 °C)  Pulse:  [57-68] 67  Resp:  [16-18] 16  SpO2:  [95 %-100 %] 96 %  BP: ()/(42-68) 103/60     Weight:  69.9 kg (154 lb 1.6 oz)  Body mass index is 21.49 kg/m².    Intake/Output Summary (Last 24 hours) at 2/15/2023 1929  Last data filed at 2/15/2023 1826  Gross per 24 hour   Intake 1197 ml   Output --   Net 1197 ml        Physical Exam  Constitutional:       General: He is not in acute distress.     Appearance: Normal appearance.   HENT:      Head: Normocephalic and atraumatic.      Nose: No congestion or rhinorrhea.      Mouth/Throat:      Mouth: Mucous membranes are moist.   Eyes:      Conjunctiva/sclera: Conjunctivae normal.      Pupils: Pupils are equal, round, and reactive to light.   Cardiovascular:      Rate and Rhythm: Normal rate and regular rhythm.      Heart sounds: No murmur heard.  Pulmonary:      Effort: Pulmonary effort is normal.      Breath sounds: Normal breath sounds. No wheezing.   Abdominal:      General: Bowel sounds are normal. There is no distension.      Palpations: Abdomen is soft.      Tenderness: There is no abdominal tenderness.   Musculoskeletal:         General: No swelling. Normal range of motion.      Cervical back: Normal range of motion and neck supple.      Right lower leg: No edema.      Left lower leg: No edema.   Skin:     General: Skin is warm and dry.      Findings: No rash.   Neurological:      General: No focal deficit present.      Mental Status: He is alert and oriented to person, place, and time.      Cranial Nerves: Dysarthria present.      Motor: Weakness and abnormal muscle tone present.   Psychiatric:         Mood and Affect: Mood normal.         Behavior: Behavior normal.       Significant Labs: All pertinent labs within the past 24 hours have been reviewed.    Significant Imaging: I have reviewed all pertinent imaging results/findings within the past 24 hours.      Assessment/Plan:      * Intraparenchymal hemorrhage of brain  -Continue with Keppra, atorvastatin, blood pressure goal less than 140/90  -on 02/06/2023 recommendation was documented that patient will need  an MRI brain with and without contrast in 4-6 weeks to rule out hemorrhagic infarct or mass and patient is to be followed by Dr. Figueroa in [place]    Right spastic hemiplegia  -2/2 history of ICH and CVA  -PT/OT      Hypothyroidism  -continue with levothyroxine 88mcg      BPH (benign prostatic hyperplasia)  - continue with tamsulosin        VTE Risk Mitigation (From admission, onward)         Ordered     IP VTE LOW RISK PATIENT  Once         02/09/23 1931     Place sequential compression device  Until discontinued         02/09/23 1931                Discharge Planning   ABRAHAM:      Code Status: Full Code   Is the patient medically ready for discharge?:     Reason for patient still in hospital (select all that apply): Treatment and PT / OT recommendations  Discharge Plan A: Home with family, Home Health   Discharge Delays: None known at this time              Thairy G Reyes, DO  Department of Hospital Medicine   [facility]

## 2023-02-16 NOTE — PT/OT/SLP PROGRESS
Physical Therapy Treatment Note           Name: Bhupendra Park    : 1960 (63 y.o.)  MRN: [id number]           TREATMENT SUMMARY AND RECOMMENDATIONS:    PT Received On: 23  PT Start Time: 09     PT Stop Time: 1000  PT Total Time (min): 25 min     Subjective Assessment:   No complaints x Lethargic   x Awake, alert, cooperative  Uncooperative    Agitated  c/o pain    Appropriate  c/o fatigue    Confused  Treated at bedside     Emotionally labile x Treated in gym/dept.    Impulsive  Other:   x Flat affect       Therapy Precautions:    Cognitive deficits  Spinal precautions    Collar - hard  Sternal precautions    Collar - soft   TLSO   x Fall risk  LSO    Hip precautions - posterior  Knee immobilizer    Hip precautions - anterior  WBAT    Impaired communication  Partial weightbearing    Oxygen  TTWB    PEG tube  NWB    Visual deficits  Other:    Hearing deficits          Treatment Objectives:     Mobility Training:   Assist level Comments    Bed mobility     Transfer CGA Stand pivot transfer wc > therapy mat and therapy mat > wc towards pt L side   Gait MIN A 2x 15ft using Hallway handrail on LUE; Min A to advance RLE   Sit to stand transitions CGA From wc level   Sitting balance     Standing balance      Wheelchair mobility     Car transfer     Other:          Therapeutic Exercise:   Exercise Sets Reps Comments   RLE: heel slides 1 10 Performed supine   RLE: Hip ABD/ADD 1 10 Performed supine PROM Only   bridges   Attempted, but pt unable to follow cues             Additional Comments:      Assessment: Patient tolerated session fair. Patient continues to be really sleepy during sessions, often falling asleep. He does, however, continue to improve during gait trials.     PT Plan: continue POC  Revisions made to plan of care: No    GOALS:   Multidisciplinary Problems       Physical Therapy Goals          Problem: Physical Therapy    Goal Priority Disciplines Outcome Goal Variances Interventions    Physical Therapy Goal     PT, PT/OT Ongoing, Progressing     Description: Goals to be met by: Discharge     Patient will increase functional independence with mobility by performin. Supine to sit with Modified [place]  2. Sit to supine with Modified [place]  3. Sit to stand transfer with Supervision  4. Gait  x 150 feet with Contact Guard Assistance using Quad Cane vs LRAD.                          Skilled PT Minutes Provided: 25 minutes   Communication with Treatment Team:     Equipment recommendations:       At end of treatment, patient remained:   Up in chair     Up in wheelchair in room    In bed    With alarm activated    Bed rails up    Call bell in reach     Family/friends present    Restraints secured properly    In bathroom with CNA/RN notified    Nurse aware   x In gym with therapist/tech    Other:

## 2023-02-16 NOTE — PT/OT/SLP PROGRESS
Occupational Therapy  Treatment    Name: Bhupendra Park    : 1960 (63 y.o.)  MRN: [id number]           TREATMENT SUMMARY AND RECOMMENDATIONS:      OT Date of Treatment: 23  OT Start Time: 900  OT Stop Time: 935  OT Total Time (min): 35 min      Subjective Assessment:   No complaints X Lethargic   X Awake, alert, cooperative  Impulsive    Uncooperative   Flat affect    Agitated  c/o pain    Appropriate  c/o fatigue    Confused  Treated at bedside     Emotionally labile  Treated in gym/dept.      Other:        Therapy Precautions:    Cognitive deficits  Spinal precautions    Collar - hard  Sternal precautions    Collar - soft   TLSO   X Fall risk  LSO    Hip precautions - posterior  Knee immobilizer    Hip precautions - anterior  WBAT   X Impaired communication  Partial weightbearing    Oxygen  TTWB    PEG tube  NWB    Visual deficits      Hearing deficits   Other:        Treatment Objectives:     Mobility Training:    Mobility task Assist level Comments    Bed mobility Mod I  From supine to sit with use of bed rail    Transfer CGA  Towards L side, Pt completed stand pivot using w/c for stability.    Sit to stands transitions     Functional mobility     Sitting balance Supervision  While EOB to complete dressing activity    Standing balance      Other:        ADL Training:    ADL Assist level Comments    Feeding     Grooming/hygiene     Bathing     Upper body dressing Set-up  Orientation to shirt    Lower body dressing Min A  Pt demonstrated challenges to thread RLE into pant leg and assistance for pulling up lower body once in standing    Toileting     Toilet transfer     Adaptive equipment training     Other:         Assessment: Patient tolerated session well. OT attempted AAROM of the RUE this AM with Pt attempting ROM twice before becoming lethargic and not responding to cues from OT     OT Plan: Continue with current POC   Revisions made to plan of care: No     GOALS:   Multidisciplinary Problems        Occupational Therapy Goals          Problem: Occupational Therapy    Goal Priority Disciplines Outcome Interventions   Occupational Therapy Goal     OT, PT/OT Ongoing, Progressing    Description: Goals to be met by: 3/10/23     Patient will increase functional independence with ADLs by performing:    UE Dressing with Modified [place].  LE Dressing with Modified [place].  Toileting from toilet with Stand-by Assistance for hygiene and clothing management.   Toilet transfer to toilet with Supervision.                         Skilled OT Minutes Provided: 35  Communication with Treatment Team:     Equipment recommendations:       At end of treatment, patient remained:   Up in chair     Up in wheelchair in room    In bed    With alarm activated    Bed rails up    Call bell in reach     Family/friends present    Restraints secured properly    In bathroom with CNA/RN notified   X In gym with PT/PTA/tech    Nurse aware    Other:

## 2023-02-16 NOTE — PLAN OF CARE
[facility]  Discharge Reassessment    Primary Care Provider: Kelly Meadows MD    Expected Discharge Date:     Reassessment (most recent)       Discharge Reassessment - 02/16/23 1737          Discharge Reassessment    Assessment Type Discharge Planning Reassessment     Did the patient's condition or plan change since previous assessment? No     Discharge Plan discussed with: Spouse/sig other     Name(s) and Number(s) Sharon wife      Communicated ABRAHAM with patient/caregiver Date not available/Unable to determine     Discharge Plan A Home with family;Home Health     DME Needed Upon Discharge  wheelchair     Discharge Barriers Identified None     Why the patient remains in the hospital Requires continued medical care        Post-Acute Status    Post-Acute Authorization Home Health     Home Health Status Pending medical clearance/testing     Hospital Resources/Appts/Education Provided Provided patient/caregiver with written discharge plan information     Discharge Delays None known at this time

## 2023-02-16 NOTE — PROGRESS NOTES
[facility] Medicine  Progress Note    Patient Name: Bhupendra Park  MRN: [id number]  Patient Class: IP- Swing   Admission Date: 2/9/2023  Length of Stay: 7 days  Attending Physician: Thairy G Reyes, DO  Primary Care Provider: Kelly Meadows MD        Subjective:     Principal Problem:Intraparenchymal hemorrhage of brain        HPI:  63-year-old male with HTN, left frontal IPH, prior cervical spine surgery status post spinal cord stimulator who presented to ED with acute encephalopathy and was subsequently found to have recurrent right frontal intraparenchymal hemorrhage. Neurosurgery recommended medical management. Serial CTs showed stability of bleed. Transferred to our facility for continued rehabilitation.   Majority of patient's information obtained from medical records as patient has receptive and expressive aphasia and is able to answer only with yes / no.        Overview/Hospital Course:  Patient admitted for rehabilitation with speech therapy, occupational therapy and physical therapy after intracranial hemorrhage. Patient's wife brought  from home for spinal stimulator.  Patient eating with supervision with his left hand.      Interval History:  Patient is ambulating 8-10 feet with quad cane. Contact guard assistance for ADLs.  He require significant amount of cuing for meals but is able to eat on his own and is consuming approximately 50% of his meals.  Concern for depression by staff, will need to monitor.  Review of Systems   Reason unable to perform ROS: Expressive aphasia.   Objective:     Vital Signs (Most Recent):  Temp: 96.2 °F (35.7 °C) (axillary) (02/16/23 1150)  Pulse: 62 (02/16/23 1150)  Resp: 16 (02/16/23 1150)  BP: (!) 116/59 (02/16/23 1150)  SpO2: 97 % (02/16/23 1150) Vital Signs (24h Range):  Temp:  [96.2 °F (35.7 °C)-98.1 °F (36.7 °C)] 96.2 °F (35.7 °C)  Pulse:  [58-67] 62  Resp:  [14-19] 16  SpO2:  [95 %-98 %] 97 %  BP: ()/(55-68)  116/59     Weight: 69.9 kg (154 lb 1.6 oz)  Body mass index is 21.49 kg/m².    Intake/Output Summary (Last 24 hours) at 2/16/2023 1511  Last data filed at 2/16/2023 1347  Gross per 24 hour   Intake 1410 ml   Output --   Net 1410 ml        Physical Exam  Constitutional:       General: He is not in acute distress.     Appearance: Normal appearance.   HENT:      Head: Normocephalic and atraumatic.      Nose: No congestion or rhinorrhea.      Mouth/Throat:      Mouth: Mucous membranes are moist.   Eyes:      Conjunctiva/sclera: Conjunctivae normal.      Pupils: Pupils are equal, round, and reactive to light.   Cardiovascular:      Rate and Rhythm: Normal rate and regular rhythm.      Heart sounds: No murmur heard.  Pulmonary:      Effort: Pulmonary effort is normal.      Breath sounds: Normal breath sounds. No wheezing.   Abdominal:      General: Bowel sounds are normal. There is no distension.      Palpations: Abdomen is soft.      Tenderness: There is no abdominal tenderness.   Musculoskeletal:         General: No swelling. Normal range of motion.      Cervical back: Normal range of motion and neck supple.      Right lower leg: No edema.      Left lower leg: No edema.   Skin:     General: Skin is warm and dry.      Findings: No rash.   Neurological:      General: No focal deficit present.      Mental Status: He is alert and oriented to person, place, and time.      Cranial Nerves: Dysarthria present.      Motor: Weakness and abnormal muscle tone present.   Psychiatric:         Mood and Affect: Mood normal.         Behavior: Behavior normal.       Significant Labs: All pertinent labs within the past 24 hours have been reviewed.    Significant Imaging: I have reviewed all pertinent imaging results/findings within the past 24 hours.      Assessment/Plan:      Neuro  * Intraparenchymal hemorrhage of brain  -Continue with Keppra, atorvastatin, blood pressure goal less than 140/90  -on 02/06/2023 recommendation was  documented that patient will need an MRI brain with and without contrast in 4-6 weeks to rule out hemorrhagic infarct or mass and patient is to be followed by Dr. Figueroa in [place]    Right spastic hemiplegia  -2/2 history of ICH and CVA  -PT/OT      Renal/  BPH (benign prostatic hyperplasia)  - continue with tamsulosin      Endocrine  Hypothyroidism  -continue with levothyroxine 88mcg        VTE Risk Mitigation (From admission, onward)         Ordered     IP VTE LOW RISK PATIENT  Once         02/09/23 1931     Place sequential compression device  Until discontinued         02/09/23 1931                Discharge Planning   ABRAHAM:      Code Status: Full Code   Is the patient medically ready for discharge?:     Reason for patient still in hospital (select all that apply): Treatment  Discharge Plan A: Home with family, Home Health   Discharge Delays: None known at this time              Thairy G Reyes, DO  Department of Hospital Medicine   [facility]

## 2023-02-17 NOTE — ASSESSMENT & PLAN NOTE
-Continue with Keppra, atorvastatin, blood pressure goal less than 140/90  -on 02/06/2023 recommendation was documented that patient will need an MRI brain with and without contrast in 4-6 weeks to rule out hemorrhagic infarct or mass and patient is to be followed by Dr. Figueroa in [place]  -antidepressant and/or stimulant?

## 2023-02-17 NOTE — PT/OT/SLP PROGRESS
Name: Bhupendra Park    : 1960 (63 y.o.)  MRN: [id number]           Patient Unavailable      Patient unable to be seen at this time secondary to: too lethargic to remain alert upon OT and ST arriving at bedside. Adjust head of bed for better positioning to allow Pt to rest.

## 2023-02-17 NOTE — PROGRESS NOTES
Name: Bhupendra Pakr    : 1960 (63 y.o.)  MRN: [id number]           Patient Unavailable      Patient unable to be seen at this time secondary to: pt too lethargic to remain alert this AM. Will f/u this PM

## 2023-02-17 NOTE — PLAN OF CARE
Problem: Adult Inpatient Plan of Care  Goal: Plan of Care Review  2/17/2023 0510 by Anali Hayes LPN  Outcome: Ongoing, Progressing  2/17/2023 0435 by Anali Hayes LPN  Outcome: Ongoing, Progressing  Goal: Patient-Specific Goal (Individualized)  2/17/2023 0510 by Anali Hayes LPN  Outcome: Ongoing, Progressing  2/17/2023 0435 by Anali Hayes LPN  Outcome: Ongoing, Progressing  Flowsheets (Taken 2/16/2023 2000)  Individualized Care Needs: Improve mobility and communication  Goal: Absence of Hospital-Acquired Illness or Injury  2/17/2023 0510 by Anali Hayes LPN  Outcome: Ongoing, Progressing  2/17/2023 0435 by Anali Hayes LPN  Outcome: Ongoing, Progressing  Intervention: Identify and Manage Fall Risk  Flowsheets (Taken 2/16/2023 2000)  Safety Promotion/Fall Prevention:   assistive device/personal item within reach   bed alarm set   room near unit station  Intervention: Prevent Skin Injury  Flowsheets (Taken 2/16/2023 2000)  Skin Protection:   incontinence pads utilized   skin sealant/moisture barrier applied   protective footwear used   adhesive use limited  Intervention: Prevent and Manage VTE (Venous Thromboembolism) Risk  Flowsheets (Taken 2/16/2023 2000)  VTE Prevention/Management:   bleeding precations maintained   bleeding risk assessed   remove, assess skin, and reapply compression stockings  Range of Motion: ROM (range of motion) performed  Intervention: Prevent Infection  Flowsheets (Taken 2/16/2023 2000)  Infection Prevention: cohorting utilized

## 2023-02-17 NOTE — PT/OT/SLP PROGRESS
Name: Bhupendra Park    : 1960 (63 y.o.)  MRN: [id number]           Patient Unavailable      Patient unable to be seen at this time secondary to: SLP attempted to see pt x2 this AM. Pt too lethargic across both attempts. Will continue POC as able.

## 2023-02-17 NOTE — PT/OT/SLP PROGRESS
Physical Therapy Treatment Note           Name: Bhupendra Park    : 1960 (63 y.o.)  MRN: [id number]           TREATMENT SUMMARY AND RECOMMENDATIONS:    PT Received On: 23  PT Start Time: 1405     PT Stop Time: 1505  PT Total Time (min): 60 min     Subjective Assessment:   No complaints  Lethargic   x Awake, alert, cooperative  Uncooperative    Agitated  c/o pain    Appropriate  c/o fatigue    Confused  Treated at bedside     Emotionally labile x Treated in gym/dept.    Impulsive  Other:   x Flat affect       Therapy Precautions:    Cognitive deficits  Spinal precautions    Collar - hard  Sternal precautions    Collar - soft   TLSO    Fall risk  LSO    Hip precautions - posterior  Knee immobilizer    Hip precautions - anterior  WBAT    Impaired communication  Partial weightbearing    Oxygen  TTWB    PEG tube  NWB    Visual deficits  Other:    Hearing deficits          Treatment Objectives:     Mobility Training:   Assist level Comments    Bed mobility MIN A Sit > supine  MIN A with RLE   Transfer CGA Stand pivot transfer wc > bed towards pt L side   Gait MIN A X15ft using hallway handrail on LUE; MIN A for advancing and stabilizing RLE    X15ft using LBQC on RUE; MIN A for advancing and stabilizing RLE   Sit to stand transitions CGA Multiple sit to stands from wc surface   Sitting balance     Standing balance      Wheelchair mobility     Car transfer     Other:          Therapeutic Exercise:   Exercise Sets Reps Comments                               Additional Comments:  NM Re-ed:  X5 RLE anterior tap ups - assistance to advance RLE onto 6 in step   X3 LLE anterior tap ups with blocking of RLE to promote WB    Assessment: Patient tolerated session fair. Pt continues with flat affect with fair participation during therapy sessions despite excellent potential. Attempted to encourage and motivate patient.     PT Plan: continue POC  Revisions made to plan of care: No    GOALS:   Multidisciplinary  Problems       Physical Therapy Goals          Problem: Physical Therapy    Goal Priority Disciplines Outcome Goal Variances Interventions   Physical Therapy Goal     PT, PT/OT Ongoing, Progressing     Description: Goals to be met by: Discharge     Patient will increase functional independence with mobility by performin. Supine to sit with Modified [place]  2. Sit to supine with Modified [place]  3. Sit to stand transfer with Supervision  4. Gait  x 150 feet with Contact Guard Assistance using Quad Cane vs LRAD.                          Skilled PT Minutes Provided: 60 minutes   Communication with Treatment Team:     Equipment recommendations:       At end of treatment, patient remained:   Up in chair     Up in wheelchair in room   x In bed   x With alarm activated   x Bed rails up   x Call bell in reach     Family/friends present    Restraints secured properly    In bathroom with CNA/RN notified    Nurse aware    In gym with therapist/tech    Other:

## 2023-02-17 NOTE — PLAN OF CARE
Problem: SLP  Goal: SLP Goal  Description: LTG:   Pt will tolerate LRD w/o overt s/s of aspiration.  Pt will improve expressive and receptive language skills in order to effectively communicate wants and needs Man[place].    STG:  Pt will tolerate minced and moist w/ good oral clearance and w/o overt s/s of aspiration. Goal met  Pt will participate in trials of soft and bite sized w/ good oral clearance and w/o overt s/s of aspiration.  Pt will answer 2U YNQs w/ 80% acc Man[place].  Pt will follow 1-step commands w/ 80% acc [place].  Pt will ID objects in Fo3 w/ 80% acc Man[place].  Pt will complete auto speech tasks w/ 80% acc [place].  Pt will name common objects w/ 80% acc Man[place].  Outcome: Ongoing, Progressing

## 2023-02-17 NOTE — PROGRESS NOTES
[facility] Medicine  Progress Note    Patient Name: Bhupendra Park  MRN: [id number]  Patient Class: IP- Swing   Admission Date: 2/9/2023  Length of Stay: 8 days  Attending Physician: Thairy G Reyes, DO  Primary Care Provider: Kelly Meadows MD        Subjective:     Principal Problem:Intraparenchymal hemorrhage of brain        HPI:  63-year-old male with HTN, left frontal IPH, prior cervical spine surgery status post spinal cord stimulator who presented to ED with acute encephalopathy and was subsequently found to have recurrent right frontal intraparenchymal hemorrhage. Neurosurgery recommended medical management. Serial CTs showed stability of bleed. Transferred to our facility for continued rehabilitation.   Majority of patient's information obtained from medical records as patient has receptive and expressive aphasia and is able to answer only with yes / no.        Overview/Hospital Course:  Patient admitted for rehabilitation with speech therapy, occupational therapy and physical therapy after intracranial hemorrhage. Patient's wife brought  from home for spinal stimulator.  Patient eating with supervision with his left hand.  Somnolence continues to be a problem for the patient, can consider a stimulant however it would be prudent to do this after were able to repeat his MRI in a few weeks.      Interval History:  Patient is ambulating 8-10 feet with quad cane. Contact guard assistance for ADLs.  He requires significant amount of cuing for meals but is able to eat on his own and is consuming approximately 50% of his meals.  Concern for depression by staff, will need to monitor/ discuss with his wife.   Review of Systems   Reason unable to perform ROS: Expressive aphasia.   Objective:     Vital Signs (Most Recent):  Temp: 97.7 °F (36.5 °C) (02/17/23 1137)  Pulse: (!) 57 (02/17/23 1137)  Resp: 17 (02/17/23 1137)  BP: 130/72 (02/17/23 1137)  SpO2: 96 % (02/17/23  1137) Vital Signs (24h Range):  Temp:  [97.5 °F (36.4 °C)-98 °F (36.7 °C)] 97.7 °F (36.5 °C)  Pulse:  [57-69] 57  Resp:  [17-18] 17  SpO2:  [95 %-99 %] 96 %  BP: ()/(51-72) 130/72     Weight: 69.9 kg (154 lb 1.6 oz)  Body mass index is 21.49 kg/m².    Intake/Output Summary (Last 24 hours) at 2/17/2023 1442  Last data filed at 2/17/2023 0800  Gross per 24 hour   Intake 720 ml   Output --   Net 720 ml        Physical Exam  Constitutional:       General: He is not in acute distress.     Appearance: Normal appearance.   HENT:      Head: Normocephalic and atraumatic.      Nose: No congestion or rhinorrhea.      Mouth/Throat:      Mouth: Mucous membranes are moist.   Eyes:      Conjunctiva/sclera: Conjunctivae normal.      Pupils: Pupils are equal, round, and reactive to light.   Cardiovascular:      Rate and Rhythm: Normal rate and regular rhythm.      Heart sounds: No murmur heard.  Pulmonary:      Effort: Pulmonary effort is normal.      Breath sounds: Normal breath sounds. No wheezing.   Abdominal:      General: Bowel sounds are normal. There is no distension.      Palpations: Abdomen is soft.      Tenderness: There is no abdominal tenderness.   Musculoskeletal:         General: No swelling. Normal range of motion.      Cervical back: Normal range of motion and neck supple.      Right lower leg: No edema.      Left lower leg: No edema.   Skin:     General: Skin is warm and dry.      Findings: No rash.   Neurological:      General: No focal deficit present.      Mental Status: He is alert and oriented to person, place, and time.      Cranial Nerves: Dysarthria present.      Motor: Weakness and abnormal muscle tone present.   Psychiatric:         Mood and Affect: Mood normal.         Behavior: Behavior normal.       Significant Labs: All pertinent labs within the past 24 hours have been reviewed.    Significant Imaging: I have reviewed all pertinent imaging results/findings within the past 24  hours.      Assessment/Plan:      * Intraparenchymal hemorrhage of brain  -Continue with Keppra, atorvastatin, blood pressure goal less than 140/90  -on 02/06/2023 recommendation was documented that patient will need an MRI brain with and without contrast in 4-6 weeks to rule out hemorrhagic infarct or mass and patient is to be followed by Dr. Figueroa in [place]  -antidepressant and/or stimulant?     Right spastic hemiplegia  -2/2 history of ICH and CVA  -PT/OT      Hypothyroidism  -continue with levothyroxine 88mcg      BPH (benign prostatic hyperplasia)  - continue with tamsulosin        VTE Risk Mitigation (From admission, onward)         Ordered     IP VTE LOW RISK PATIENT  Once         02/09/23 1931     Place sequential compression device  Until discontinued         02/09/23 1931                Discharge Planning   ABRAHAM:      Code Status: Full Code   Is the patient medically ready for discharge?:     Reason for patient still in hospital (select all that apply): Treatment and PT / OT recommendations  Discharge Plan A: Home with family, Home Health   Discharge Delays: None known at this time              Thairy G Reyes, DO  Department of Hospital Medicine   [facility]

## 2023-02-17 NOTE — PT/OT/SLP PROGRESS
Occupational Therapy  Treatment    Name: Bhupendra Park    : 1960 (63 y.o.)  MRN: [id number]           TREATMENT SUMMARY AND RECOMMENDATIONS:      OT Date of Treatment: 23  OT Start Time: 1316  OT Stop Time: 1405  OT Total Time (min): 49 min      Subjective Assessment:   No complaints  Lethargic   x Awake, alert, cooperative  Impulsive    Uncooperative   Flat affect    Agitated  c/o pain   x Appropriate  c/o fatigue    Confused x Treated at bedside     Emotionally labile  Treated in gym/dept.      Other:        Therapy Precautions:    Cognitive deficits  Spinal precautions    Collar - hard  Sternal precautions    Collar - soft   TLSO   x Fall risk  LSO    Hip precautions - posterior  Knee immobilizer    Hip precautions - anterior  WBAT   x Impaired communication  Partial weightbearing    Oxygen  TTWB    PEG tube  NWB    Visual deficits      Hearing deficits   Other:        Treatment Objectives:     Mobility Training:    Mobility task Assist level Comments    Bed mobility Min A Supine<sitting EOB; assist to move  RLE   Transfer Min A Stand step t/f from EOB to WC using GB and quad cane   Sit to stands transitions Min A EOB<standing using GB and quad cane   Functional mobility     Sitting balance     Standing balance      Other:        ADL Training:    ADL Assist level Comments    Feeding     Grooming/hygiene MI  Oral care while sitting in WC   Bathing     Upper body dressing CGA [facility] gown and don pull-over shirt using aravind-dressing technique    Lower body dressing Min A       Min A   Mod A Don pants while sitting EOB using aravind-dressing technique; assist to pull up pants in back while standing using quad cane for support   Don socks; assist to pull socks full up over heels   Don tennis shoes; assist to don R shoe d/t orthotic    Toileting     Toilet transfer     Adaptive equipment training     Other:           Therapeutic Exercise:   Exercise Sets Reps Comments                                Additional Comments:      Assessment: Patient tolerated session well.    OT Plan: Continue with POC  Revisions made to plan of care: No    GOALS:   Multidisciplinary Problems       Occupational Therapy Goals          Problem: Occupational Therapy    Goal Priority Disciplines Outcome Interventions   Occupational Therapy Goal     OT, PT/OT Ongoing, Progressing    Description: Goals to be met by: 3/10/23     Patient will increase functional independence with ADLs by performing:    UE Dressing with Modified [place].  LE Dressing with Modified [place].  Toileting from toilet with Stand-by Assistance for hygiene and clothing management.   Toilet transfer to toilet with Supervision.                         Skilled OT Minutes Provided: 49  Communication with Treatment Team:     Equipment recommendations:       At end of treatment, patient remained:   Up in chair     Up in wheelchair in room    In bed    With alarm activated    Bed rails up    Call bell in reach     Family/friends present    Restraints secured properly    In bathroom with CNA/RN notified   x In gym with PT/PTA/tech    Nurse aware    Other:

## 2023-02-17 NOTE — PLAN OF CARE
Problem: Adult Inpatient Plan of Care  Goal: Plan of Care Review  Outcome: Ongoing, Progressing  Goal: Patient-Specific Goal (Individualized)  Outcome: Ongoing, Progressing  Goal: Absence of Hospital-Acquired Illness or Injury  Outcome: Ongoing, Progressing  Intervention: Identify and Manage Fall Risk  Flowsheets (Taken 2/17/2023 1421)  Safety Promotion/Fall Prevention:   assistive device/personal item within reach   bed alarm set   nonskid shoes/socks when out of bed   medications reviewed   room near unit station   instructed to call staff for mobility  Intervention: Prevent Skin Injury  Flowsheets (Taken 2/17/2023 1421)  Body Position:   sitting up in bed   turned   heels elevated  Skin Protection: incontinence pads utilized  Intervention: Prevent and Manage VTE (Venous Thromboembolism) Risk  Flowsheets (Taken 2/17/2023 1421)  Activity Management:   Rolling - L1   Arm raise - L1  VTE Prevention/Management:   ambulation promoted   remove, assess skin, and reapply compression stockings  Intervention: Prevent Infection  Flowsheets (Taken 2/17/2023 1421)  Infection Prevention:   cohorting utilized   hand hygiene promoted   single patient room provided  Goal: Optimal Comfort and Wellbeing  Outcome: Ongoing, Progressing  Intervention: Monitor Pain and Promote Comfort  Flowsheets (Taken 2/17/2023 1421)  Pain Management Interventions:   care clustered   medication offered but refused   pillow support provided   position adjusted   quiet environment facilitated  Intervention: Provide Person-Centered Care  Flowsheets (Taken 2/17/2023 1421)  Trust Relationship/Rapport:   care explained   reassurance provided  Goal: Readiness for Transition of Care  Outcome: Ongoing, Progressing     Problem: Skin Injury Risk Increased  Goal: Skin Health and Integrity  Outcome: Ongoing, Progressing     Problem: Fall Injury Risk  Goal: Absence of Fall and Fall-Related Injury  Outcome: Ongoing, Progressing  Intervention: Identify and Manage  Contributors  Flowsheets (Taken 2/17/2023 1421)  Self-Care Promotion: independence encouraged  Medication Review/Management: medications reviewed  Intervention: Promote Injury-Free Environment  Flowsheets (Taken 2/17/2023 1421)  Safety Promotion/Fall Prevention:   assistive device/personal item within reach   bed alarm set   nonskid shoes/socks when out of bed   medications reviewed   room near unit station   instructed to call staff for mobility     Problem: Infection  Goal: Absence of Infection Signs and Symptoms  Outcome: Ongoing, Progressing

## 2023-02-17 NOTE — SUBJECTIVE & OBJECTIVE
Interval History:  Patient is ambulating 8-10 feet with quad cane. Contact guard assistance for ADLs.  He requires significant amount of cuing for meals but is able to eat on his own and is consuming approximately 50% of his meals.  Concern for depression by staff, will need to monitor/ discuss with his wife.   Review of Systems   Reason unable to perform ROS: Expressive aphasia.   Objective:     Vital Signs (Most Recent):  Temp: 97.7 °F (36.5 °C) (02/17/23 1137)  Pulse: (!) 57 (02/17/23 1137)  Resp: 17 (02/17/23 1137)  BP: 130/72 (02/17/23 1137)  SpO2: 96 % (02/17/23 1137) Vital Signs (24h Range):  Temp:  [97.5 °F (36.4 °C)-98 °F (36.7 °C)] 97.7 °F (36.5 °C)  Pulse:  [57-69] 57  Resp:  [17-18] 17  SpO2:  [95 %-99 %] 96 %  BP: ()/(51-72) 130/72     Weight: 69.9 kg (154 lb 1.6 oz)  Body mass index is 21.49 kg/m².    Intake/Output Summary (Last 24 hours) at 2/17/2023 1442  Last data filed at 2/17/2023 0800  Gross per 24 hour   Intake 720 ml   Output --   Net 720 ml        Physical Exam  Constitutional:       General: He is not in acute distress.     Appearance: Normal appearance.   HENT:      Head: Normocephalic and atraumatic.      Nose: No congestion or rhinorrhea.      Mouth/Throat:      Mouth: Mucous membranes are moist.   Eyes:      Conjunctiva/sclera: Conjunctivae normal.      Pupils: Pupils are equal, round, and reactive to light.   Cardiovascular:      Rate and Rhythm: Normal rate and regular rhythm.      Heart sounds: No murmur heard.  Pulmonary:      Effort: Pulmonary effort is normal.      Breath sounds: Normal breath sounds. No wheezing.   Abdominal:      General: Bowel sounds are normal. There is no distension.      Palpations: Abdomen is soft.      Tenderness: There is no abdominal tenderness.   Musculoskeletal:         General: No swelling. Normal range of motion.      Cervical back: Normal range of motion and neck supple.      Right lower leg: No edema.      Left lower leg: No edema.   Skin:      General: Skin is warm and dry.      Findings: No rash.   Neurological:      General: No focal deficit present.      Mental Status: He is alert and oriented to person, place, and time.      Cranial Nerves: Dysarthria present.      Motor: Weakness and abnormal muscle tone present.   Psychiatric:         Mood and Affect: Mood normal.         Behavior: Behavior normal.       Significant Labs: All pertinent labs within the past 24 hours have been reviewed.    Significant Imaging: I have reviewed all pertinent imaging results/findings within the past 24 hours.

## 2023-02-18 NOTE — PT/OT/SLP PROGRESS
"Occupational Therapy  Treatment    Name: Bhupendra Park    : 1960 (63 y.o.)  MRN: [id number]           TREATMENT SUMMARY AND RECOMMENDATIONS:      OT Date of Treatment: 23  OT Start Time:   OT Stop Time: 103  OT Total Time (min): 10 min      Subjective Assessment:   No complaints x Lethargic   x Awake, alert, cooperative  Impulsive    Uncooperative  x Flat affect    Agitated  c/o pain    Appropriate  c/o fatigue   x Confused x Treated at bedside     Emotionally labile  Treated in gym/dept.      Other:        Therapy Precautions:    Cognitive deficits  Spinal precautions    Collar - hard  Sternal precautions    Collar - soft   TLSO   x Fall risk  LSO    Hip precautions - posterior  Knee immobilizer    Hip precautions - anterior  WBAT    Impaired communication  Partial weightbearing    Oxygen  TTWB    PEG tube  NWB    Visual deficits      Hearing deficits   Other:        Treatment Objectives:     Mobility Training:    Mobility task Assist level Comments    Bed mobility     Transfer     Sit to stands transitions     Functional mobility     Sitting balance     Standing balance      Other:        ADL Training:    ADL Assist level Comments    Feeding     Grooming/hygiene     Bathing     Upper body dressing     Lower body dressing     Toileting     Toilet transfer     Adaptive equipment training     Other:           Therapeutic Exercise:   Exercise Sets Reps Comments                               Additional Comments:  Pt agreeable to participation with therapy initially and removed blankets. When therapist prompted to move legs to the side of the bed, pt reported "no". Pt yawned several times and looked off to the side often. Therapist stretched R hand, noted odor. Utilized washcloth to dry hand, and hand  to clean in between fingers. Placed ball in hand for improved position. Unable to engage with additional exercises.     Assessment: Patient tolerated session fair.    OT Plan: Continue " POC  Revisions made to plan of care: No    GOALS:   Multidisciplinary Problems       Occupational Therapy Goals          Problem: Occupational Therapy    Goal Priority Disciplines Outcome Interventions   Occupational Therapy Goal     OT, PT/OT Ongoing, Progressing    Description: Goals to be met by: 3/10/23     Patient will increase functional independence with ADLs by performing:    UE Dressing with Modified King.  LE Dressing with Modified King.  Toileting from toilet with Stand-by Assistance for hygiene and clothing management.   Toilet transfer to toilet with Supervision.                         Skilled OT Minutes Provided: 10  Communication with Treatment Team:     Equipment recommendations:       At end of treatment, patient remained:   Up in chair     Up in wheelchair in room   x In bed   x With alarm activated    Bed rails up   x Call bell in reach     Family/friends present    Restraints secured properly    In bathroom with CNA/RN notified    In gym with PT/PTA/tech    Nurse aware    Other:

## 2023-02-18 NOTE — SUBJECTIVE & OBJECTIVE
Interval History:  Patient is ambulating 8-10 feet with quad cane. Contact guard assistance for ADLs.  He requires significant amount of cuing for meals but is able to eat on his own and is consuming approximately 50% of his meals.  Concern for depression by staff, will need to monitor/ discuss with his wife.   Review of Systems   Reason unable to perform ROS: Expressive aphasia.   Objective:     Vital Signs (Most Recent):  Temp: 97.9 °F (36.6 °C) (02/18/23 1154)  Pulse: 62 (02/18/23 1154)  Resp: 18 (02/18/23 0408)  BP: 98/63 (02/18/23 1154)  SpO2: 98 % (02/18/23 1154) Vital Signs (24h Range):  Temp:  [97.6 °F (36.4 °C)-98.2 °F (36.8 °C)] 97.9 °F (36.6 °C)  Pulse:  [56-65] 62  Resp:  [18] 18  SpO2:  [94 %-98 %] 98 %  BP: ()/(52-66) 98/63     Weight: 69.9 kg (154 lb 1.6 oz)  Body mass index is 21.49 kg/m².    Intake/Output Summary (Last 24 hours) at 2/18/2023 1409  Last data filed at 2/18/2023 1243  Gross per 24 hour   Intake 1191 ml   Output --   Net 1191 ml        Physical Exam  Constitutional:       General: He is not in acute distress.     Appearance: Normal appearance.   HENT:      Head: Normocephalic and atraumatic.      Nose: No congestion or rhinorrhea.      Mouth/Throat:      Mouth: Mucous membranes are moist.   Eyes:      Conjunctiva/sclera: Conjunctivae normal.      Pupils: Pupils are equal, round, and reactive to light.   Cardiovascular:      Rate and Rhythm: Normal rate and regular rhythm.      Heart sounds: No murmur heard.  Pulmonary:      Effort: Pulmonary effort is normal.      Breath sounds: Normal breath sounds. No wheezing.   Abdominal:      General: Bowel sounds are normal. There is no distension.      Palpations: Abdomen is soft.      Tenderness: There is no abdominal tenderness.   Musculoskeletal:         General: No swelling. Normal range of motion.      Cervical back: Normal range of motion and neck supple.      Right lower leg: No edema.      Left lower leg: No edema.   Skin:      General: Skin is warm and dry.      Findings: No rash.   Neurological:      General: No focal deficit present.      Mental Status: He is alert and oriented to person, place, and time.      Cranial Nerves: Dysarthria present.      Motor: Weakness and abnormal muscle tone present.   Psychiatric:         Mood and Affect: Mood normal.         Behavior: Behavior normal.       Significant Labs: All pertinent labs within the past 24 hours have been reviewed.    Significant Imaging: I have reviewed all pertinent imaging results/findings within the past 24 hours.

## 2023-02-18 NOTE — PROGRESS NOTES
[facility]  Progress Note    Patient Name: Bhupendra Park  MRN: [id number]  Patient Class: IP- Swing   Admission Date: 2/9/2023  Length of Stay: 9 days  Attending Physician: Thairy G Reyes, DO  Primary Care Provider: Kelly Meadows MD        Subjective:     Principal Problem:Intraparenchymal hemorrhage of brain        HPI:  63-year-old male with HTN, left frontal IPH, prior cervical spine surgery status post spinal cord stimulator who presented to ED with acute encephalopathy and was subsequently found to have recurrent right frontal intraparenchymal hemorrhage. Neurosurgery recommended medical management. Serial CTs showed stability of bleed. Transferred to our facility for continued rehabilitation.   Majority of patient's information obtained from medical records as patient has receptive and expressive aphasia and is able to answer only with yes / no.        Overview/Hospital Course:  Patient admitted for rehabilitation with speech therapy, occupational therapy and physical therapy after intracranial hemorrhage. Patient's wife brought  from home for spinal stimulator.  Patient eating with supervision with his left hand.  Somnolence continues to be a problem for the patient, can consider a stimulant however it would be prudent to do this after were able to repeat his MRI in a few weeks.  02/18/2023   No new c/o, awaiting PT/OT      Interval History:  Patient is ambulating 8-10 feet with quad cane. Contact guard assistance for ADLs.  He requires significant amount of cuing for meals but is able to eat on his own and is consuming approximately 50% of his meals.  Concern for depression by staff, will need to monitor/ discuss with his wife.   Review of Systems   Reason unable to perform ROS: Expressive aphasia.   Objective:     Vital Signs (Most Recent):  Temp: 97.9 °F (36.6 °C) (02/18/23 1154)  Pulse: 62 (02/18/23 1154)  Resp: 18 (02/18/23 0408)  BP: 98/63  (02/18/23 1154)  SpO2: 98 % (02/18/23 1154) Vital Signs (24h Range):  Temp:  [97.6 °F (36.4 °C)-98.2 °F (36.8 °C)] 97.9 °F (36.6 °C)  Pulse:  [56-65] 62  Resp:  [18] 18  SpO2:  [94 %-98 %] 98 %  BP: ()/(52-66) 98/63     Weight: 69.9 kg (154 lb 1.6 oz)  Body mass index is 21.49 kg/m².    Intake/Output Summary (Last 24 hours) at 2/18/2023 1409  Last data filed at 2/18/2023 1243  Gross per 24 hour   Intake 1191 ml   Output --   Net 1191 ml        Physical Exam  Constitutional:       General: He is not in acute distress.     Appearance: Normal appearance.   HENT:      Head: Normocephalic and atraumatic.      Nose: No congestion or rhinorrhea.      Mouth/Throat:      Mouth: Mucous membranes are moist.   Eyes:      Conjunctiva/sclera: Conjunctivae normal.      Pupils: Pupils are equal, round, and reactive to light.   Cardiovascular:      Rate and Rhythm: Normal rate and regular rhythm.      Heart sounds: No murmur heard.  Pulmonary:      Effort: Pulmonary effort is normal.      Breath sounds: Normal breath sounds. No wheezing.   Abdominal:      General: Bowel sounds are normal. There is no distension.      Palpations: Abdomen is soft.      Tenderness: There is no abdominal tenderness.   Musculoskeletal:         General: No swelling. Normal range of motion.      Cervical back: Normal range of motion and neck supple.      Right lower leg: No edema.      Left lower leg: No edema.   Skin:     General: Skin is warm and dry.      Findings: No rash.   Neurological:      General: No focal deficit present.      Mental Status: He is alert and oriented to person, place, and time.      Cranial Nerves: Dysarthria present.      Motor: Weakness and abnormal muscle tone present.   Psychiatric:         Mood and Affect: Mood normal.         Behavior: Behavior normal.       Significant Labs: All pertinent labs within the past 24 hours have been reviewed.    Significant Imaging: I have reviewed all pertinent imaging results/findings  within the past 24 hours.      Assessment/Plan:      * Intraparenchymal hemorrhage of brain  -Continue with Keppra, atorvastatin, blood pressure goal less than 140/90  -on 02/06/2023 recommendation was documented that patient will need an MRI brain with and without contrast in 4-6 weeks to rule out hemorrhagic infarct or mass and patient is to be followed by Dr. Figueroa in [place]  -antidepressant and/or stimulant?     BPH (benign prostatic hyperplasia)  - continue with tamsulosin      Hypothyroidism  -continue with levothyroxine 88mcg      Right spastic hemiplegia  -2/2 history of ICH and CVA  -continue PT/OT        VTE Risk Mitigation (From admission, onward)         Ordered     IP VTE LOW RISK PATIENT  Once         02/09/23 1931     Place sequential compression device  Until discontinued         02/09/23 1931                Discharge Planning   ABRAHAM:      Code Status: Full Code   Is the patient medically ready for discharge?:     Reason for patient still in hospital (select all that apply): Patient trending condition, Laboratory test and Treatment  Discharge Plan A: Home with family, Home Health   Discharge Delays: None known at this time              Thomas Bull MD  Department of Hospital Medicine   [facility]

## 2023-02-18 NOTE — PLAN OF CARE
Problem: Adult Inpatient Plan of Care  Goal: Plan of Care Review  Outcome: Ongoing, Progressing  Goal: Patient-Specific Goal (Individualized)  Outcome: Ongoing, Progressing  Flowsheets (Taken 2/18/2023 0728)  Anxieties, Fears or Concerns: None noted at this time  Individualized Care Needs: Increase strength and improve mobility before Discharge  Goal: Absence of Hospital-Acquired Illness or Injury  Outcome: Ongoing, Progressing  Intervention: Prevent Skin Injury  Flowsheets (Taken 2/18/2023 0728)  Body Position: position changed independently  Skin Protection:   adhesive use limited   incontinence pads utilized  Goal: Optimal Comfort and Wellbeing  Outcome: Ongoing, Progressing  Goal: Readiness for Transition of Care  Outcome: Ongoing, Progressing     Problem: Fall Injury Risk  Goal: Absence of Fall and Fall-Related Injury  Outcome: Ongoing, Progressing  Intervention: Promote Injury-Free Environment  Flowsheets (Taken 2/18/2023 0728)  Safety Promotion/Fall Prevention:   assistive device/personal item within reach   bed alarm set   chair alarm set   Fall Risk reviewed with patient/family   Fall Risk signage in place   gait belt with ambulation   lighting adjusted   side rails raised x 3

## 2023-02-19 NOTE — SUBJECTIVE & OBJECTIVE
Interval History:  Patient is ambulating 8-10 feet with quad cane. Contact guard assistance for ADLs.  He requires significant amount of cuing for meals but is able to eat on his own and is consuming approximately 50% of his meals.  Concern for depression by staff, will need to monitor/ discuss with his wife.   Review of Systems   Reason unable to perform ROS: Expressive aphasia.   Objective:     Vital Signs (Most Recent):  Temp: 98.3 °F (36.8 °C) (02/19/23 1216)  Pulse: (!) 57 (02/19/23 1216)  Resp: 17 (02/19/23 0342)  BP: (!) 93/59 (02/19/23 1216)  SpO2: 98 % (02/19/23 1216) Vital Signs (24h Range):  Temp:  [97.2 °F (36.2 °C)-98.3 °F (36.8 °C)] 98.3 °F (36.8 °C)  Pulse:  [56-62] 57  Resp:  [17-18] 17  SpO2:  [97 %-98 %] 98 %  BP: ()/(55-65) 93/59     Weight: 69.9 kg (154 lb 1.6 oz)  Body mass index is 21.49 kg/m².    Intake/Output Summary (Last 24 hours) at 2/19/2023 1328  Last data filed at 2/19/2023 0800  Gross per 24 hour   Intake 594 ml   Output --   Net 594 ml        Physical Exam  Constitutional:       General: He is not in acute distress.     Appearance: Normal appearance.   HENT:      Head: Normocephalic and atraumatic.      Nose: No congestion or rhinorrhea.      Mouth/Throat:      Mouth: Mucous membranes are moist.   Eyes:      Conjunctiva/sclera: Conjunctivae normal.      Pupils: Pupils are equal, round, and reactive to light.   Cardiovascular:      Rate and Rhythm: Normal rate and regular rhythm.      Heart sounds: No murmur heard.  Pulmonary:      Effort: Pulmonary effort is normal.      Breath sounds: Normal breath sounds. No wheezing.   Abdominal:      General: Bowel sounds are normal. There is no distension.      Palpations: Abdomen is soft.      Tenderness: There is no abdominal tenderness.   Musculoskeletal:         General: No swelling. Normal range of motion.      Cervical back: Normal range of motion and neck supple.      Right lower leg: No edema.      Left lower leg: No edema.    Skin:     General: Skin is warm and dry.      Findings: No rash.   Neurological:      General: No focal deficit present.      Mental Status: He is alert and oriented to person, place, and time.      Cranial Nerves: Dysarthria present.      Motor: Weakness and abnormal muscle tone present.   Psychiatric:         Mood and Affect: Mood normal.         Behavior: Behavior normal.       Significant Labs: All pertinent labs within the past 24 hours have been reviewed.    Significant Imaging: I have reviewed all pertinent imaging results/findings within the past 24 hours.

## 2023-02-19 NOTE — PLAN OF CARE
[facility]  Discharge Reassessment    Primary Care Provider: Kelly Meadows MD    Expected Discharge Date:     Reassessment (most recent)       Discharge Reassessment - 02/19/23 1052          Discharge Reassessment    Assessment Type Discharge Planning Reassessment     Did the patient's condition or plan change since previous assessment? No     Discharge Plan discussed with: Spouse/sig other     Name(s) and Number(s) Sharon wife      Communicated ABRAHAM with patient/caregiver Date not available/Unable to determine     Discharge Plan A Home with family;Home Health     DME Needed Upon Discharge  wheelchair     Discharge Barriers Identified None     Why the patient remains in the hospital Requires continued medical care        Post-Acute Status    Post-Acute Authorization Home Health     Home Health Status Pending medical clearance/testing     Hospital Resources/Appts/Education Provided Provided patient/caregiver with written discharge plan information     Discharge Delays None known at this time

## 2023-02-19 NOTE — PLAN OF CARE
Problem: Adult Inpatient Plan of Care  Goal: Plan of Care Review  Outcome: Ongoing, Progressing  Goal: Patient-Specific Goal (Individualized)  Outcome: Ongoing, Progressing  Flowsheets (Taken 2/18/2023 2306)  Anxieties, Fears or Concerns: None noted at this time  Individualized Care Needs: Increase strenth and improved mobility before Discharge  Goal: Absence of Hospital-Acquired Illness or Injury  Outcome: Ongoing, Progressing  Goal: Optimal Comfort and Wellbeing  Outcome: Ongoing, Progressing  Goal: Readiness for Transition of Care  Outcome: Ongoing, Progressing     Problem: Fall Injury Risk  Goal: Absence of Fall and Fall-Related Injury  Outcome: Ongoing, Progressing  Intervention: Identify and Manage Contributors  Flowsheets (Taken 2/18/2023 1930)  Self-Care Promotion:   independence encouraged   meal set-up provided   safe use of adaptive equipment encouraged  Medication Review/Management: medications reviewed  Intervention: Promote Injury-Free Environment  Flowsheets (Taken 2/18/2023 1930)  Safety Promotion/Fall Prevention:   assistive device/personal item within reach   bed alarm set   gait belt with ambulation   lighting adjusted   medications reviewed   nonskid shoes/socks when out of bed   room near unit station   side rails raised x 3     Problem: Infection  Goal: Absence of Infection Signs and Symptoms  Outcome: Ongoing, Progressing  Intervention: Prevent or Manage Infection  Flowsheets (Taken 2/18/2023 2306)  Isolation Precautions: protective

## 2023-02-19 NOTE — PLAN OF CARE
Problem: Adult Inpatient Plan of Care  Goal: Plan of Care Review  Outcome: Ongoing, Progressing  Goal: Patient-Specific Goal (Individualized)  Outcome: Ongoing, Progressing  Goal: Absence of Hospital-Acquired Illness or Injury  Outcome: Ongoing, Progressing  Intervention: Identify and Manage Fall Risk  Flowsheets (Taken 2/19/2023 1326)  Safety Promotion/Fall Prevention:   assistive device/personal item within reach   bed alarm set   nonskid shoes/socks when out of bed   room near unit station   instructed to call staff for mobility  Intervention: Prevent Skin Injury  Flowsheets (Taken 2/19/2023 1326)  Body Position:   sitting up in bed   side-lying   turned  Skin Protection:   adhesive use limited   incontinence pads utilized  Intervention: Prevent and Manage VTE (Venous Thromboembolism) Risk  Flowsheets (Taken 2/19/2023 1326)  Activity Management: Rolling - L1  VTE Prevention/Management: remove, assess skin, and reapply compression stockings  Range of Motion: active ROM (range of motion) encouraged  Intervention: Prevent Infection  Flowsheets (Taken 2/19/2023 1326)  Infection Prevention:   cohorting utilized   single patient room provided  Goal: Optimal Comfort and Wellbeing  Outcome: Ongoing, Progressing  Intervention: Monitor Pain and Promote Comfort  Flowsheets (Taken 2/19/2023 1326)  Pain Management Interventions:   care clustered   pillow support provided   position adjusted  Intervention: Provide Person-Centered Care  Flowsheets (Taken 2/19/2023 1326)  Trust Relationship/Rapport:   care explained   choices provided  Goal: Readiness for Transition of Care  Outcome: Ongoing, Progressing     Problem: Skin Injury Risk Increased  Goal: Skin Health and Integrity  Outcome: Ongoing, Progressing  Intervention: Optimize Skin Protection  Flowsheets (Taken 2/19/2023 1326)  Pressure Reduction Techniques:   weight shift assistance provided   pressure points protected  Pressure Reduction Devices: positioning supports  utilized  Skin Protection:   adhesive use limited   incontinence pads utilized  Head of Bed (HOB) Positioning: HOB at 30-45 degrees     Problem: Fall Injury Risk  Goal: Absence of Fall and Fall-Related Injury  Outcome: Ongoing, Progressing  Intervention: Identify and Manage Contributors  Flowsheets (Taken 2/19/2023 1326)  Self-Care Promotion:   independence encouraged   meal set-up provided   safe use of adaptive equipment encouraged  Medication Review/Management: medications reviewed  Intervention: Promote Injury-Free Environment  Flowsheets (Taken 2/19/2023 1326)  Safety Promotion/Fall Prevention:   assistive device/personal item within reach   bed alarm set   nonskid shoes/socks when out of bed   room near unit station   instructed to call staff for mobility     Problem: Infection  Goal: Absence of Infection Signs and Symptoms  Outcome: Ongoing, Progressing  Intervention: Prevent or Manage Infection  Flowsheets (Taken 2/19/2023 1326)  Fever Reduction/Comfort Measures: lightweight bedding  Infection Management: aseptic technique maintained  Isolation Precautions: protective

## 2023-02-19 NOTE — PROGRESS NOTES
[facility] Medicine  Progress Note    Patient Name: Bhupendra Park  MRN: [id number]  Patient Class: IP- Swing   Admission Date: 2/9/2023  Length of Stay: 10 days  Attending Physician: Thairy G Reyes, DO  Primary Care Provider: Kelly Meadows MD        Subjective:     Principal Problem:Intraparenchymal hemorrhage of brain        HPI:  63-year-old male with HTN, left frontal IPH, prior cervical spine surgery status post spinal cord stimulator who presented to ED with acute encephalopathy and was subsequently found to have recurrent right frontal intraparenchymal hemorrhage. Neurosurgery recommended medical management. Serial CTs showed stability of bleed. Transferred to our facility for continued rehabilitation.   Majority of patient's information obtained from medical records as patient has receptive and expressive aphasia and is able to answer only with yes / no.        Overview/Hospital Course:  Patient admitted for rehabilitation with speech therapy, occupational therapy and physical therapy after intracranial hemorrhage. Patient's wife brought  from home for spinal stimulator.  Patient eating with supervision with his left hand.  Somnolence continues to be a problem for the patient, can consider a stimulant however it would be prudent to do this after were able to repeat his MRI in a few weeks.  02/18/2023   No new c/o, awaiting PT/OT  02/19/2023 no c/o his mental status improved today much more awake and alert today on rounds       Interval History:  Patient is ambulating 8-10 feet with quad cane. Contact guard assistance for ADLs.  He requires significant amount of cuing for meals but is able to eat on his own and is consuming approximately 50% of his meals.  Concern for depression by staff, will need to monitor/ discuss with his wife.   Review of Systems   Reason unable to perform ROS: Expressive aphasia.   Objective:     Vital Signs (Most Recent):  Temp: 98.3 °F  (36.8 °C) (02/19/23 1216)  Pulse: (!) 57 (02/19/23 1216)  Resp: 17 (02/19/23 0342)  BP: (!) 93/59 (02/19/23 1216)  SpO2: 98 % (02/19/23 1216) Vital Signs (24h Range):  Temp:  [97.2 °F (36.2 °C)-98.3 °F (36.8 °C)] 98.3 °F (36.8 °C)  Pulse:  [56-62] 57  Resp:  [17-18] 17  SpO2:  [97 %-98 %] 98 %  BP: ()/(55-65) 93/59     Weight: 69.9 kg (154 lb 1.6 oz)  Body mass index is 21.49 kg/m².    Intake/Output Summary (Last 24 hours) at 2/19/2023 1328  Last data filed at 2/19/2023 0800  Gross per 24 hour   Intake 594 ml   Output --   Net 594 ml        Physical Exam  Constitutional:       General: He is not in acute distress.     Appearance: Normal appearance.   HENT:      Head: Normocephalic and atraumatic.      Nose: No congestion or rhinorrhea.      Mouth/Throat:      Mouth: Mucous membranes are moist.   Eyes:      Conjunctiva/sclera: Conjunctivae normal.      Pupils: Pupils are equal, round, and reactive to light.   Cardiovascular:      Rate and Rhythm: Normal rate and regular rhythm.      Heart sounds: No murmur heard.  Pulmonary:      Effort: Pulmonary effort is normal.      Breath sounds: Normal breath sounds. No wheezing.   Abdominal:      General: Bowel sounds are normal. There is no distension.      Palpations: Abdomen is soft.      Tenderness: There is no abdominal tenderness.   Musculoskeletal:         General: No swelling. Normal range of motion.      Cervical back: Normal range of motion and neck supple.      Right lower leg: No edema.      Left lower leg: No edema.   Skin:     General: Skin is warm and dry.      Findings: No rash.   Neurological:      General: No focal deficit present.      Mental Status: He is alert and oriented to person, place, and time.      Cranial Nerves: Dysarthria present.      Motor: Weakness and abnormal muscle tone present.   Psychiatric:         Mood and Affect: Mood normal.         Behavior: Behavior normal.       Significant Labs: All pertinent labs within the past 24 hours  have been reviewed.    Significant Imaging: I have reviewed all pertinent imaging results/findings within the past 24 hours.      Assessment/Plan:      * Intraparenchymal hemorrhage of brain  -Continue with Keppra, atorvastatin, blood pressure goal less than 140/90  -on 02/06/2023 recommendation was documented that patient will need an MRI brain with and without contrast in 4-6 weeks to rule out hemorrhagic infarct or mass and patient is to be followed by Dr. Figueroa in [place]  -antidepressant and/or stimulant?     BPH (benign prostatic hyperplasia)  - continue with tamsulosin      Hypothyroidism  -continue with levothyroxine 88mcg      Right spastic hemiplegia  -2/2 history of ICH and CVA  -continue PT/OT        VTE Risk Mitigation (From admission, onward)         Ordered     IP VTE LOW RISK PATIENT  Once         02/09/23 1931     Place sequential compression device  Until discontinued         02/09/23 1931                Discharge Planning   ABRAHAM:      Code Status: Full Code   Is the patient medically ready for discharge?:     Reason for patient still in hospital (select all that apply): Patient trending condition, Laboratory test, Treatment and PT / OT recommendations  Discharge Plan A: Home with family, Home Health   Discharge Delays: None known at this time              Thomas Bull MD  Department of Hospital Medicine   [facility]

## 2023-02-20 NOTE — ASSESSMENT & PLAN NOTE
-Continue with Keppra, atorvastatin, blood pressure goal less than 140/90  -on 02/06/2023 recommendation was documented that patient will need an MRI brain with and without contrast in 4-6 weeks to rule out hemorrhagic infarct or mass and patient is to be followed by Dr. Figueroa in [place]  -antidepressant and/or stimulant after repeat MRI

## 2023-02-20 NOTE — PT/OT/SLP PROGRESS
Name: Bhupendra Park    : 1960 (63 y.o.)  MRN: [id number]           Patient Unavailable      Patient unable to be seen at this time secondary to: pt lethargic today- unable to perform treatment- will resume when able

## 2023-02-20 NOTE — PT/OT/SLP PROGRESS
Name: Bhupendra Park    : 1960 (63 y.o.)  MRN: [id number]           Patient Unavailable      Patient unable to be seen at this time secondary to: pt lethargic and unable to remain awake in order to participate in language tasks. Will continue POC as able.

## 2023-02-20 NOTE — PLAN OF CARE
Problem: Adult Inpatient Plan of Care  Goal: Plan of Care Review  Outcome: Ongoing, Progressing  Goal: Patient-Specific Goal (Individualized)  Outcome: Ongoing, Progressing  Goal: Absence of Hospital-Acquired Illness or Injury  Outcome: Ongoing, Progressing  Intervention: Identify and Manage Fall Risk  Flowsheets (Taken 2/20/2023 0904)  Safety Promotion/Fall Prevention:   assistive device/personal item within reach   bed alarm set   room near unit station   nonskid shoes/socks when out of bed   medications reviewed   lighting adjusted  Intervention: Prevent and Manage VTE (Venous Thromboembolism) Risk  Flowsheets (Taken 2/20/2023 0904)  Activity Management: Rolling - L1  VTE Prevention/Management:   fluids promoted   remove, assess skin, and reapply compression stockings  Range of Motion: active ROM (range of motion) encouraged  Intervention: Prevent Infection  Flowsheets (Taken 2/20/2023 0904)  Infection Prevention:   cohorting utilized   environmental surveillance performed   equipment surfaces disinfected   hand hygiene promoted   personal protective equipment utilized   single patient room provided   rest/sleep promoted  Goal: Optimal Comfort and Wellbeing  Outcome: Ongoing, Progressing  Intervention: Monitor Pain and Promote Comfort  Flowsheets (Taken 2/20/2023 0904)  Pain Management Interventions:   position adjusted   quiet environment facilitated   care clustered  Intervention: Provide Person-Centered Care  Flowsheets (Taken 2/20/2023 0904)  Trust Relationship/Rapport:   care explained   choices provided   questions answered   questions encouraged   reassurance provided  Goal: Readiness for Transition of Care  Outcome: Ongoing, Progressing     Problem: Skin Injury Risk Increased  Goal: Skin Health and Integrity  Outcome: Ongoing, Progressing  Intervention: Optimize Skin Protection  Flowsheets (Taken 2/20/2023 0904)  Head of Bed (HOB) Positioning: HOB at 30-45 degrees     Problem: Fall Injury Risk  Goal:  Absence of Fall and Fall-Related Injury  Outcome: Ongoing, Progressing  Intervention: Identify and Manage Contributors  Flowsheets (Taken 2/20/2023 0904)  Self-Care Promotion:   meal set-up provided   safe use of adaptive equipment encouraged  Medication Review/Management: medications reviewed  Intervention: Promote Injury-Free Environment  Flowsheets (Taken 2/20/2023 0904)  Safety Promotion/Fall Prevention:   assistive device/personal item within reach   bed alarm set   room near unit station   nonskid shoes/socks when out of bed   medications reviewed   lighting adjusted     Problem: Infection  Goal: Absence of Infection Signs and Symptoms  Outcome: Ongoing, Progressing

## 2023-02-20 NOTE — PROGRESS NOTES
"Inpatient Nutrition Evaluation    Admit Date: 2/9/2023   Total duration of encounter: 11 days    Nutrition Recommendation/Prescription     Continue mince and moist. 2.continue boost plus with meals.     Nutrition Assessment     Chart Review    Reason Seen: continuous nutrition monitoring, length of stay, and follow-up    Malnutrition Screening Tool Results   Have you recently lost weight without trying?: No  Have you been eating poorly because of a decreased appetite?: No   MST Score: 0     Diagnosis:  Intraparenchymal hemorrhage of brain 2/9/2023      Right spastic hemiplegia 5/3/2022      Hypothyroidism 2/9/2023      BPH (benign prostatic hyperplasia)        Relevant Medical History:     Nutrition-Related Medications: atorvastatin, levothyroxine,     Nutrition-Related Labs:   Latest Reference Range & Units 02/18/23 05:48   Sodium 136 - 145 mmol/L 139   Potassium 3.5 - 5.1 mmol/L 4.1   Chloride 98 - 107 mmol/L 100   CO2 23 - 31 mmol/L 30   Anion Gap mEq/L 9.0   BUN 8.4 - 25.7 mg/dL 15.7   Creatinine 0.73 - 1.18 mg/dL 0.84   BUN/CREAT RATIO  19   eGFR mls/min/1.73/m2 >60   Glucose 82 - 115 mg/dL 89   Calcium 8.8 - 10.0 mg/dL 9.7       Diet Order: Diet Minced & Moist  Oral Supplement Order: Boost Plus  Appetite/Oral Intake: poor/0-25% of meals  Factors Affecting Nutritional Intake: chewing difficulty, decreased appetite, depression, and difficulty/impaired swallowing  Food/Mormon/Cultural Preferences: none reported  Food Allergies: none reported    Skin Integrity: bruised (ecchymotic)  Wound(s):       Comments    Pt lethargic, sleeping during rounds. No family present. Intake 25%. Continue current recs.     Anthropometrics    Height: 5' 11" (180.3 cm)    Last Weight: 70.7 kg (155 lb 13.8 oz) (02/20/23 0500) Weight Method: Bed Scale  BMI (Calculated): 21.7  BMI Classification: normal (BMI 18.5-24.9)        Ideal Body Weight (IBW), Male: 172 lb     % Ideal Body Weight, Male (lb): 95.75 %                        "   Usual Weight Provided By: unable to obtain usual weight    Wt Readings from Last 3 Encounters:   02/20/23 0500 70.7 kg (155 lb 13.8 oz)   02/13/23 0500 69.9 kg (154 lb 1.6 oz)   02/09/23 1948 74.7 kg (164 lb 10.9 oz)   02/09/23 1946 74.7 kg (164 lb 10.9 oz)   04/05/19 0950 89 kg (196 lb 3.4 oz)   11/05/18 1116 89 kg (196 lb 3.4 oz)   07/18/18 0906 92.9 kg (204 lb 12.9 oz)   04/16/18 1020 93 kg (205 lb 0.4 oz)   03/23/18 0833 91.1 kg (200 lb 13.4 oz)   01/26/18 0933 88.7 kg (195 lb 8.8 oz)      Weight Change(s) Since Admission:  Admit Weight: 74.7 kg (164 lb 10.9 oz) (02/09/23 1946)      Patient Education    Not applicable.    Monitoring & Evaluation     Dietitian will monitor food and beverage intake, weight, weight change, electrolyte/renal panel, and glucose/endocrine profile.  Nutrition Risk/Follow-Up: low (follow-up in 5-7 days)  Patients assigned 'low nutrition risk' status do not qualify for a full nutritional assessment but will be monitored and re-evaluated in a 5-7 day time period. Please consult if re-evaluation needed sooner.

## 2023-02-20 NOTE — PROGRESS NOTES
"Upon arrival to room pt lying supine in bed. Pt stated, "I don't want to get up." Will attempt to continue with POC in AM.   "

## 2023-02-20 NOTE — PLAN OF CARE
Problem: Adult Inpatient Plan of Care  Goal: Plan of Care Review  Outcome: Ongoing, Progressing  Goal: Patient-Specific Goal (Individualized)  Outcome: Ongoing, Progressing  Flowsheets (Taken 2/20/2023 0325)  Anxieties, Fears or Concerns: None noted at present  Individualized Care Needs: Return to previous level of mobility before this Stroke  Goal: Absence of Hospital-Acquired Illness or Injury  Outcome: Ongoing, Progressing  Goal: Optimal Comfort and Wellbeing  Outcome: Ongoing, Progressing  Intervention: Provide Person-Centered Care  Flowsheets (Taken 2/19/2023 2105)  Trust Relationship/Rapport:   care explained   reassurance provided  Goal: Readiness for Transition of Care  Outcome: Ongoing, Progressing     Problem: Fall Injury Risk  Goal: Absence of Fall and Fall-Related Injury  Outcome: Ongoing, Progressing  Intervention: Identify and Manage Contributors  Flowsheets (Taken 2/20/2023 0325)  Self-Care Promotion:   independence encouraged   meal set-up provided   safe use of adaptive equipment encouraged  Medication Review/Management: medications reviewed  Intervention: Promote Injury-Free Environment  Flowsheets (Taken 2/19/2023 2105)  Safety Promotion/Fall Prevention:   assistive device/personal item within reach   bed alarm set   Fall Risk signage in place   gait belt with ambulation   nonskid shoes/socks when out of bed   room near unit station   side rails raised x 3

## 2023-02-20 NOTE — SUBJECTIVE & OBJECTIVE
Interval History:  Patient is ambulating 8-10 feet with quad cane. Contact guard assistance for ADLs.  He requires significant amount of cuing for meals but is able to eat on his own and is consuming approximately 50% of his meals.  Review of Systems   Reason unable to perform ROS: Expressive aphasia.   Objective:     Vital Signs (Most Recent):  Temp: 97.4 °F (36.3 °C) (02/20/23 0700)  Pulse: 60 (02/20/23 0700)  Resp: 18 (02/20/23 0423)  BP: 107/67 (02/20/23 0700)  SpO2: 100 % (02/20/23 0700) Vital Signs (24h Range):  Temp:  [97.4 °F (36.3 °C)-98.3 °F (36.8 °C)] 97.4 °F (36.3 °C)  Pulse:  [56-63] 60  Resp:  [17-18] 18  SpO2:  [97 %-100 %] 100 %  BP: ()/(59-72) 107/67     Weight: 70.7 kg (155 lb 13.8 oz)  Body mass index is 21.74 kg/m².    Intake/Output Summary (Last 24 hours) at 2/20/2023 1031  Last data filed at 2/20/2023 0800  Gross per 24 hour   Intake 1280 ml   Output --   Net 1280 ml        Physical Exam  Constitutional:       General: He is not in acute distress.     Appearance: Normal appearance.   HENT:      Head: Normocephalic and atraumatic.      Nose: No congestion or rhinorrhea.      Mouth/Throat:      Mouth: Mucous membranes are moist.   Eyes:      Conjunctiva/sclera: Conjunctivae normal.      Pupils: Pupils are equal, round, and reactive to light.   Cardiovascular:      Rate and Rhythm: Normal rate and regular rhythm.      Heart sounds: No murmur heard.  Pulmonary:      Effort: Pulmonary effort is normal.      Breath sounds: Normal breath sounds. No wheezing.   Abdominal:      General: Bowel sounds are normal. There is no distension.      Palpations: Abdomen is soft.      Tenderness: There is no abdominal tenderness.   Musculoskeletal:         General: No swelling. Normal range of motion.      Cervical back: Normal range of motion and neck supple.      Right lower leg: No edema.      Left lower leg: No edema.   Skin:     General: Skin is warm and dry.      Findings: No rash.   Neurological:       General: No focal deficit present.      Mental Status: He is alert and oriented to person, place, and time.      Cranial Nerves: Dysarthria present.      Motor: Weakness and abnormal muscle tone present.   Psychiatric:         Mood and Affect: Mood normal.         Behavior: Behavior normal.       Significant Labs: All pertinent labs within the past 24 hours have been reviewed.    Significant Imaging: I have reviewed all pertinent imaging results/findings within the past 24 hours.

## 2023-02-20 NOTE — PROGRESS NOTES
"Name: Bhupendra Park    : 1960 (63 y.o.)  MRN: [id number]           Patient Unavailable      Patient unable to be seen at this time secondary to: PM - attempted to see pt and encourage to participate but pt refusing and stated "I do not want to get up". Encouraged pt to participate tomorrow         "

## 2023-02-20 NOTE — PT/OT/SLP PROGRESS
"Name: Bhupendra Park    : 1960 (63 y.o.)  MRN: [id number]           Patient Unavailable      Patient unable to be seen at this time secondary to: OT attempted x2 this AM with Pt verbally reporting "no" during first attempt and unable to remain alert during second. Will follow up as schedule allows.          "

## 2023-02-20 NOTE — PROGRESS NOTES
[facility] Medicine  Progress Note    Patient Name: Bhupendra Park  MRN: [id number]  Patient Class: IP- Swing   Admission Date: 2/9/2023  Length of Stay: 11 days  Attending Physician: Thairy G Reyes, DO  Primary Care Provider: Kelly Meadows MD        Subjective:     Principal Problem:Intraparenchymal hemorrhage of brain        HPI:  63-year-old male with HTN, left frontal IPH, prior cervical spine surgery status post spinal cord stimulator who presented to ED with acute encephalopathy and was subsequently found to have recurrent right frontal intraparenchymal hemorrhage. Neurosurgery recommended medical management. Serial CTs showed stability of bleed. Transferred to our facility for continued rehabilitation.   Majority of patient's information obtained from medical records as patient has receptive and expressive aphasia and is able to answer only with yes / no.        Overview/Hospital Course:  Patient admitted for rehabilitation with speech therapy, occupational therapy and physical therapy after intracranial hemorrhage. Patient's wife brought  from home for spinal stimulator.  Patient eating with supervision with his left hand.  Somnolence continues to be a problem for the patient, can consider a stimulant however it would be prudent to do this after repeating his MRI on or about April 5, 2023.      Interval History:  Patient is ambulating 8-10 feet with quad cane. Contact guard assistance for ADLs.  He requires significant amount of cuing for meals but is able to eat on his own and is consuming approximately 50% of his meals.  Review of Systems   Reason unable to perform ROS: Expressive aphasia.   Objective:     Vital Signs (Most Recent):  Temp: 97.4 °F (36.3 °C) (02/20/23 0700)  Pulse: 60 (02/20/23 0700)  Resp: 18 (02/20/23 0423)  BP: 107/67 (02/20/23 0700)  SpO2: 100 % (02/20/23 0700) Vital Signs (24h Range):  Temp:  [97.4 °F (36.3 °C)-98.3 °F (36.8 °C)] 97.4 °F  (36.3 °C)  Pulse:  [56-63] 60  Resp:  [17-18] 18  SpO2:  [97 %-100 %] 100 %  BP: ()/(59-72) 107/67     Weight: 70.7 kg (155 lb 13.8 oz)  Body mass index is 21.74 kg/m².    Intake/Output Summary (Last 24 hours) at 2/20/2023 1031  Last data filed at 2/20/2023 0800  Gross per 24 hour   Intake 1280 ml   Output --   Net 1280 ml        Physical Exam  Constitutional:       General: He is not in acute distress.     Appearance: Normal appearance.   HENT:      Head: Normocephalic and atraumatic.      Nose: No congestion or rhinorrhea.      Mouth/Throat:      Mouth: Mucous membranes are moist.   Eyes:      Conjunctiva/sclera: Conjunctivae normal.      Pupils: Pupils are equal, round, and reactive to light.   Cardiovascular:      Rate and Rhythm: Normal rate and regular rhythm.      Heart sounds: No murmur heard.  Pulmonary:      Effort: Pulmonary effort is normal.      Breath sounds: Normal breath sounds. No wheezing.   Abdominal:      General: Bowel sounds are normal. There is no distension.      Palpations: Abdomen is soft.      Tenderness: There is no abdominal tenderness.   Musculoskeletal:         General: No swelling. Normal range of motion.      Cervical back: Normal range of motion and neck supple.      Right lower leg: No edema.      Left lower leg: No edema.   Skin:     General: Skin is warm and dry.      Findings: No rash.   Neurological:      General: No focal deficit present.      Mental Status: He is alert and oriented to person, place, and time.      Cranial Nerves: Dysarthria present.      Motor: Weakness and abnormal muscle tone present.   Psychiatric:         Mood and Affect: Mood normal.         Behavior: Behavior normal.       Significant Labs: All pertinent labs within the past 24 hours have been reviewed.    Significant Imaging: I have reviewed all pertinent imaging results/findings within the past 24 hours.      Assessment/Plan:      * Intraparenchymal hemorrhage of brain  -Continue with Keppra,  atorvastatin, blood pressure goal less than 140/90  -on 02/06/2023 recommendation was documented that patient will need an MRI brain with and without contrast in 4-6 weeks to rule out hemorrhagic infarct or mass and patient is to be followed by Dr. Figueroa in [place]  -antidepressant and/or stimulant after repeat MRI    Right spastic hemiplegia  -2/2 history of ICH and CVA  -continue PT/OT      Hypothyroidism  -continue with levothyroxine 88mcg      BPH (benign prostatic hyperplasia)  - continue with tamsulosin        VTE Risk Mitigation (From admission, onward)         Ordered     IP VTE LOW RISK PATIENT  Once         02/09/23 1931     Place sequential compression device  Until discontinued         02/09/23 1931                Discharge Planning   ABRAHAM:      Code Status: Full Code   Is the patient medically ready for discharge?:     Reason for patient still in hospital (select all that apply): Treatment, PT / OT recommendations and Pending disposition  Discharge Plan A: Home with family, Home Health   Discharge Delays: None known at this time              Thairy G Reyes, DO  Department of Hospital Medicine   [facility]

## 2023-02-21 NOTE — PROGRESS NOTES
[facility] Medicine  Progress Note    Patient Name: Bhupendra Park  MRN: [id number]  Patient Class: IP- Swing   Admission Date: 2/9/2023  Length of Stay: 12 days  Attending Physician: Thairy G Reyes, DO  Primary Care Provider: Kelly Meadows MD        Subjective:     Principal Problem:Intraparenchymal hemorrhage of brain        HPI:  63-year-old male with HTN, left frontal IPH, prior cervical spine surgery status post spinal cord stimulator who presented to ED with acute encephalopathy and was subsequently found to have recurrent right frontal intraparenchymal hemorrhage. Neurosurgery recommended medical management. Serial CTs showed stability of bleed. Transferred to our facility for continued rehabilitation.   Majority of patient's information obtained from medical records as patient has receptive and expressive aphasia and is able to answer only with yes / no.        Overview/Hospital Course:  Patient admitted for rehabilitation with speech therapy, occupational therapy and physical therapy after intracranial hemorrhage. Patient's wife brought  from home for spinal stimulator.  Patient eating with supervision with his left hand.  Somnolence continues to be a problem for the patient, can consider a stimulant however it would be prudent to do this after repeating his MRI on or about April 5, 2023.      Interval History:  Patient is ambulating 20 feet with quad cane. Contact guard assistance for ADLs.  He requires significant amount of cuing for meals but is able to eat on his own and is consuming approximately 50% of his meals.  Review of Systems   Reason unable to perform ROS: Expressive aphasia.   Objective:     Vital Signs (Most Recent):  Temp: 97.3 °F (36.3 °C) (02/21/23 0700)  Pulse: (!) 57 (02/21/23 0700)  Resp: 19 (02/21/23 0403)  BP: (!) 104/56 (02/21/23 0700)  SpO2: 97 % (02/21/23 0700) Vital Signs (24h Range):  Temp:  [97.3 °F (36.3 °C)-98 °F (36.7 °C)] 97.3  °F (36.3 °C)  Pulse:  [50-57] 57  Resp:  [18-19] 19  SpO2:  [96 %-99 %] 97 %  BP: ()/(54-69) 104/56     Weight: 70.7 kg (155 lb 13.8 oz)  Body mass index is 21.74 kg/m².    Intake/Output Summary (Last 24 hours) at 2/21/2023 0949  Last data filed at 2/21/2023 0816  Gross per 24 hour   Intake 837 ml   Output --   Net 837 ml        Physical Exam  Constitutional:       General: He is not in acute distress.     Appearance: Normal appearance.   HENT:      Head: Normocephalic and atraumatic.      Nose: No congestion or rhinorrhea.      Mouth/Throat:      Mouth: Mucous membranes are moist.   Eyes:      Conjunctiva/sclera: Conjunctivae normal.      Pupils: Pupils are equal, round, and reactive to light.   Cardiovascular:      Rate and Rhythm: Normal rate and regular rhythm.      Heart sounds: No murmur heard.  Pulmonary:      Effort: Pulmonary effort is normal.      Breath sounds: Normal breath sounds. No wheezing.   Abdominal:      General: Bowel sounds are normal. There is no distension.      Palpations: Abdomen is soft.      Tenderness: There is no abdominal tenderness.   Musculoskeletal:         General: No swelling. Normal range of motion.      Cervical back: Normal range of motion and neck supple.      Right lower leg: No edema.      Left lower leg: No edema.   Skin:     General: Skin is warm and dry.      Findings: No rash.   Neurological:      General: No focal deficit present.      Mental Status: He is alert and oriented to person, place, and time.      Cranial Nerves: Dysarthria present.      Motor: Weakness and abnormal muscle tone present.   Psychiatric:         Mood and Affect: Mood normal.         Behavior: Behavior normal.       Significant Labs: All pertinent labs within the past 24 hours have been reviewed.    Significant Imaging: I have reviewed all pertinent imaging results/findings within the past 24 hours.      Assessment/Plan:      * Intraparenchymal hemorrhage of brain  -Continue with Keppra,  atorvastatin, blood pressure goal less than 140/90  -on 02/06/2023 recommendation was documented that patient will need an MRI brain with and without contrast in 4-6 weeks to rule out hemorrhagic infarct or mass and patient is to be followed by Dr. Figueroa in [place]  -antidepressant and/or stimulant after repeat MRI    Right spastic hemiplegia  -2/2 history of ICH and CVA  -continue PT/OT      Hypothyroidism  -continue with levothyroxine 88mcg      BPH (benign prostatic hyperplasia)  - continue with tamsulosin        VTE Risk Mitigation (From admission, onward)         Ordered     IP VTE LOW RISK PATIENT  Once         02/09/23 1931     Place sequential compression device  Until discontinued         02/09/23 1931                Discharge Planning   ABRAHAM:      Code Status: Full Code   Is the patient medically ready for discharge?:     Reason for patient still in hospital (select all that apply): Treatment and PT / OT recommendations  Discharge Plan A: Home with family, Home Health   Discharge Delays: None known at this time              Thairy G Reyes, DO  Department of Hospital Medicine   [facility]

## 2023-02-21 NOTE — ASSESSMENT & PLAN NOTE
-Continue with Keppra, atorvastatin, blood pressure goal less than 140/90  -on 02/06/2023 recommendation was documented that patient will need an MRI brain with and without contrast in 4-6 weeks to rule out hemorrhagic infarct or mass and patient is to be followed by Dr. Figueroa in [place]  -antidepressant and/or stimulant after repeat MRI

## 2023-02-21 NOTE — PLAN OF CARE
Problem: Adult Inpatient Plan of Care  Goal: Plan of Care Review  Outcome: Ongoing, Progressing  Flowsheets (Taken 2/21/2023 1136)  Plan of Care Reviewed With: patient  Goal: Patient-Specific Goal (Individualized)  Outcome: Ongoing, Progressing  Flowsheets (Taken 2/21/2023 1136)  Anxieties, Fears or Concerns: none  Individualized Care Needs: Return to previous level of mobility  Goal: Absence of Hospital-Acquired Illness or Injury  Outcome: Ongoing, Progressing  Intervention: Identify and Manage Fall Risk  Flowsheets (Taken 2/21/2023 1136)  Safety Promotion/Fall Prevention:   assistive device/personal item within reach   bed alarm set   gait belt with ambulation   muscle strengthening facilitated   nonskid shoes/socks when out of bed   instructed to call staff for mobility   room near unit station  Intervention: Prevent Skin Injury  Flowsheets (Taken 2/21/2023 1136)  Body Position:   position changed independently   neutral body alignment  Skin Protection:   adhesive use limited   tubing/devices free from skin contact   incontinence pads utilized   skin-to-device areas padded  Intervention: Prevent and Manage VTE (Venous Thromboembolism) Risk  Flowsheets (Taken 2/21/2023 1136)  Activity Management: Walk with assistive devise and /or staff member - L3  VTE Prevention/Management:   ROM (active) performed   dorsiflexion/plantar flexion performed   fluids promoted  Range of Motion: active ROM (range of motion) encouraged  Goal: Optimal Comfort and Wellbeing  Outcome: Ongoing, Progressing  Intervention: Monitor Pain and Promote Comfort  Flowsheets (Taken 2/21/2023 1136)  Pain Management Interventions:   position adjusted   quiet environment facilitated  Intervention: Provide Person-Centered Care  Flowsheets (Taken 2/21/2023 1136)  Trust Relationship/Rapport:   care explained   thoughts/feelings acknowledged   empathic listening provided   emotional support provided  Goal: Readiness for Transition of Care  Outcome:  Ongoing, Progressing     Problem: Skin Injury Risk Increased  Goal: Skin Health and Integrity  Outcome: Ongoing, Progressing  Intervention: Optimize Skin Protection  Flowsheets (Taken 2/21/2023 1136)  Pressure Reduction Techniques:   frequent weight shift encouraged   heels elevated off bed   weight shift assistance provided  Pressure Reduction Devices: positioning supports utilized  Skin Protection:   adhesive use limited   tubing/devices free from skin contact   incontinence pads utilized   skin-to-device areas padded  Head of Bed (HOB) Positioning: HOB at 30-45 degrees

## 2023-02-21 NOTE — PT/OT/SLP PROGRESS
Occupational Therapy  Treatment    Name: Bhupendra Park    : 1960 (63 y.o.)  MRN: [id number]           TREATMENT SUMMARY AND RECOMMENDATIONS:      OT Date of Treatment: 23  OT Start Time: 915  OT Stop Time: 950  OT Total Time (min): 35 min      Subjective Assessment:   No complaints  Lethargic   X Awake, alert, cooperative  Impulsive    Uncooperative   Flat affect    Agitated  c/o pain    Appropriate  c/o fatigue    Confused  Treated at bedside     Emotionally labile  Treated in gym/dept.      Other:        Therapy Precautions:    Cognitive deficits  Spinal precautions    Collar - hard  Sternal precautions    Collar - soft   TLSO   X Fall risk  LSO    Hip precautions - posterior  Knee immobilizer    Hip precautions - anterior  WBAT   x Impaired communication  Partial weightbearing    Oxygen  TTWB    PEG tube  NWB    Visual deficits      Hearing deficits   Other:        Treatment Objectives:     Mobility Training:    Mobility task Assist level Comments    Bed mobility Supervision  Supine to sit transition    Transfer CGA  From w/c to EOB with use of bed rail towards L side    Sit to stands transitions CGA     Functional mobility Mod A  With use of LBQC and min/mod A to swing RLE, Pt ambulated ~50ft prior to needing rest break    Sitting balance     Standing balance      Other:        ADL Training:    ADL Assist level Comments    Feeding     Grooming/hygiene     Bathing     Upper body dressing     Lower body dressing Supervision  Donning of bilateral socks while seated EOB    Toileting     Toilet transfer     Adaptive equipment training     Other:         Assessment: Patient tolerated session well. Pt required max encouragement at beginning of session. Demonstrated good progress with functional mobility for increased distance and decreased assistance to advance RLE.    OT Plan: continue with current POC   Revisions made to plan of care: No    GOALS:   Multidisciplinary Problems       Occupational  Therapy Goals          Problem: Occupational Therapy    Goal Priority Disciplines Outcome Interventions   Occupational Therapy Goal     OT, PT/OT Ongoing, Progressing    Description: Goals to be met by: 3/10/23     Patient will increase functional independence with ADLs by performing:    UE Dressing with Modified [place].  LE Dressing with Modified [place].  Toileting from toilet with Stand-by Assistance for hygiene and clothing management.   Toilet transfer to toilet with Supervision.                         Skilled OT Minutes Provided: 35  Communication with Treatment Team:     Equipment recommendations:       At end of treatment, patient remained:   Up in chair     Up in wheelchair in room   X In bed   X With alarm activated   X Bed rails up   X Call bell in reach     Family/friends present    Restraints secured properly    In bathroom with CNA/RN notified    In gym with PT/PTA/tech   X Nurse aware    Other:

## 2023-02-21 NOTE — PLAN OF CARE
Problem: Adult Inpatient Plan of Care  Goal: Plan of Care Review  Outcome: Ongoing, Progressing  Flowsheets (Taken 2/21/2023 0502)  Plan of Care Reviewed With: patient  Goal: Patient-Specific Goal (Individualized)  Outcome: Ongoing, Progressing  Goal: Absence of Hospital-Acquired Illness or Injury  Outcome: Ongoing, Progressing  Intervention: Identify and Manage Fall Risk  Flowsheets (Taken 2/21/2023 0502)  Safety Promotion/Fall Prevention:   assistive device/personal item within reach   bed alarm set   nonskid shoes/socks when out of bed   side rails raised x 3  Intervention: Prevent Skin Injury  Flowsheets (Taken 2/21/2023 0502)  Body Position:   position maintained   supine  Skin Protection:   adhesive use limited   incontinence pads utilized  Intervention: Prevent and Manage VTE (Venous Thromboembolism) Risk  Flowsheets (Taken 2/21/2023 0502)  Activity Management: Rolling - L1  VTE Prevention/Management:   bleeding precations maintained   bleeding risk assessed  Range of Motion: active ROM (range of motion) encouraged  Intervention: Prevent Infection  Flowsheets (Taken 2/21/2023 0502)  Infection Prevention:   cohorting utilized   hand hygiene promoted   rest/sleep promoted   equipment surfaces disinfected  Goal: Optimal Comfort and Wellbeing  Outcome: Ongoing, Progressing  Intervention: Monitor Pain and Promote Comfort  Flowsheets (Taken 2/21/2023 0502)  Pain Management Interventions:   care clustered   position adjusted  Intervention: Provide Person-Centered Care  Flowsheets (Taken 2/21/2023 0502)  Trust Relationship/Rapport:   care explained   questions answered  Goal: Readiness for Transition of Care  Outcome: Ongoing, Progressing     Problem: Skin Injury Risk Increased  Goal: Skin Health and Integrity  Outcome: Ongoing, Progressing  Intervention: Optimize Skin Protection  Flowsheets (Taken 2/21/2023 0502)  Pressure Reduction Techniques:   frequent weight shift encouraged   weight shift assistance  provided  Pressure Reduction Devices: positioning supports utilized  Skin Protection:   adhesive use limited   incontinence pads utilized  Head of Bed (HOB) Positioning: HOB at 20-30 degrees     Problem: Fall Injury Risk  Goal: Absence of Fall and Fall-Related Injury  Outcome: Ongoing, Progressing  Intervention: Identify and Manage Contributors  Flowsheets (Taken 2/21/2023 0502)  Self-Care Promotion: independence encouraged  Medication Review/Management: medications reviewed  Intervention: Promote Injury-Free Environment  Flowsheets (Taken 2/21/2023 0502)  Safety Promotion/Fall Prevention:   assistive device/personal item within reach   bed alarm set   nonskid shoes/socks when out of bed   side rails raised x 3     Problem: Infection  Goal: Absence of Infection Signs and Symptoms  Outcome: Ongoing, Progressing  Intervention: Prevent or Manage Infection  Flowsheets (Taken 2/21/2023 0502)  Isolation Precautions: protective

## 2023-02-21 NOTE — SUBJECTIVE & OBJECTIVE
Interval History:  Patient is ambulating 20 feet with quad cane. Contact guard assistance for ADLs.  He requires significant amount of cuing for meals but is able to eat on his own and is consuming approximately 50% of his meals.  Review of Systems   Reason unable to perform ROS: Expressive aphasia.   Objective:     Vital Signs (Most Recent):  Temp: 97.3 °F (36.3 °C) (02/21/23 0700)  Pulse: (!) 57 (02/21/23 0700)  Resp: 19 (02/21/23 0403)  BP: (!) 104/56 (02/21/23 0700)  SpO2: 97 % (02/21/23 0700) Vital Signs (24h Range):  Temp:  [97.3 °F (36.3 °C)-98 °F (36.7 °C)] 97.3 °F (36.3 °C)  Pulse:  [50-57] 57  Resp:  [18-19] 19  SpO2:  [96 %-99 %] 97 %  BP: ()/(54-69) 104/56     Weight: 70.7 kg (155 lb 13.8 oz)  Body mass index is 21.74 kg/m².    Intake/Output Summary (Last 24 hours) at 2/21/2023 0949  Last data filed at 2/21/2023 0816  Gross per 24 hour   Intake 837 ml   Output --   Net 837 ml        Physical Exam  Constitutional:       General: He is not in acute distress.     Appearance: Normal appearance.   HENT:      Head: Normocephalic and atraumatic.      Nose: No congestion or rhinorrhea.      Mouth/Throat:      Mouth: Mucous membranes are moist.   Eyes:      Conjunctiva/sclera: Conjunctivae normal.      Pupils: Pupils are equal, round, and reactive to light.   Cardiovascular:      Rate and Rhythm: Normal rate and regular rhythm.      Heart sounds: No murmur heard.  Pulmonary:      Effort: Pulmonary effort is normal.      Breath sounds: Normal breath sounds. No wheezing.   Abdominal:      General: Bowel sounds are normal. There is no distension.      Palpations: Abdomen is soft.      Tenderness: There is no abdominal tenderness.   Musculoskeletal:         General: No swelling. Normal range of motion.      Cervical back: Normal range of motion and neck supple.      Right lower leg: No edema.      Left lower leg: No edema.   Skin:     General: Skin is warm and dry.      Findings: No rash.   Neurological:       General: No focal deficit present.      Mental Status: He is alert and oriented to person, place, and time.      Cranial Nerves: Dysarthria present.      Motor: Weakness and abnormal muscle tone present.   Psychiatric:         Mood and Affect: Mood normal.         Behavior: Behavior normal.       Significant Labs: All pertinent labs within the past 24 hours have been reviewed.    Significant Imaging: I have reviewed all pertinent imaging results/findings within the past 24 hours.

## 2023-02-22 NOTE — PROGRESS NOTES
"Inpatient Nutrition Evaluation    Admit Date: 2/9/2023   Total duration of encounter: 13 days    Nutrition Recommendation/Prescription     Continue minced and moist 2. Continue boost plus with meals. 3. Continue 1:1 supervision.    Nutrition Assessment     Chart Review    Reason Seen: continuous nutrition monitoring, length of stay, and follow-up    Malnutrition Screening Tool Results   Have you recently lost weight without trying?: No  Have you been eating poorly because of a decreased appetite?: No   MST Score: 0     Diagnosis:  Intraparenchymal hemorrhage of brain 2/9/2023      Right spastic hemiplegia 5/3/2022      Hypothyroidism 2/9/2023      BPH (benign prostatic hyperplasia)        Relevant Medical History:     Nutrition-Related Medications: atorvastatin, levothyroxine,     Nutrition-Related Labs:   Latest Reference Range & Units 02/18/23 05:48   Sodium 136 - 145 mmol/L 139   Potassium 3.5 - 5.1 mmol/L 4.1   Chloride 98 - 107 mmol/L 100   CO2 23 - 31 mmol/L 30   Anion Gap mEq/L 9.0   BUN 8.4 - 25.7 mg/dL 15.7   Creatinine 0.73 - 1.18 mg/dL 0.84   BUN/CREAT RATIO  19   eGFR mls/min/1.73/m2 >60   Glucose 82 - 115 mg/dL 89   Calcium 8.8 - 10.0 mg/dL 9.7       Diet Order: Diet Minced & Moist  Oral Supplement Order: Boost Plus  Appetite/Oral Intake: fair  Factors Affecting Nutritional Intake: impaired cognitive status/motor control, chewing difficulty, decreased appetite, and difficulty/impaired swallowing  Food/Buddhist/Cultural Preferences:  coffee  Food Allergies: none reported    Skin Integrity: bruised (ecchymotic)  Wound(s):       Comments    Pt intake fair. Pt ate his breakfast but very little of lunch. SLP to do a trial tomorrow on soft and bite sized. Family requested in rounds for patient to have coffee.   Anthropometrics    Height: 5' 11" (180.3 cm)    Last Weight: 70.7 kg (155 lb 13.8 oz) (02/20/23 0500) Weight Method: Bed Scale  BMI (Calculated): 21.7  BMI Classification: normal (BMI 18.5-24.9)      "   Ideal Body Weight (IBW), Male: 172 lb     % Ideal Body Weight, Male (lb): 95.75 %                          Usual Weight Provided By: unable to obtain usual weight    Wt Readings from Last 3 Encounters:   02/20/23 0500 70.7 kg (155 lb 13.8 oz)   02/13/23 0500 69.9 kg (154 lb 1.6 oz)   02/09/23 1948 74.7 kg (164 lb 10.9 oz)   02/09/23 1946 74.7 kg (164 lb 10.9 oz)   04/05/19 0950 89 kg (196 lb 3.4 oz)   11/05/18 1116 89 kg (196 lb 3.4 oz)   07/18/18 0906 92.9 kg (204 lb 12.9 oz)   04/16/18 1020 93 kg (205 lb 0.4 oz)   03/23/18 0833 91.1 kg (200 lb 13.4 oz)   01/26/18 0933 88.7 kg (195 lb 8.8 oz)      Weight Change(s) Since Admission:  Admit Weight: 74.7 kg (164 lb 10.9 oz) (02/09/23 1946)      Patient Education    Not applicable.    Monitoring & Evaluation     Dietitian will monitor food and beverage intake, weight, weight change, glucose/endocrine profile, and gastrointestinal profile.  Nutrition Risk/Follow-Up: low (follow-up in 5-7 days)  Patients assigned 'low nutrition risk' status do not qualify for a full nutritional assessment but will be monitored and re-evaluated in a 5-7 day time period. Please consult if re-evaluation needed sooner.

## 2023-02-22 NOTE — PT/OT/SLP PROGRESS
Name: Bhupendra Park    : 1960 (63 y.o.)  MRN: [id number]            Interdisciplinary Team Conference     Case conference held with patient/family and care team to discuss progress, plan of care, barriers to be addressed for safe return home, equipment recommendations, and discharge planning. Communicated therapy progress with MD, RN, therapy clinicians and case management. All questions/concerns answered.

## 2023-02-22 NOTE — PT/OT/SLP PROGRESS
"Speech Language Pathology Treatment    Patient Name:  Bhupendra Park   MRN:  [id number]  Admitting Diagnosis: Intraparenchymal hemorrhage of brain    Recommendations:                 General Recommendations:  Dysphagia therapy and Speech/language therapy  Diet recommendations:  Minced & Moist Diet - IDDSI Level 5, Liquid Diet Level: Thin liquids - IDDSI Level 0   Aspiration Precautions: 1 bite/sip at a time, Alternating bites/sips, Assistance with meals, Check for pocketing/oral residue, HOB to 90 degrees, Meds crushed in puree, Small bites/sips, and Standard aspiration precautions   General Precautions: Standard, aphasia, aspiration  Communication strategies:  eye glasses, yes/no questions only, provide increased time to answer, and go to room if call light pushed    Subjective     Pt seen outside of hospital w/ wife present. Pt pleasant and cooperative.    SLP attempting to transfer pt to [facility] following session, though pt sat on bed, therefore pt laid in bed following SLP session. Bed alarm on and call light on pt's L side.    Patient goals: pt unable to state at this time    Pain/Comfort:       Respiratory Status: Room air    Objective:     Has the patient been evaluated by SLP for swallowing?      Keep patient NPO?     Current Respiratory Status:        Auto speech: pt counted 1-10 given visual cue for "1' to begin. Pt stated 6/7 Feng given mod-max verbal cueing.  Object naming completed w/ 50% acc I'ly.   SLP engaged pt in simple conversational tasks regarding home life. Pt w/ occasional 1 word responses and answered "yes" and "no." Pt requires increased time for processing and initiation.    Pt consumed soft and bite sized trials w/ good oral clearance and no overt s/s of aspiration noted. SLP to bring soft and bite sized trial tray tomorrow to assess over an entire meal.    Cont SLP POC.    Assessment:     Bhupendra Park is a 63 y.o. male with an SLP diagnosis of Aphasia and Dysphagia.  He presents " with expressive and receptive aphasia. Diet modification warranted at this time to ensure safety w/ PO.    Goals:   Multidisciplinary Problems       SLP Goals          Problem: SLP    Goal Priority Disciplines Outcome   SLP Goal     SLP Ongoing, Progressing   Description: LTG:   Pt will tolerate LRD w/o overt s/s of aspiration.  Pt will improve expressive and receptive language skills in order to effectively communicate wants and needs Manish.    STG:  Pt will tolerate minced and moist w/ good oral clearance and w/o overt s/s of aspiration. Goal met  Pt will participate in trials of soft and bite sized w/ good oral clearance and w/o overt s/s of aspiration.  Pt will answer 2U YNQs w/ 80% acc Manish.  Pt will follow 1-step commands w/ 80% acc Manish.  Pt will ID objects in Fo3 w/ 80% acc Manish.  Pt will complete auto speech tasks w/ 80% acc Manish.  Pt will name common objects w/ 80% acc Manish.                       Plan:     Patient to be seen:   (PRN)   Plan of Care expires:  03/03/23  Plan of Care reviewed with:  patient   SLP Follow-Up:  Yes       Discharge recommendations:      Barriers to Discharge:  Level of Skilled Assistance Needed see PT/OT notes    Time Tracking:     SLP Treatment Date:  2/22/23  Speech Start Time:  10:20  Speech Stop Time:  11:00    Speech Total Time (min):  40 min    Billable Minutes: Speech Therapy Individual 30 min language/10 min swallowing    Adamaris Carrion M.S., CCC-SLP   02/22/2023

## 2023-02-22 NOTE — SUBJECTIVE & OBJECTIVE
Interval History:  Patient is ambulating 20 feet with quad cane. Contact guard assistance for ADLs.  He requires significant amount of cuing for meals but is able to eat on his own and is consuming approximately 50% of his meals.  Review of Systems   Reason unable to perform ROS: Expressive aphasia.   Objective:     Vital Signs (Most Recent):  Temp: 98.5 °F (36.9 °C) (02/22/23 1035)  Pulse: (!) 57 (02/22/23 1035)  Resp: 18 (02/22/23 1035)  BP: 111/70 (02/22/23 1035)  SpO2: 99 % (02/22/23 1035) Vital Signs (24h Range):  Temp:  [97.5 °F (36.4 °C)-98.5 °F (36.9 °C)] 98.5 °F (36.9 °C)  Pulse:  [55-64] 57  Resp:  [18-20] 18  SpO2:  [97 %-99 %] 99 %  BP: ()/(49-70) 111/70     Weight: 70.7 kg (155 lb 13.8 oz)  Body mass index is 21.74 kg/m².    Intake/Output Summary (Last 24 hours) at 2/22/2023 1102  Last data filed at 2/22/2023 0800  Gross per 24 hour   Intake 1311 ml   Output --   Net 1311 ml        Physical Exam  Constitutional:       General: He is not in acute distress.     Appearance: Normal appearance.   HENT:      Head: Normocephalic and atraumatic.      Nose: No congestion or rhinorrhea.      Mouth/Throat:      Mouth: Mucous membranes are moist.   Eyes:      Conjunctiva/sclera: Conjunctivae normal.      Pupils: Pupils are equal, round, and reactive to light.   Cardiovascular:      Rate and Rhythm: Normal rate and regular rhythm.      Heart sounds: No murmur heard.  Pulmonary:      Effort: Pulmonary effort is normal.      Breath sounds: Normal breath sounds. No wheezing.   Abdominal:      General: Bowel sounds are normal. There is no distension.      Palpations: Abdomen is soft.      Tenderness: There is no abdominal tenderness.   Musculoskeletal:         General: No swelling. Normal range of motion.      Cervical back: Normal range of motion and neck supple.      Right lower leg: No edema.      Left lower leg: No edema.   Skin:     General: Skin is warm and dry.      Findings: No rash.   Neurological:       General: No focal deficit present.      Mental Status: He is alert and oriented to person, place, and time.      Cranial Nerves: Dysarthria present.      Motor: Weakness and abnormal muscle tone present.   Psychiatric:         Mood and Affect: Mood normal.         Behavior: Behavior normal.       Significant Labs: All pertinent labs within the past 24 hours have been reviewed.    Significant Imaging: I have reviewed all pertinent imaging results/findings within the past 24 hours.

## 2023-02-22 NOTE — PLAN OF CARE
Problem: Adult Inpatient Plan of Care  Goal: Plan of Care Review  Outcome: Ongoing, Progressing  Flowsheets (Taken 2/21/2023 1930)  Plan of Care Reviewed With: patient  Goal: Patient-Specific Goal (Individualized)  Outcome: Ongoing, Progressing  Flowsheets (Taken 2/21/2023 1930)  Anxieties, Fears or Concerns: none  Individualized Care Needs: Return to previous level of mobility  Goal: Absence of Hospital-Acquired Illness or Injury  Outcome: Ongoing, Progressing  Intervention: Identify and Manage Fall Risk  Flowsheets (Taken 2/21/2023 1930)  Safety Promotion/Fall Prevention:   assistive device/personal item within reach   bed alarm set   gait belt with ambulation   muscle strengthening facilitated   nonskid shoes/socks when out of bed   instructed to call staff for mobility   room near unit station  Intervention: Prevent Skin Injury  Flowsheets (Taken 2/21/2023 1930)  Body Position:   position changed independently   neutral body alignment  Skin Protection:   adhesive use limited   tubing/devices free from skin contact   incontinence pads utilized   skin-to-device areas padded  Intervention: Prevent and Manage VTE (Venous Thromboembolism) Risk  Flowsheets (Taken 2/21/2023 1930)  Activity Management: Walk with assistive devise and /or staff member - L3  VTE Prevention/Management:   ROM (active) performed   dorsiflexion/plantar flexion performed   fluids promoted  Range of Motion: active ROM (range of motion) encouraged  Goal: Optimal Comfort and Wellbeing  Outcome: Ongoing, Progressing  Intervention: Monitor Pain and Promote Comfort  Flowsheets (Taken 2/21/2023 1930)  Pain Management Interventions:   position adjusted   quiet environment facilitated  Intervention: Provide Person-Centered Care  Flowsheets (Taken 2/21/2023 1930)  Trust Relationship/Rapport:   care explained   thoughts/feelings acknowledged   empathic listening provided   emotional support provided  Goal: Readiness for Transition of Care  Outcome:  Ongoing, Progressing     Problem: Skin Injury Risk Increased  Goal: Skin Health and Integrity  Outcome: Ongoing, Progressing  Intervention: Optimize Skin Protection  Flowsheets (Taken 2/21/2023 1930)  Pressure Reduction Techniques:   frequent weight shift encouraged   heels elevated off bed   weight shift assistance provided  Pressure Reduction Devices: positioning supports utilized  Skin Protection:   adhesive use limited   tubing/devices free from skin contact   incontinence pads utilized   skin-to-device areas padded  Head of Bed (HOB) Positioning: HOB at 30-45 degrees

## 2023-02-22 NOTE — PROGRESS NOTES
[facility] Medicine  Progress Note    Patient Name: Bhupendra Park  MRN: [id number]  Patient Class: IP- Swing   Admission Date: 2/9/2023  Length of Stay: 13 days  Attending Physician: Thairy G Reyes, DO  Primary Care Provider: Kelly Meadows MD        Subjective:     Principal Problem:Intraparenchymal hemorrhage of brain        HPI:  63-year-old male with HTN, left frontal IPH, prior cervical spine surgery status post spinal cord stimulator who presented to ED with acute encephalopathy and was subsequently found to have recurrent right frontal intraparenchymal hemorrhage. Neurosurgery recommended medical management. Serial CTs showed stability of bleed. Transferred to our facility for continued rehabilitation.   Majority of patient's information obtained from medical records as patient has receptive and expressive aphasia and is able to answer only with yes / no.        Overview/Hospital Course:  Patient admitted for rehabilitation with speech therapy, occupational therapy and physical therapy after intracranial hemorrhage. Patient's wife brought  from home for spinal stimulator.  Patient eating with supervision with his left hand.  Somnolence continues to be a problem for the patient, can consider a stimulant however it would be prudent to do this after repeating his MRI on or about April 5, 2023.      Interval History:  Patient is ambulating 20 feet with quad cane. Contact guard assistance for ADLs.  He requires significant amount of cuing for meals but is able to eat on his own and is consuming approximately 50% of his meals.  Review of Systems   Reason unable to perform ROS: Expressive aphasia.   Objective:     Vital Signs (Most Recent):  Temp: 98.5 °F (36.9 °C) (02/22/23 1035)  Pulse: (!) 57 (02/22/23 1035)  Resp: 18 (02/22/23 1035)  BP: 111/70 (02/22/23 1035)  SpO2: 99 % (02/22/23 1035) Vital Signs (24h Range):  Temp:  [97.5 °F (36.4 °C)-98.5 °F (36.9 °C)] 98.5  °F (36.9 °C)  Pulse:  [55-64] 57  Resp:  [18-20] 18  SpO2:  [97 %-99 %] 99 %  BP: ()/(49-70) 111/70     Weight: 70.7 kg (155 lb 13.8 oz)  Body mass index is 21.74 kg/m².    Intake/Output Summary (Last 24 hours) at 2/22/2023 1102  Last data filed at 2/22/2023 0800  Gross per 24 hour   Intake 1311 ml   Output --   Net 1311 ml        Physical Exam  Constitutional:       General: He is not in acute distress.     Appearance: Normal appearance.   HENT:      Head: Normocephalic and atraumatic.      Nose: No congestion or rhinorrhea.      Mouth/Throat:      Mouth: Mucous membranes are moist.   Eyes:      Conjunctiva/sclera: Conjunctivae normal.      Pupils: Pupils are equal, round, and reactive to light.   Cardiovascular:      Rate and Rhythm: Normal rate and regular rhythm.      Heart sounds: No murmur heard.  Pulmonary:      Effort: Pulmonary effort is normal.      Breath sounds: Normal breath sounds. No wheezing.   Abdominal:      General: Bowel sounds are normal. There is no distension.      Palpations: Abdomen is soft.      Tenderness: There is no abdominal tenderness.   Musculoskeletal:         General: No swelling. Normal range of motion.      Cervical back: Normal range of motion and neck supple.      Right lower leg: No edema.      Left lower leg: No edema.   Skin:     General: Skin is warm and dry.      Findings: No rash.   Neurological:      General: No focal deficit present.      Mental Status: He is alert and oriented to person, place, and time.      Cranial Nerves: Dysarthria present.      Motor: Weakness and abnormal muscle tone present.   Psychiatric:         Mood and Affect: Mood normal.         Behavior: Behavior normal.       Significant Labs: All pertinent labs within the past 24 hours have been reviewed.    Significant Imaging: I have reviewed all pertinent imaging results/findings within the past 24 hours.      Assessment/Plan:      * Intraparenchymal hemorrhage of brain  -Continue with Keppra,  atorvastatin, blood pressure goal less than 140/90  -on 02/06/2023 recommendation was documented that patient will need an MRI brain with and without contrast in 4-6 weeks to rule out hemorrhagic infarct or mass and patient is to be followed by Dr. Figueroa in [place]  -antidepressant and/or stimulant after repeat MRI    Right spastic hemiplegia  -2/2 history of ICH and CVA  -continue PT/OT      Hypothyroidism  -continue with levothyroxine 88mcg      BPH (benign prostatic hyperplasia)  - continue with tamsulosin        VTE Risk Mitigation (From admission, onward)         Ordered     IP VTE LOW RISK PATIENT  Once         02/09/23 1931     Place sequential compression device  Until discontinued         02/09/23 1931                Discharge Planning   ABRAHAM:      Code Status: Full Code   Is the patient medically ready for discharge?:     Reason for patient still in hospital (select all that apply): Treatment and PT / OT recommendations  Discharge Plan A: Home with family, Home Health   Discharge Delays: None known at this time              Thairy G Reyes, DO  Department of Hospital Medicine   [facility]

## 2023-02-22 NOTE — PT/OT/SLP PROGRESS
Physical Therapy Treatment Note           Name: Bhupendra Park    : 1960 (63 y.o.)  MRN: [id number]           TREATMENT SUMMARY AND RECOMMENDATIONS:    PT Received On: 23  PT Start Time: 0950     PT Stop Time: 1015  PT Total Time (min): 25 min     Subjective Assessment:   No complaints  Lethargic    Awake, alert, cooperative x Uncooperative    Agitated  c/o pain    Appropriate  c/o fatigue    Confused x Treated at bedside     Emotionally labile  Treated in gym/dept.    Impulsive  Other:    Flat affect       Therapy Precautions:    Cognitive deficits  Spinal precautions    Collar - hard  Sternal precautions    Collar - soft   TLSO   x Fall risk  LSO    Hip precautions - posterior  Knee immobilizer    Hip precautions - anterior  WBAT    Impaired communication  Partial weightbearing    Oxygen  TTWB    PEG tube  NWB    Visual deficits  Other:    Hearing deficits          Treatment Objectives:     Mobility Training:   Assist level Comments    Bed mobility     Transfer     Gait MIN A X50ft using LBQC and MIN A for stability; pt able to advance RLE on his own, however with short strides and narrow CAPO   Sit to stand transitions CGA From EOB and wc surfaces    Sitting balance     Standing balance      Wheelchair mobility     Car transfer     Other:          Therapeutic Exercise:   Exercise Sets Reps Comments                               Additional Comments:      Assessment: Patient tolerated session fair. Pt just finishing working with OT and required encouragement to work with PT. Wife present to provide encouragement.     PT Plan: Continue POC  Revisions made to plan of care: No    GOALS:   Multidisciplinary Problems       Physical Therapy Goals          Problem: Physical Therapy    Goal Priority Disciplines Outcome Goal Variances Interventions   Physical Therapy Goal     PT, PT/OT Ongoing, Progressing     Description: Goals to be met by: Discharge     Patient will increase functional independence  with mobility by performin. Supine to sit with Modified [place]  2. Sit to supine with Modified [place]  3. Sit to stand transfer with Supervision  4. Gait  x 150 feet with Contact Guard Assistance using Quad Cane vs LRAD.                          Skilled PT Minutes Provided: 25 minutes   Communication with Treatment Team:     Equipment recommendations:       At end of treatment, patient remained:   Up in chair     Up in wheelchair in room    In bed    With alarm activated    Bed rails up    Call bell in reach     Family/friends present    Restraints secured properly    In bathroom with CNA/RN notified    Nurse aware    In gym with therapist/tech   x Other: left with ST

## 2023-02-22 NOTE — PATIENT CARE CONFERENCE
Name: Bhupendra Park    : 1960 (63 y.o.)  MRN: [id number]            Interdisciplinary Team Conference     Case conference held with patient/family and care team to discuss progress, plan of care, barriers to be addressed for safe return home, equipment recommendations, and discharge planning. Communicated therapy progress with MD, RN, therapy clinicians and case management. All questions/concerns answered.

## 2023-02-22 NOTE — PT/OT/SLP PROGRESS
Occupational Therapy  Treatment    Name: Bhupendra Park    : 1960 (63 y.o.)  MRN: [id number]           TREATMENT SUMMARY AND RECOMMENDATIONS:      OT Date of Treatment: 23  OT Start Time: 940  OT Stop Time: 950  OT Total Time (min): 10 min      Subjective Assessment:   No complaints  Lethargic    Awake, alert, cooperative  Impulsive   X Uncooperative   Flat affect    Agitated  c/o pain    Appropriate  c/o fatigue    Confused  Treated at bedside     Emotionally labile  Treated in gym/dept.      Other:        Therapy Precautions:    Cognitive deficits  Spinal precautions    Collar - hard  Sternal precautions    Collar - soft   TLSO   X Fall risk  LSO    Hip precautions - posterior  Knee immobilizer    Hip precautions - anterior  WBAT   X Impaired communication  Partial weightbearing    Oxygen  TTWB    PEG tube  NWB    Visual deficits      Hearing deficits   Other:        Treatment Objectives:     Mobility Training:    Mobility task Assist level Comments    Bed mobility Min A  With increased encouragement and assistance for upper body pulling   Transfer     Sit to stands transitions CGA  From EOB    Functional mobility     Sitting balance     Standing balance      Other:        ADL Training:    ADL Assist level Comments    Feeding     Grooming/hygiene     Bathing     Upper body dressing     Lower body dressing CGA  For sitting balance to lean forward and drew bilateral socks    Toileting     Toilet transfer     Adaptive equipment training     Other:           Additional Comments:  Pt required significant encouragement to get OOB with assistance from wife. Pt agreeable to only complete ambulation with Pt following max attempt by OT     Assessment: Patient tolerated session poor.    OT Plan: Continue with current POC  Revisions made to plan of care: No    GOALS:   Multidisciplinary Problems       Occupational Therapy Goals          Problem: Occupational Therapy    Goal Priority Disciplines Outcome  Interventions   Occupational Therapy Goal     OT, PT/OT Ongoing, Progressing    Description: Goals to be met by: 3/10/23     Patient will increase functional independence with ADLs by performing:    UE Dressing with Modified [place].  LE Dressing with Modified Mobile.  Toileting from toilet with Stand-by Assistance for hygiene and clothing management.   Toilet transfer to toilet with Supervision.                         Skilled OT Minutes Provided: 10  Communication with Treatment Team:     Equipment recommendations:       At end of treatment, patient remained:   Up in chair     Up in wheelchair in room    In bed    With alarm activated    Bed rails up    Call bell in reach     Family/friends present    Restraints secured properly    In bathroom with CNA/RN notified    In gym with PT/PTA/tech    Nurse aware   X Other: hand off to PT Andrei

## 2023-02-22 NOTE — PLAN OF CARE
Problem: Adult Inpatient Plan of Care  Goal: Plan of Care Review  Outcome: Ongoing, Progressing  Flowsheets (Taken 2/22/2023 1619)  Plan of Care Reviewed With:   patient   spouse  Goal: Absence of Hospital-Acquired Illness or Injury  Outcome: Ongoing, Progressing  Intervention: Identify and Manage Fall Risk  Flowsheets (Taken 2/22/2023 1619)  Safety Promotion/Fall Prevention:   assistive device/personal item within reach   bed alarm set   nonskid shoes/socks when out of bed   instructed to call staff for mobility   room near unit station  Intervention: Prevent and Manage VTE (Venous Thromboembolism) Risk  Flowsheets (Taken 2/22/2023 1619)  Activity Management: Up in chair - L3  Range of Motion: active ROM (range of motion) encouraged  Goal: Optimal Comfort and Wellbeing  Outcome: Ongoing, Progressing  Intervention: Monitor Pain and Promote Comfort  Flowsheets (Taken 2/22/2023 1619)  Pain Management Interventions:   quiet environment facilitated   pillow support provided   relaxation techniques promoted  Intervention: Provide Person-Centered Care  Flowsheets (Taken 2/22/2023 1619)  Trust Relationship/Rapport:   care explained   questions answered   reassurance provided   empathic listening provided   thoughts/feelings acknowledged   emotional support provided     Problem: Skin Injury Risk Increased  Goal: Skin Health and Integrity  Outcome: Ongoing, Progressing  Intervention: Optimize Skin Protection  Flowsheets (Taken 2/22/2023 1619)  Pressure Reduction Techniques:   weight shift assistance provided   frequent weight shift encouraged  Pressure Reduction Devices: positioning supports utilized  Skin Protection:   adhesive use limited   tubing/devices free from skin contact   incontinence pads utilized  Head of Bed (HOB) Positioning: HOB at 20-30 degrees  Intervention: Promote and Optimize Oral Intake  Flowsheets (Taken 2/22/2023 1619)  Oral Nutrition Promotion:   calorie-dense foods provided   safe use of adaptive  equipment encouraged   physical activity promoted     Problem: Infection  Goal: Absence of Infection Signs and Symptoms  Outcome: Ongoing, Progressing  Intervention: Prevent or Manage Infection  Flowsheets (Taken 2/22/2023 1611)  Fever Reduction/Comfort Measures:   lightweight clothing   lightweight bedding  Infection Management: aseptic technique maintained

## 2023-02-23 NOTE — SUBJECTIVE & OBJECTIVE
Interval History:  Patient is ambulating 50 feet with quad cane. Contact guard assistance for ADLs.  He requires significant amount of cuing for meals but is able to eat on his own and is consuming approximately 50% of his meals.  Review of Systems   Reason unable to perform ROS: Expressive aphasia.   Objective:     Vital Signs (Most Recent):  Temp: 97.6 °F (36.4 °C) (02/23/23 1523)  Pulse: 65 (02/23/23 1523)  Resp: 18 (02/23/23 0323)  BP: (!) 97/58 (02/23/23 1523)  SpO2: 97 % (02/23/23 1132) Vital Signs (24h Range):  Temp:  [97.4 °F (36.3 °C)-98.3 °F (36.8 °C)] 97.6 °F (36.4 °C)  Pulse:  [59-65] 65  Resp:  [18] 18  SpO2:  [97 %] 97 %  BP: (90-99)/(58-62) 97/58     Weight: 70.7 kg (155 lb 13.8 oz)  Body mass index is 21.74 kg/m².    Intake/Output Summary (Last 24 hours) at 2/23/2023 1702  Last data filed at 2/23/2023 1308  Gross per 24 hour   Intake 834 ml   Output --   Net 834 ml        Physical Exam  Constitutional:       General: He is not in acute distress.     Appearance: Normal appearance.   HENT:      Head: Normocephalic and atraumatic.      Nose: No congestion or rhinorrhea.      Mouth/Throat:      Mouth: Mucous membranes are moist.   Eyes:      Conjunctiva/sclera: Conjunctivae normal.      Pupils: Pupils are equal, round, and reactive to light.   Cardiovascular:      Rate and Rhythm: Normal rate and regular rhythm.      Heart sounds: No murmur heard.  Pulmonary:      Effort: Pulmonary effort is normal.      Breath sounds: Normal breath sounds. No wheezing.   Abdominal:      General: Bowel sounds are normal. There is no distension.      Palpations: Abdomen is soft.      Tenderness: There is no abdominal tenderness.   Musculoskeletal:         General: No swelling. Normal range of motion.      Cervical back: Normal range of motion and neck supple.      Right lower leg: No edema.      Left lower leg: No edema.   Skin:     General: Skin is warm and dry.      Findings: No rash.   Neurological:      General: No  focal deficit present.      Mental Status: He is alert and oriented to person, place, and time.      Cranial Nerves: Dysarthria present.      Motor: Weakness and abnormal muscle tone present.   Psychiatric:         Mood and Affect: Mood normal.         Behavior: Behavior normal.       Significant Labs: All pertinent labs within the past 24 hours have been reviewed.    Significant Imaging: I have reviewed all pertinent imaging results/findings within the past 24 hours.

## 2023-02-23 NOTE — PROGRESS NOTES
Name: Bhupendra Park    : 1960 (63 y.o.)  MRN: [id number]           Patient Unavailable      Patient unable to be seen at this time secondary to: attempted to see pt 2x this PM, pt refusing both attempts by not responding to PT nor making eye contact. Doffed R wrist splint put on by OT - no notable redness. Will resume POC tomorrow

## 2023-02-23 NOTE — PLAN OF CARE
Problem: SLP  Goal: SLP Goal  Description: LTG:   Pt will tolerate LRD w/o overt s/s of aspiration.  Pt will improve expressive and receptive language skills in order to effectively communicate wants and needs Manish.    STG:  Pt will tolerate minced and moist w/ good oral clearance and w/o overt s/s of aspiration. Goal met  Pt will participate in trials of soft and bite sized w/ good oral clearance and w/o overt s/s of aspiration. Goal met  Pt will answer 2U YNQs w/ 80% acc Manish.  Pt will follow 1-step commands w/ 80% acc Manish.  Pt will ID objects in Fo3 w/ 80% acc Manish.  Pt will complete auto speech tasks w/ 80% acc Manish.  Pt will name common objects w/ 80% acc Manish.  Outcome: Ongoing, Progressing

## 2023-02-23 NOTE — PT/OT/SLP PROGRESS
Physical Therapy Treatment Note           Name: Bhupendra Park    : 1960 (63 y.o.)  MRN: [id number]           TREATMENT SUMMARY AND RECOMMENDATIONS:    PT Received On: 23  PT Start Time: 0930     PT Stop Time: 1000  PT Total Time (min): 30 min     Subjective Assessment:   No complaints  Lethargic   x Awake, alert, cooperative  Uncooperative    Agitated  c/o pain    Appropriate  c/o fatigue    Confused  Treated at bedside     Emotionally labile x Treated in gym/dept.    Impulsive  Other:    Flat affect       Therapy Precautions:    Cognitive deficits  Spinal precautions    Collar - hard  Sternal precautions    Collar - soft   TLSO    Fall risk  LSO    Hip precautions - posterior  Knee immobilizer    Hip precautions - anterior  WBAT    Impaired communication  Partial weightbearing    Oxygen  TTWB    PEG tube  NWB    Visual deficits  Other:    Hearing deficits          Treatment Objectives:     Mobility Training:   Assist level Comments    Bed mobility SBA Supine > sit   Transfer CGA Stand pivot transfer bed > wc towards pt L side   Gait MIN A X50ft using LBQC on LUE; pt able to advance RLE, however required assistance to achieve longer strides and wider CAPO   Sit to stand transitions     Sitting balance     Standing balance      Wheelchair mobility     Car transfer     Other:          Therapeutic Exercise:   Exercise Sets Reps Comments                               Additional Comments:      Assessment: Patient tolerated session well. Pt. Cooperative with PT this AM. He will continue to benefit from skilled PT services to regain NM control and functional independence.     PT Plan: continue POC  Revisions made to plan of care: No    GOALS:   Multidisciplinary Problems       Physical Therapy Goals          Problem: Physical Therapy    Goal Priority Disciplines Outcome Goal Variances Interventions   Physical Therapy Goal     PT, PT/OT Ongoing, Progressing     Description: Goals to be met by:  Discharge     Patient will increase functional independence with mobility by performin. Supine to sit with Modified [place]  2. Sit to supine with Modified [place]  3. Sit to stand transfer with Supervision  4. Gait  x 150 feet with Contact Guard Assistance using Quad Cane vs LRAD.                          Skilled PT Minutes Provided: 30 minutes   Communication with Treatment Team:     Equipment recommendations:       At end of treatment, patient remained:   Up in chair     Up in wheelchair in room    In bed    With alarm activated    Bed rails up    Call bell in reach     Family/friends present    Restraints secured properly    In bathroom with CNA/RN notified    Nurse aware   x In gym with therapist/tech    Other:

## 2023-02-23 NOTE — PLAN OF CARE
[facility]  Discharge Reassessment    Primary Care Provider: Kelly Meadows MD    Expected Discharge Date:     Reassessment (most recent)       Discharge Reassessment - 02/22/23 1830          Discharge Reassessment    Assessment Type Discharge Planning Reassessment     Did the patient's condition or plan change since previous assessment? No     Discharge Plan discussed with: Spouse/sig other     Name(s) and Number(s) Sharon Wife      Communicated ABRAHAM with patient/caregiver Date not available/Unable to determine     Discharge Plan A Home with family;Home Health     Discharge Plan B Long-term acute care facility (LTAC)     DME Needed Upon Discharge  wheelchair     Discharge Barriers Identified None     Why the patient remains in the hospital Requires continued medical care        Post-Acute Status    Post-Acute Authorization Placement;Home Health     Post-Acute Placement Status Patient List Provided     Home Health Status Pending medical clearance/testing     Hospital Resources/Appts/Education Provided Provided patient/caregiver with written discharge plan information     Discharge Delays None known at this time

## 2023-02-23 NOTE — ASSESSMENT & PLAN NOTE
-Continue with Keppra, atorvastatin, blood pressure goal less than 140/90  -on 02/06/2023 recommendation was documented that patient will need an MRI brain with and without contrast in 4-6 weeks to rule out hemorrhagic infarct or mass and patient is to be followed by Dr. Figueroa in [place]  -consider antidepressant and/or stimulant after repeat MRI

## 2023-02-23 NOTE — PT/OT/SLP PROGRESS
Occupational Therapy  Treatment    Name: Bhupendra Park    : 1960 (63 y.o.)  MRN: [id number]           TREATMENT SUMMARY AND RECOMMENDATIONS:      OT Date of Treatment: 23  OT Start Time: 1005  OT Stop Time: 1038  OT Total Time (min): 33 min      Subjective Assessment:   No complaints  Lethargic   X Awake, alert, cooperative  Impulsive    Uncooperative   Flat affect    Agitated  c/o pain    Appropriate  c/o fatigue    Confused  Treated at bedside     Emotionally labile X Treated in gym/dept.      Other:        Therapy Precautions:    Cognitive deficits  Spinal precautions    Collar - hard  Sternal precautions    Collar - soft   TLSO   X Fall risk  LSO    Hip precautions - posterior  Knee immobilizer    Hip precautions - anterior  WBAT   X Impaired communication  Partial weightbearing    Oxygen  TTWB    PEG tube  NWB    Visual deficits      Hearing deficits   Other:        Treatment Objectives:     Mobility Training:    Mobility task Assist level Comments    Bed mobility Supervision  Sit to supine from EOB   Transfer SBA  Towards L side from w/c to EOB with use of bed rail for stability during pivot    Sit to stands transitions SBA  From various surfaces    Functional mobility     Sitting balance     Standing balance  Min A  Dynamic functional reaching with twisting component to L and R side      Therapeutic Exercise:   Exercise Sets Reps Comments   AAROm  2 10 RUE bicep curls, scap retraction/protraction, shoulder flexion/extension                          Additional Comments:  Pt presented in better mood with increased participation     Assessment: Patient tolerated session well. During standing activity, OT noted Pt compensating for twisting with moving base of support, however resulting small base of support due to poor proprioception of RLE. Pt able to note when off balance and adjust base of support until in a more stable standing position. Increased standing tolerance and balance noted. During  MATHIEU Pt presented with increased activation in all planes    OT Plan: Continue with current POC   Revisions made to plan of care: No    GOALS:   Multidisciplinary Problems       Occupational Therapy Goals          Problem: Occupational Therapy    Goal Priority Disciplines Outcome Interventions   Occupational Therapy Goal     OT, PT/OT Ongoing, Progressing    Description: Goals to be met by: 3/10/23     Patient will increase functional independence with ADLs by performing:    UE Dressing with Modified [place].  LE Dressing with Modified [place].  Toileting from toilet with Stand-by Assistance for hygiene and clothing management.   Toilet transfer to toilet with Supervision.                         Skilled OT Minutes Provided: 33  Communication with Treatment Team:     Equipment recommendations:       At end of treatment, patient remained:   Up in chair     Up in wheelchair in room   X In bed   X With alarm activated   X Bed rails up   X Call bell in reach     Family/friends present    Restraints secured properly    In bathroom with CNA/RN notified    In gym with PT/PTA/tech    Nurse aware    Other:

## 2023-02-23 NOTE — PROGRESS NOTES
[facility] Medicine  Progress Note    Patient Name: Bhupendra Park  MRN: [id number]  Patient Class: IP- Swing   Admission Date: 2/9/2023  Length of Stay: 14 days  Attending Physician: Thairy G Reyes, DO  Primary Care Provider: Kelly Meadows MD        Subjective:     Principal Problem:Intraparenchymal hemorrhage of brain        HPI:  63-year-old male with HTN, left frontal IPH, prior cervical spine surgery status post spinal cord stimulator who presented to ED with acute encephalopathy and was subsequently found to have recurrent right frontal intraparenchymal hemorrhage. Neurosurgery recommended medical management. Serial CTs showed stability of bleed. Transferred to our facility for continued rehabilitation.   Majority of patient's information obtained from medical records as patient has receptive and expressive aphasia and is able to answer only with yes / no.        Overview/Hospital Course:  Patient admitted for rehabilitation with speech therapy, occupational therapy and physical therapy after intracranial hemorrhage. Patient's wife brought  from home for spinal stimulator.  Patient eating with supervision with his left hand.  Somnolence improving.      Interval History:  Patient is ambulating 50 feet with quad cane. Contact guard assistance for ADLs.  He requires significant amount of cuing for meals but is able to eat on his own and is consuming approximately 50% of his meals.  Review of Systems   Reason unable to perform ROS: Expressive aphasia.   Objective:     Vital Signs (Most Recent):  Temp: 97.6 °F (36.4 °C) (02/23/23 1523)  Pulse: 65 (02/23/23 1523)  Resp: 18 (02/23/23 0323)  BP: (!) 97/58 (02/23/23 1523)  SpO2: 97 % (02/23/23 1132) Vital Signs (24h Range):  Temp:  [97.4 °F (36.3 °C)-98.3 °F (36.8 °C)] 97.6 °F (36.4 °C)  Pulse:  [59-65] 65  Resp:  [18] 18  SpO2:  [97 %] 97 %  BP: (90-99)/(58-62) 97/58     Weight: 70.7 kg (155 lb 13.8 oz)  Body mass index  is 21.74 kg/m².    Intake/Output Summary (Last 24 hours) at 2/23/2023 1702  Last data filed at 2/23/2023 1308  Gross per 24 hour   Intake 834 ml   Output --   Net 834 ml        Physical Exam  Constitutional:       General: He is not in acute distress.     Appearance: Normal appearance.   HENT:      Head: Normocephalic and atraumatic.      Nose: No congestion or rhinorrhea.      Mouth/Throat:      Mouth: Mucous membranes are moist.   Eyes:      Conjunctiva/sclera: Conjunctivae normal.      Pupils: Pupils are equal, round, and reactive to light.   Cardiovascular:      Rate and Rhythm: Normal rate and regular rhythm.      Heart sounds: No murmur heard.  Pulmonary:      Effort: Pulmonary effort is normal.      Breath sounds: Normal breath sounds. No wheezing.   Abdominal:      General: Bowel sounds are normal. There is no distension.      Palpations: Abdomen is soft.      Tenderness: There is no abdominal tenderness.   Musculoskeletal:         General: No swelling. Normal range of motion.      Cervical back: Normal range of motion and neck supple.      Right lower leg: No edema.      Left lower leg: No edema.   Skin:     General: Skin is warm and dry.      Findings: No rash.   Neurological:      General: No focal deficit present.      Mental Status: He is alert and oriented to person, place, and time.      Cranial Nerves: Dysarthria present.      Motor: Weakness and abnormal muscle tone present.   Psychiatric:         Mood and Affect: Mood normal.         Behavior: Behavior normal.       Significant Labs: All pertinent labs within the past 24 hours have been reviewed.    Significant Imaging: I have reviewed all pertinent imaging results/findings within the past 24 hours.      Assessment/Plan:      * Intraparenchymal hemorrhage of brain  -Continue with Keppra, atorvastatin, blood pressure goal less than 140/90  -on 02/06/2023 recommendation was documented that patient will need an MRI brain with and without contrast in  4-6 weeks to rule out hemorrhagic infarct or mass and patient is to be followed by Dr. Figueroa in [place]  -consider antidepressant and/or stimulant after repeat MRI    Right spastic hemiplegia  -2/2 history of ICH and CVA  -continue PT/OT      Hypothyroidism  -continue with levothyroxine 88mcg      BPH (benign prostatic hyperplasia)  - continue with tamsulosin        VTE Risk Mitigation (From admission, onward)         Ordered     IP VTE LOW RISK PATIENT  Once         02/09/23 1931     Place sequential compression device  Until discontinued         02/09/23 1931                Discharge Planning   ABRAHAM:      Code Status: Full Code   Is the patient medically ready for discharge?:     Reason for patient still in hospital (select all that apply): Treatment and PT / OT recommendations  Discharge Plan A: Home with family, Home Health   Discharge Delays: None known at this time              Thairy G Reyes, DO  Department of Hospital Medicine   [facility]

## 2023-02-23 NOTE — PLAN OF CARE
[facility]  Discharge Reassessment    Primary Care Provider: Kelly Meadows MD    Expected Discharge Date:     Reassessment (most recent)       Discharge Reassessment - 02/23/23 1519          Discharge Reassessment    Assessment Type Discharge Planning Reassessment     Did the patient's condition or plan change since previous assessment? No     Discharge Plan discussed with: Spouse/sig other     Name(s) and Number(s) Sharon spouse [phone]     Communicated ABRAHAM with patient/caregiver Date not available/Unable to determine     Discharge Plan A Home with family;Home Health     Discharge Plan B New Nursing Home placement - long-term care facility     DME Needed Upon Discharge  wheelchair;walker, rolling     Discharge Barriers Identified None     Why the patient remains in the hospital Requires continued medical care        Post-Acute Status    Post-Acute Authorization Home Health;Placement     Post-Acute Placement Status Referrals Sent     Home Health Status Referrals Sent     Hospital Resources/Appts/Education Provided Provided patient/caregiver with written discharge plan information     Patient choice form signed by patient/caregiver List with quality metrics by geographic area provided     Discharge Delays None known at this time

## 2023-02-23 NOTE — PT/OT/SLP PROGRESS
Speech Language Pathology Treatment    Patient Name:  Bhupendra Park   MRN:  [id number]  Admitting Diagnosis: Intraparenchymal hemorrhage of brain    Recommendations:                 General Recommendations:  Dysphagia therapy and Speech/language therapy  Diet recommendations:  Soft & Bite Sized Diet - IDDSI Level 6, Liquid Diet Level: Thin liquids - IDDSI Level 0   Aspiration Precautions: 1 bite/sip at a time, Alternating bites/sips, Assistance with meals, Check for pocketing/oral residue, HOB to 90 degrees, Meds crushed in puree, Small bites/sips, and Standard aspiration precautions   General Precautions: Standard, aphasia, aspiration  Communication strategies:  eye glasses, yes/no questions only, provide increased time to answer, and go to room if call light pushed    Subjective     Pt seen bedside. Pt alert and pleasant.     Patient goals: pt unable to state at this time    Pain/Comfort:       Respiratory Status: Room air    Objective:     Has the patient been evaluated by SLP for swallowing?      Keep patient NPO?     Current Respiratory Status:        Pt consumed soft and bite sized trial tray for breakfast. Pt ate 100% of scrambled eggs and a couple bites of breakfast sausage. Occasional verbal and visual cueing for small bites during intake. Lingual sweep and liquid wash utilized to clear mild oral residue. Pt consumed single sips of thin liquid via cup. No overt s/s of aspiration noted across meal.    Diet order upgraded to soft and bite sized and communication board in room updated.    Cont SLP POC.    Assessment:     Bhupendra Park is a 63 y.o. male with an SLP diagnosis of Aphasia and Dysphagia.  He presents with expressive and receptive aphasia. Diet modification warranted at this time to ensure safety w/ PO.    Goals:   Multidisciplinary Problems       SLP Goals          Problem: SLP    Goal Priority Disciplines Outcome   SLP Goal     SLP Ongoing, Progressing   Description: LTG:   Pt will tolerate  LRD w/o overt s/s of aspiration.  Pt will improve expressive and receptive language skills in order to effectively communicate wants and needs Manish.    STG:  Pt will tolerate minced and moist w/ good oral clearance and w/o overt s/s of aspiration. Goal met  Pt will participate in trials of soft and bite sized w/ good oral clearance and w/o overt s/s of aspiration. Goal met  Pt will answer 2U YNQs w/ 80% acc Manish.  Pt will follow 1-step commands w/ 80% acc Manish.  Pt will ID objects in Fo3 w/ 80% acc Manish.  Pt will complete auto speech tasks w/ 80% acc Manish.  Pt will name common objects w/ 80% acc Manish.                       Plan:     Patient to be seen:   (PRN)   Plan of Care expires:  03/03/23  Plan of Care reviewed with:  patient   SLP Follow-Up:  Yes       Discharge recommendations:      Barriers to Discharge:  Level of Skilled Assistance Needed see PT/OT notes    Time Tracking:     SLP Treatment Date:  2/23/23  Speech Start Time:  8:55  Speech Stop Time:  9:30    Speech Total Time (min):  35 min    Billable Minutes: Treatment Swallowing Dysfunction 35 minutes    Adamaris Carrion M.S., CCC-SLP   02/23/2023

## 2023-02-23 NOTE — PLAN OF CARE
Problem: Adult Inpatient Plan of Care  Goal: Plan of Care Review  Outcome: Ongoing, Progressing  Flowsheets (Taken 2/22/2023 1900)  Plan of Care Reviewed With:   patient   spouse  Goal: Absence of Hospital-Acquired Illness or Injury  Outcome: Ongoing, Progressing  Intervention: Identify and Manage Fall Risk  Flowsheets (Taken 2/22/2023 1900)  Safety Promotion/Fall Prevention:   assistive device/personal item within reach   bed alarm set   nonskid shoes/socks when out of bed   instructed to call staff for mobility   room near unit station  Intervention: Prevent and Manage VTE (Venous Thromboembolism) Risk  Flowsheets (Taken 2/22/2023 1900)  Activity Management: Up in chair - L3  Range of Motion: active ROM (range of motion) encouraged  Goal: Optimal Comfort and Wellbeing  Outcome: Ongoing, Progressing  Intervention: Monitor Pain and Promote Comfort  Flowsheets (Taken 2/22/2023 1900)  Pain Management Interventions:   quiet environment facilitated   pillow support provided   relaxation techniques promoted  Intervention: Provide Person-Centered Care  Flowsheets (Taken 2/22/2023 1900)  Trust Relationship/Rapport:   care explained   questions answered   reassurance provided   empathic listening provided   thoughts/feelings acknowledged   emotional support provided     Problem: Skin Injury Risk Increased  Goal: Skin Health and Integrity  Outcome: Ongoing, Progressing  Intervention: Optimize Skin Protection  Flowsheets (Taken 2/22/2023 1900)  Pressure Reduction Techniques:   weight shift assistance provided   frequent weight shift encouraged  Pressure Reduction Devices: positioning supports utilized  Skin Protection:   adhesive use limited   tubing/devices free from skin contact   incontinence pads utilized  Head of Bed (HOB) Positioning: HOB at 20-30 degrees  Intervention: Promote and Optimize Oral Intake  Flowsheets (Taken 2/22/2023 1900)  Oral Nutrition Promotion:   calorie-dense foods provided   safe use of adaptive  equipment encouraged   physical activity promoted     Problem: Infection  Goal: Absence of Infection Signs and Symptoms  Outcome: Ongoing, Progressing  Intervention: Prevent or Manage Infection  Flowsheets (Taken 2/22/2023 1900)  Fever Reduction/Comfort Measures:   lightweight clothing   lightweight bedding  Infection Management: aseptic technique maintained

## 2023-02-23 NOTE — PROGRESS NOTES
Name: Bhupendra Park    : 1960 (63 y.o.)  MRN: [id number]           Patient Unavailable      Patient unable to be seen at this time secondary to: Pt refused PM session d/t fatigue despite encouragement.

## 2023-02-23 NOTE — PLAN OF CARE
Problem: Adult Inpatient Plan of Care  Goal: Plan of Care Review  Outcome: Ongoing, Progressing  Goal: Patient-Specific Goal (Individualized)  Outcome: Ongoing, Progressing  Goal: Absence of Hospital-Acquired Illness or Injury  Outcome: Ongoing, Progressing  Intervention: Identify and Manage Fall Risk  Flowsheets (Taken 2/23/2023 0912)  Safety Promotion/Fall Prevention:   assistive device/personal item within reach   bed alarm set   nonskid shoes/socks when out of bed   medications reviewed   lighting adjusted   side rails raised x 3  Intervention: Prevent Skin Injury  Flowsheets (Taken 2/23/2023 0912)  Body Position: position changed independently  Skin Protection:   incontinence pads utilized   tubing/devices free from skin contact   protective footwear used  Intervention: Prevent and Manage VTE (Venous Thromboembolism) Risk  Flowsheets (Taken 2/23/2023 0912)  Activity Management: Up in chair - L3  VTE Prevention/Management:   remove, assess skin, and reapply compression stockings   fluids promoted  Range of Motion: active ROM (range of motion) encouraged  Intervention: Prevent Infection  Flowsheets (Taken 2/23/2023 0912)  Infection Prevention:   cohorting utilized   environmental surveillance performed   hand hygiene promoted   equipment surfaces disinfected   personal protective equipment utilized   rest/sleep promoted  Goal: Optimal Comfort and Wellbeing  Outcome: Ongoing, Progressing  Intervention: Monitor Pain and Promote Comfort  Flowsheets (Taken 2/23/2023 0912)  Pain Management Interventions: quiet environment facilitated  Intervention: Provide Person-Centered Care  Flowsheets (Taken 2/23/2023 0912)  Trust Relationship/Rapport:   care explained   choices provided   questions answered   questions encouraged   reassurance provided   thoughts/feelings acknowledged     Problem: Skin Injury Risk Increased  Goal: Skin Health and Integrity  Outcome: Ongoing, Progressing  Intervention: Optimize Skin  Protection  Flowsheets (Taken 2/23/2023 0912)  Pressure Reduction Techniques:   frequent weight shift encouraged   heels elevated off bed   pressure points protected   weight shift assistance provided  Pressure Reduction Devices: positioning supports utilized  Skin Protection:   incontinence pads utilized   tubing/devices free from skin contact   protective footwear used  Head of Bed (HOB) Positioning: HOB at 30-45 degrees  Intervention: Promote and Optimize Oral Intake  Flowsheets (Taken 2/23/2023 0912)  Oral Nutrition Promotion:   calorie-dense foods provided   safe use of adaptive equipment encouraged     Problem: Fall Injury Risk  Goal: Absence of Fall and Fall-Related Injury  Outcome: Ongoing, Progressing  Intervention: Identify and Manage Contributors  Flowsheets (Taken 2/23/2023 0912)  Self-Care Promotion:   independence encouraged   BADL personal objects within reach   BADL personal routines maintained   meal set-up provided   safe use of adaptive equipment encouraged  Medication Review/Management: medications reviewed  Intervention: Promote Injury-Free Environment  Flowsheets (Taken 2/23/2023 0912)  Safety Promotion/Fall Prevention:   assistive device/personal item within reach   bed alarm set   nonskid shoes/socks when out of bed   medications reviewed   lighting adjusted   side rails raised x 3

## 2023-02-24 NOTE — PT/OT/SLP PROGRESS
Occupational Therapy  Treatment    Name: Bhupendra Park    : 1960 (63 y.o.)  MRN: [id number]           TREATMENT SUMMARY AND RECOMMENDATIONS:      OT Date of Treatment: 23  OT Start Time:   OT Stop Time: 1535  OT Total Time (min): 40 min      Subjective Assessment:   No complaints  Lethargic   x Awake, alert, cooperative  Impulsive    Uncooperative   Flat affect    Agitated  c/o pain    Appropriate  c/o fatigue    Confused  Treated at bedside     Emotionally labile x Treated in gym/dept.      Other:        Therapy Precautions:   x Cognitive deficits  Spinal precautions    Collar - hard  Sternal precautions    Collar - soft   TLSO   x Fall risk  LSO    Hip precautions - posterior  Knee immobilizer    Hip precautions - anterior  WBAT   x Impaired communication  Partial weightbearing    Oxygen  TTWB    PEG tube  NWB    Visual deficits      Hearing deficits   Other:        Treatment Objectives:     Mobility Training:    Mobility task Assist level Comments    Bed mobility     Transfer CGA Stand/pivot t/f w/c>BSchair   Sit to stands transitions     Functional mobility     Sitting balance     Standing balance  Min-mod A Pt stood with no UE support and performed functional reaching laterally and across midline to grasp rings and place onto pole. Minimal R sided lateral lean noted when became distracted but able to self correct.    Pt then participated in balloon volleyball to hit back and forth with LUE; required min-mod A for balance d/t R lateral lean and R knee buckling. OT blocked knee and noted better control in standing.    Other:          Therapeutic Exercise:   Exercise Sets Reps Comments   RUE PROM   Gentle passive stretching to R shoulder flex/ext, elbow flex/ext, sup/pro, wrist flex/ext, and digits to decrease muscle tone and tightness   Sit to stands 1 5 reps CGA from w/c level    Core strengthening/trunk control 2 15 While seated in w/c, pt reached laterally to floor level to grasp rings from  across midline and then place onto L side. Performed from both ways in preparation for LB dressing.              Additional Comments:  Pt awake and alert this session and wanting to participate. Great session and great effort!    Assessment: Patient tolerated session well.    OT Plan: Continue POC  Revisions made to plan of care: No    GOALS:   Multidisciplinary Problems       Occupational Therapy Goals          Problem: Occupational Therapy    Goal Priority Disciplines Outcome Interventions   Occupational Therapy Goal     OT, PT/OT Ongoing, Progressing    Description: Goals to be met by: 3/10/23     Patient will increase functional independence with ADLs by performing:    UE Dressing with Modified [place].  LE Dressing with Modified [place].  Toileting from toilet with Stand-by Assistance for hygiene and clothing management.   Toilet transfer to toilet with Supervision.                         Skilled OT Minutes Provided: 40  Communication with Treatment Team:     Equipment recommendations:       At end of treatment, patient remained:  x Up in chair     Up in wheelchair in room    In bed   x With alarm activated    Bed rails up   x Call bell in reach     Family/friends present   x Restraints secured properly    In bathroom with CNA/RN notified    In gym with PT/PTA/tech    Nurse aware    Other:

## 2023-02-24 NOTE — PLAN OF CARE
Problem: Adult Inpatient Plan of Care  Goal: Plan of Care Review  Outcome: Ongoing, Progressing  Goal: Patient-Specific Goal (Individualized)  Outcome: Ongoing, Progressing  Goal: Absence of Hospital-Acquired Illness or Injury  Outcome: Ongoing, Progressing  Intervention: Identify and Manage Fall Risk  Flowsheets (Taken 2/23/2023 1845)  Safety Promotion/Fall Prevention:   assistive device/personal item within reach   bed alarm set   nonskid shoes/socks when out of bed   medications reviewed   lighting adjusted   side rails raised x 3  Intervention: Prevent Skin Injury  Flowsheets (Taken 2/23/2023 1845)  Body Position: position changed independently  Skin Protection:   incontinence pads utilized   tubing/devices free from skin contact   protective footwear used  Intervention: Prevent and Manage VTE (Venous Thromboembolism) Risk  Flowsheets (Taken 2/23/2023 1845)  Activity Management: Up in chair - L3  VTE Prevention/Management:   remove, assess skin, and reapply compression stockings   fluids promoted  Range of Motion: active ROM (range of motion) encouraged  Intervention: Prevent Infection  Flowsheets (Taken 2/23/2023 1845)  Infection Prevention:   cohorting utilized   environmental surveillance performed   hand hygiene promoted   equipment surfaces disinfected   personal protective equipment utilized   rest/sleep promoted  Goal: Optimal Comfort and Wellbeing  Outcome: Ongoing, Progressing  Intervention: Monitor Pain and Promote Comfort  Flowsheets (Taken 2/23/2023 1845)  Pain Management Interventions: quiet environment facilitated  Intervention: Provide Person-Centered Care  Flowsheets (Taken 2/23/2023 1845)  Trust Relationship/Rapport:   care explained   choices provided   questions answered   questions encouraged   reassurance provided   thoughts/feelings acknowledged     Problem: Skin Injury Risk Increased  Goal: Skin Health and Integrity  Outcome: Ongoing, Progressing  Intervention: Optimize Skin  Protection  Flowsheets (Taken 2/23/2023 1845)  Pressure Reduction Techniques:   frequent weight shift encouraged   heels elevated off bed   pressure points protected   weight shift assistance provided  Pressure Reduction Devices: positioning supports utilized  Skin Protection:   incontinence pads utilized   tubing/devices free from skin contact   protective footwear used  Head of Bed (HOB) Positioning: HOB at 30-45 degrees  Intervention: Promote and Optimize Oral Intake  Flowsheets (Taken 2/23/2023 1845)  Oral Nutrition Promotion:   calorie-dense foods provided   safe use of adaptive equipment encouraged     Problem: Fall Injury Risk  Goal: Absence of Fall and Fall-Related Injury  Outcome: Ongoing, Progressing  Intervention: Identify and Manage Contributors  Flowsheets (Taken 2/23/2023 1845)  Self-Care Promotion:   independence encouraged   BADL personal objects within reach   BADL personal routines maintained   meal set-up provided   safe use of adaptive equipment encouraged  Medication Review/Management: medications reviewed  Intervention: Promote Injury-Free Environment  Flowsheets (Taken 2/23/2023 1845)  Safety Promotion/Fall Prevention:   assistive device/personal item within reach   bed alarm set   nonskid shoes/socks when out of bed   medications reviewed   lighting adjusted   side rails raised x 3

## 2023-02-24 NOTE — PT/OT/SLP PROGRESS
Physical Therapy Treatment Note           Name: Bhupendra Park    : 1960 (63 y.o.)  MRN: [id number]           TREATMENT SUMMARY AND RECOMMENDATIONS:    PT Received On: 23  PT Start Time: 1130     PT Stop Time: 1150  PT Total Time (min): 20 min     Subjective Assessment:   No complaints  Lethargic   x Awake, alert, cooperative  Uncooperative    Agitated  c/o pain    Appropriate  c/o fatigue    Confused  Treated at bedside     Emotionally labile x Treated in gym/dept.    Impulsive  Other:    Flat affect       Therapy Precautions:    Cognitive deficits  Spinal precautions    Collar - hard  Sternal precautions    Collar - soft   TLSO    Fall risk  LSO    Hip precautions - posterior  Knee immobilizer    Hip precautions - anterior  WBAT    Impaired communication  Partial weightbearing    Oxygen  TTWB    PEG tube  NWB    Visual deficits  Other:    Hearing deficits          Treatment Objectives:     Mobility Training:   Assist level Comments    Bed mobility SBA Supine > sit   Transfer SBA-CGA Stand pivot transfer bed > wc towards pt L side   Gait CGA-MIN A X60ft using LBQC on LUE  Patient with increased [place] and coordination with steps today and required less assistance advancing RLE; MIN A just for foot placement.   Sit to stand transitions     Sitting balance     Standing balance      Wheelchair mobility     Car transfer     Other:          Therapeutic Exercise:   Exercise Sets Reps Comments                               Additional Comments:      Assessment: Patient tolerated session well. Patient continues to improve each day in therapy and is progressing well towards set goals. Increased NM control noted in RLE during gait today. Patient will continue to benefit from skilled PT services to increase functional independence for safe return home.     PT Plan: continue POC  Revisions made to plan of care: No    GOALS:   Multidisciplinary Problems       Physical Therapy Goals          Problem:  Physical Therapy    Goal Priority Disciplines Outcome Goal Variances Interventions   Physical Therapy Goal     PT, PT/OT Ongoing, Progressing     Description: Goals to be met by: Discharge     Patient will increase functional independence with mobility by performin. Supine to sit with Modified [place]  2. Sit to supine with Modified [place]  3. Sit to stand transfer with Supervision  4. Gait  x 150 feet with Contact Guard Assistance using Quad Cane vs LRAD.                          Skilled PT Minutes Provided: 20 minutes   Communication with Treatment Team:     Equipment recommendations:       At end of treatment, patient remained:   Up in chair     Up in wheelchair in room    In bed    With alarm activated    Bed rails up    Call bell in reach     Family/friends present    Restraints secured properly    In bathroom with CNA/RN notified    Nurse aware   x In gym with therapist/tech    Other:

## 2023-02-24 NOTE — PT/OT/SLP PROGRESS
"Speech Language Pathology Treatment    Patient Name:  Bhupendra Park   MRN:  [id number]  Admitting Diagnosis: Intraparenchymal hemorrhage of brain    Recommendations:                 General Recommendations:  Dysphagia therapy and Speech/language therapy  Diet recommendations:  Soft & Bite Sized Diet - IDDSI Level 6, Liquid Diet Level: Thin liquids - IDDSI Level 0   Aspiration Precautions: 1 bite/sip at a time, Alternating bites/sips, Assistance with meals, Check for pocketing/oral residue, HOB to 90 degrees, Meds crushed in puree, Small bites/sips, and Standard aspiration precautions   General Precautions: Standard, aphasia, aspiration  Communication strategies:  eye glasses, yes/no questions only, provide increased time to answer, and go to room if call light pushed    Subjective     Pt seen bedside. Pt alert and pleasant, though verbal cueing required occasionally during session to keep eyes open and participate in language tasks.    Patient goals: pt unable to state at this time    Pain/Comfort:       Respiratory Status: Room air    Objective:     Has the patient been evaluated by SLP for swallowing?      Keep patient NPO?     Current Respiratory Status:        SLP introduced a low tech communication board to pt. Pt unable to ID pictures given verbal cue in order for SLP to assess comprehension and effectiveness. Towards end of session, pt touching head as if to have a headache. SLP assisted pt w/ pointing to "medicine" on board to convey the need for medication. Pt able to demonstrate usage of board at this time Manish.  Object naming completed w/ 50% acc I'ly, increasing to 80% acc semantic and phonemic cueing.  1-step command following completed w/ 25% acc I'ly, unable to increase acc despite maxA provided.    Cont SLP POC.    Assessment:     Bhupendra Park is a 63 y.o. male with an SLP diagnosis of Aphasia and Dysphagia.  He presents with expressive and receptive aphasia. Diet modification warranted at " this time to ensure safety w/ PO.    Goals:   Multidisciplinary Problems       SLP Goals          Problem: SLP    Goal Priority Disciplines Outcome   SLP Goal     SLP Ongoing, Progressing   Description: LTG:   Pt will tolerate LRD w/o overt s/s of aspiration.  Pt will improve expressive and receptive language skills in order to effectively communicate wants and needs Manish.    STG:  Pt will tolerate minced and moist w/ good oral clearance and w/o overt s/s of aspiration. Goal met  Pt will participate in trials of soft and bite sized w/ good oral clearance and w/o overt s/s of aspiration. Goal met  Pt will answer 2U YNQs w/ 80% acc Manish.  Pt will follow 1-step commands w/ 80% acc Manish.  Pt will ID objects in Fo3 w/ 80% acc Manish.  Pt will complete auto speech tasks w/ 80% acc Manish.  Pt will name common objects w/ 80% acc Manish.                       Plan:     Patient to be seen:   (PRN)   Plan of Care expires:  03/03/23  Plan of Care reviewed with:  patient   SLP Follow-Up:  Yes       Discharge recommendations:      Barriers to Discharge:  Level of Skilled Assistance Needed see PT/OT notes    Time Tracking:     SLP Treatment Date:  2/23/23  Speech Start Time:  9:35  Speech Stop Time:  10:08    Speech Total Time (min): 33 min    Billable Minutes: Speech Therapy Individual 33 min language    Adamaris Carrion M.S., CCC-SLP   02/24/2023

## 2023-02-24 NOTE — PT/OT/SLP PROGRESS
Physical Therapy Treatment Note           Name: Bhupendra Park    : 1960 (63 y.o.)  MRN: [id number]           TREATMENT SUMMARY AND RECOMMENDATIONS:    PT Received On: 23  PT Start Time: 1430     PT Stop Time: 1455  PT Total Time (min): 25 min     Subjective Assessment:  x No complaints  Lethargic    Awake, alert, cooperative  Uncooperative    Agitated  c/o pain    Appropriate  c/o fatigue    Confused  Treated at bedside     Emotionally labile x Treated in gym/dept.    Impulsive  Other:    Flat affect       Therapy Precautions:    Cognitive deficits  Spinal precautions    Collar - hard  Sternal precautions    Collar - soft   TLSO    Fall risk  LSO    Hip precautions - posterior  Knee immobilizer    Hip precautions - anterior  WBAT   x Impaired communication  Partial weightbearing    Oxygen  TTWB    PEG tube  NWB    Visual deficits  Other:    Hearing deficits          Treatment Objectives:     Mobility Training:   Assist level Comments    Bed mobility Manish Supine-sit   Transfer Manish Bed-WC towards L side   Gait CGA/Manish Amb on firm surface with hallway handrail 20 ft x 2, HHA x1 20 ft and LBQC 40 ft    Sit to stand transitions CGA Sit-stand    Sitting balance     Standing balance      Wheelchair mobility     Car transfer     Other:          Therapeutic Exercise:   Exercise Sets Reps Comments   UBE 10 min Level 2 UBE with b LE use                          Additional Comments:  Improving R LE movement and strength - pt able to advance R LE during gait     Assessment: Patient tolerated session good.    PT Plan: continue  Revisions made to plan of care: No    GOALS:   Multidisciplinary Problems       Physical Therapy Goals          Problem: Physical Therapy    Goal Priority Disciplines Outcome Goal Variances Interventions   Physical Therapy Goal     PT, PT/OT Ongoing, Progressing     Description: Goals to be met by: Discharge     Patient will increase functional independence with mobility by  performin. Supine to sit with Modified [place]  2. Sit to supine with Modified [place]  3. Sit to stand transfer with Supervision  4. Gait  x 150 feet with Contact Guard Assistance using Quad Cane vs LRAD.                          Skilled PT Minutes Provided: 25   Communication with Treatment Team:     Equipment recommendations:       At end of treatment, patient remained:   Up in chair     Up in wheelchair in room    In bed    With alarm activated    Bed rails up    Call bell in reach     Family/friends present    Restraints secured properly    In bathroom with CNA/RN notified    Nurse aware   x In gym with therapist/tech    Other:

## 2023-02-24 NOTE — PROGRESS NOTES
[facility] Medicine  Progress Note    Patient Name: Bhupendra Park  MRN: [id number]  Patient Class: IP- Swing   Admission Date: 2/9/2023  Length of Stay: 15 days  Attending Physician: Thairy G Reyes, DO  Primary Care Provider: Kelly Meadows MD        Subjective:     Principal Problem:Intraparenchymal hemorrhage of brain        HPI:  63-year-old male with HTN, left frontal IPH, prior cervical spine surgery status post spinal cord stimulator who presented to ED with acute encephalopathy and was subsequently found to have recurrent right frontal intraparenchymal hemorrhage. Neurosurgery recommended medical management. Serial CTs showed stability of bleed. Transferred to our facility for continued rehabilitation.   Majority of patient's information obtained from medical records as patient has receptive and expressive aphasia and is able to answer only with yes / no.        Overview/Hospital Course:  Patient admitted for rehabilitation with speech therapy, occupational therapy and physical therapy after intracranial hemorrhage. Patient's wife brought  from home for spinal stimulator.  Patient eating with supervision with his left hand.  Somnolence improving.      Interval History:  Patient is ambulating 50 feet with quad cane. Contact guard assistance for ADLs.  He requires significant amount of cuing for meals but is able to eat on his own and is consuming approximately 50% of his meals. ST recently upgraded his diet.   Review of Systems   Reason unable to perform ROS: Expressive aphasia.   Objective:     Vital Signs (Most Recent):  Temp: 97.8 °F (36.6 °C) (02/24/23 1106)  Pulse: (!) 58 (02/24/23 1106)  Resp: 18 (02/24/23 0821)  BP: (!) 94/58 (02/24/23 1106)  SpO2: 99 % (02/24/23 1106) Vital Signs (24h Range):  Temp:  [97.3 °F (36.3 °C)-97.9 °F (36.6 °C)] 97.8 °F (36.6 °C)  Pulse:  [58-68] 58  Resp:  [18] 18  SpO2:  [94 %-99 %] 99 %  BP: ()/(53-74) 94/58     Weight:  70.7 kg (155 lb 13.8 oz)  Body mass index is 21.74 kg/m².    Intake/Output Summary (Last 24 hours) at 2/24/2023 1252  Last data filed at 2/24/2023 1052  Gross per 24 hour   Intake 837 ml   Output --   Net 837 ml        Physical Exam  Constitutional:       General: He is not in acute distress.     Appearance: Normal appearance.   HENT:      Head: Normocephalic and atraumatic.      Nose: No congestion or rhinorrhea.      Mouth/Throat:      Mouth: Mucous membranes are moist.   Eyes:      Conjunctiva/sclera: Conjunctivae normal.      Pupils: Pupils are equal, round, and reactive to light.   Cardiovascular:      Rate and Rhythm: Normal rate and regular rhythm.      Heart sounds: No murmur heard.  Pulmonary:      Effort: Pulmonary effort is normal.      Breath sounds: Normal breath sounds. No wheezing.   Abdominal:      General: Bowel sounds are normal. There is no distension.      Palpations: Abdomen is soft.      Tenderness: There is no abdominal tenderness.   Musculoskeletal:         General: No swelling. Normal range of motion.      Cervical back: Normal range of motion and neck supple.      Right lower leg: No edema.      Left lower leg: No edema.   Skin:     General: Skin is warm and dry.      Findings: No rash.   Neurological:      General: No focal deficit present.      Mental Status: He is alert and oriented to person, place, and time.      Cranial Nerves: Dysarthria present.      Motor: Weakness and abnormal muscle tone present.   Psychiatric:         Mood and Affect: Mood normal.         Behavior: Behavior normal.       Significant Labs: All pertinent labs within the past 24 hours have been reviewed.    Significant Imaging: I have reviewed all pertinent imaging results/findings within the past 24 hours.      Assessment/Plan:      * Intraparenchymal hemorrhage of brain  -Continue with Keppra, atorvastatin, blood pressure goal less than 140/90  -on 02/06/2023 recommendation was documented that patient will need  an MRI brain with and without contrast in 4-6 weeks to rule out hemorrhagic infarct or mass and patient is to be followed by Dr. Figueroa in [place]  -consider antidepressant and/or stimulant after repeat MRI    Right spastic hemiplegia  -2/2 history of ICH and CVA  -continue PT/OT      Hypothyroidism  -continue with levothyroxine 88mcg      BPH (benign prostatic hyperplasia)  - continue with tamsulosin        VTE Risk Mitigation (From admission, onward)         Ordered     IP VTE LOW RISK PATIENT  Once         02/09/23 1931     Place sequential compression device  Until discontinued         02/09/23 1931                Discharge Planning   ABRAHAM:      Code Status: Full Code   Is the patient medically ready for discharge?:     Reason for patient still in hospital (select all that apply): Treatment and PT / OT recommendations  Discharge Plan A: Home with family, Home Health   Discharge Delays: None known at this time              Thairy G Reyes, DO  Department of Hospital Medicine   [facility]

## 2023-02-24 NOTE — PROGRESS NOTES
"Inpatient Nutrition Evaluation    Admit Date: 2/9/2023   Total duration of encounter: 15 days    Nutrition Recommendation/Prescription     Continue soft and bite sized. 2. Continue boost plus with meals.     Nutrition Assessment     Chart Review    Reason Seen: continuous nutrition monitoring, length of stay, and follow-up    Malnutrition Screening Tool Results   Have you recently lost weight without trying?: No  Have you been eating poorly because of a decreased appetite?: No   MST Score: 0     Diagnosis:     Noted    Intraparenchymal hemorrhage of brain 2/9/2023      Right spastic hemiplegia 5/3/2022      Hypothyroidism 2/9/2023      BPH (benign prostatic hyperplasia)        Relevant Medical History:     Nutrition-Related Medications: atorvastatin, levothyroxine,     Nutrition-Related Labs:   Latest Reference Range & Units 02/23/23 03:25   Sodium 136 - 145 mmol/L 141   Potassium 3.5 - 5.1 mmol/L 4.2   Chloride 98 - 107 mmol/L 101   CO2 23 - 31 mmol/L 30   Anion Gap mEq/L 10.0   BUN 8.4 - 25.7 mg/dL 14.6   Creatinine 0.73 - 1.18 mg/dL 0.81   BUN/CREAT RATIO  18   eGFR mls/min/1.73/m2 >60   Glucose 82 - 115 mg/dL 96   Calcium 8.8 - 10.0 mg/dL 9.8         Diet Order: Diet Soft & Bite Sized  Oral Supplement Order: Boost Plus  Appetite/Oral Intake: fair/50-75% of meals  Factors Affecting Nutritional Intake: impaired cognitive status/motor control, chewing difficulty, decreased appetite, and difficulty/impaired swallowing  Food/Muslim/Cultural Preferences:  likes grits  Food Allergies: none reported    Skin Integrity: bruised (ecchymotic)  Wound(s):       Comments    Pt intake improved 75% of meal. No family present during rounds. Diet was upgraded by speech. Pt does not talk much, likes to sleep.     Anthropometrics    Height: 5' 11" (180.3 cm)    Last Weight: 70.7 kg (155 lb 13.8 oz) (02/20/23 0500) Weight Method: Bed Scale  BMI (Calculated): 21.7  BMI Classification: normal (BMI 18.5-24.9)        Ideal Body Weight " (IBW), Male: 172 lb     % Ideal Body Weight, Male (lb): 95.75 %                          Usual Weight Provided By: unable to obtain usual weight    Wt Readings from Last 3 Encounters:   02/20/23 0500 70.7 kg (155 lb 13.8 oz)   02/13/23 0500 69.9 kg (154 lb 1.6 oz)   02/09/23 1948 74.7 kg (164 lb 10.9 oz)   02/09/23 1946 74.7 kg (164 lb 10.9 oz)   04/05/19 0950 89 kg (196 lb 3.4 oz)   11/05/18 1116 89 kg (196 lb 3.4 oz)   07/18/18 0906 92.9 kg (204 lb 12.9 oz)   04/16/18 1020 93 kg (205 lb 0.4 oz)   03/23/18 0833 91.1 kg (200 lb 13.4 oz)   01/26/18 0933 88.7 kg (195 lb 8.8 oz)      Weight Change(s) Since Admission:  Admit Weight: 74.7 kg (164 lb 10.9 oz) (02/09/23 1946)      Patient Education    Not applicable.    Monitoring & Evaluation     Dietitian will monitor food and beverage intake, weight, weight change, glucose/endocrine profile, and gastrointestinal profile.  Nutrition Risk/Follow-Up: low (follow-up in 5-7 days)  Patients assigned 'low nutrition risk' status do not qualify for a full nutritional assessment but will be monitored and re-evaluated in a 5-7 day time period. Please consult if re-evaluation needed sooner.

## 2023-02-24 NOTE — SUBJECTIVE & OBJECTIVE
Interval History:  Patient is ambulating 50 feet with quad cane. Contact guard assistance for ADLs.  He requires significant amount of cuing for meals but is able to eat on his own and is consuming approximately 50% of his meals. ST recently upgraded his diet.   Review of Systems   Reason unable to perform ROS: Expressive aphasia.   Objective:     Vital Signs (Most Recent):  Temp: 97.8 °F (36.6 °C) (02/24/23 1106)  Pulse: (!) 58 (02/24/23 1106)  Resp: 18 (02/24/23 0821)  BP: (!) 94/58 (02/24/23 1106)  SpO2: 99 % (02/24/23 1106) Vital Signs (24h Range):  Temp:  [97.3 °F (36.3 °C)-97.9 °F (36.6 °C)] 97.8 °F (36.6 °C)  Pulse:  [58-68] 58  Resp:  [18] 18  SpO2:  [94 %-99 %] 99 %  BP: ()/(53-74) 94/58     Weight: 70.7 kg (155 lb 13.8 oz)  Body mass index is 21.74 kg/m².    Intake/Output Summary (Last 24 hours) at 2/24/2023 1252  Last data filed at 2/24/2023 1052  Gross per 24 hour   Intake 837 ml   Output --   Net 837 ml        Physical Exam  Constitutional:       General: He is not in acute distress.     Appearance: Normal appearance.   HENT:      Head: Normocephalic and atraumatic.      Nose: No congestion or rhinorrhea.      Mouth/Throat:      Mouth: Mucous membranes are moist.   Eyes:      Conjunctiva/sclera: Conjunctivae normal.      Pupils: Pupils are equal, round, and reactive to light.   Cardiovascular:      Rate and Rhythm: Normal rate and regular rhythm.      Heart sounds: No murmur heard.  Pulmonary:      Effort: Pulmonary effort is normal.      Breath sounds: Normal breath sounds. No wheezing.   Abdominal:      General: Bowel sounds are normal. There is no distension.      Palpations: Abdomen is soft.      Tenderness: There is no abdominal tenderness.   Musculoskeletal:         General: No swelling. Normal range of motion.      Cervical back: Normal range of motion and neck supple.      Right lower leg: No edema.      Left lower leg: No edema.   Skin:     General: Skin is warm and dry.      Findings: No  rash.   Neurological:      General: No focal deficit present.      Mental Status: He is alert and oriented to person, place, and time.      Cranial Nerves: Dysarthria present.      Motor: Weakness and abnormal muscle tone present.   Psychiatric:         Mood and Affect: Mood normal.         Behavior: Behavior normal.       Significant Labs: All pertinent labs within the past 24 hours have been reviewed.    Significant Imaging: I have reviewed all pertinent imaging results/findings within the past 24 hours.

## 2023-02-24 NOTE — PT/OT/SLP PROGRESS
Occupational Therapy  Treatment    Name: Bhupendra Park    : 1960 (63 y.o.)  MRN: [id number]           TREATMENT SUMMARY AND RECOMMENDATIONS:      OT Date of Treatment: 23  OT Start Time: 1155  OT Stop Time: 1220  OT Total Time (min): 25 min      Subjective Assessment:   No complaints  Lethargic   X Awake, alert, cooperative  Impulsive    Uncooperative   Flat affect    Agitated  c/o pain    Appropriate  c/o fatigue    Confused  Treated at bedside     Emotionally labile  Treated in gym/dept.      Other:        Therapy Precautions:   X Cognitive deficits  Spinal precautions    Collar - hard  Sternal precautions    Collar - soft   TLSO   X Fall risk  LSO    Hip precautions - posterior  Knee immobilizer    Hip precautions - anterior  WBAT   X Impaired communication  Partial weightbearing    Oxygen  TTWB    PEG tube  NWB    Visual deficits      Hearing deficits   Other:        Treatment Objectives:     Mobility Training:    Mobility task Assist level Comments    Bed mobility SBA  Sit to supine transition   Transfer SBA  From w/c to EOB    Sit to stands transitions     Functional mobility     Sitting balance  Pt sat EOB, unsupported while eating lunch    Standing balance      Other:        ADL Training:    ADL Assist level Comments    Feeding Supervision  Pt used L hand to complete self-feeding with OT providing stabilizer or blocking to get food onto utensil    Grooming/hygiene     Bathing     Upper body dressing     Lower body dressing     Toileting     Toilet transfer     Adaptive equipment training     Other:         Assessment: Patient tolerated session well.    OT Plan: Continue with current POC   Revisions made to plan of care: No    GOALS:   Multidisciplinary Problems       Occupational Therapy Goals          Problem: Occupational Therapy    Goal Priority Disciplines Outcome Interventions   Occupational Therapy Goal     OT, PT/OT Ongoing, Progressing    Description: Goals to be met by: 3/10/23      Patient will increase functional independence with ADLs by performing:    UE Dressing with Modified [place].  LE Dressing with Modified [place].  Toileting from toilet with Stand-by Assistance for hygiene and clothing management.   Toilet transfer to toilet with Supervision.                         Skilled OT Minutes Provided: 25  Communication with Treatment Team:     Equipment recommendations:       At end of treatment, patient remained:   Up in chair     Up in wheelchair in room   X In bed   X With alarm activated   X Bed rails up   X Call bell in reach     Family/friends present    Restraints secured properly    In bathroom with CNA/RN notified    In gym with PT/PTA/tech    Nurse aware    Other:

## 2023-02-24 NOTE — ASSESSMENT & PLAN NOTE
-Continue with Keppra, atorvastatin, blood pressure goal less than 140/90  -on 02/06/2023 recommendation was documented that patient will need an MRI brain with and without contrast in 4-6 weeks to rule out hemorrhagic infarct or mass and patient is to be followed by Dr. Figueroa in [place]  -consider antidepressant and/or stimulant after repeat MRI

## 2023-02-25 NOTE — PLAN OF CARE
Problem: Adult Inpatient Plan of Care  Goal: Plan of Care Review  Outcome: Ongoing, Progressing  Goal: Patient-Specific Goal (Individualized)  Outcome: Ongoing, Progressing  Goal: Absence of Hospital-Acquired Illness or Injury  Outcome: Ongoing, Progressing  Intervention: Identify and Manage Fall Risk  Flowsheets (Taken 2/24/2023 1954)  Safety Promotion/Fall Prevention:   assistive device/personal item within reach   chair alarm set   nonskid shoes/socks when out of bed   gait belt with ambulation   in recliner, wheels locked   medications reviewed   instructed to call staff for mobility   room near unit station   Roll belt self-releasing  Intervention: Prevent Skin Injury  Flowsheets (Taken 2/24/2023 1954)  Body Position:   legs elevated   weight shifting   sitting up in bed   turned  Skin Protection: adhesive use limited  Intervention: Prevent and Manage VTE (Venous Thromboembolism) Risk  Flowsheets (Taken 2/24/2023 1954)  Activity Management:   Ambulated in room - L4   Up in chair - L3   Walk with assistive devise and /or staff member - L3   Standing - L3   Rolling - L1  VTE Prevention/Management:   remove, assess skin, and reapply compression stockings   dorsiflexion/plantar flexion performed   bleeding risk assessed  Range of Motion: active ROM (range of motion) encouraged  Intervention: Prevent Infection  Flowsheets (Taken 2/24/2023 1954)  Infection Prevention:   cohorting utilized   hand hygiene promoted   single patient room provided  Goal: Optimal Comfort and Wellbeing  Outcome: Ongoing, Progressing  Intervention: Monitor Pain and Promote Comfort  Flowsheets (Taken 2/24/2023 1954)  Pain Management Interventions:   care clustered   medication offered   quiet environment facilitated   pillow support provided  Intervention: Provide Person-Centered Care  Flowsheets (Taken 2/24/2023 1954)  Trust Relationship/Rapport:   care explained   choices provided  Goal: Readiness for Transition of Care  Outcome: Ongoing,  Progressing     Problem: Skin Injury Risk Increased  Goal: Skin Health and Integrity  Outcome: Ongoing, Progressing  Intervention: Optimize Skin Protection  Flowsheets (Taken 2/24/2023 1954)  Pressure Reduction Techniques:   weight shift assistance provided   rest period provided between sit times  Pressure Reduction Devices: positioning supports utilized  Skin Protection: adhesive use limited  Head of Bed (HOB) Positioning: HOB at 30-45 degrees     Problem: Fall Injury Risk  Goal: Absence of Fall and Fall-Related Injury  Outcome: Ongoing, Progressing     Problem: Infection  Goal: Absence of Infection Signs and Symptoms  Outcome: Ongoing, Progressing

## 2023-02-25 NOTE — PT/OT/SLP PROGRESS
Physical Therapy Treatment Note           Name: Bhupendra Park    : 1960 (63 y.o.)  MRN: [id number]           TREATMENT SUMMARY AND RECOMMENDATIONS:    PT Received On: 23  PT Start Time: 1110     PT Stop Time: 1130  PT Total Time (min): 20 min     Subjective Assessment:  x No complaints  Lethargic    Awake, alert, cooperative  Uncooperative    Agitated  c/o pain    Appropriate  c/o fatigue    Confused x Treated at bedside     Emotionally labile  Treated in gym/dept.    Impulsive  Other:    Flat affect       Therapy Precautions:    Cognitive deficits  Spinal precautions    Collar - hard  Sternal precautions    Collar - soft   TLSO   x Fall risk  LSO    Hip precautions - posterior  Knee immobilizer    Hip precautions - anterior  WBAT   x Impaired communication  Partial weightbearing    Oxygen  TTWB    PEG tube  NWB    Visual deficits  Other:    Hearing deficits          Treatment Objectives:     Mobility Training:   Assist level Comments    Bed mobility     Transfer Min A Stand pivot wc to chair towards L side   Gait Min A Amb firm surface with LBQC x60 ft, min A for balance and occasional PT assist with RLE swing   Sit to stand transitions CGA Sit to stand    Sitting balance     Standing balance      Wheelchair mobility     Car transfer     Other:          Therapeutic Exercise:   Exercise Sets Reps Comments                               Additional Comments:  Improved stride length with swing through pattern on RLE, increased foot drag as pt fatigued.     Assessment: Patient tolerated session well.    PT Plan: cont POC  Revisions made to plan of care: No    GOALS:   Multidisciplinary Problems       Physical Therapy Goals          Problem: Physical Therapy    Goal Priority Disciplines Outcome Goal Variances Interventions   Physical Therapy Goal     PT, PT/OT Ongoing, Progressing     Description: Goals to be met by: Discharge     Patient will increase functional independence with mobility by  performin. Supine to sit with Modified [place]  2. Sit to supine with Modified [place]  3. Sit to stand transfer with Supervision  4. Gait  x 150 feet with Contact Guard Assistance using Quad Cane vs LRAD.                          Skilled PT Minutes Provided: 20   Communication with Treatment Team:     Equipment recommendations:       At end of treatment, patient remained:  x Up in chair     Up in wheelchair in room    In bed   x With alarm activated    Bed rails up   x Call bell in reach     Family/friends present   x Restraints secured properly    In bathroom with CNA/RN notified   x Nurse aware    In gym with therapist/tech    Other:

## 2023-02-25 NOTE — ASSESSMENT & PLAN NOTE
-Continue with Keppra, atorvastatin, blood pressure goal less than 140/90  -on 02/06/2023 recommendation was documented that patient will need an MRI brain with and without contrast in 4-6 weeks to rule out hemorrhagic infarct or mass and patient is to be followed by Dr. Figueroa in [place]  -consider antidepressant and/or stimulant after repeat MRI

## 2023-02-25 NOTE — PLAN OF CARE
Problem: Adult Inpatient Plan of Care  Goal: Plan of Care Review  Outcome: Ongoing, Progressing  Flowsheets (Taken 2/24/2023 2115)  Plan of Care Reviewed With: patient     Problem: Adult Inpatient Plan of Care  Goal: Patient-Specific Goal (Individualized)  Outcome: Ongoing, Progressing  Flowsheets (Taken 2/24/2023 2115)  Anxieties, Fears or Concerns: Can assume inability to fully communicate needs.  Individualized Care Needs:   Enhanced safety measures   comfort measures   allow adequate time to communicate needs.     Problem: Adult Inpatient Plan of Care  Goal: Absence of Hospital-Acquired Illness or Injury  Intervention: Identify and Manage Fall Risk  Flowsheets (Taken 2/24/2023 2115)  Safety Promotion/Fall Prevention:   bed alarm set   assistive device/personal item within reach   Fall Risk reviewed with patient/family   Fall Risk signage in place   high risk medications identified   nonskid shoes/socks when out of bed   lighting adjusted   medications reviewed   room near unit station     Problem: Adult Inpatient Plan of Care  Goal: Absence of Hospital-Acquired Illness or Injury  Intervention: Prevent Infection  Flowsheets (Taken 2/24/2023 2115)  Infection Prevention:   equipment surfaces disinfected   hand hygiene promoted   rest/sleep promoted     Problem: Adult Inpatient Plan of Care  Goal: Optimal Comfort and Wellbeing  Intervention: Monitor Pain and Promote Comfort  Flowsheets (Taken 2/24/2023 2115)  Pain Management Interventions:   care clustered   pain management plan reviewed with patient/caregiver   medication offered   pillow support provided   quiet environment facilitated     Problem: Adult Inpatient Plan of Care  Goal: Optimal Comfort and Wellbeing  Intervention: Provide Person-Centered Care  Flowsheets (Taken 2/24/2023 2115)  Trust Relationship/Rapport:   care explained   choices provided   emotional support provided   empathic listening provided   questions encouraged   reassurance provided    thoughts/feelings acknowledged

## 2023-02-25 NOTE — SUBJECTIVE & OBJECTIVE
Interval History:  Patient is ambulating 50 feet with quad cane. Contact guard assistance for ADLs.  He requires significant amount of cuing for meals but is able to eat on his own and is consuming approximately 50% of his meals. ST recently upgraded his diet.   Review of Systems   Reason unable to perform ROS: Expressive aphasia.   Objective:     Vital Signs (Most Recent):  Temp: 97.6 °F (36.4 °C) (02/25/23 1138)  Pulse: 63 (02/25/23 1138)  Resp: 18 (02/25/23 0943)  BP: 104/71 (02/25/23 1138)  SpO2: 99 % (02/25/23 1138) Vital Signs (24h Range):  Temp:  [97.3 °F (36.3 °C)-97.7 °F (36.5 °C)] 97.6 °F (36.4 °C)  Pulse:  [56-69] 63  Resp:  [18] 18  SpO2:  [94 %-99 %] 99 %  BP: ()/(50-71) 104/71     Weight: 70.7 kg (155 lb 13.8 oz)  Body mass index is 21.74 kg/m².    Intake/Output Summary (Last 24 hours) at 2/25/2023 1147  Last data filed at 2/25/2023 0929  Gross per 24 hour   Intake 1314 ml   Output --   Net 1314 ml        Physical Exam  Constitutional:       General: He is not in acute distress.     Appearance: Normal appearance.   HENT:      Head: Normocephalic and atraumatic.      Nose: No congestion or rhinorrhea.      Mouth/Throat:      Mouth: Mucous membranes are moist.   Eyes:      Conjunctiva/sclera: Conjunctivae normal.      Pupils: Pupils are equal, round, and reactive to light.   Cardiovascular:      Rate and Rhythm: Normal rate and regular rhythm.      Heart sounds: No murmur heard.  Pulmonary:      Effort: Pulmonary effort is normal.      Breath sounds: Normal breath sounds. No wheezing.   Abdominal:      General: Bowel sounds are normal. There is no distension.      Palpations: Abdomen is soft.      Tenderness: There is no abdominal tenderness.   Musculoskeletal:         General: No swelling. Normal range of motion.      Cervical back: Normal range of motion and neck supple.      Right lower leg: No edema.      Left lower leg: No edema.   Skin:     General: Skin is warm and dry.      Findings: No  rash.   Neurological:      General: No focal deficit present.      Mental Status: He is alert and oriented to person, place, and time.      Cranial Nerves: Dysarthria present.      Motor: Weakness and abnormal muscle tone present.   Psychiatric:         Mood and Affect: Mood normal.         Behavior: Behavior normal.       Significant Labs: All pertinent labs within the past 24 hours have been reviewed.    Significant Imaging: I have reviewed all pertinent imaging results/findings within the past 24 hours.

## 2023-02-25 NOTE — PROGRESS NOTES
[facility] Medicine  Progress Note    Patient Name: Bhupendra Park  MRN: [id number]  Patient Class: IP- Swing   Admission Date: 2/9/2023  Length of Stay: 16 days  Attending Physician: Thairy G Reyes, DO  Primary Care Provider: Kelly Meadows MD        Subjective:     Principal Problem:Intraparenchymal hemorrhage of brain        HPI:  63-year-old male with HTN, left frontal IPH, prior cervical spine surgery status post spinal cord stimulator who presented to ED with acute encephalopathy and was subsequently found to have recurrent right frontal intraparenchymal hemorrhage. Neurosurgery recommended medical management. Serial CTs showed stability of bleed. Transferred to our facility for continued rehabilitation.   Majority of patient's information obtained from medical records as patient has receptive and expressive aphasia and is able to answer only with yes / no.        Overview/Hospital Course:  Patient admitted for rehabilitation with speech therapy, occupational therapy and physical therapy after intracranial hemorrhage. Patient's wife brought  from home for spinal stimulator.  Patient eating with supervision with his left hand.  Somnolence improving.      Interval History:  Patient is ambulating 50 feet with quad cane. Contact guard assistance for ADLs.  He requires significant amount of cuing for meals but is able to eat on his own and is consuming approximately 50% of his meals. ST recently upgraded his diet.   Review of Systems   Reason unable to perform ROS: Expressive aphasia.   Objective:     Vital Signs (Most Recent):  Temp: 97.6 °F (36.4 °C) (02/25/23 1138)  Pulse: 63 (02/25/23 1138)  Resp: 18 (02/25/23 0943)  BP: 104/71 (02/25/23 1138)  SpO2: 99 % (02/25/23 1138) Vital Signs (24h Range):  Temp:  [97.3 °F (36.3 °C)-97.7 °F (36.5 °C)] 97.6 °F (36.4 °C)  Pulse:  [56-69] 63  Resp:  [18] 18  SpO2:  [94 %-99 %] 99 %  BP: ()/(50-71) 104/71     Weight: 70.7  kg (155 lb 13.8 oz)  Body mass index is 21.74 kg/m².    Intake/Output Summary (Last 24 hours) at 2/25/2023 1147  Last data filed at 2/25/2023 0929  Gross per 24 hour   Intake 1314 ml   Output --   Net 1314 ml        Physical Exam  Constitutional:       General: He is not in acute distress.     Appearance: Normal appearance.   HENT:      Head: Normocephalic and atraumatic.      Nose: No congestion or rhinorrhea.      Mouth/Throat:      Mouth: Mucous membranes are moist.   Eyes:      Conjunctiva/sclera: Conjunctivae normal.      Pupils: Pupils are equal, round, and reactive to light.   Cardiovascular:      Rate and Rhythm: Normal rate and regular rhythm.      Heart sounds: No murmur heard.  Pulmonary:      Effort: Pulmonary effort is normal.      Breath sounds: Normal breath sounds. No wheezing.   Abdominal:      General: Bowel sounds are normal. There is no distension.      Palpations: Abdomen is soft.      Tenderness: There is no abdominal tenderness.   Musculoskeletal:         General: No swelling. Normal range of motion.      Cervical back: Normal range of motion and neck supple.      Right lower leg: No edema.      Left lower leg: No edema.   Skin:     General: Skin is warm and dry.      Findings: No rash.   Neurological:      General: No focal deficit present.      Mental Status: He is alert and oriented to person, place, and time.      Cranial Nerves: Dysarthria present.      Motor: Weakness and abnormal muscle tone present.   Psychiatric:         Mood and Affect: Mood normal.         Behavior: Behavior normal.       Significant Labs: All pertinent labs within the past 24 hours have been reviewed.    Significant Imaging: I have reviewed all pertinent imaging results/findings within the past 24 hours.      Assessment/Plan:      * Intraparenchymal hemorrhage of brain  -Continue with Keppra, atorvastatin, blood pressure goal less than 140/90  -on 02/06/2023 recommendation was documented that patient will need an  MRI brain with and without contrast in 4-6 weeks to rule out hemorrhagic infarct or mass and patient is to be followed by Dr. Figueroa in [place]  -consider antidepressant and/or stimulant after repeat MRI    Right spastic hemiplegia  -2/2 history of ICH and CVA  -continue PT/OT      Hypothyroidism  -continue with levothyroxine 88mcg      BPH (benign prostatic hyperplasia)  - continue with tamsulosin        VTE Risk Mitigation (From admission, onward)         Ordered     IP VTE LOW RISK PATIENT  Once         02/09/23 1931     Place sequential compression device  Until discontinued         02/09/23 1931                Discharge Planning   ABRAHAM:      Code Status: Full Code   Is the patient medically ready for discharge?:     Reason for patient still in hospital (select all that apply): Treatment and PT / OT recommendations  Discharge Plan A: Home with family, Home Health   Discharge Delays: None known at this time              Thairy G Reyes, DO  Department of Hospital Medicine   [facility]

## 2023-02-26 NOTE — SUBJECTIVE & OBJECTIVE
Interval History:  Patient is ambulating 50 feet with quad cane. Contact guard assistance for ADLs.  He requires significant amount of cuing for meals but is able to eat on his own and is consuming approximately 50% of his meals. ST recently upgraded his diet.   Review of Systems   Reason unable to perform ROS: Expressive aphasia.   Objective:     Vital Signs (Most Recent):  Temp: 97.7 °F (36.5 °C) (02/26/23 1114)  Pulse: (!) 55 (02/26/23 1114)  Resp: 18 (02/26/23 0915)  BP: 111/63 (02/26/23 1114)  SpO2: 98 % (02/26/23 1114) Vital Signs (24h Range):  Temp:  [97.5 °F (36.4 °C)-98.2 °F (36.8 °C)] 97.7 °F (36.5 °C)  Pulse:  [55-75] 55  Resp:  [18] 18  SpO2:  [96 %-99 %] 98 %  BP: ()/(52-72) 111/63     Weight: 70.7 kg (155 lb 13.8 oz)  Body mass index is 21.74 kg/m².    Intake/Output Summary (Last 24 hours) at 2/26/2023 1134  Last data filed at 2/26/2023 0821  Gross per 24 hour   Intake 845 ml   Output --   Net 845 ml        Physical Exam  Constitutional:       General: He is not in acute distress.     Appearance: Normal appearance.   HENT:      Head: Normocephalic and atraumatic.      Nose: No congestion or rhinorrhea.      Mouth/Throat:      Mouth: Mucous membranes are moist.   Eyes:      Conjunctiva/sclera: Conjunctivae normal.      Pupils: Pupils are equal, round, and reactive to light.   Cardiovascular:      Rate and Rhythm: Normal rate and regular rhythm.      Heart sounds: No murmur heard.  Pulmonary:      Effort: Pulmonary effort is normal.      Breath sounds: Normal breath sounds. No wheezing.   Abdominal:      General: Bowel sounds are normal. There is no distension.      Palpations: Abdomen is soft.      Tenderness: There is no abdominal tenderness.   Musculoskeletal:         General: No swelling. Normal range of motion.      Cervical back: Normal range of motion and neck supple.      Right lower leg: No edema.      Left lower leg: No edema.   Skin:     General: Skin is warm and dry.      Findings: No  rash.   Neurological:      General: No focal deficit present.      Mental Status: He is alert and oriented to person, place, and time.      Cranial Nerves: Dysarthria present.      Motor: Weakness and abnormal muscle tone present.   Psychiatric:         Mood and Affect: Mood normal.         Behavior: Behavior normal.       Significant Labs: All pertinent labs within the past 24 hours have been reviewed.    Significant Imaging: I have reviewed all pertinent imaging results/findings within the past 24 hours.

## 2023-02-26 NOTE — PLAN OF CARE
[facility]  Discharge Reassessment    Primary Care Provider: Kelly Meadows MD    Expected Discharge Date:     Reassessment (most recent)       Discharge Reassessment - 02/26/23 0922          Discharge Reassessment    Assessment Type Discharge Planning Reassessment     Did the patient's condition or plan change since previous assessment? No     Discharge Plan discussed with: Spouse/sig other     Name(s) and Number(s) Sharon spouse [phone]     Communicated ABRAHAM with patient/caregiver Date not available/Unable to determine     Discharge Plan A Home Health;Home with family     Discharge Plan B New Nursing Home placement - senior care care facility     DME Needed Upon Discharge  walker, rolling     Discharge Barriers Identified None     Why the patient remains in the hospital Requires continued medical care        Post-Acute Status    Post-Acute Authorization Home Health;Placement     Post-Acute Placement Status Patient List Provided     Home Health Status Pending medical clearance/testing     Hospital Resources/Appts/Education Provided Provided patient/caregiver with written discharge plan information     Discharge Delays None known at this time

## 2023-02-26 NOTE — PLAN OF CARE
Patient provided allotted time to communicate his needs. Fall prevention precautions are in place; patient understands to utilize call bell to request help. Will need reinforcement.

## 2023-02-26 NOTE — PROGRESS NOTES
[facility] Medicine  Progress Note    Patient Name: Bhupendra Park  MRN: [id number]  Patient Class: IP- Swing   Admission Date: 2/9/2023  Length of Stay: 17 days  Attending Physician: Thairy G Reyes, DO  Primary Care Provider: Kelly Meadows MD        Subjective:     Principal Problem:Intraparenchymal hemorrhage of brain        HPI:  63-year-old male with HTN, left frontal IPH, prior cervical spine surgery status post spinal cord stimulator who presented to ED with acute encephalopathy and was subsequently found to have recurrent right frontal intraparenchymal hemorrhage. Neurosurgery recommended medical management. Serial CTs showed stability of bleed. Transferred to our facility for continued rehabilitation.   Majority of patient's information obtained from medical records as patient has receptive and expressive aphasia and is able to answer only with yes / no.        Overview/Hospital Course:  Patient admitted for rehabilitation with speech therapy, occupational therapy and physical therapy after intracranial hemorrhage. Patient's wife brought  from home for spinal stimulator.  Patient eating with supervision with his left hand.  Somnolence improving.      Interval History:  Patient is ambulating 50 feet with quad cane. Contact guard assistance for ADLs.  He requires significant amount of cuing for meals but is able to eat on his own and is consuming approximately 50% of his meals. ST recently upgraded his diet.   Review of Systems   Reason unable to perform ROS: Expressive aphasia.   Objective:     Vital Signs (Most Recent):  Temp: 97.7 °F (36.5 °C) (02/26/23 1114)  Pulse: (!) 55 (02/26/23 1114)  Resp: 18 (02/26/23 0915)  BP: 111/63 (02/26/23 1114)  SpO2: 98 % (02/26/23 1114) Vital Signs (24h Range):  Temp:  [97.5 °F (36.4 °C)-98.2 °F (36.8 °C)] 97.7 °F (36.5 °C)  Pulse:  [55-75] 55  Resp:  [18] 18  SpO2:  [96 %-99 %] 98 %  BP: ()/(52-72) 111/63     Weight:  70.7 kg (155 lb 13.8 oz)  Body mass index is 21.74 kg/m².    Intake/Output Summary (Last 24 hours) at 2/26/2023 1134  Last data filed at 2/26/2023 0821  Gross per 24 hour   Intake 845 ml   Output --   Net 845 ml        Physical Exam  Constitutional:       General: He is not in acute distress.     Appearance: Normal appearance.   HENT:      Head: Normocephalic and atraumatic.      Nose: No congestion or rhinorrhea.      Mouth/Throat:      Mouth: Mucous membranes are moist.   Eyes:      Conjunctiva/sclera: Conjunctivae normal.      Pupils: Pupils are equal, round, and reactive to light.   Cardiovascular:      Rate and Rhythm: Normal rate and regular rhythm.      Heart sounds: No murmur heard.  Pulmonary:      Effort: Pulmonary effort is normal.      Breath sounds: Normal breath sounds. No wheezing.   Abdominal:      General: Bowel sounds are normal. There is no distension.      Palpations: Abdomen is soft.      Tenderness: There is no abdominal tenderness.   Musculoskeletal:         General: No swelling. Normal range of motion.      Cervical back: Normal range of motion and neck supple.      Right lower leg: No edema.      Left lower leg: No edema.   Skin:     General: Skin is warm and dry.      Findings: No rash.   Neurological:      General: No focal deficit present.      Mental Status: He is alert and oriented to person, place, and time.      Cranial Nerves: Dysarthria present.      Motor: Weakness and abnormal muscle tone present.   Psychiatric:         Mood and Affect: Mood normal.         Behavior: Behavior normal.       Significant Labs: All pertinent labs within the past 24 hours have been reviewed.    Significant Imaging: I have reviewed all pertinent imaging results/findings within the past 24 hours.      Assessment/Plan:      * Intraparenchymal hemorrhage of brain  -Continue with Keppra, atorvastatin, blood pressure goal less than 140/90  -on 02/06/2023 recommendation was documented that patient will need  an MRI brain with and without contrast in 4-6 weeks to rule out hemorrhagic infarct or mass and patient is to be followed by Dr. Figueroa in [place]  -consider antidepressant and/or stimulant after repeat MRI    Right spastic hemiplegia  -2/2 history of ICH and CVA  -continue PT/OT      Hypothyroidism  -continue with levothyroxine 88mcg      BPH (benign prostatic hyperplasia)  - continue with tamsulosin        VTE Risk Mitigation (From admission, onward)         Ordered     IP VTE LOW RISK PATIENT  Once         02/09/23 1931     Place sequential compression device  Until discontinued         02/09/23 1931                Discharge Planning   ABRAHAM:      Code Status: Full Code   Is the patient medically ready for discharge?:     Reason for patient still in hospital (select all that apply): Treatment, PT / OT recommendations and Pending disposition  Discharge Plan A: Home Health, Home with family   Discharge Delays: None known at this time              Thairy G Reyes, DO  Department of Hospital Medicine   [facility]

## 2023-02-26 NOTE — PLAN OF CARE
Problem: Adult Inpatient Plan of Care  Goal: Plan of Care Review  Outcome: Ongoing, Progressing  Flowsheets (Taken 2/25/2023 2045)  Plan of Care Reviewed With: patient     Problem: Adult Inpatient Plan of Care  Goal: Patient-Specific Goal (Individualized)  Outcome: Ongoing, Progressing  Flowsheets (Taken 2/25/2023 2045)  Anxieties, Fears or Concerns: Inability to fully communicate own needs  Individualized Care Needs:   Enhanced safety measures   comfort measures   allow adequate time to communicate needs.     Problem: Adult Inpatient Plan of Care  Goal: Absence of Hospital-Acquired Illness or Injury  Intervention: Identify and Manage Fall Risk  Flowsheets (Taken 2/25/2023 2045)  Safety Promotion/Fall Prevention:   bed alarm set   assistive device/personal item within reach   Fall Risk reviewed with patient/family   Fall Risk signage in place   lighting adjusted   room near unit station   nonskid shoes/socks when out of bed     Problem: Adult Inpatient Plan of Care  Goal: Absence of Hospital-Acquired Illness or Injury  Intervention: Prevent Infection  Flowsheets (Taken 2/25/2023 2045)  Infection Prevention:   equipment surfaces disinfected   rest/sleep promoted     Problem: Adult Inpatient Plan of Care  Goal: Optimal Comfort and Wellbeing  Intervention: Monitor Pain and Promote Comfort  Flowsheets (Taken 2/25/2023 2045)  Pain Management Interventions:   care clustered   pain management plan reviewed with patient/caregiver   quiet environment facilitated   pillow support provided     Problem: Adult Inpatient Plan of Care  Goal: Optimal Comfort and Wellbeing  Intervention: Provide Person-Centered Care  Flowsheets (Taken 2/25/2023 2045)  Trust Relationship/Rapport:   care explained   choices provided   emotional support provided   empathic listening provided   questions encouraged   reassurance provided   thoughts/feelings acknowledged     Problem: Infection  Goal: Absence of Infection Signs and  Symptoms  Intervention: Prevent or Manage Infection  Flowsheets (Taken 2/25/2023 2045)  Fever Reduction/Comfort Measures:   lightweight bedding   lightweight clothing  Infection Management: aseptic technique maintained

## 2023-02-26 NOTE — ASSESSMENT & PLAN NOTE
-Continue with Keppra, atorvastatin, blood pressure goal less than 140/90  -on 02/06/2023 recommendation was documented that patient will need an MRI brain with and without contrast in 4-6 weeks to rule out hemorrhagic infarct or mass and patient is to be followed by Dr. Figueroa in [place]  -consider antidepressant and/or stimulant after repeat MRI

## 2023-02-27 NOTE — PROGRESS NOTES
02/26/23 1828   Post Fall Summary   Witnessed No   If fall not witnessed, who found the patient or otherwise learned of the fall?  Dietary   Patient condition for unwitnessed fall? Conscious   Assisted fall:   No   Is this a development fall? No   Do you suspect that this is an intentional fall? No   Degree of Injury None   Hit Head No   Safety Interventions at time of fall: Call [place] within reach;Instructed to call staff for mobility;Nonskid shoes/socks;Patient acknowledged to call staff for mobility;Other  (roll belt)   Treatment Plan: Patient Family Verbalizes Understanding   Verbalizes Understanding Patient;Significant Other (see comments)   Name MD/JOSE ANGEL Notified Thairy Reyes Time MD/JOSE ANGEL Notified [phone]   Admin Coordinator Notified Pelon Barlow   Charge Nurse Notified Done   Family Notified Done   Imaging Orders: Yes

## 2023-02-27 NOTE — NURSING
"Called telesitter to add patient to telesitter monitor. I was informed that the sitter had reached her maximum of patients she could safely monitor. I contacted Reji in staffing to inform him. He asked for the room number and then said "okay" and hung up.   "

## 2023-02-27 NOTE — SUBJECTIVE & OBJECTIVE
Interval History:  Patient is ambulating 50 feet with quad cane. Contact guard assistance for ADLs.  He requires significant amount of cuing for meals but is able to eat on his own and is consuming approximately 50% of his meals. ST recently upgraded his diet.   Review of Systems   Reason unable to perform ROS: Expressive aphasia.   Objective:     Vital Signs (Most Recent):  Temp: 98.2 °F (36.8 °C) (02/27/23 1112)  Pulse: 66 (02/27/23 1112)  Resp: 18 (02/27/23 1112)  BP: 99/64 (02/27/23 1112)  SpO2: 99 % (02/27/23 1112) Vital Signs (24h Range):  Temp:  [97.5 °F (36.4 °C)-98.2 °F (36.8 °C)] 98.2 °F (36.8 °C)  Pulse:  [55-66] 66  Resp:  [18-19] 18  SpO2:  [95 %-100 %] 99 %  BP: ()/(58-69) 99/64     Weight: 68.7 kg (151 lb 7.3 oz)  Body mass index is 21.12 kg/m².    Intake/Output Summary (Last 24 hours) at 2/27/2023 1401  Last data filed at 2/27/2023 1200  Gross per 24 hour   Intake 1157 ml   Output --   Net 1157 ml        Physical Exam  Constitutional:       General: He is not in acute distress.     Appearance: Normal appearance.   HENT:      Head: Normocephalic and atraumatic.      Nose: No congestion or rhinorrhea.      Mouth/Throat:      Mouth: Mucous membranes are moist.   Eyes:      Conjunctiva/sclera: Conjunctivae normal.      Pupils: Pupils are equal, round, and reactive to light.   Cardiovascular:      Rate and Rhythm: Normal rate and regular rhythm.      Heart sounds: No murmur heard.  Pulmonary:      Effort: Pulmonary effort is normal.      Breath sounds: Normal breath sounds. No wheezing.   Abdominal:      General: Bowel sounds are normal. There is no distension.      Palpations: Abdomen is soft.      Tenderness: There is no abdominal tenderness.   Musculoskeletal:         General: No swelling. Normal range of motion.      Cervical back: Normal range of motion and neck supple.      Right lower leg: No edema.      Left lower leg: No edema.   Skin:     General: Skin is warm and dry.      Findings: No  rash.   Neurological:      General: No focal deficit present.      Mental Status: He is alert and oriented to person, place, and time.      Cranial Nerves: Dysarthria present.      Motor: Weakness and abnormal muscle tone present.   Psychiatric:         Mood and Affect: Mood normal.         Behavior: Behavior normal.       Significant Labs: All pertinent labs within the past 24 hours have been reviewed.    Significant Imaging: I have reviewed all pertinent imaging results/findings within the past 24 hours.

## 2023-02-27 NOTE — PT/OT/SLP PROGRESS
Physical Therapy Treatment Note           Name: Bhupendra Park    : 1960 (63 y.o.)  MRN: [id number]           TREATMENT SUMMARY AND RECOMMENDATIONS:    PT Received On: 23  PT Start Time: 1100     PT Stop Time: 1125  PT Total Time (min): 25 min     Subjective Assessment:   No complaints  Lethargic    Awake, alert, cooperative  Uncooperative    Agitated  c/o pain    Appropriate  c/o fatigue    Confused  Treated at bedside     Emotionally labile  Treated in gym/dept.    Impulsive  Other:    Flat affect       Therapy Precautions:    Cognitive deficits  Spinal precautions    Collar - hard  Sternal precautions    Collar - soft   TLSO    Fall risk  LSO    Hip precautions - posterior  Knee immobilizer    Hip precautions - anterior  WBAT    Impaired communication  Partial weightbearing    Oxygen  TTWB    PEG tube  NWB    Visual deficits  Other:    Hearing deficits          Treatment Objectives:     Mobility Training:   Assist level Comments    Bed mobility     Transfer     Gait CGA Amb on firm surface with LBQC 30 ft x 2 with VC to prevent R LE adduction   Sit to stand transitions     Sitting balance     Standing balance      Wheelchair mobility     Car transfer CGA In and out truck with running board   Other:  Maye Curb training with LBQC       Therapeutic Exercise:   Exercise Sets Reps Comments   R LE exer in supine 10 reps x 2  Abd/add, heel slides, SLR, quad sets    Bridging  10 reps  B LE bridging in supine                   Additional Comments:  Improving mobility     Assessment: Patient tolerated session well.    PT Plan: continue  Revisions made to plan of care: No    GOALS:   Multidisciplinary Problems       Physical Therapy Goals          Problem: Physical Therapy    Goal Priority Disciplines Outcome Goal Variances Interventions   Physical Therapy Goal     PT, PT/OT Ongoing, Progressing     Description: Goals to be met by: Discharge     Patient will increase functional independence with  mobility by performin. Supine to sit with Modified [place]  2. Sit to supine with Modified [place]  3. Sit to stand transfer with Supervision  4. Gait  x 150 feet with Contact Guard Assistance using Quad Cane vs LRAD.                          Skilled PT Minutes Provided: 25   Communication with Treatment Team:     Equipment recommendations:       At end of treatment, patient remained:  x Up in chair     Up in wheelchair in room    In bed   x With alarm activated    Bed rails up   x Call bell in reach     Family/friends present    Restraints secured properly    In bathroom with CNA/RN notified    Nurse aware    In gym with therapist/tech   x Other:roll belt

## 2023-02-27 NOTE — PROGRESS NOTES
"Inpatient Nutrition Evaluation    Admit Date: 2/9/2023   Total duration of encounter: 18 days    Nutrition Recommendation/Prescription     Continue soft and bite sized, 1: 1 assistance with meals.     Nutrition Assessment     Chart Review    Reason Seen: continuous nutrition monitoring, length of stay, and follow-up    Malnutrition Screening Tool Results   Have you recently lost weight without trying?: No  Have you been eating poorly because of a decreased appetite?: No   MST Score: 0     Diagnosis:  Intraparenchymal hemorrhage of brain 2/9/2023      Right spastic hemiplegia 5/3/2022      Hypothyroidism 2/9/2023      BPH (benign prostatic hyperplasia)        Relevant Medical History:     Nutrition-Related Medications: atorvastatin, levothyroxine,   Nutrition-Related Labs:   Latest Reference Range & Units 02/23/23 03:25   Sodium 136 - 145 mmol/L 141   Potassium 3.5 - 5.1 mmol/L 4.2   Chloride 98 - 107 mmol/L 101   CO2 23 - 31 mmol/L 30   Anion Gap mEq/L 10.0   BUN 8.4 - 25.7 mg/dL 14.6   Creatinine 0.73 - 1.18 mg/dL 0.81   BUN/CREAT RATIO  18   eGFR mls/min/1.73/m2 >60   Glucose 82 - 115 mg/dL 96   Calcium 8.8 - 10.0 mg/dL 9.8       Diet Order: Diet Soft & Bite Sized  Oral Supplement Order: boost plus  Appetite/Oral Intake: good/50-75% of meals  Factors Affecting Nutritional Intake: impaired cognitive status/motor control, chewing difficulty, decreased appetite, and difficulty/impaired swallowing  Food/Hinduism/Cultural Preferences: none reported  Food Allergies: none reported    Skin Integrity: bruised (ecchymotic)  Wound(s):       Comments    Intake improved. Pt's family present during rounds as well as SLP.  Pt more talking more. Will monitor.     Anthropometrics    Height: 5' 11" (180.3 cm)    Last Weight: 68.7 kg (151 lb 7.3 oz) (02/27/23 0500) Weight Method: Bed Scale  BMI (Calculated): 21.1  BMI Classification: normal (BMI 18.5-24.9)        Ideal Body Weight (IBW), Male: 172 lb     % Ideal Body Weight, Male " (lb): 95.75 %                          Usual Weight Provided By: unable to obtain usual weight    Wt Readings from Last 3 Encounters:   02/27/23 0500 68.7 kg (151 lb 7.3 oz)   02/20/23 0500 70.7 kg (155 lb 13.8 oz)   02/13/23 0500 69.9 kg (154 lb 1.6 oz)   02/09/23 1948 74.7 kg (164 lb 10.9 oz)   02/09/23 1946 74.7 kg (164 lb 10.9 oz)   04/05/19 0950 89 kg (196 lb 3.4 oz)   11/05/18 1116 89 kg (196 lb 3.4 oz)   07/18/18 0906 92.9 kg (204 lb 12.9 oz)   04/16/18 1020 93 kg (205 lb 0.4 oz)   03/23/18 0833 91.1 kg (200 lb 13.4 oz)   01/26/18 0933 88.7 kg (195 lb 8.8 oz)      Weight Change(s) Since Admission:  Admit Weight: 74.7 kg (164 lb 10.9 oz) (02/09/23 1946)  Last weight documented: 70.7 kg. Current wt: 68.7 kg. -2kg wt loss.     Patient Education    Not applicable.    Monitoring & Evaluation     Dietitian will monitor food and beverage intake, weight, weight change, electrolyte/renal panel, glucose/endocrine profile, and gastrointestinal profile.  Nutrition Risk/Follow-Up: low (follow-up in 5-7 days)  Patients assigned 'low nutrition risk' status do not qualify for a full nutritional assessment but will be monitored and re-evaluated in a 5-7 day time period. Please consult if re-evaluation needed sooner.

## 2023-02-27 NOTE — PT/OT/SLP PROGRESS
Physical Therapy Treatment Note           Name: Bhupendra Park    : 1960 (63 y.o.)  MRN: [id number]           TREATMENT SUMMARY AND RECOMMENDATIONS:    PT Received On: 23  PT Start Time: 1400     PT Stop Time: 1428  PT Total Time (min): 28 min     Subjective Assessment:  x No complaints  Lethargic   x Awake, alert, cooperative  Uncooperative    Agitated  c/o pain    Appropriate  c/o fatigue    Confused  Treated at bedside     Emotionally labile x Treated in gym/dept.    Impulsive  Other:    Flat affect       Therapy Precautions:    Cognitive deficits  Spinal precautions    Collar - hard  Sternal precautions    Collar - soft   TLSO   x Fall risk  LSO    Hip precautions - posterior  Knee immobilizer    Hip precautions - anterior  WBAT    Impaired communication  Partial weightbearing    Oxygen  TTWB    PEG tube  NWB    Visual deficits x Other:AFO R    Hearing deficits          Treatment Objectives:     Mobility Training:   Assist level Comments    Bed mobility     Transfer CGA Stand pivot using LBQC   Gait MIN A X 100 feet using LBQC, x 15 feet without AD using yellow tape on floor for CAPO correction. Lateral stepping using hand R, x 10 feet B directions. Stepping strategies with focus on R swing through speed and hip flexion baldemar using targets and tactile cues.    Sit to stand transitions CGA X 5 reps from    Sitting balance     Standing balance  MIN A Functional reaching OH with L UE, no UE support, dynamic wt shifts in wide CAPO x 10 in frontal and sagittal planes.    Wheelchair mobility     Car transfer     Other:          Therapeutic Exercise:   Exercise Sets Reps Comments   Squats  10                          Additional Comments:  Pt in good spirits and participated well with PT. Improving balance and R LE movement allowing for gait progressions. PT to progress as able.     Assessment: Patient tolerated session well.    PT Plan: Continue per POC  Revisions made to plan of care:  No    GOALS:   Multidisciplinary Problems       Physical Therapy Goals          Problem: Physical Therapy    Goal Priority Disciplines Outcome Goal Variances Interventions   Physical Therapy Goal     PT, PT/OT Ongoing, Progressing     Description: Goals to be met by: Discharge     Patient will increase functional independence with mobility by performin. Supine to sit with Modified [place]  2. Sit to supine with Modified [place]  3. Sit to stand transfer with Supervision  4. Gait  x 150 feet with Contact Guard Assistance using Quad Cane vs LRAD.                          Skilled PT Minutes Provided: 28   Communication with Treatment Team:     Equipment recommendations:       At end of treatment, patient remained:  x Up in chair     Up in wheelchair in room    In bed    With alarm activated    Bed rails up    Call bell in reach     Family/friends present    Restraints secured properly    In bathroom with CNA/RN notified    Nurse aware   x In gym with therapist/tech    Other:

## 2023-02-27 NOTE — PT/OT/SLP PROGRESS
Occupational Therapy  Treatment    Name: Bhupendra Park    : 1960 (63 y.o.)  MRN: [id number]           TREATMENT SUMMARY AND RECOMMENDATIONS:      OT Date of Treatment: 23  OT Start Time: 1430  OT Stop Time: 1520  OT Total Time (min): 50 min      Subjective Assessment:   No complaints  Lethargic   x Awake, alert, cooperative  Impulsive    Uncooperative   Flat affect    Agitated  c/o pain    Appropriate  c/o fatigue    Confused  Treated at bedside     Emotionally labile x Treated in gym/dept.      Other:        Therapy Precautions:   x Cognitive deficits  Spinal precautions    Collar - hard  Sternal precautions    Collar - soft   TLSO   x Fall risk  LSO    Hip precautions - posterior  Knee immobilizer    Hip precautions - anterior  WBAT   x Impaired communication  Partial weightbearing    Oxygen  TTWB    PEG tube  NWB    Visual deficits      Hearing deficits   Other:        Treatment Objectives:     Mobility Training:    Mobility task Assist level Comments    Bed mobility SBA EOB>supine   Transfer CGA Transfer training stand/pivot w/c<>chair x6 times with cues for RLE placement; CGA for transfers. Stand/pivot t/f w/c>EOB CGA - no AE used during transfers.    Sit to stands transitions CGA From w/c level    Functional mobility     Sitting balance     Standing balance  Min A  Functional reaching ax to reach with LUE across midline to grasp clothespins and then place on L lateral side with focus on weight shifting and challenging balance. Noted R lateral lean with cues to self-correct.    Other:        ADL Training:    ADL Assist level Comments    Feeding     Grooming/hygiene     Bathing     Upper body dressing     Lower body dressing Min A  Pt able to doff B shoes; assist to doff AFO   Toileting     Toilet transfer     Adaptive equipment training     Other:           Therapeutic Exercise:   Exercise Sets Reps Comments   2# dumbbell LUE 1 20 Chest press, bicep curls, shoulder press, shoulder abd/add   Sit  to stands  2 5 reps 4 inch block under LLE to increase WB through RLE; CGA   RUE PROM with gentle stretching 1 10 R shoulder flex/ext, abd/add, elbow flex/ext, forearm sup/pro, wrist flex/ext  Unable to open hand this session d/t increased tone.             Additional Comments:  Pt refusing R resting hand splint at end of session.     Assessment: Patient tolerated session well. Pt making great progress each session with balance and transfers requiring less assistance. Pt is motivated and participated very well today.     OT Plan: Continue POC  Revisions made to plan of care: No    GOALS:   Multidisciplinary Problems       Occupational Therapy Goals          Problem: Occupational Therapy    Goal Priority Disciplines Outcome Interventions   Occupational Therapy Goal     OT, PT/OT Ongoing, Progressing    Description: Goals to be met by: 3/10/23     Patient will increase functional independence with ADLs by performing:    UE Dressing with Modified [place].-- GOAL MET   LE Dressing with Modified [place].-- continue  Toileting from toilet with Stand-by Assistance for hygiene and clothing management. -- continue   Toilet transfer to toilet with Supervision.-- continue                          Skilled OT Minutes Provided: 50  Communication with Treatment Team:     Equipment recommendations:       At end of treatment, patient remained:   Up in chair     Up in wheelchair in room   x In bed   x With alarm activated   x Bed rails up   x Call bell in reach     Family/friends present    Restraints secured properly    In bathroom with CNA/RN notified    In gym with PT/PTA/tech    Nurse aware    Other:

## 2023-02-27 NOTE — PT/OT/SLP PROGRESS
Name: Bhupendra Park    : 1960 (63 y.o.)  MRN: [id number]    [facility] [place]ations:      Large Base Quad Cane: Patient would benefit from a LBQC upon discharge to increase safety, decrease fall risk, and improve mobility/transfers. Patient is currently unable to independently and functionally walk for household distances of >50 feet without use of LBQC. Patient would benefit from a LBQC within his/her home environment to increase safety with transfers and gait and decrease risk of falls and subsequent injury.       Hospital bed: Patient is currently requiring Min-Mod A with all bed mobility tasks including rolling R<>L and supine<>sit transitions secondary to debility and history of reoccurring CVAs. Patient is mainly bed bound due to inability to transfer to bedside chair with Min to Mod A. A hospital bed would allow for frequent repositioning, elevation of head to decrease risk of aspiration, and bed rails to decrease fall risk. Patient would benefit from a hospital bed within the home environment to reduce the burden on family/caregivers and to ensure safe bed mobility and functionality for both the patient and family/caregivers.

## 2023-02-27 NOTE — PLAN OF CARE
Problem: Adult Inpatient Plan of Care  Goal: Plan of Care Review  Outcome: Ongoing, Progressing  Flowsheets (Taken 2/27/2023 1609)  Plan of Care Reviewed With:   spouse   patient  Goal: Patient-Specific Goal (Individualized)  Outcome: Ongoing, Progressing  Flowsheets (Taken 2/27/2023 1609)  Anxieties, Fears or Concerns: communicate needs  Individualized Care Needs: prevent falling, allow time for expressing needs  Goal: Optimal Comfort and Wellbeing  Outcome: Ongoing, Progressing  Intervention: Monitor Pain and Promote Comfort  Flowsheets (Taken 2/27/2023 1609)  Pain Management Interventions:   diversional activity provided   quiet environment facilitated  Intervention: Provide Person-Centered Care  Flowsheets (Taken 2/27/2023 1609)  Trust Relationship/Rapport:   care explained   emotional support provided   thoughts/feelings acknowledged   empathic listening provided   reassurance provided     Problem: Fall Injury Risk  Goal: Absence of Fall and Fall-Related Injury  Outcome: Ongoing, Progressing  Intervention: Identify and Manage Contributors  Flowsheets (Taken 2/27/2023 1609)  Self-Care Promotion:   independence encouraged   BADL personal objects within reach  Intervention: Promote Injury-Free Environment  Flowsheets (Taken 2/27/2023 1609)  Safety Promotion/Fall Prevention:   assistive device/personal item within reach   chair alarm set   Fall Risk signage in place   Fall Risk reviewed with patient/family   diversional activities provided   bed alarm set   gait belt with ambulation   room near unit station   /camera at bedside   Roll belt self-releasing   instructed to call staff for mobility

## 2023-02-27 NOTE — PLAN OF CARE
Problem: Occupational Therapy  Goal: Occupational Therapy Goal  Description: Goals to be met by: 3/10/23     Patient will increase functional independence with ADLs by performing:    UE Dressing with Modified [place].-- GOAL MET   LE Dressing with Modified [place].-- continue  Toileting from toilet with Stand-by Assistance for hygiene and clothing management. -- continue   Toilet transfer to toilet with Supervision.-- continue     Outcome: Ongoing, Progressing

## 2023-02-27 NOTE — PT/OT/SLP PROGRESS
Speech Language Pathology Treatment    Patient Name:  Bhupendra Park   MRN:  [id number]  Admitting Diagnosis: Intraparenchymal hemorrhage of brain    Recommendations:                 General Recommendations:  Dysphagia therapy and Speech/language therapy  Diet recommendations:  Soft & Bite Sized Diet - IDDSI Level 6, Liquid Diet Level: Thin liquids - IDDSI Level 0   Aspiration Precautions: 1 bite/sip at a time, Alternating bites/sips, Assistance with meals, Check for pocketing/oral residue, HOB to 90 degrees, Meds crushed in puree, Small bites/sips, and Standard aspiration precautions   General Precautions: Standard, aphasia, aspiration  Communication strategies:  eye glasses, yes/no questions only, provide increased time to answer, and go to room if call light pushed    Subjective     Pt sitting in gerichair w/ wife present. Pt pleasant and in good spirits.    Patient goals: pt unable to state at this time    Pain/Comfort:       Respiratory Status: Room air    Objective:     Has the patient been evaluated by SLP for swallowing?      Keep patient NPO?     Current Respiratory Status:        Pt able to formulate simple-moderate complexity sentences to describe detail from the weekend. 100% speech intell noted and fluent speech.  Pt answered 2U YNQs w/ 50% acc I'ly, unable to increase acc despite maxA. Increased time for processing.  SLP made a change to communication board and again reviewed w/ pt (I.e., wanting to get back in bed, to use the bathroom and to get in chair).   Pt able to state and demonstrate how to call for assistance.    Cont SLP POC.    Assessment:     Bhupendra Park is a 63 y.o. male with an SLP diagnosis of Aphasia and Dysphagia.  He presents with expressive and receptive aphasia. Diet modification warranted at this time to ensure safety w/ PO.    Goals:   Multidisciplinary Problems       SLP Goals          Problem: SLP    Goal Priority Disciplines Outcome   SLP Goal     SLP Ongoing,  Progressing   Description: LTG:   Pt will tolerate LRD w/o overt s/s of aspiration.  Pt will improve expressive and receptive language skills in order to effectively communicate wants and needs Manish.    STG:  Pt will tolerate minced and moist w/ good oral clearance and w/o overt s/s of aspiration. Goal met  Pt will participate in trials of soft and bite sized w/ good oral clearance and w/o overt s/s of aspiration. Goal met  Pt will answer 2U YNQs w/ 80% acc Manish.  Pt will follow 1-step commands w/ 80% acc Manish.  Pt will ID objects in Fo3 w/ 80% acc Manish.  Pt will complete auto speech tasks w/ 80% acc Manish.  Pt will name common objects w/ 80% acc Manish.                       Plan:     Patient to be seen:   (PRN)   Plan of Care expires:  03/03/23  Plan of Care reviewed with:  patient   SLP Follow-Up:  Yes       Discharge recommendations:      Barriers to Discharge:  Level of Skilled Assistance Needed see PT/OT notes    Time Tracking:     SLP Treatment Date:  2/27/23  Speech Start Time:  10:00  Speech Stop Time:  10:30    Speech Total Time (min): 30 min    Billable Minutes: Speech Therapy Individual 30 min language    Adamaris Carrion M.S., CCC-SLP   02/27/2023

## 2023-02-27 NOTE — PLAN OF CARE
Problem: Adult Inpatient Plan of Care  Goal: Plan of Care Review  Outcome: Ongoing, Progressing  Flowsheets (Taken 2/26/2023 2205)  Plan of Care Reviewed With: patient     Problem: Adult Inpatient Plan of Care  Goal: Patient-Specific Goal (Individualized)  Outcome: Ongoing, Progressing  Flowsheets (Taken 2/26/2023 2205)  Anxieties, Fears or Concerns: Communication of needs for self  Individualized Care Needs:   Allow adequate time for expression of needs   enhanced safety to prevent falling.     Problem: Adult Inpatient Plan of Care  Goal: Absence of Hospital-Acquired Illness or Injury  Intervention: Identify and Manage Fall Risk  Flowsheets (Taken 2/26/2023 2205)  Safety Promotion/Fall Prevention:   bed alarm set   assistive device/personal item within reach   Fall Risk reviewed with patient/family   Fall Risk signage in place   nonskid shoes/socks when out of bed   /camera at bedside   room near unit station   instructed to call staff for mobility     Problem: Adult Inpatient Plan of Care  Goal: Absence of Hospital-Acquired Illness or Injury  Intervention: Prevent Infection  Flowsheets (Taken 2/26/2023 2205)  Infection Prevention:   equipment surfaces disinfected   hand hygiene promoted   rest/sleep promoted     Problem: Adult Inpatient Plan of Care  Goal: Optimal Comfort and Wellbeing  Intervention: Monitor Pain and Promote Comfort  Flowsheets (Taken 2/26/2023 2205)  Pain Management Interventions:   pain management plan reviewed with patient/caregiver   medication offered but refused   relaxation techniques promoted   quiet environment facilitated   care clustered     Problem: Adult Inpatient Plan of Care  Goal: Optimal Comfort and Wellbeing  Intervention: Provide Person-Centered Care  Flowsheets (Taken 2/26/2023 2205)  Trust Relationship/Rapport: (No questions offered)   care explained   choices provided   emotional support provided   empathic listening provided   questions encouraged    thoughts/feelings acknowledged   reassurance provided     Problem: Fall Injury Risk  Goal: Absence of Fall and Fall-Related Injury  Intervention: Identify and Manage Contributors  Flowsheets (Taken 2/26/2023 2205)  Self-Care Promotion: independence encouraged  Medication Review/Management: medications reviewed     Problem: Infection  Goal: Absence of Infection Signs and Symptoms  Intervention: Prevent or Manage Infection  Flowsheets (Taken 2/26/2023 2205)  Fever Reduction/Comfort Measures:   lightweight bedding   lightweight clothing  Infection Management: aseptic technique maintained

## 2023-02-27 NOTE — PLAN OF CARE
Spoke to patient's wife, Merline, in regards to staffing meeting reminder. During call Pt's wife asked about DME being delivered and I informed her we are ordering HB and LBQC. I did inform her that insurance may now cover bed and she stated a friend does have a bed and she will see if it works on Wednesday. She stated that Pt has done outpatient therapy in the past and would like him to continue with outpatient vs HH. He will be set up with [facility].

## 2023-02-27 NOTE — PROGRESS NOTES
[facility]  [facility] Medicine  Progress Note    Patient Name: Bhupendra Park  MRN: [id number]  Patient Class: IP- Swing   Admission Date: 2/9/2023  Length of Stay: 18 days  Attending Physician: Thairy G Reyes, DO  Primary Care Provider: Kelly Meadows MD        Subjective:     Principal Problem:Intraparenchymal hemorrhage of brain        HPI:  63-year-old male with HTN, left frontal IPH, prior cervical spine surgery status post spinal cord stimulator who presented to ED with acute encephalopathy and was subsequently found to have recurrent right frontal intraparenchymal hemorrhage. Neurosurgery recommended medical management. Serial CTs showed stability of bleed. Transferred to our facility for continued rehabilitation.   Majority of patient's information obtained from medical records as patient has receptive and expressive aphasia and is able to answer only with yes / no.        Overview/Hospital Course:  Patient admitted for rehabilitation with speech therapy, occupational therapy and physical therapy after intracranial hemorrhage. Patient's wife brought  from home for spinal stimulator.  Patient eating with supervision with his left hand.  Somnolence appears to be resolved and pt back to his baseline demeanor.  [id number] patient had an unwitnessed fall and he was found kneeling on his left knee.  He denies any pain on evaluation, no acute osseous abnormality on x-ray of left knee.      Interval History:  Patient is ambulating 50 feet with quad cane. Contact guard assistance for ADLs.  He requires significant amount of cuing for meals but is able to eat on his own and is consuming approximately 50% of his meals. ST recently upgraded his diet.   Review of Systems   Reason unable to perform ROS: Expressive aphasia.   Objective:     Vital Signs (Most Recent):  Temp: 98.2 °F (36.8 °C) (02/27/23 1112)  Pulse: 66 (02/27/23 1112)  Resp: 18 (02/27/23 1112)  BP: 99/64 (02/27/23  1112)  SpO2: 99 % (02/27/23 1112) Vital Signs (24h Range):  Temp:  [97.5 °F (36.4 °C)-98.2 °F (36.8 °C)] 98.2 °F (36.8 °C)  Pulse:  [55-66] 66  Resp:  [18-19] 18  SpO2:  [95 %-100 %] 99 %  BP: ()/(58-69) 99/64     Weight: 68.7 kg (151 lb 7.3 oz)  Body mass index is 21.12 kg/m².    Intake/Output Summary (Last 24 hours) at 2/27/2023 1401  Last data filed at 2/27/2023 1200  Gross per 24 hour   Intake 1157 ml   Output --   Net 1157 ml        Physical Exam  Constitutional:       General: He is not in acute distress.     Appearance: Normal appearance.   HENT:      Head: Normocephalic and atraumatic.      Nose: No congestion or rhinorrhea.      Mouth/Throat:      Mouth: Mucous membranes are moist.   Eyes:      Conjunctiva/sclera: Conjunctivae normal.      Pupils: Pupils are equal, round, and reactive to light.   Cardiovascular:      Rate and Rhythm: Normal rate and regular rhythm.      Heart sounds: No murmur heard.  Pulmonary:      Effort: Pulmonary effort is normal.      Breath sounds: Normal breath sounds. No wheezing.   Abdominal:      General: Bowel sounds are normal. There is no distension.      Palpations: Abdomen is soft.      Tenderness: There is no abdominal tenderness.   Musculoskeletal:         General: No swelling. Normal range of motion.      Cervical back: Normal range of motion and neck supple.      Right lower leg: No edema.      Left lower leg: No edema.   Skin:     General: Skin is warm and dry.      Findings: No rash.   Neurological:      General: No focal deficit present.      Mental Status: He is alert and oriented to person, place, and time.      Cranial Nerves: Dysarthria present.      Motor: Weakness and abnormal muscle tone present.   Psychiatric:         Mood and Affect: Mood normal.         Behavior: Behavior normal.       Significant Labs: All pertinent labs within the past 24 hours have been reviewed.    Significant Imaging: I have reviewed all pertinent imaging results/findings within  the past 24 hours.      Assessment/Plan:      * Intraparenchymal hemorrhage of brain  -Continue with Keppra, atorvastatin, blood pressure goal less than 140/90  -on 02/06/2023 recommendation was documented that patient will need an MRI brain with and without contrast in 4-6 weeks to rule out hemorrhagic infarct or mass and patient is to be followed by Dr. Figueroa in [place]      Right spastic hemiplegia  -2/2 history of ICH and CVA  -continue PT/OT      Hypothyroidism  -continue with levothyroxine 88mcg      BPH (benign prostatic hyperplasia)  - continue with tamsulosin        VTE Risk Mitigation (From admission, onward)         Ordered     IP VTE LOW RISK PATIENT  Once         02/09/23 1931     Place sequential compression device  Until discontinued         02/09/23 1931                Discharge Planning   ABRAHAM:      Code Status: Full Code   Is the patient medically ready for discharge?:     Reason for patient still in hospital (select all that apply): Treatment and PT / OT recommendations  Discharge Plan A: Home Health, Home with family   Discharge Delays: None known at this time              Thairy G Reyes, DO  Department of Hospital Medicine   [facility]

## 2023-02-27 NOTE — ASSESSMENT & PLAN NOTE
-Continue with Keppra, atorvastatin, blood pressure goal less than 140/90  -on 02/06/2023 recommendation was documented that patient will need an MRI brain with and without contrast in 4-6 weeks to rule out hemorrhagic infarct or mass and patient is to be followed by Dr. Figueroa in [place]

## 2023-02-27 NOTE — PT/OT/SLP PROGRESS
Occupational Therapy  Treatment    Name: Bhupendra Park    : 1960 (63 y.o.)  MRN: [id number]           TREATMENT SUMMARY AND RECOMMENDATIONS:      OT Date of Treatment: 23  OT Start Time: 1050  OT Stop Time: 1120  OT Total Time (min): 30 min      Subjective Assessment:   No complaints  Lethargic   X Awake, alert, cooperative  Impulsive    Uncooperative   Flat affect    Agitated  c/o pain    Appropriate  c/o fatigue    Confused  Treated at bedside     Emotionally labile  Treated in gym/dept.      Other:        Therapy Precautions:    Cognitive deficits  Spinal precautions    Collar - hard  Sternal precautions    Collar - soft   TLSO   X Fall risk  LSO    Hip precautions - posterior  Knee immobilizer    Hip precautions - anterior  WBAT     x Impaired communication  Partial weightbearing    Oxygen  TTWB    PEG tube  NWB    Visual deficits      Hearing deficits   Other:        Treatment Objectives:     Mobility Training:    Mobility task Assist level Comments    Bed mobility     Transfer     Sit to stands transitions SBA  From bedside chair    Functional mobility CGA  From bedside chair to commode with use of LBQC for ~10ft   Sitting balance Supervision  On commode for dressing    Standing balance      Other:        ADL Training:    ADL Assist level Comments    Feeding     Grooming/hygiene     Bathing     Upper body dressing Mod I     Lower body dressing Min A  OT assisted with threading RLE    Toileting CGA  Following (+) output no need for perineal hygiene    Toilet transfer CGA  To lower and stand from commode, no use of grab bars    Adaptive equipment training     Other:       Additional Comments:  Pt's spouse present for session reporting the need to practice bathing and car transfers with her present to assure Pt is capable of activity prior to discharge.     Assessment: Patient tolerated session well.    OT Plan: Continue with current POC   Revisions made to plan of care: yes    GOALS:    Multidisciplinary Problems       Occupational Therapy Goals          Problem: Occupational Therapy    Goal Priority Disciplines Outcome Interventions   Occupational Therapy Goal     OT, PT/OT Ongoing, Progressing    Description: Goals to be met by: 3/10/23     Patient will increase functional independence with ADLs by performing:    UE Dressing with Modified [place].-- GOAL MET   LE Dressing with Modified [place].-- continue  Toileting from toilet with Stand-by Assistance for hygiene and clothing management. -- continue   Toilet transfer to toilet with Supervision.-- continue                          Skilled OT Minutes Provided: 30  Communication with Treatment Team:     Equipment recommendations:       At end of treatment, patient remained:   Up in chair     Up in wheelchair in room    In bed    With alarm activated    Bed rails up    Call bell in reach     Family/friends present    Restraints secured properly    In bathroom with CNA/RN notified    In gym with PT/PTA/tech    Nurse aware   X Other: Hand off to CHRISTI Nichols during ambulation to car transfer

## 2023-02-28 NOTE — PT/OT/SLP PROGRESS
Name: Bhupendra Park    : 1960 (63 y.o.)  MRN: [id number]           Patient Unavailable      Patient unable to be seen at this time secondary to: pt refused due to fatigue this PM

## 2023-02-28 NOTE — PLAN OF CARE
Problem: Adult Inpatient Plan of Care  Goal: Plan of Care Review  Outcome: Ongoing, Progressing  Flowsheets (Taken 2/27/2023 2130)  Plan of Care Reviewed With: patient  Goal: Patient-Specific Goal (Individualized)  Outcome: Ongoing, Progressing  Flowsheets (Taken 2/27/2023 2130)  Anxieties, Fears or Concerns: communicate needs  Individualized Care Needs: prevent falling  Goal: Absence of Hospital-Acquired Illness or Injury  Outcome: Ongoing, Progressing  Intervention: Identify and Manage Fall Risk  Flowsheets (Taken 2/27/2023 2130)  Safety Promotion/Fall Prevention:   assistive device/personal item within reach   bed alarm set   Fall Risk reviewed with patient/family   side rails raised x 3  Intervention: Prevent Skin Injury  Flowsheets (Taken 2/27/2023 2130)  Body Position: position changed independently  Skin Protection: adhesive use limited  Intervention: Prevent and Manage VTE (Venous Thromboembolism) Risk  Flowsheets (Taken 2/27/2023 2130)  Activity Management: Rolling - L1  VTE Prevention/Management: remove, assess skin, and reapply compression stockings  Range of Motion: active ROM (range of motion) encouraged  Intervention: Prevent Infection  Flowsheets (Taken 2/27/2023 2130)  Infection Prevention:   environmental surveillance performed   personal protective equipment utilized   rest/sleep promoted   single patient room provided   equipment surfaces disinfected   hand hygiene promoted

## 2023-02-28 NOTE — ASSESSMENT & PLAN NOTE
-Continue with Keppra, atorvastatin, blood pressure goal less than 140/90  -on 02/06/2023 recommendation was documented that patient will need an MRI brain with and without contrast in 4-6 weeks to rule out hemorrhagic infarct or mass and patient is to be followed by Dr. Figueroa in [place]

## 2023-02-28 NOTE — SUBJECTIVE & OBJECTIVE
Interval History:  Patient is ambulating 75 and 150 ft with quad cane. Contact guard assistance for ADLs.  He requires significant amount of cuing for meals but is able to eat on his own and is consuming approximately 50% of his meals. ST recently upgraded his diet.   Review of Systems   Reason unable to perform ROS: Expressive aphasia.   Objective:     Vital Signs (Most Recent):  Temp: 98 °F (36.7 °C) (02/28/23 1104)  Pulse: 63 (02/28/23 1104)  Resp: 18 (02/28/23 1059)  BP: 106/68 (02/28/23 1104)  SpO2: 99 % (02/28/23 1104) Vital Signs (24h Range):  Temp:  [97.3 °F (36.3 °C)-98.4 °F (36.9 °C)] 98 °F (36.7 °C)  Pulse:  [61-68] 63  Resp:  [18] 18  SpO2:  [95 %-99 %] 99 %  BP: ()/(49-72) 106/68     Weight: 68.7 kg (151 lb 7.3 oz)  Body mass index is 21.12 kg/m².    Intake/Output Summary (Last 24 hours) at 2/28/2023 1333  Last data filed at 2/28/2023 1200  Gross per 24 hour   Intake 240 ml   Output --   Net 240 ml        Physical Exam  Constitutional:       General: He is not in acute distress.     Appearance: Normal appearance.   HENT:      Head: Normocephalic and atraumatic.      Nose: No congestion or rhinorrhea.      Mouth/Throat:      Mouth: Mucous membranes are moist.   Eyes:      Conjunctiva/sclera: Conjunctivae normal.      Pupils: Pupils are equal, round, and reactive to light.   Cardiovascular:      Rate and Rhythm: Normal rate and regular rhythm.      Heart sounds: No murmur heard.  Pulmonary:      Effort: Pulmonary effort is normal.      Breath sounds: Normal breath sounds. No wheezing.   Abdominal:      General: Bowel sounds are normal. There is no distension.      Palpations: Abdomen is soft.      Tenderness: There is no abdominal tenderness.   Musculoskeletal:         General: No swelling. Normal range of motion.      Cervical back: Normal range of motion and neck supple.      Right lower leg: No edema.      Left lower leg: No edema.   Skin:     General: Skin is warm and dry.      Findings: No  rash.   Neurological:      General: No focal deficit present.      Mental Status: He is alert and oriented to person, place, and time.      Cranial Nerves: Dysarthria present.      Motor: Weakness and abnormal muscle tone present.   Psychiatric:         Mood and Affect: Mood normal.         Behavior: Behavior normal.       Significant Labs: All pertinent labs within the past 24 hours have been reviewed.    Significant Imaging: I have reviewed all pertinent imaging results/findings within the past 24 hours.

## 2023-02-28 NOTE — PROGRESS NOTES
"Name: Bhupendra Park    : 1960 (63 y.o.)  MRN: [id number]           Patient Unavailable      Patient unable to be seen at this time secondary to: Pt asleep on entry but easy to arouse. OT noted Pt lunch in front and not touched. OT assisted to help Pt eat but Pt just staring away and not responding. OT offered several things - eating lunch, getting in bed to rest, going to gym for therapy, going outside, and pt not responding to therapist, just staring at TV. When asked Pt if he wanted OT to come back, he stated "yes" - will return if schedule allows.           "

## 2023-02-28 NOTE — PT/OT/SLP PROGRESS
Physical Therapy Treatment Note           Name: Bhupendra Park    : 1960 (63 y.o.)  MRN: [id number]           TREATMENT SUMMARY AND RECOMMENDATIONS:    PT Received On: 23  PT Start Time: 1030     PT Stop Time: 1055  PT Total Time (min): 25 min     Subjective Assessment:  x No complaints  Lethargic    Awake, alert, cooperative  Uncooperative    Agitated  c/o pain    Appropriate  c/o fatigue    Confused  Treated at bedside     Emotionally labile  Treated in gym/dept.    Impulsive  Other:    Flat affect       Therapy Precautions:    Cognitive deficits  Spinal precautions    Collar - hard  Sternal precautions    Collar - soft   TLSO   x Fall risk  LSO    Hip precautions - posterior  Knee immobilizer    Hip precautions - anterior  WBAT    Impaired communication  Partial weightbearing    Oxygen  TTWB    PEG tube  NWB    Visual deficits  Other:    Hearing deficits          Treatment Objectives:     Mobility Training:   Assist level Comments    Bed mobility     Transfer     Gait Manish Amb on firm surface with LBQC 75 ft and 150 ft    Sit to stand transitions     Sitting balance     Standing balance      Wheelchair mobility     Car transfer     Other:          Therapeutic Exercise:   Exercise Sets Reps Comments   R LE exer long and short sitting  10 reps  In all planes to promote muscle strength and ROM                          Additional Comments:  Improving mobility     Assessment: Patient tolerated session well.    PT Plan: continue  Revisions made to plan of care: No    GOALS:   Multidisciplinary Problems       Physical Therapy Goals          Problem: Physical Therapy    Goal Priority Disciplines Outcome Goal Variances Interventions   Physical Therapy Goal     PT, PT/OT Ongoing, Progressing     Description: Goals to be met by: Discharge     Patient will increase functional independence with mobility by performin. Supine to sit with Modified [place]  2. Sit to supine with Modified  [place]  3. Sit to stand transfer with Supervision  4. Gait  x 150 feet with Contact Guard Assistance using Quad Cane vs LRAD.                          Skilled PT Minutes Provided: 25   Communication with Treatment Team:     Equipment recommendations:       At end of treatment, patient remained:  x Up in chair     Up in wheelchair in room    In bed   x With alarm activated    Bed rails up   x Call bell in reach     Family/friends present    Restraints secured properly    In bathroom with CNA/RN notified    Nurse aware    In gym with therapist/tech   x Other: roll belt

## 2023-02-28 NOTE — PROGRESS NOTES
[facility]  [facility] Medicine  Progress Note    Patient Name: Bhupendra Park  MRN: [id number]  Patient Class: IP- Swing   Admission Date: 2/9/2023  Length of Stay: 19 days  Attending Physician: Thairy G Reyes, DO  Primary Care Provider: Kelly Meadows MD        Subjective:     Principal Problem:Intraparenchymal hemorrhage of brain        HPI:  63-year-old male with HTN, left frontal IPH, prior cervical spine surgery status post spinal cord stimulator who presented to ED with acute encephalopathy and was subsequently found to have recurrent right frontal intraparenchymal hemorrhage. Neurosurgery recommended medical management. Serial CTs showed stability of bleed. Transferred to our facility for continued rehabilitation.   Majority of patient's information obtained from medical records as patient has receptive and expressive aphasia and is able to answer only with yes / no.        Overview/Hospital Course:  Patient admitted for rehabilitation with speech therapy, occupational therapy and physical therapy after intracranial hemorrhage. Patient's wife brought  from home for spinal stimulator.  Patient eating with supervision with his left hand.  Somnolence appears to be resolved and pt back to his baseline demeanor. On 2/26 patient had an unwitnessed fall and he was found kneeling on his left knee.  He denies any pain on evaluation, no acute osseous abnormality on x-ray of left knee.      Interval History:  Patient is ambulating 75 and 150 ft with quad cane. Contact guard assistance for ADLs.  He requires significant amount of cuing for meals but is able to eat on his own and is consuming approximately 50% of his meals. ST recently upgraded his diet.   Review of Systems   Reason unable to perform ROS: Expressive aphasia.   Objective:     Vital Signs (Most Recent):  Temp: 98 °F (36.7 °C) (02/28/23 1104)  Pulse: 63 (02/28/23 1104)  Resp: 18 (02/28/23 1059)  BP: 106/68 (02/28/23  1104)  SpO2: 99 % (02/28/23 1104) Vital Signs (24h Range):  Temp:  [97.3 °F (36.3 °C)-98.4 °F (36.9 °C)] 98 °F (36.7 °C)  Pulse:  [61-68] 63  Resp:  [18] 18  SpO2:  [95 %-99 %] 99 %  BP: ()/(49-72) 106/68     Weight: 68.7 kg (151 lb 7.3 oz)  Body mass index is 21.12 kg/m².    Intake/Output Summary (Last 24 hours) at 2/28/2023 1333  Last data filed at 2/28/2023 1200  Gross per 24 hour   Intake 240 ml   Output --   Net 240 ml        Physical Exam  Constitutional:       General: He is not in acute distress.     Appearance: Normal appearance.   HENT:      Head: Normocephalic and atraumatic.      Nose: No congestion or rhinorrhea.      Mouth/Throat:      Mouth: Mucous membranes are moist.   Eyes:      Conjunctiva/sclera: Conjunctivae normal.      Pupils: Pupils are equal, round, and reactive to light.   Cardiovascular:      Rate and Rhythm: Normal rate and regular rhythm.      Heart sounds: No murmur heard.  Pulmonary:      Effort: Pulmonary effort is normal.      Breath sounds: Normal breath sounds. No wheezing.   Abdominal:      General: Bowel sounds are normal. There is no distension.      Palpations: Abdomen is soft.      Tenderness: There is no abdominal tenderness.   Musculoskeletal:         General: No swelling. Normal range of motion.      Cervical back: Normal range of motion and neck supple.      Right lower leg: No edema.      Left lower leg: No edema.   Skin:     General: Skin is warm and dry.      Findings: No rash.   Neurological:      General: No focal deficit present.      Mental Status: He is alert and oriented to person, place, and time.      Cranial Nerves: Dysarthria present.      Motor: Weakness and abnormal muscle tone present.   Psychiatric:         Mood and Affect: Mood normal.         Behavior: Behavior normal.       Significant Labs: All pertinent labs within the past 24 hours have been reviewed.    Significant Imaging: I have reviewed all pertinent imaging results/findings within the past  24 hours.      Assessment/Plan:      * Intraparenchymal hemorrhage of brain  -Continue with Keppra, atorvastatin, blood pressure goal less than 140/90  -on 02/06/2023 recommendation was documented that patient will need an MRI brain with and without contrast in 4-6 weeks to rule out hemorrhagic infarct or mass and patient is to be followed by Dr. Figueroa in [place]      Right spastic hemiplegia  -2/2 history of ICH and CVA  -continue PT/OT      Hypothyroidism  -continue with levothyroxine 88mcg      BPH (benign prostatic hyperplasia)  - continue with tamsulosin        VTE Risk Mitigation (From admission, onward)         Ordered     IP VTE LOW RISK PATIENT  Once         02/09/23 1931     Place sequential compression device  Until discontinued         02/09/23 1931                Discharge Planning   ABRAHAM:      Code Status: Full Code   Is the patient medically ready for discharge?:     Reason for patient still in hospital (select all that apply): PT / OT recommendations  Discharge Plan A: Home Health, Home with family   Discharge Delays: None known at this time              Thairy G Reyes, DO  Department of Hospital Medicine   [facility]

## 2023-02-28 NOTE — PT/OT/SLP PROGRESS
Speech Language Pathology Treatment    Patient Name:  Bhupendra Park   MRN:  [id number]  Admitting Diagnosis: Intraparenchymal hemorrhage of brain    Recommendations:                 General Recommendations:  Dysphagia therapy and Speech/language therapy  Diet recommendations:  Soft & Bite Sized Diet - IDDSI Level 6, Liquid Diet Level: Thin liquids - IDDSI Level 0   Aspiration Precautions: 1 bite/sip at a time, Alternating bites/sips, Assistance with meals, Check for pocketing/oral residue, HOB to 90 degrees, Meds crushed in puree, Small bites/sips, and Standard aspiration precautions   General Precautions: Standard, aphasia, aspiration  Communication strategies:  eye glasses, yes/no questions only, provide increased time to answer, and go to room if call light pushed    Subjective     Pt sitting in gerichair. Pt pleasant and in good spirits.    Patient goals:     Pain/Comfort:       Respiratory Status: Room air    Objective:     Has the patient been evaluated by SLP for swallowing?      Keep patient NPO?     Current Respiratory Status:        Object naming completed w/ 60% acc I'ly, increasing to 90% acc given semantic and phonemic cueing.  Pt answered 2U YNQs w/ 30% acc I'ly, increasing to 40% acc modA. Pt unable to increase acc despite maxA. Pt presenting sleepy across task, w/ eyes occasionally closing which required frequent verbal cueing and increased time for processing.    Cont SLP POC.    Assessment:     Bhupendra Park is a 63 y.o. male with an SLP diagnosis of Aphasia and Dysphagia.  He presents with expressive and receptive aphasia. Diet modification warranted at this time to ensure safety w/ PO.    Goals:   Multidisciplinary Problems       SLP Goals          Problem: SLP    Goal Priority Disciplines Outcome   SLP Goal     SLP Ongoing, Progressing   Description: LTG:   Pt will tolerate LRD w/o overt s/s of aspiration.  Pt will improve expressive and receptive language skills in order to effectively  communicate wants and needs Manish.    STG:  Pt will tolerate minced and moist w/ good oral clearance and w/o overt s/s of aspiration. Goal met  Pt will participate in trials of soft and bite sized w/ good oral clearance and w/o overt s/s of aspiration. Goal met  Pt will answer 2U YNQs w/ 80% acc Manish.  Pt will follow 1-step commands w/ 80% acc Manish.  Pt will ID objects in Fo3 w/ 80% acc Manish.  Pt will complete auto speech tasks w/ 80% acc Manish.  Pt will name common objects w/ 80% acc Manish.                       Plan:     Patient to be seen:   (PRN)   Plan of Care expires:  03/03/23  Plan of Care reviewed with:  patient   SLP Follow-Up:  Yes       Discharge recommendations:      Barriers to Discharge:  Level of Skilled Assistance Needed see PT/OT notes    Time Tracking:     SLP Treatment Date:  2/28/23  Speech Start Time:  10:00  Speech Stop Time:  10:30    Speech Total Time (min): 30 min    Billable Minutes: Speech Therapy Individual 30 min language    Adamaris Carrion M.S., CCC-SLP   02/28/2023

## 2023-03-01 NOTE — PT/OT/SLP PROGRESS
Physical Therapy Treatment Note           Name: Bhupendra Park    : 1960 (63 y.o.)  MRN: [id number]           TREATMENT SUMMARY AND RECOMMENDATIONS:    PT Received On: 23  PT Start Time: 1038     PT Stop Time: 1108  PT Total Time (min): 30 min     Subjective Assessment:   No complaints  Lethargic   x Awake, alert, cooperative  Uncooperative    Agitated  c/o pain    Appropriate  c/o fatigue    Confused  Treated at bedside     Emotionally labile x Treated in gym/dept.    Impulsive  Other:    Flat affect       Therapy Precautions:    Cognitive deficits  Spinal precautions    Collar - hard  Sternal precautions    Collar - soft   TLSO   x Fall risk  LSO    Hip precautions - posterior  Knee immobilizer    Hip precautions - anterior  WBAT    Impaired communication  Partial weightbearing    Oxygen  TTWB    PEG tube  NWB    Visual deficits x Other:R side weakness from CVA    Hearing deficits          Treatment Objectives:     Mobility Training:   Assist level Comments    Bed mobility     Transfer     Gait CGA X 140 feet using LBQC, no AFO today to further work R LE, good tolerance demonstrated.    Sit to stand transitions SBA    Sitting balance     Standing balance  SBA With R UE grasp on hand rail with functional reaching in all planes using L UE -promoting Wb'ing through R side.    Wheelchair mobility     Car transfer     Other:          Therapeutic Exercise:   Exercise Sets Reps Comments   Trunk mobility/stability  10 B UE OH reaches and chops B with B HHA on ball to also promote WB'ing through R UE   Squats  10 With B UE grasp on the barros hand rail                   Additional Comments:  Good participation and progress being demonstrated. Pt would continue to benefit from continued therapy to continue progress towards PLOF allowing pt to go home with wife vs NH and to reduce risk of falls once home. Further progress is suspected at this time.     Assessment: Patient tolerated session well.    PT  Plan: continue per POC  Revisions made to plan of care: No    GOALS:   Multidisciplinary Problems       Physical Therapy Goals          Problem: Physical Therapy    Goal Priority Disciplines Outcome Goal Variances Interventions   Physical Therapy Goal     PT, PT/OT Ongoing, Progressing     Description: Goals to be met by: Discharge     Patient will increase functional independence with mobility by performin. Supine to sit with Modified [place]  2. Sit to supine with Modified [place]  3. Sit to stand transfer with Supervision  4. Gait  x 150 feet with Contact Guard Assistance using Quad Cane vs LRAD.                          Skilled PT Minutes Provided: 30   Communication with Treatment Team:     Equipment recommendations:       At end of treatment, patient remained:  x Up in chair     Up in wheelchair in room    In bed    With alarm activated    Bed rails up    Call bell in reach     Family/friends present    Restraints secured properly    In bathroom with CNA/RN notified    Nurse aware   x In gym with therapist/tech    Other:

## 2023-03-01 NOTE — PT/OT/SLP PROGRESS
Physical Therapy Treatment Note           Name: Bhupendra Park    : 1960 (63 y.o.)  MRN: [id number]           TREATMENT SUMMARY AND RECOMMENDATIONS:    PT Received On: 23  PT Start Time: 1529     PT Stop Time: 1548  PT Total Time (min): 19 min     Subjective Assessment:   No complaints  Lethargic   x Awake, alert, cooperative  Uncooperative    Agitated  c/o pain    Appropriate  c/o fatigue    Confused  Treated at bedside     Emotionally labile x Treated in gym/dept.    Impulsive  Other:    Flat affect       Therapy Precautions:    Cognitive deficits  Spinal precautions    Collar - hard  Sternal precautions    Collar - soft   TLSO   x Fall risk  LSO    Hip precautions - posterior  Knee immobilizer    Hip precautions - anterior  WBAT    Impaired communication  Partial weightbearing    Oxygen  TTWB    PEG tube  NWB    Visual deficits x Other:R side weakness from CVA    Hearing deficits          Treatment Objectives:     Mobility Training:   Assist level Comments    Bed mobility     Transfer SBA  Stand pivot   Gait CGA X 150 feet using LBQC, AFO, outside negotiations including ramps (incline and decline)   Sit to stand transitions SBA/CGA From WC   Sitting balance     Standing balance      Wheelchair mobility     Car transfer     Other:          Therapeutic Exercise:   Exercise Sets Reps Comments                               Additional Comments:  Good participation and progress being demonstrated. Outdoor ambulation completed this PM with increased cues needed for foot clearance due to difficulty with shoes on cement. Overall pt is progressing well and would benefit from continued PT to allow pt to go home with wife vs NH.      Assessment: Patient tolerated session well.    PT Plan: continue per POC  Revisions made to plan of care: No    GOALS:   Multidisciplinary Problems       Physical Therapy Goals          Problem: Physical Therapy    Goal Priority Disciplines Outcome Goal Variances  Interventions   Physical Therapy Goal     PT, PT/OT Ongoing, Progressing     Description: Goals to be met by: Discharge     Patient will increase functional independence with mobility by performin. Supine to sit with Modified [place]  2. Sit to supine with Modified [place]  3. Sit to stand transfer with Supervision  4. Gait  x 150 feet with Contact Guard Assistance using Quad Cane vs LRAD.                          Skilled PT Minutes Provided: 18   Communication with Treatment Team:     Equipment recommendations:       At end of treatment, patient remained:  x Up in chair     Up in wheelchair in room    In bed   x With alarm activated    Bed rails up   x Call bell in reach    x Family/friends present   x Restraints secured properly    In bathroom with CNA/RN notified    Nurse aware    In gym with therapist/tech    Other:

## 2023-03-01 NOTE — PATIENT CARE CONFERENCE
Name: Bhupendra Park    : 1960 (63 y.o.)  MRN: [id number]            Interdisciplinary Team Conference     Case conference held with patient/family and care team to discuss progress, plan of care, barriers to be addressed for safe return home, equipment recommendations, and discharge planning. Communicated therapy progress with MD, RN, therapy clinicians and case management. All questions/concerns answered.

## 2023-03-01 NOTE — SUBJECTIVE & OBJECTIVE
Interval History:  Patient is ambulating 100ft with quad cane but is making progress on ambulating without a cane and contact guard assistance. He is ambulating his right leg on his own which is significant amount of progress.  Min assistance for dressing. Possibly updating diet further this week Review of Systems   Reason unable to perform ROS: Expressive aphasia.   Objective:     Vital Signs (Most Recent):  Temp: 97.8 °F (36.6 °C) (03/01/23 0700)  Pulse: 65 (03/01/23 0842)  Resp: 18 (03/01/23 0842)  BP: (!) 93/56 (03/01/23 0700)  SpO2: 97 % (03/01/23 0842) Vital Signs (24h Range):  Temp:  [97.4 °F (36.3 °C)-98 °F (36.7 °C)] 97.8 °F (36.6 °C)  Pulse:  [61-74] 65  Resp:  [18] 18  SpO2:  [95 %-100 %] 97 %  BP: ()/(52-68) 93/56     Weight: 68.7 kg (151 lb 7.3 oz)  Body mass index is 21.12 kg/m².    Intake/Output Summary (Last 24 hours) at 3/1/2023 1047  Last data filed at 3/1/2023 0843  Gross per 24 hour   Intake 720 ml   Output --   Net 720 ml        Physical Exam  Constitutional:       General: He is not in acute distress.     Appearance: Normal appearance.   HENT:      Head: Normocephalic and atraumatic.      Nose: No congestion or rhinorrhea.      Mouth/Throat:      Mouth: Mucous membranes are moist.   Eyes:      Conjunctiva/sclera: Conjunctivae normal.      Pupils: Pupils are equal, round, and reactive to light.   Cardiovascular:      Rate and Rhythm: Normal rate and regular rhythm.      Heart sounds: No murmur heard.  Pulmonary:      Effort: Pulmonary effort is normal.      Breath sounds: Normal breath sounds. No wheezing.   Abdominal:      General: Bowel sounds are normal. There is no distension.      Palpations: Abdomen is soft.      Tenderness: There is no abdominal tenderness.   Musculoskeletal:         General: No swelling. Normal range of motion.      Cervical back: Normal range of motion and neck supple.      Right lower leg: No edema.      Left lower leg: No edema.   Skin:     General: Skin is warm  and dry.      Findings: No rash.   Neurological:      General: No focal deficit present.      Mental Status: He is alert and oriented to person, place, and time.      Cranial Nerves: Dysarthria present.      Motor: Weakness and abnormal muscle tone present.   Psychiatric:         Mood and Affect: Mood normal.         Behavior: Behavior normal.       Significant Labs: All pertinent labs within the past 24 hours have been reviewed.    Significant Imaging: I have reviewed all pertinent imaging results/findings within the past 24 hours.

## 2023-03-01 NOTE — PATIENT CARE CONFERENCE
Name: Bhupendra Park    : 1960 (63 y.o.)  MRN: [id number]            Interdisciplinary Team Conference     Case conference held with patient/family and care team to discuss progress, plan of care, barriers to be addressed for safe return home, equipment recommendations, and discharge planning. Communicated therapy progress with MD, RN, therapy clinicians and case management. All questions/concerns answered.

## 2023-03-01 NOTE — ASSESSMENT & PLAN NOTE
-Continue with Keppra, atorvastatin, blood pressure goal less than 140/90  -on 02/06/2023 recommendation was documented that patient will need an MRI brain with and without contrast in 4-6 weeks to rule out hemorrhagic infarct or mass and patient is to be followed by Dr. Figueroa in [place]

## 2023-03-01 NOTE — PT/OT/SLP PROGRESS
Occupational Therapy  Treatment    Name: Bhupendra Park    : 1960 (63 y.o.)  MRN: [id number]           TREATMENT SUMMARY AND RECOMMENDATIONS:      OT Date of Treatment: 23  OT Start Time: 1108  OT Stop Time: 1235  OT Total Time (min): 87 min      Subjective Assessment:   No complaints  Lethargic   x Awake, alert, cooperative  Impulsive    Uncooperative   Flat affect    Agitated  c/o pain    Appropriate  c/o fatigue    Confused  Treated at bedside     Emotionally labile  Treated in gym/dept.      Other:        Therapy Precautions:    Cognitive deficits  Spinal precautions    Collar - hard  Sternal precautions    Collar - soft   TLSO   X Fall risk  LSO    Hip precautions - posterior  Knee immobilizer    Hip precautions - anterior  WBAT   X Impaired communication  Partial weightbearing    Oxygen  TTWB    PEG tube  NWB    Visual deficits      Hearing deficits   Other:        Treatment Objectives:     Mobility Training:    Mobility task Assist level Comments    Bed mobility     Transfer CGA From w/c Pt looked wheelchair ~5ft from bedside chair, ambulating to bedside chair to sit.   Sit to stands transitions     Functional mobility Supervision  While seated in w/c, Pt propelled self using BLE from therapy gym to Pt room for ~475ft.   Sitting balance     Standing balance      Other:        ADL Training:    ADL Assist level Comments    Feeding Set-up    Grooming/hygiene     Bathing     Upper body dressing     Lower body dressing     Toileting     Toilet transfer     Adaptive equipment training     Other:       Neuro Re-Ed:   In supine, Pt completed 2x5 reps of AAROM shoulder flexion/extension and elbow flexion/extension.   OT provided K taping to digit 2 and 3 for increased extension    Assessment: Patient tolerated session well. Pt demonstrated good progress with overall AROM of RUE, endurance of RLE to propel in w/c, and balance for transfers with reduced assistance needed. Pt also demonstrates progress with  self-feeding, requiring no cueing once set-up for meal time     OT Plan: Continue with current POC   Revisions made to plan of care: No    GOALS:   Multidisciplinary Problems       Occupational Therapy Goals          Problem: Occupational Therapy    Goal Priority Disciplines Outcome Interventions   Occupational Therapy Goal     OT, PT/OT Ongoing, Progressing    Description: Goals to be met by: 3/10/23     Patient will increase functional independence with ADLs by performing:    UE Dressing with Modified [place].-- GOAL MET   LE Dressing with Modified [place].-- continue  Toileting from toilet with Stand-by Assistance for hygiene and clothing management. -- continue   Toilet transfer to toilet with Supervision.-- continue                          Skilled OT Minutes Provided: 87  Communication with Treatment Team: OT discussed with ST, Pt's progress at meal time requiring no verbal cues or increased assistance to complete self-feeding at lunch. Therapy team agreed Pt would benefit from plate guard to assist with scooping food onto utensil.    Equipment recommendations:       At end of treatment, patient remained:  X Up in chair     Up in wheelchair in room    In bed   X With alarm activated    Bed rails up   X Call bell in reach     Family/friends present    Restraints secured properly    In bathroom with CNA/RN notified    In gym with PT/PTA/tech    Nurse aware    Other:

## 2023-03-01 NOTE — PROGRESS NOTES
[facility]  Progress Note    Patient Name: Bhupendra Park  MRN: [id number]  Patient Class: IP- Swing   Admission Date: 2/9/2023  Length of Stay: 20 days  Attending Physician: Thairy G Reyes, DO  Primary Care Provider: Kelly Meadows MD        Subjective:     Principal Problem:Intraparenchymal hemorrhage of brain        HPI:  63-year-old male with HTN, left frontal IPH, prior cervical spine surgery status post spinal cord stimulator who presented to ED with acute encephalopathy and was subsequently found to have recurrent right frontal intraparenchymal hemorrhage. Neurosurgery recommended medical management. Serial CTs showed stability of bleed. Transferred to our facility for continued rehabilitation.   Majority of patient's information obtained from medical records as patient has receptive and expressive aphasia and is able to answer only with yes / no.        Overview/Hospital Course:  Patient admitted for rehabilitation with speech therapy, occupational therapy and physical therapy after intracranial hemorrhage. Patient's wife brought  from home for spinal stimulator.  Patient eating with supervision with his left hand.  Somnolence appears to be resolved and pt back to his baseline demeanor. On 2/26 patient had an unwitnessed fall and he was found kneeling on his left knee.  He denies any pain on evaluation, no acute osseous abnormality on x-ray of left knee.      Interval History:  Patient is ambulating 100ft with quad cane but is making progress on ambulating without a cane and contact guard assistance. He is ambulating his right leg on his own which is significant amount of progress.  Min assistance for dressing. Possibly updating diet further this week Review of Systems   Reason unable to perform ROS: Expressive aphasia.   Objective:     Vital Signs (Most Recent):  Temp: 97.8 °F (36.6 °C) (03/01/23 0700)  Pulse: 65 (03/01/23 0842)  Resp: 18 (03/01/23  0842)  BP: (!) 93/56 (03/01/23 0700)  SpO2: 97 % (03/01/23 0842) Vital Signs (24h Range):  Temp:  [97.4 °F (36.3 °C)-98 °F (36.7 °C)] 97.8 °F (36.6 °C)  Pulse:  [61-74] 65  Resp:  [18] 18  SpO2:  [95 %-100 %] 97 %  BP: ()/(52-68) 93/56     Weight: 68.7 kg (151 lb 7.3 oz)  Body mass index is 21.12 kg/m².    Intake/Output Summary (Last 24 hours) at 3/1/2023 1047  Last data filed at 3/1/2023 0843  Gross per 24 hour   Intake 720 ml   Output --   Net 720 ml        Physical Exam  Constitutional:       General: He is not in acute distress.     Appearance: Normal appearance.   HENT:      Head: Normocephalic and atraumatic.      Nose: No congestion or rhinorrhea.      Mouth/Throat:      Mouth: Mucous membranes are moist.   Eyes:      Conjunctiva/sclera: Conjunctivae normal.      Pupils: Pupils are equal, round, and reactive to light.   Cardiovascular:      Rate and Rhythm: Normal rate and regular rhythm.      Heart sounds: No murmur heard.  Pulmonary:      Effort: Pulmonary effort is normal.      Breath sounds: Normal breath sounds. No wheezing.   Abdominal:      General: Bowel sounds are normal. There is no distension.      Palpations: Abdomen is soft.      Tenderness: There is no abdominal tenderness.   Musculoskeletal:         General: No swelling. Normal range of motion.      Cervical back: Normal range of motion and neck supple.      Right lower leg: No edema.      Left lower leg: No edema.   Skin:     General: Skin is warm and dry.      Findings: No rash.   Neurological:      General: No focal deficit present.      Mental Status: He is alert and oriented to person, place, and time.      Cranial Nerves: Dysarthria present.      Motor: Weakness and abnormal muscle tone present.   Psychiatric:         Mood and Affect: Mood normal.         Behavior: Behavior normal.       Significant Labs: All pertinent labs within the past 24 hours have been reviewed.    Significant Imaging: I have reviewed all pertinent imaging  results/findings within the past 24 hours.      Assessment/Plan:      * Intraparenchymal hemorrhage of brain  -Continue with Keppra, atorvastatin, blood pressure goal less than 140/90  -on 02/06/2023 recommendation was documented that patient will need an MRI brain with and without contrast in 4-6 weeks to rule out hemorrhagic infarct or mass and patient is to be followed by Dr. Figueroa in [place]      Right spastic hemiplegia  -2/2 history of ICH and CVA  -continue PT/OT      Hypothyroidism  -continue with levothyroxine 88mcg      BPH (benign prostatic hyperplasia)  - continue with tamsulosin        VTE Risk Mitigation (From admission, onward)         Ordered     IP VTE LOW RISK PATIENT  Once         02/09/23 1931     Place sequential compression device  Until discontinued         02/09/23 1931                Discharge Planning   ABRAHAM:      Code Status: Full Code   Is the patient medically ready for discharge?:     Reason for patient still in hospital (select all that apply): Treatment and PT / OT recommendations  Discharge Plan A: Home Health, Home with family   Discharge Delays: None known at this time              Thairy G Reyes, DO  Department of Hospital Medicine   [facility]

## 2023-03-01 NOTE — PLAN OF CARE
Problem: Adult Inpatient Plan of Care  Goal: Plan of Care Review  Outcome: Ongoing, Progressing  Flowsheets (Taken 3/1/2023 1211)  Plan of Care Reviewed With:   patient   spouse  Goal: Patient-Specific Goal (Individualized)  Outcome: Ongoing, Progressing  Flowsheets (Taken 3/1/2023 1211)  Anxieties, Fears or Concerns: Improvement in communication/speech  Individualized Care Needs: Fall prevention and gait strengthening  Goal: Absence of Hospital-Acquired Illness or Injury  Outcome: Ongoing, Progressing  Intervention: Identify and Manage Fall Risk  Flowsheets (Taken 3/1/2023 1211)  Safety Promotion/Fall Prevention:   assistive device/personal item within reach   chair alarm set   commode/urinal/bedpan at bedside   Fall Risk reviewed with patient/family   gait belt with ambulation   in recliner, wheels locked   nonskid shoes/socks when out of bed   toileting scheduled   supervised activity   instructed to call staff for mobility  Intervention: Prevent Skin Injury  Flowsheets (Taken 3/1/2023 1211)  Body Position:   weight shifting   position maintained  Skin Protection: adhesive use limited  Intervention: Prevent and Manage VTE (Venous Thromboembolism) Risk  Flowsheets (Taken 3/1/2023 1211)  Activity Management:   Up in chair - L3   Leg kicks - L2   Arm raise - L1  VTE Prevention/Management:   ambulation promoted   fluids promoted   ROM (active) performed  Range of Motion: active ROM (range of motion) encouraged  Intervention: Prevent Infection  Flowsheets (Taken 3/1/2023 1211)  Infection Prevention:   environmental surveillance performed   hand hygiene promoted   single patient room provided  Goal: Optimal Comfort and Wellbeing  Outcome: Ongoing, Progressing  Intervention: Monitor Pain and Promote Comfort  Flowsheets (Taken 3/1/2023 1211)  Pain Management Interventions:   pillow support provided   quiet environment facilitated   care clustered  Intervention: Provide Person-Centered Care  Flowsheets (Taken 3/1/2023  1211)  Trust Relationship/Rapport:   care explained   choices provided   emotional support provided   empathic listening provided   questions answered   questions encouraged   thoughts/feelings acknowledged   reassurance provided  Goal: Readiness for Transition of Care  Outcome: Ongoing, Progressing  Intervention: Mutually Develop Transition Plan  Flowsheets (Taken 3/1/2023 1211)  Equipment Currently Used at Home: none  Who are your caregiver(s) and their phone number(s)?: Sharon (spouse)  Communicated ABRAHAM with patient/caregiver: Date not available/Unable to determine  Do you expect to return to your current living situation?: Yes  Do you have help at home or someone to help you manage your care at home?: Yes  Readmission within 30 days?: No  Do you currently have service(s) that help you manage your care at home?: No     Problem: Skin Injury Risk Increased  Goal: Skin Health and Integrity  Outcome: Ongoing, Progressing  Intervention: Optimize Skin Protection  Flowsheets (Taken 3/1/2023 1211)  Pressure Reduction Techniques: frequent weight shift encouraged  Pressure Reduction Devices: positioning supports utilized  Skin Protection: adhesive use limited  Head of Bed (HOB) Positioning: HOB at 20-30 degrees  Intervention: Promote and Optimize Oral Intake  Flowsheets (Taken 3/1/2023 1211)  Oral Nutrition Promotion:   calorie-dense foods provided   calorie-dense liquids provided     Problem: Fall Injury Risk  Goal: Absence of Fall and Fall-Related Injury  Outcome: Ongoing, Progressing  Intervention: Identify and Manage Contributors  Flowsheets (Taken 3/1/2023 1211)  Self-Care Promotion:   independence encouraged   BADL personal objects within reach   safe use of adaptive equipment encouraged  Medication Review/Management: medications reviewed  Intervention: Promote Injury-Free Environment  Flowsheets (Taken 3/1/2023 1211)  Safety Promotion/Fall Prevention:   assistive device/personal item within reach   chair alarm  set   commode/urinal/bedpan at bedside   Fall Risk reviewed with patient/family   gait belt with ambulation   in recliner, wheels locked   nonskid shoes/socks when out of bed   toileting scheduled   supervised activity   instructed to call staff for mobility     Problem: Infection  Goal: Absence of Infection Signs and Symptoms  Outcome: Ongoing, Progressing  Intervention: Prevent or Manage Infection  Flowsheets (Taken 3/1/2023 1211)  Fever Reduction/Comfort Measures:   lightweight bedding   lightweight clothing  Infection Management: aseptic technique maintained  Isolation Precautions: protective

## 2023-03-01 NOTE — PT/OT/SLP PROGRESS
Occupational Therapy  Treatment    Name: Bhupendra Park    : 1960 (63 y.o.)  MRN: [id number]           TREATMENT SUMMARY AND RECOMMENDATIONS:      OT Date of Treatment: 23  OT Start Time:   OT Stop Time:   OT Total Time (min): 31 min      Subjective Assessment:   No complaints  Lethargic   x Awake, alert, cooperative  Impulsive    Uncooperative   Flat affect    Agitated  c/o pain    Appropriate  c/o fatigue    Confused x Treated at bedside     Emotionally labile  Treated in gym/dept.     x Other: exercises took place outside to increase mood        Therapy Precautions:   x Cognitive deficits  Spinal precautions    Collar - hard  Sternal precautions    Collar - soft   TLSO   x Fall risk  LSO    Hip precautions - posterior  Knee immobilizer    Hip precautions - anterior  WBAT   x Impaired communication  Partial weightbearing    Oxygen  TTWB    PEG tube  NWB    Visual deficits      Hearing deficits   Other:        Treatment Objectives:     Mobility Training:    Mobility task Assist level Comments    Bed mobility     Transfer CGA Stand/pivot t/f with LBQC [place]>w/c   Sit to stands transitions CGA X5 reps from w/c level    Functional mobility CGA X10 feet with LBQC in room    Sitting balance     Standing balance      Other:        ADL Training:    ADL Assist level Comments    Feeding     Grooming/hygiene     Bathing     Upper body dressing     Lower body dressing Min A Pt required assist to drew RLE and R AFO/shoe; able to complete remainder of donning brief and shoes    Toileting     Toilet transfer     Adaptive equipment training     Other:           Therapeutic Exercise:   Exercise Sets Reps Comments   RUE PROM with gentle stretch 1 10 Shoulder flex/ext, abd/add, elbow flex/ext, wrist flex/ext    2# dumbbell  2 10 LUE chest press, shoulder press, bicep curls, shoulder ad/add                   Additional Comments:      Assessment: Patient tolerated session well.    OT Plan: Continue  POC  Revisions made to plan of care: No    GOALS:   Multidisciplinary Problems       Occupational Therapy Goals          Problem: Occupational Therapy    Goal Priority Disciplines Outcome Interventions   Occupational Therapy Goal     OT, PT/OT Ongoing, Progressing    Description: Goals to be met by: 3/10/23     Patient will increase functional independence with ADLs by performing:    UE Dressing with Modified [place].-- GOAL MET   LE Dressing with Modified [place].-- continue  Toileting from toilet with Stand-by Assistance for hygiene and clothing management. -- continue   Toilet transfer to toilet with Supervision.-- continue                          Skilled OT Minutes Provided: 31  Communication with Treatment Team:     Equipment recommendations:       At end of treatment, patient remained:   Up in chair     Up in wheelchair in room    In bed    With alarm activated    Bed rails up    Call bell in reach     Family/friends present    Restraints secured properly    In bathroom with CNA/RN notified    In gym with PT/PTA/tech    Nurse aware   x Other: outside with PT

## 2023-03-02 NOTE — PT/OT/SLP PROGRESS
Physical Therapy Treatment Note           Name: Bhupendra Park    : 1960 (63 y.o.)  MRN: [id number]           TREATMENT SUMMARY AND RECOMMENDATIONS:    PT Received On: 23  PT Start Time: 1535     PT Stop Time: 1600  PT Total Time (min): 25 min     Subjective Assessment:   No complaints  Lethargic   x Awake, alert, cooperative  Uncooperative    Agitated  c/o pain    Appropriate  c/o fatigue    Confused  Treated at bedside     Emotionally labile x Treated in gym/dept.    Impulsive  Other:    Flat affect       Therapy Precautions:    Cognitive deficits  Spinal precautions    Collar - hard  Sternal precautions    Collar - soft   TLSO   x Fall risk  LSO    Hip precautions - posterior  Knee immobilizer    Hip precautions - anterior  WBAT    Impaired communication  Partial weightbearing    Oxygen  TTWB    PEG tube  NWB    Visual deficits  Other:    Hearing deficits          Treatment Objectives:     Mobility Training:   Assist level Comments    Bed mobility SBA-MOD I Sit > supine   Transfer SBA-CGA Stand pivot transfer wc > bed   Gait CGA-MIN A X120ft using LBQC on LUE with two-point gait pattern; difficulty fully clearing RLE during swing phase when fatigued requiring assistance for stability    Sit to stand transitions     Sitting balance     Standing balance      Wheelchair mobility     Car transfer     Other:          Therapeutic Exercise:   Exercise Sets Reps Comments                               Additional Comments:  NM Re-education:   - 2x5 sit to stands without use of UE from wc level  - anterior tap ups onto 4in step x10 reps each  - stepping coordination activity including stepping onto 3 different color x's with RLE located anterior: middle, L lateral, R lateral     Assessment: Patient tolerated session well.    PT Plan: continue POC  Revisions made to plan of care: No    GOALS:   Multidisciplinary Problems       Physical Therapy Goals          Problem: Physical Therapy    Goal Priority  Disciplines Outcome Goal Variances Interventions   Physical Therapy Goal     PT, PT/OT Ongoing, Progressing     Description: Goals to be met by: Discharge     Patient will increase functional independence with mobility by performin. Supine to sit with Modified [place]  2. Sit to supine with Modified [place]  3. Sit to stand transfer with Supervision  4. Gait  x 150 feet with Contact Guard Assistance using Quad Cane vs LRAD.                          Skilled PT Minutes Provided: 25 minutes   Communication with Treatment Team:     Equipment recommendations:       At end of treatment, patient remained:   Up in chair     Up in wheelchair in room   x In bed   x With alarm activated   x Bed rails up   x Call bell in reach     Family/friends present    Restraints secured properly    In bathroom with CNA/RN notified    Nurse aware    In gym with therapist/tech    Other:

## 2023-03-02 NOTE — PLAN OF CARE
[facility]  Discharge Reassessment    Primary Care Provider: Kelly Meadows MD    Expected Discharge Date:     Reassessment (most recent)       Discharge Reassessment - 03/01/23 1841          Discharge Reassessment    Assessment Type Discharge Planning Reassessment     Did the patient's condition or plan change since previous assessment? No     Discharge Plan discussed with: Spouse/sig other     Name(s) and Number(s) Sharon wife      Communicated ABRAHAM with patient/caregiver Date not available/Unable to determine     Discharge Plan A Home with family;Home Health     DME Needed Upon Discharge  bedside commode;cane, straight     Discharge Barriers Identified None     Why the patient remains in the hospital Requires continued medical care        Post-Acute Status    Post-Acute Authorization Home Health     Home Health Status Pending medical clearance/testing     Hospital Resources/Appts/Education Provided Provided patient/caregiver with written discharge plan information     Patient choice form signed by patient/caregiver List with quality metrics by geographic area provided     Discharge Delays None known at this time

## 2023-03-02 NOTE — PROGRESS NOTES
[facility]  Progress Note    Patient Name: Bhupendra Park  MRN: [id number]  Patient Class: IP- Swing   Admission Date: 2/9/2023  Length of Stay: 21 days  Attending Physician: Thairy G Reyes, DO  Primary Care Provider: Kelly Meadows MD        Subjective:     Principal Problem:Intraparenchymal hemorrhage of brain        HPI:  63-year-old male with HTN, left frontal IPH, prior cervical spine surgery status post spinal cord stimulator who presented to ED with acute encephalopathy and was subsequently found to have recurrent right frontal intraparenchymal hemorrhage. Neurosurgery recommended medical management. Serial CTs showed stability of bleed. Transferred to our facility for continued rehabilitation.   Majority of patient's information obtained from medical records as patient has receptive and expressive aphasia and is able to answer only with yes / no.        Overview/Hospital Course:  Patient admitted for rehabilitation with speech therapy, occupational therapy and physical therapy after intracranial hemorrhage. Patient's wife brought  from home for spinal stimulator.  Patient eating with supervision with his left hand.  Somnolence appears to be resolved and pt back to his baseline demeanor. On 2/26 patient had an unwitnessed fall and he was found kneeling on his left knee.  He denies any pain on evaluation, no acute osseous abnormality on x-ray of left knee.      Interval History:  Patient is ambulating 100ft with quad cane but is making progress on ambulating without a cane and contact guard assistance. He is ambulating his right leg on his own which is significant amount of progress.  Min assistance for dressing.  Diet upgraded to regular diet on 03/02    Review of Systems   Reason unable to perform ROS: Expressive aphasia.   Objective:     Vital Signs (Most Recent):  Temp: 97.8 °F (36.6 °C) (03/02/23 0735)  Pulse: 63 (03/02/23 0745)  Resp: 18 (03/02/23  0745)  BP: (!) 96/59 (03/02/23 0735)  SpO2: (!) 93 % (03/02/23 0745) Vital Signs (24h Range):  Temp:  [97.4 °F (36.3 °C)-98.1 °F (36.7 °C)] 97.8 °F (36.6 °C)  Pulse:  [60-65] 63  Resp:  [18] 18  SpO2:  [93 %-99 %] 93 %  BP: ()/(59-67) 96/59     Weight: 68.7 kg (151 lb 7.3 oz)  Body mass index is 21.12 kg/m².    Intake/Output Summary (Last 24 hours) at 3/2/2023 1132  Last data filed at 3/1/2023 1747  Gross per 24 hour   Intake 480 ml   Output --   Net 480 ml        Physical Exam  Constitutional:       General: He is not in acute distress.     Appearance: Normal appearance.   HENT:      Head: Normocephalic and atraumatic.      Nose: No congestion or rhinorrhea.      Mouth/Throat:      Mouth: Mucous membranes are moist.   Eyes:      Conjunctiva/sclera: Conjunctivae normal.      Pupils: Pupils are equal, round, and reactive to light.   Cardiovascular:      Rate and Rhythm: Normal rate and regular rhythm.      Heart sounds: No murmur heard.  Pulmonary:      Effort: Pulmonary effort is normal.      Breath sounds: Normal breath sounds. No wheezing.   Abdominal:      General: Bowel sounds are normal. There is no distension.      Palpations: Abdomen is soft.      Tenderness: There is no abdominal tenderness.   Musculoskeletal:         General: No swelling. Normal range of motion.      Cervical back: Normal range of motion and neck supple.      Right lower leg: No edema.      Left lower leg: No edema.   Skin:     General: Skin is warm and dry.      Findings: No rash.   Neurological:      General: No focal deficit present.      Mental Status: He is alert and oriented to person, place, and time.      Cranial Nerves: Dysarthria present.      Motor: Weakness and abnormal muscle tone present.   Psychiatric:         Mood and Affect: Mood normal.         Behavior: Behavior normal.       Significant Labs: All pertinent labs within the past 24 hours have been reviewed.    Significant Imaging: I have reviewed all pertinent  imaging results/findings within the past 24 hours.      Assessment/Plan:      * Intraparenchymal hemorrhage of brain  -Continue with Keppra, atorvastatin, blood pressure goal less than 140/90  -on 02/06/2023 recommendation was documented that patient will need an MRI brain with and without contrast in 4-6 weeks to rule out hemorrhagic infarct or mass and patient is to be followed by Dr. Figueroa in [place]      Right spastic hemiplegia  -2/2 history of ICH and CVA  -continue PT/OT      Hypothyroidism  -continue with levothyroxine 88mcg      BPH (benign prostatic hyperplasia)  - continue with tamsulosin        VTE Risk Mitigation (From admission, onward)         Ordered     IP VTE LOW RISK PATIENT  Once         02/09/23 1931     Place sequential compression device  Until discontinued         02/09/23 1931                Discharge Planning   ABRAHAM:      Code Status: Full Code   Is the patient medically ready for discharge?:     Reason for patient still in hospital (select all that apply): Treatment and PT / OT recommendations  Discharge Plan A: Home with family, Home Health   Discharge Delays: None known at this time              Thairy G Reyes, DO  Department of Hospital Medicine   [facility]

## 2023-03-02 NOTE — SUBJECTIVE & OBJECTIVE
Interval History:  Patient is ambulating 100ft with quad cane but is making progress on ambulating without a cane and contact guard assistance. He is ambulating his right leg on his own which is significant amount of progress.  Min assistance for dressing.  Diet upgraded to regular diet on 03/02    Review of Systems   Reason unable to perform ROS: Expressive aphasia.   Objective:     Vital Signs (Most Recent):  Temp: 97.8 °F (36.6 °C) (03/02/23 0735)  Pulse: 63 (03/02/23 0745)  Resp: 18 (03/02/23 0745)  BP: (!) 96/59 (03/02/23 0735)  SpO2: (!) 93 % (03/02/23 0745) Vital Signs (24h Range):  Temp:  [97.4 °F (36.3 °C)-98.1 °F (36.7 °C)] 97.8 °F (36.6 °C)  Pulse:  [60-65] 63  Resp:  [18] 18  SpO2:  [93 %-99 %] 93 %  BP: ()/(59-67) 96/59     Weight: 68.7 kg (151 lb 7.3 oz)  Body mass index is 21.12 kg/m².    Intake/Output Summary (Last 24 hours) at 3/2/2023 1132  Last data filed at 3/1/2023 1747  Gross per 24 hour   Intake 480 ml   Output --   Net 480 ml        Physical Exam  Constitutional:       General: He is not in acute distress.     Appearance: Normal appearance.   HENT:      Head: Normocephalic and atraumatic.      Nose: No congestion or rhinorrhea.      Mouth/Throat:      Mouth: Mucous membranes are moist.   Eyes:      Conjunctiva/sclera: Conjunctivae normal.      Pupils: Pupils are equal, round, and reactive to light.   Cardiovascular:      Rate and Rhythm: Normal rate and regular rhythm.      Heart sounds: No murmur heard.  Pulmonary:      Effort: Pulmonary effort is normal.      Breath sounds: Normal breath sounds. No wheezing.   Abdominal:      General: Bowel sounds are normal. There is no distension.      Palpations: Abdomen is soft.      Tenderness: There is no abdominal tenderness.   Musculoskeletal:         General: No swelling. Normal range of motion.      Cervical back: Normal range of motion and neck supple.      Right lower leg: No edema.      Left lower leg: No edema.   Skin:     General: Skin  is warm and dry.      Findings: No rash.   Neurological:      General: No focal deficit present.      Mental Status: He is alert and oriented to person, place, and time.      Cranial Nerves: Dysarthria present.      Motor: Weakness and abnormal muscle tone present.   Psychiatric:         Mood and Affect: Mood normal.         Behavior: Behavior normal.       Significant Labs: All pertinent labs within the past 24 hours have been reviewed.    Significant Imaging: I have reviewed all pertinent imaging results/findings within the past 24 hours.

## 2023-03-02 NOTE — PLAN OF CARE
Problem: Physical Therapy  Goal: Physical Therapy Goal  Description: Goals to be met by: Discharge     Patient will increase functional independence with mobility by performin. Supine to sit with Modified [place]  2. Sit to supine with Modified [place]  3. Sit to stand transfer with Supervision  4. Gait  x 150 feet with Contact Guard Assistance using Quad Cane vs LRAD.     Outcome: Ongoing, Progressing

## 2023-03-02 NOTE — PLAN OF CARE
Problem: Adult Inpatient Plan of Care  Goal: Plan of Care Review  Outcome: Ongoing, Progressing  Flowsheets (Taken 3/2/2023 1502)  Plan of Care Reviewed With: patient  Goal: Patient-Specific Goal (Individualized)  Outcome: Ongoing, Progressing  Goal: Absence of Hospital-Acquired Illness or Injury  Outcome: Ongoing, Progressing  Intervention: Identify and Manage Fall Risk  Flowsheets (Taken 3/2/2023 1502)  Safety Promotion/Fall Prevention:   assistive device/personal item within reach   bed alarm set   nonskid shoes/socks when out of bed   side rails raised x 3   instructed to call staff for mobility  Intervention: Prevent Skin Injury  Flowsheets (Taken 3/2/2023 1502)  Body Position: sitting up in bed  Skin Protection:   adhesive use limited   incontinence pads utilized  Intervention: Prevent and Manage VTE (Venous Thromboembolism) Risk  Flowsheets (Taken 3/2/2023 1502)  Activity Management: Ambulated in [place] - L4  VTE Prevention/Management:   ambulation promoted   bleeding precations maintained   fluids promoted  Range of Motion: active ROM (range of motion) encouraged  Intervention: Prevent Infection  Flowsheets (Taken 3/2/2023 1502)  Infection Prevention:   equipment surfaces disinfected   hand hygiene promoted   single patient room provided   rest/sleep promoted   personal protective equipment utilized  Goal: Optimal Comfort and Wellbeing  Outcome: Ongoing, Progressing  Intervention: Monitor Pain and Promote Comfort  Flowsheets (Taken 3/2/2023 1502)  Pain Management Interventions:   medication offered but refused   quiet environment facilitated  Intervention: Provide Person-Centered Care  Flowsheets (Taken 3/2/2023 1502)  Trust Relationship/Rapport:   care explained   questions answered   reassurance provided  Goal: Readiness for Transition of Care  Outcome: Ongoing, Progressing     Problem: Skin Injury Risk Increased  Goal: Skin Health and Integrity  Outcome: Ongoing, Progressing  Intervention: Optimize Skin  Protection  Flowsheets (Taken 3/2/2023 1502)  Pressure Reduction Techniques: frequent weight shift encouraged  Pressure Reduction Devices: positioning supports utilized  Skin Protection:   adhesive use limited   incontinence pads utilized  Head of Bed (HOB) Positioning: HOB at 20-30 degrees  Intervention: Promote and Optimize Oral Intake  Flowsheets (Taken 3/2/2023 1502)  Oral Nutrition Promotion:   calorie-dense foods provided   calorie-dense liquids provided     Problem: Fall Injury Risk  Goal: Absence of Fall and Fall-Related Injury  Outcome: Ongoing, Progressing  Intervention: Identify and Manage Contributors  Flowsheets (Taken 3/2/2023 1502)  Self-Care Promotion: independence encouraged  Medication Review/Management: medications reviewed  Intervention: Promote Injury-Free Environment  Flowsheets (Taken 3/2/2023 1502)  Safety Promotion/Fall Prevention:   assistive device/personal item within reach   bed alarm set   nonskid shoes/socks when out of bed   side rails raised x 3   instructed to call staff for mobility     Problem: Infection  Goal: Absence of Infection Signs and Symptoms  Outcome: Ongoing, Progressing  Intervention: Prevent or Manage Infection  Flowsheets (Taken 3/2/2023 1502)  Fever Reduction/Comfort Measures:   lightweight bedding   lightweight clothing  Infection Management: aseptic technique maintained  Isolation Precautions: protective

## 2023-03-02 NOTE — PT/OT/SLP PROGRESS
Occupational Therapy  Treatment    Name: Bhupendra Park    : 1960 (63 y.o.)  MRN: [id number]           TREATMENT SUMMARY AND RECOMMENDATIONS:      OT Date of Treatment: 23  OT Start Time: 1035  OT Stop Time: 1115  OT Total Time (min): 40 min      Subjective Assessment:   No complaints  Lethargic   X Awake, alert, cooperative  Impulsive    Uncooperative   Flat affect    Agitated  c/o pain    Appropriate  c/o fatigue    Confused  Treated at bedside     Emotionally labile  Treated in gym/dept.      Other:        Therapy Precautions:    Cognitive deficits  Spinal precautions    Collar - hard  Sternal precautions    Collar - soft   TLSO   X Fall risk  LSO    Hip precautions - posterior  Knee immobilizer    Hip precautions - anterior  WBAT   X Impaired communication  Partial weightbearing    Oxygen  TTWB    PEG tube  NWB    Visual deficits      Hearing deficits   Other:        Treatment Objectives:     Mobility Training:    Mobility task Assist level Comments    Bed mobility     Transfer CGA  From w/c to bedside with no use of AD   Sit to stands transitions SBA  From all surfaces    Functional mobility Min A  Hand Held Assist attempt however Pt's RLE presented fatigued requiring increased assistance  During second attempt with SBQC, pt able to ambulate ~100ft needing OT assistance to swing RLE through at end due to fatigue   Sitting balance     Standing balance  Min A  Pt completed functional reaching from shoulder level at L side to floor level at R side.   Other:          Therapeutic Exercise:   Exercise Sets Reps Comments   AAROM  2 10 Elbow flexion/extension   Shoulder flexion                          Assessment: Patient tolerated session well.    OT Plan: Continue with current POC   Revisions made to plan of care: No    GOALS:   Multidisciplinary Problems       Occupational Therapy Goals          Problem: Occupational Therapy    Goal Priority Disciplines Outcome Interventions   Occupational Therapy Goal      OT, PT/OT Ongoing, Progressing    Description: Goals to be met by: 3/10/23     Patient will increase functional independence with ADLs by performing:    UE Dressing with Modified [place].-- GOAL MET   LE Dressing with Modified [place].-- continue  Toileting from toilet with Stand-by Assistance for hygiene and clothing management. -- continue   Toilet transfer to toilet with Supervision.-- continue                          Skilled OT Minutes Provided: 40  Communication with Treatment Team:     Equipment recommendations:       At end of treatment, patient remained:  X Up in chair     Up in wheelchair in room    In bed   X With alarm activated    Bed rails up   X Call bell in reach     Family/friends present   x Restraints secured properly    In bathroom with CNA/RN notified    In gym with PT/PTA/tech    Nurse aware    Other:

## 2023-03-02 NOTE — PT/OT/SLP PROGRESS
"Speech Language Pathology Treatment    Patient Name:  Bhupendra Park   MRN:  [id number]  Admitting Diagnosis: Intraparenchymal hemorrhage of brain    Recommendations:                 General Recommendations:  Dysphagia therapy and Speech/language therapy  Diet recommendations:  Regular Diet - IDDSI Level 7, Liquid Diet Level: Thin liquids - IDDSI Level 0   Aspiration Precautions: 1 bite/sip at a time, Alternating bites/sips, Assistance with meals, Check for pocketing/oral residue, HOB to 90 degrees, Meds whole 1 at a time, Small bites/sips, and Standard aspiration precautions   General Precautions: Standard, fall  Communication strategies:  eye glasses, yes/no questions only, provide increased time to answer, and go to room if call light pushed    Subjective     Pt in bed upon SLP arrival. Pt pleasant. Pt less interactive across session and more quiet as compared to previous session.    Patient goals:     Pain/Comfort:       Respiratory Status: Room air    Objective:     Has the patient been evaluated by SLP for swallowing?   Yes  Keep patient NPO?     Current Respiratory Status:        Pt consuming soft and bite sized breakfast tray upon SLP arrival. Good oral clearance and no overt s/s of aspiration noted.  Pt completed trials of regular consistency w/ good oral clearance and w/o overt s/s of aspiration. Pt also denying any difficulties by responding, "no."    Diet order changed to regular at this time. SLP to f/u for diet tolerance. Continue targeting language skills.    Cont SLP POC.    Assessment:     Bhupendra Park is a 63 y.o. male with an SLP diagnosis of Aphasia and Dysphagia.  He presents with expressive and receptive aphasia.     Goals:   Multidisciplinary Problems       SLP Goals          Problem: SLP    Goal Priority Disciplines Outcome   SLP Goal     SLP Ongoing, Progressing   Description: LTG:   Pt will tolerate LRD w/o overt s/s of aspiration.  Pt will improve expressive and receptive language " skills in order to effectively communicate wants and needs Manish.    STG:  Pt will tolerate minced and moist w/ good oral clearance and w/o overt s/s of aspiration. Goal met  Pt will participate in trials of soft and bite sized w/ good oral clearance and w/o overt s/s of aspiration. Goal met  Pt will answer 2U YNQs w/ 80% acc Manish.  Pt will follow 1-step commands w/ 80% acc Manish.  Pt will ID objects in Fo3 w/ 80% acc Manish. Goal met  Pt will complete auto speech tasks w/ 80% acc Manish. Goal met  Pt will name common objects w/ 80% acc Manish. Goal met  Pt will complete trials of regular w/ good oral clearance and w/o overt s/s of aspiration. Goal met  Pt will answer WH Qs w/ 80% acc Manish.                       Plan:     Patient to be seen:   (PRN)   Plan of Care expires:  03/10/23  Plan of Care reviewed with:  patient   SLP Follow-Up:  Yes       Discharge recommendations:  outpatient speech therapy, outpatient OT, outpatient PT   Barriers to Discharge:  Level of Skilled Assistance Needed see PT/OT notes    Time Tracking:     SLP Treatment Date:  3/2/23  Speech Start Time:  8:48  Speech Stop Time:  9:08    Speech Total Time (min): 20 min    Billable Minutes: Treatment Swallowing Dysfunction 20    Adamaris Carrion M.S., CCC-SLP   03/02/2023

## 2023-03-02 NOTE — PLAN OF CARE
Problem: SLP  Goal: SLP Goal  Description: LTG:   Pt will tolerate LRD w/o overt s/s of aspiration.  Pt will improve expressive and receptive language skills in order to effectively communicate wants and needs Manish.    STG:  Pt will tolerate minced and moist w/ good oral clearance and w/o overt s/s of aspiration. Goal met  Pt will participate in trials of soft and bite sized w/ good oral clearance and w/o overt s/s of aspiration. Goal met  Pt will answer 2U YNQs w/ 80% acc Manish.  Pt will follow 1-step commands w/ 80% acc Manish.  Pt will ID objects in Fo3 w/ 80% acc Manish. Goal met  Pt will complete auto speech tasks w/ 80% acc Manish. Goal met  Pt will name common objects w/ 80% acc Manish. Goal met  Pt will complete trials of regular w/ good oral clearance and w/o overt s/s of aspiration. Goal met  Pt will answer WH Qs w/ 80% acc Manish.  Outcome: Ongoing, Progressing

## 2023-03-02 NOTE — PROGRESS NOTES
"Inpatient Nutrition Evaluation    Admit Date: 2/9/2023   Total duration of encounter: 21 days    Nutrition Recommendation/Prescription     Continue regular diet used plate guard with meals. 2. Continue boost plus with meals.    Nutrition Assessment     Chart Review    Reason Seen: continuous nutrition monitoring, length of stay, and follow-up    Malnutrition Screening Tool Results   Have you recently lost weight without trying?: No  Have you been eating poorly because of a decreased appetite?: No   MST Score: 0     Diagnosis:  Intraparenchymal hemorrhage of brain 2/9/2023      Right spastic hemiplegia 5/3/2022      Hypothyroidism 2/9/2023      BPH (benign prostatic hyperplasia)        Relevant Medical History:     Nutrition-Related Medications: atorvastatin, levothyroxine,     Nutrition-Related Labs:      Diet Order: Diet Adult Regular  Oral Supplement Order: Boost Plus  Appetite/Oral Intake: good  Factors Affecting Nutritional Intake: impaired cognitive status/motor control and difficulty/impaired swallowing  Food/Temple/Cultural Preferences: none reported  Food Allergies: none reported    Skin Integrity: bruised (ecchymotic)  Wound(s):       Comments    Pt intake improved, upgraded to regular diet, using plate guard at lunch. Family present. Pt consumed 50% of lunch. Discussed food preferences. Will monitor.     Anthropometrics    Height: 5' 11" (180.3 cm)    Last Weight: 68.7 kg (151 lb 7.3 oz) (02/27/23 0500) Weight Method: Bed Scale  BMI (Calculated): 21.1  BMI Classification: normal (BMI 18.5-24.9)        Ideal Body Weight (IBW), Male: 172 lb     % Ideal Body Weight, Male (lb): 95.75 %                          Usual Weight Provided By: unable to obtain usual weight    Wt Readings from Last 3 Encounters:   02/27/23 0500 68.7 kg (151 lb 7.3 oz)   02/20/23 0500 70.7 kg (155 lb 13.8 oz)   02/13/23 0500 69.9 kg (154 lb 1.6 oz)   02/09/23 1948 74.7 kg (164 lb 10.9 oz)   02/09/23 1946 74.7 kg (164 lb 10.9 oz) "   04/05/19 0950 89 kg (196 lb 3.4 oz)   11/05/18 1116 89 kg (196 lb 3.4 oz)   07/18/18 0906 92.9 kg (204 lb 12.9 oz)   04/16/18 1020 93 kg (205 lb 0.4 oz)   03/23/18 0833 91.1 kg (200 lb 13.4 oz)   01/26/18 0933 88.7 kg (195 lb 8.8 oz)      Weight Change(s) Since Admission:  Admit Weight: 74.7 kg (164 lb 10.9 oz) (02/09/23 1946)      Patient Education    Not applicable.    Monitoring & Evaluation     Dietitian will monitor food and beverage intake, electrolyte/renal panel, glucose/endocrine profile, and gastrointestinal profile.  Nutrition Risk/Follow-Up: low (follow-up in 5-7 days)  Patients assigned 'low nutrition risk' status do not qualify for a full nutritional assessment but will be monitored and re-evaluated in a 5-7 day time period. Please consult if re-evaluation needed sooner.

## 2023-03-02 NOTE — PT/OT/SLP PROGRESS
Physical Therapy Treatment Note           Name: Bhupendra Park    : 1960 (63 y.o.)  MRN: [id number]           TREATMENT SUMMARY AND RECOMMENDATIONS:    PT Received On: 23  PT Start Time: 1015     PT Stop Time: 1040  PT Total Time (min): 25 min     Subjective Assessment:   No complaints  Lethargic   x Awake, alert, cooperative  Uncooperative    Agitated  c/o pain    Appropriate  c/o fatigue    Confused  Treated at bedside     Emotionally labile x Treated in gym/dept.    Impulsive  Other:    Flat affect       Therapy Precautions:    Cognitive deficits  Spinal precautions    Collar - hard  Sternal precautions    Collar - soft   TLSO   x Fall risk  LSO    Hip precautions - posterior  Knee immobilizer    Hip precautions - anterior  WBAT    Impaired communication  Partial weightbearing    Oxygen  TTWB    PEG tube  NWB    Visual deficits  Other:    Hearing deficits          Treatment Objectives:     Mobility Training:   Assist level Comments    Bed mobility MOD I Supine > sit   Transfer     Gait CGA X100ft using LBQC on LUE with 2-point gait pattern; pt able to advance RLE independently needing minimal cues for foot clearance   Sit to stand transitions CGA From wc level    Sitting balance     Standing balance  CGA Standing on firm surface reaching for cones with LUE with minimal trunk excursions    Wheelchair mobility     Car transfer     Other:          Therapeutic Exercise:   Exercise Sets Reps Comments                               Additional Comments:  Pt. Refusing to wear AFO, but demonstrates appropriate foot clearance during gait, therefore will trial without today    Assessment: Patient tolerated session well. He is demonstrating increased participation, increased NM control, and is progressing with functional mobility each day, requiring less physical assistance. Patient will continue to benefit from skilled PT services to regain functional independence for safe return home.    PT Plan:  continue POC  Revisions made to plan of care: No    GOALS:   Multidisciplinary Problems       Physical Therapy Goals          Problem: Physical Therapy    Goal Priority Disciplines Outcome Goal Variances Interventions   Physical Therapy Goal     PT, PT/OT Ongoing, Progressing     Description: Goals to be met by: Discharge     Patient will increase functional independence with mobility by performin. Supine to sit with Modified [place]  2. Sit to supine with Modified [place]  3. Sit to stand transfer with Supervision  4. Gait  x 150 feet with Contact Guard Assistance using Quad Cane vs LRAD.                          Skilled PT Minutes Provided: 25 minutes   Communication with Treatment Team:     Equipment recommendations:       At end of treatment, patient remained:   Up in chair     Up in wheelchair in room    In bed    With alarm activated    Bed rails up    Call bell in reach     Family/friends present    Restraints secured properly    In bathroom with CNA/RN notified    Nurse aware   x In gym with therapist/tech    Other:

## 2023-03-02 NOTE — PT/OT/SLP PROGRESS
Occupational Therapy  Treatment    Name: Bhupendra Park    : 1960 (63 y.o.)  MRN: [id number]           TREATMENT SUMMARY AND RECOMMENDATIONS:      OT Date of Treatment: 23  OT Start Time: 1500  OT Stop Time: 1530  OT Total Time (min): 30 min      Subjective Assessment:   No complaints  Lethargic   x Awake, alert, cooperative  Impulsive    Uncooperative   Flat affect    Agitated  c/o pain    Appropriate  c/o fatigue    Confused x Treated at bedside     Emotionally labile x Treated in gym/dept.      Other:        Therapy Precautions:   x Cognitive deficits  Spinal precautions    Collar - hard  Sternal precautions    Collar - soft   TLSO   x Fall risk  LSO    Hip precautions - posterior  Knee immobilizer    Hip precautions - anterior  WBAT    Impaired communication  Partial weightbearing    Oxygen  TTWB    PEG tube  NWB    Visual deficits      Hearing deficits   Other:        Treatment Objectives:     Mobility Training:    Mobility task Assist level Comments    Bed mobility SBA Supine>EOB   Transfer CGA Stand/pivot t/f with LBQC   Sit to stands transitions     Functional mobility Min A X150 feet with LBQC; cues for scissoring and attending to task d/t distractions in hallway making him lose his balance    Sitting balance     Standing balance  CGA Pt stood without support and participated in balloon volleyball ax to hit back<>forth with tech using LUE. Pt had 1 LOB posteriorly but able to self-correct. Cues to placement of feet for proper base of support.    Other:        ADL Training:    ADL Assist level Comments    Feeding     Grooming/hygiene     Bathing     Upper body dressing     Lower body dressing Min A Pt donned L shoe; OT donned R AFO/shoe   Toileting     Toilet transfer     Adaptive equipment training     Other:         Additional Comments: pt continues to make progress and show improvements with gait, balance, and ADL tasks. Recommend continue OT/PT for increased safety and independence in home  environment.       Assessment: Patient tolerated session well.    OT Plan: Continue POC  Revisions made to plan of care: No    GOALS:   Multidisciplinary Problems       Occupational Therapy Goals          Problem: Occupational Therapy    Goal Priority Disciplines Outcome Interventions   Occupational Therapy Goal     OT, PT/OT Ongoing, Progressing    Description: Goals to be met by: 3/10/23     Patient will increase functional independence with ADLs by performing:    UE Dressing with Modified [place].-- GOAL MET   LE Dressing with Modified [place].-- continue  Toileting from toilet with Stand-by Assistance for hygiene and clothing management. -- continue   Toilet transfer to toilet with Supervision.-- continue                          Skilled OT Minutes Provided: 30  Communication with Treatment Team:     Equipment recommendations:       At end of treatment, patient remained:   Up in chair     Up in wheelchair in room    In bed    With alarm activated    Bed rails up    Call bell in reach     Family/friends present    Restraints secured properly    In bathroom with CNA/RN notified   x In gym with PT/PTA/tech    Nurse aware    Other:

## 2023-03-02 NOTE — PLAN OF CARE
Weekly Staffing Report      Date Admitted: 2/9/2023 :   Staffing Date: 3/1/2023     Patient Active Problem List   Diagnosis    Right spastic hemiplegia    Intraparenchymal hemorrhage of brain    Hypothyroidism    BPH (benign prostatic hyperplasia)          Team Members Present:       Nursing Present     Yes [x]    No []    Physical Therapy Present    Yes [x]    No []    Occupational Therapy Present     Yes [x]    No []    Speech Therapy Present    Yes [x]    No []    NA []    Dietary Present    Yes [x]    No []        Physician Present   [] Duane Carney    [x] Thairy Reyes    [] KEVIN Tian    [] JAHAIRA Duarte     [] GENEVA Ramachandran      Family Present    [] Adult Children    [x] Spouse    [] POA    [] Friend/ Caregiver    [] Other       Interdisciplinary Meeting Notes:    Staffing doing very good with PT OT ST will continue to work with them requesting more days from insurance              Please see Documented PT/OT/ST, Dietary, Nursing, and Physician notes for detailed treatment information.

## 2023-03-02 NOTE — ASSESSMENT & PLAN NOTE
-Continue with Keppra, atorvastatin, blood pressure goal less than 140/90  -on 02/06/2023 recommendation was documented that patient will need an MRI brain with and without contrast in 4-6 weeks to rule out hemorrhagic infarct or mass and patient is to be followed by Dr. Figueroa in [place]

## 2023-03-03 NOTE — PT/OT/SLP PROGRESS
Occupational Therapy  Treatment    Name: Bhupendra Park    : 1960 (63 y.o.)  MRN: [id number]           TREATMENT SUMMARY AND RECOMMENDATIONS:      OT Date of Treatment: 23  OT Start Time: 1430  OT Stop Time: 1500  OT Total Time (min): 30 min      Subjective Assessment:   No complaints  Lethargic   x Awake, alert, cooperative  Impulsive    Uncooperative   Flat affect    Agitated  c/o pain    Appropriate  c/o fatigue    Confused  Treated at bedside     Emotionally labile x Treated in gym/dept.      Other:        Therapy Precautions:    Cognitive deficits  Spinal precautions    Collar - hard  Sternal precautions    Collar - soft   TLSO   x Fall risk  LSO    Hip precautions - posterior  Knee immobilizer    Hip precautions - anterior  WBAT    Impaired communication  Partial weightbearing    Oxygen  TTWB    PEG tube  NWB    Visual deficits      Hearing deficits   Other:        Treatment Objectives:     Mobility Training:    Mobility task Assist level Comments    Bed mobility SBA EOB>supine   Transfer CGA Stand/pivot t/f w/c>EOB   Sit to stands transitions     Functional mobility CGA/min A X150 feet with LBQC; occasional min A needed d/t LOB from increased distractions or moving too quickly    Sitting balance     Standing balance  Min A Pt stood with no UE support, performed reach across midline and to low stool with LUE to grasp clothespins and then place onto pole above shoulder level.    Other:        ADL Training:    ADL Assist level Comments    Feeding     Grooming/hygiene     Bathing     Upper body dressing     Lower body dressing     Toileting CGA Pt diaper soiled; pt able to wipe up self in standing and perform management of shorts on/off hips   Toilet transfer     Adaptive equipment training     Other:           Therapeutic Exercise:   Exercise Sets Reps Comments   2# dumbbell 1 15 LUE chest press, shoulder press, shoulder abd/add, straight arm raises    Sit to stands 2 5 reps CGA from w/c level                     Additional Comments:      Assessment: Patient tolerated session well.    OT Plan: Continue POC  Revisions made to plan of care: No    GOALS:   Multidisciplinary Problems       Occupational Therapy Goals          Problem: Occupational Therapy    Goal Priority Disciplines Outcome Interventions   Occupational Therapy Goal     OT, PT/OT Ongoing, Progressing    Description: Goals to be met by: 3/10/23     Patient will increase functional independence with ADLs by performing:    UE Dressing with Modified [place].-- GOAL MET   LE Dressing with Modified [place].-- continue  Toileting from toilet with Stand-by Assistance for hygiene and clothing management. -- continue   Toilet transfer to toilet with Supervision.-- continue                          Skilled OT Minutes Provided: 30  Communication with Treatment Team:     Equipment recommendations:       At end of treatment, patient remained:   Up in chair     Up in wheelchair in room   x In bed   x With alarm activated   x Bed rails up   x Call bell in reach     Family/friends present   x Restraints secured properly    In bathroom with CNA/RN notified    In gym with PT/PTA/tech    Nurse aware    Other:

## 2023-03-03 NOTE — PT/OT/SLP PROGRESS
Speech Language Pathology Treatment    Patient Name:  Bhupendra Park   MRN:  [id number]  Admitting Diagnosis: Intraparenchymal hemorrhage of brain    Recommendations:                 General Recommendations:  Dysphagia therapy and Speech/language therapy  Diet recommendations:  Regular Diet - IDDSI Level 7, Liquid Diet Level: Thin liquids - IDDSI Level 0   Aspiration Precautions: 1 bite/sip at a time, Alternating bites/sips, Assistance with meals, Check for pocketing/oral residue, HOB to 90 degrees, Meds whole 1 at a time, Small bites/sips, and Standard aspiration precautions   General Precautions: Standard, fall  Communication strategies:  eye glasses, yes/no questions only, provide increased time to answer, and go to room if call light pushed    Subjective     Handoff from PT. Pt seen outside of hospital for session.  Pt returned to [facility] upon conclusion of session w/ alarm on and roll belt in place. Pt's parents present to visit.    Patient goals:     Pain/Comfort:       Respiratory Status: Room air    Objective:     Has the patient been evaluated by SLP for swallowing?   Yes  Keep patient NPO?     Current Respiratory Status:        Pt participated in simple conversational tasks regarding his morning and therapy. Pt able to convey messages w/ moderate length sentences. Increased time required for processing and word finding.  Pt answered WH Qs w/ 50% acc I'ly, increasing to 90% acc modA.  Pt distractible by cars and visitors while sitting outside of hospital.    Overall good session.  Cont SLP POC.    Assessment:     Bhupendra Park is a 63 y.o. male with an SLP diagnosis of Aphasia and Dysphagia.  He presents with expressive and receptive aphasia.     Goals:   Multidisciplinary Problems       SLP Goals          Problem: SLP    Goal Priority Disciplines Outcome   SLP Goal     SLP Ongoing, Progressing   Description: LTG:   Pt will tolerate LRD w/o overt s/s of aspiration.  Pt will improve expressive and  receptive language skills in order to effectively communicate wants and needs Manish.    STG:  Pt will tolerate minced and moist w/ good oral clearance and w/o overt s/s of aspiration. Goal met  Pt will participate in trials of soft and bite sized w/ good oral clearance and w/o overt s/s of aspiration. Goal met  Pt will answer 2U YNQs w/ 80% acc Manish.  Pt will follow 1-step commands w/ 80% acc Manish.  Pt will ID objects in Fo3 w/ 80% acc Manish. Goal met  Pt will complete auto speech tasks w/ 80% acc Manish. Goal met  Pt will name common objects w/ 80% acc Manish. Goal met  Pt will complete trials of regular w/ good oral clearance and w/o overt s/s of aspiration. Goal met  Pt will answer WH Qs w/ 80% acc Manish.                       Plan:     Patient to be seen:   (PRN)   Plan of Care expires:  03/10/23  Plan of Care reviewed with:  patient   SLP Follow-Up:  Yes       Discharge recommendations:  outpatient speech therapy, outpatient OT, outpatient PT   Barriers to Discharge:  Level of Skilled Assistance Needed see PT/OT notes    Time Tracking:     SLP Treatment Date:  3/3/23  Speech Start Time:  10:25  Speech Stop Time:  11:00  Speech Total Time (min): 35 min    Billable Minutes: Speech Therapy Individual 35     Adamaris Carrion M.S., CCC-SLP   03/03/2023

## 2023-03-03 NOTE — PLAN OF CARE
Received [facility]. Notified patient's wife of Last covered date 03/05/2023 with discharge date 03/06/2023. Explained the appeal process at this time. Wife stated that she did not want to file an appeal at this time. [facility] faxed back at this time.

## 2023-03-03 NOTE — PT/OT/SLP PROGRESS
Physical Therapy Treatment Note           Name: Bhupendra Park    : 1960 (63 y.o.)  MRN: [id number]           TREATMENT SUMMARY AND RECOMMENDATIONS:    PT Received On: 23  PT Start Time: 1345     PT Stop Time: 1408  PT Total Time (min): 23 min     Subjective Assessment:   No complaints  Lethargic   x Awake, alert, cooperative  Uncooperative    Agitated  c/o pain    Appropriate  c/o fatigue    Confused  Treated at bedside     Emotionally labile x Treated in gym/dept.    Impulsive  Other:    Flat affect       Therapy Precautions:    Cognitive deficits  Spinal precautions    Collar - hard  Sternal precautions    Collar - soft   TLSO   x Fall risk  LSO    Hip precautions - posterior  Knee immobilizer    Hip precautions - anterior  WBAT    Impaired communication  Partial weightbearing    Oxygen  TTWB    PEG tube  NWB    Visual deficits x Other:R side weakness from CVA    Hearing deficits          Treatment Objectives:     Mobility Training:   Assist level Comments    Bed mobility CGA Supine>sit   Transfer SBA  Stand pivot   Gait CGA X 150 feet using LBQC, AFO.   Sit to stand transitions SBA/CGA From    Sitting balance     Standing balance      Wheelchair mobility     Car transfer     Other:          Therapeutic Exercise:   Exercise Sets Reps Comments                               Additional Comments:  Good participation and progress being demonstrated. Improved foot clearance demonstrated from this morning. PT to continue progressing as tolerated.     Assessment: Patient tolerated session well.    PT Plan: continue per POC  Revisions made to plan of care: No    GOALS:   Multidisciplinary Problems       Physical Therapy Goals          Problem: Physical Therapy    Goal Priority Disciplines Outcome Goal Variances Interventions   Physical Therapy Goal     PT, PT/OT Ongoing, Progressing     Description: Goals to be met by: Discharge     Patient will increase functional independence with mobility by  performin. Supine to sit with Modified [place]  2. Sit to supine with Modified [place]  3. Sit to stand transfer with Supervision  4. Gait  x 150 feet with Contact Guard Assistance using Quad Cane vs LRAD.                          Skilled PT Minutes Provided: 23   Communication with Treatment Team:     Equipment recommendations:       At end of treatment, patient remained:  x Up in chair     Up in wheelchair in room    In bed    With alarm activated    Bed rails up    Call bell in reach     Family/friends present    Restraints secured properly    In bathroom with CNA/RN notified    Nurse aware   x In gym with therapist/tech    Other:

## 2023-03-03 NOTE — PLAN OF CARE
Problem: Occupational Therapy  Goal: Occupational Therapy Goal  Description: Goals to be met by: 3/10/23     Patient will increase functional independence with ADLs by performing:    UE Dressing with Modified [place].-- GOAL MET   LE Dressing with Modified [place].-- continue  Toileting from toilet with Stand-by Assistance for hygiene and clothing management. -- continue   Toilet transfer to toilet with Supervision.-- continue     Outcome: Ongoing, Progressing

## 2023-03-03 NOTE — PLAN OF CARE
Problem: Adult Inpatient Plan of Care  Goal: Plan of Care Review  Outcome: Ongoing, Progressing  Goal: Patient-Specific Goal (Individualized)  Outcome: Ongoing, Progressing  Flowsheets (Taken 3/3/2023 0104)  Anxieties, Fears or Concerns: hospitalization  Individualized Care Needs: fall prevention  Goal: Absence of Hospital-Acquired Illness or Injury  Outcome: Ongoing, Progressing  Intervention: Identify and Manage Fall Risk  Flowsheets (Taken 3/3/2023 0104)  Safety Promotion/Fall Prevention:   assistive device/personal item within reach   bed alarm set   nonskid shoes/socks when out of bed   Roll belt self-releasing   room near unit station   /camera at bedside  Intervention: Prevent Skin Injury  Flowsheets (Taken 3/3/2023 0104)  Body Position: supine  Skin Protection:   adhesive use limited   incontinence pads utilized   tubing/devices free from skin contact  Goal: Optimal Comfort and Wellbeing  Outcome: Ongoing, Progressing  Goal: Readiness for Transition of Care  Outcome: Ongoing, Progressing     Problem: Skin Injury Risk Increased  Goal: Skin Health and Integrity  Outcome: Ongoing, Progressing  Intervention: Optimize Skin Protection  Flowsheets (Taken 3/3/2023 0104)  Pressure Reduction Devices: positioning supports utilized  Skin Protection:   adhesive use limited   incontinence pads utilized   tubing/devices free from skin contact  Head of Bed (HOB) Positioning: HOB at 30-45 degrees     Problem: Fall Injury Risk  Goal: Absence of Fall and Fall-Related Injury  Outcome: Ongoing, Progressing  Intervention: Identify and Manage Contributors  Flowsheets (Taken 3/3/2023 0104)  Self-Care Promotion:   independence encouraged   safe use of adaptive equipment encouraged   BADL personal objects within reach   BADL personal routines maintained   meal set-up provided  Medication Review/Management: medications reviewed     Problem: Infection  Goal: Absence of Infection Signs and Symptoms  Outcome: Ongoing,  Progressing  Intervention: Prevent or Manage Infection  Flowsheets (Taken 3/3/2023 0104)  Isolation Precautions: protective

## 2023-03-03 NOTE — PT/OT/SLP PROGRESS
Occupational Therapy  Treatment    Name: Bhupendra Park    : 1960 (63 y.o.)  MRN: [id number]           TREATMENT SUMMARY AND RECOMMENDATIONS:      OT Date of Treatment: 23  OT Start Time: 930  OT Stop Time:   OT Total Time (min): 30 min      Subjective Assessment:   No complaints  Lethargic   x Awake, alert, cooperative  Impulsive    Uncooperative   Flat affect    Agitated  c/o pain    Appropriate  c/o fatigue    Confused x Treated at bedside     Emotionally labile  Treated in gym/dept.      Other:        Therapy Precautions:   x Cognitive deficits  Spinal precautions    Collar - hard  Sternal precautions    Collar - soft   TLSO   x Fall risk  LSO    Hip precautions - posterior  Knee immobilizer    Hip precautions - anterior  WBAT    Impaired communication  Partial weightbearing    Oxygen  TTWB    PEG tube  NWB    Visual deficits      Hearing deficits   Other:        Treatment Objectives:     Mobility Training:    Mobility task Assist level Comments    Bed mobility     Transfer CGA Stand/pivot t/f with LBQC   Sit to stands transitions CGA    Functional mobility Min A X150 feet with LBQC   Sitting balance     Standing balance      Other:        ADL Training:    ADL Assist level Comments    Feeding     Grooming/hygiene     Bathing     Upper body dressing Mod (I)  Pt donned pullover shirt   Lower body dressing CGA Pt able to drew shorts, B socks, R AFO and B shoes. CGA only needed when managing over hips however approaching SBA    Toileting     Toilet transfer     Adaptive equipment training     Other:           Therapeutic Exercise:   Exercise Sets Reps Comments   PROM RUE 1 10 Shoulder flex/ext, shoulder abd/add, elbow flex/ext, wrist flex/ext                          Additional Comments:      Assessment: Patient tolerated session well.    OT Plan: Continue POC  Revisions made to plan of care: No    GOALS:   Multidisciplinary Problems       Occupational Therapy Goals          Problem: Occupational  Therapy    Goal Priority Disciplines Outcome Interventions   Occupational Therapy Goal     OT, PT/OT Ongoing, Progressing    Description: Goals to be met by: 3/10/23     Patient will increase functional independence with ADLs by performing:    UE Dressing with Modified [place].-- GOAL MET   LE Dressing with Modified [place].-- continue  Toileting from toilet with Stand-by Assistance for hygiene and clothing management. -- continue   Toilet transfer to toilet with Supervision.-- continue                          Skilled OT Minutes Provided: 30  Communication with Treatment Team:     Equipment recommendations:       At end of treatment, patient remained:   Up in chair     Up in wheelchair in room    In bed    With alarm activated    Bed rails up    Call bell in reach     Family/friends present    Restraints secured properly    In bathroom with CNA/RN notified   x In gym with PT/PTA/tech    Nurse aware    Other:

## 2023-03-03 NOTE — PLAN OF CARE
Problem: Adult Inpatient Plan of Care  Goal: Plan of Care Review  Outcome: Ongoing, Progressing  Flowsheets (Taken 3/3/2023 1639)  Plan of Care Reviewed With: patient  Goal: Patient-Specific Goal (Individualized)  Outcome: Ongoing, Progressing  Flowsheets (Taken 3/3/2023 1639)  Anxieties, Fears or Concerns: hospitalization  Individualized Care Needs: Fall prevention  Goal: Absence of Hospital-Acquired Illness or Injury  Outcome: Ongoing, Progressing  Intervention: Identify and Manage Fall Risk  Flowsheets (Taken 3/3/2023 1639)  Safety Promotion/Fall Prevention:   assistive device/personal item within reach   bed alarm set   chair alarm set   Fall Risk reviewed with patient/family   Fall Risk signage in place   in recliner, wheels locked   lighting adjusted   medications reviewed   nonskid shoes/socks when out of bed   side rails raised x 3   instructed to call staff for mobility  Intervention: Prevent Skin Injury  Flowsheets (Taken 3/3/2023 1639)  Body Position:   position changed independently   sitting up in bed   supine   weight shifting  Skin Protection:   adhesive use limited   incontinence pads utilized  Intervention: Prevent and Manage VTE (Venous Thromboembolism) Risk  Flowsheets (Taken 3/3/2023 1639)  Activity Management:   Sitting at edge of bed - L2   Standing - L3   Rolling - L1   Up in chair - L3  VTE Prevention/Management:   ambulation promoted   fluids promoted   bleeding precations maintained  Range of Motion: active ROM (range of motion) encouraged  Intervention: Prevent Infection  Flowsheets (Taken 3/3/2023 1639)  Infection Prevention:   cohorting utilized   environmental surveillance performed   equipment surfaces disinfected   hand hygiene promoted   single patient room provided   rest/sleep promoted  Goal: Optimal Comfort and Wellbeing  Outcome: Ongoing, Progressing  Intervention: Monitor Pain and Promote Comfort  Flowsheets (Taken 3/3/2023 1639)  Pain Management Interventions:   care  clustered   pillow support provided   position adjusted  Intervention: Provide Person-Centered Care  Flowsheets (Taken 3/3/2023 1639)  Trust Relationship/Rapport:   care explained   questions encouraged   reassurance provided   questions answered  Goal: Readiness for Transition of Care  Outcome: Ongoing, Progressing     Problem: Skin Injury Risk Increased  Goal: Skin Health and Integrity  Outcome: Ongoing, Progressing  Intervention: Optimize Skin Protection  Flowsheets (Taken 3/3/2023 1639)  Pressure Reduction Techniques: frequent weight shift encouraged  Pressure Reduction Devices: positioning supports utilized  Skin Protection:   adhesive use limited   incontinence pads utilized  Head of Bed (HOB) Positioning:   HOB flat   HOB at 20-30 degrees   HOB at 45 degrees   HOB at 60-90 degrees  Intervention: Promote and Optimize Oral Intake  Flowsheets (Taken 3/3/2023 1639)  Oral Nutrition Promotion:   calorie-dense foods provided   calorie-dense liquids provided   rest periods promoted   physical activity promoted   safe use of adaptive equipment encouraged     Problem: Fall Injury Risk  Goal: Absence of Fall and Fall-Related Injury  Outcome: Ongoing, Progressing  Intervention: Identify and Manage Contributors  Flowsheets (Taken 3/3/2023 1639)  Self-Care Promotion:   independence encouraged   BADL personal objects within reach   meal set-up provided   safe use of adaptive equipment encouraged  Intervention: Promote Injury-Free Environment  Flowsheets (Taken 3/3/2023 1639)  Safety Promotion/Fall Prevention:   assistive device/personal item within reach   bed alarm set   chair alarm set   Fall Risk reviewed with patient/family   Fall Risk signage in place   in recliner, wheels locked   lighting adjusted   medications reviewed   nonskid shoes/socks when out of bed   side rails raised x 3   instructed to call staff for mobility     Problem: Infection  Goal: Absence of Infection Signs and Symptoms  Outcome: Ongoing,  Progressing  Intervention: Prevent or Manage Infection  Flowsheets (Taken 3/3/2023 6816)  Infection Management: aseptic technique maintained

## 2023-03-03 NOTE — PLAN OF CARE
Spoke with Kelli at [facility] to update that Pt's DC will be Monday 3/6. Kelli stated the HB has been approved by [facility]. I asked Kelli to have someone call the Pt's wife to explain cost of rental and set up delivery.   Kelli confirmed the Pt's wife will be called.

## 2023-03-03 NOTE — PROGRESS NOTES
[facility]  Hospital Medicine Progress Note        Chief Complaint: Inpatient Follow-up for     HPI:   63-year-old male with HTN, left frontal IPH, prior cervical spine surgery status post spinal cord stimulator who presented to ED with acute encephalopathy and was subsequently found to have recurrent right frontal intraparenchymal hemorrhage. Neurosurgery recommended medical management. Serial CTs showed stability of bleed. Transferred to our facility for continued rehabilitation.   Majority of patient's information obtained from medical records as patient has receptive and expressive aphasia and is able to answer only with yes / no.                   Overview/Hospital Course:    Patient admitted for rehabilitation with speech therapy, occupational therapy and physical therapy after intracranial hemorrhage. Patient's wife brought  from home for spinal stimulator.  Patient eating with supervision with his left hand.  Somnolence appears to be resolved and pt back to his baseline demeanor. On 2/26 patient had an unwitnessed fall and he was found kneeling on his left knee.  He denies any pain on evaluation, no acute osseous abnormality on x-ray of left knee.              Interval History:  Patient is ambulating 100ft with quad cane but is making progress on ambulating without a cane and contact guard assistance. He is ambulating his right leg on his own which is significant amount of progress.  Min assistance for dressing.  Diet upgraded to regular diet on 03/02    March 3, 2023, patient was sitting at the chair watching television, no complaint, no fever, no shortness for breath        Objective/physical exam:  General: In no acute distress, afebrile  Chest: Clear to auscultation bilaterally  Heart: RRR, +S1, S2, no appreciable murmur  Abdomen: Soft, nontender, BS +  MSK: Warm, no lower extremity edema, no clubbing or cyanosis  Neurologic: Alert and oriented x4, Cranial nerve  II-XII intact, right arm strength 3/5, right leg strength 3/5    VITAL SIGNS: 24 HRS MIN & MAX LAST   Temp  Min: 97.6 °F (36.4 °C)  Max: 98.6 °F (37 °C) 98.4 °F (36.9 °C)   BP  Min: 96/67  Max: 122/73 103/64   Pulse  Min: 59  Max: 66  65   Resp  Min: 17  Max: 18 18   SpO2  Min: 96 %  Max: 100 % 96 %       Recent Labs   Lab 03/01/23  0325   WBC 4.7   RBC 4.38*   HGB 12.6*   HCT 37.4*   MCV 85.4   MCH 28.8   MCHC 33.7   RDW 12.4   *   MPV 10.4       Recent Labs   Lab 03/01/23  0325      K 3.8   CO2 28   BUN 14.7   CREATININE 0.81   CALCIUM 9.5          Microbiology Results (last 7 days)       ** No results found for the last 168 hours. **             See below for Radiology    Scheduled Med:   atorvastatin  40 mg Oral Daily    famotidine  20 mg Oral BID    levETIRAcetam  500 mg Oral BID    levothyroxine  88 mcg Oral QAM    tamsulosin  0.4 mg Oral Daily        Continuous Infusions:       PRN Meds:  acetaminophen, albuterol **AND** ipratropium, aluminum-magnesium hydroxide-simethicone, bisacodyL, dextrose 10 % in water (D10W), dextrose 10 % in water (D10W), glucagon (human recombinant), glucose, glucose, HYDROcodone-acetaminophen, melatonin, morphine, naloxone, ondansetron, ondansetron, polyethylene glycol, simethicone, sodium chloride 0.9%       Assessment/Plan:  Intraparenchymal hemorrhage of brain    -Continue with Keppra, atorvastatin, blood pressure goal less than 140/90    -on 02/06/2023 recommendation was documented that patient will need an MRI brain with and without contrast in 4-6 weeks to rule out hemorrhagic infarct or mass and patient is to be followed by Dr. Figueroa in [place]              Right spastic hemiplegia    -2/2 history of ICH and CVA    -continue PT/OT              Hypothyroidism    -continue with levothyroxine 88mcg              BPH (benign prostatic hyperplasia)    - continue with tamsulosin    VTE prophylaxis:     Patient condition:  Stable/Fair/Guarded/ Serious/  Critical    Anticipated discharge and Disposition:         All diagnosis and differential diagnosis have been reviewed; assessment and plan has been documented; I have personally reviewed the labs and test results that are presently available; I have reviewed the patients medication list; I have reviewed the consulting providers response and recommendations. I have reviewed or attempted to review medical records based upon their availability    All of the patient's questions have been  addressed and answered. Patient's is agreeable to the above stated plan. I will continue to monitor closely and make adjustments to medical management as needed.  _____________________________________________________________________    Nutrition Status:    Radiology:  X-Ray Knee 1 or 2 View Left  Narrative: EXAMINATION:  XR KNEE 1 OR 2 VIEW LEFT    CLINICAL HISTORY:  fall;    TECHNIQUE:  Two-view    COMPARISON:  None available    FINDINGS:  Articular surfaces alignment is preserved.  No acute fracture or dislocation identified.  No significant degenerative osteoarthritic changes.  Impression: No acute osseous abnormality identified.    Electronically signed by: Atif Sandoval  Date:    02/26/2023  Time:    20:57      Alvino Gardner MD   03/03/2023

## 2023-03-03 NOTE — PT/OT/SLP PROGRESS
Physical Therapy Treatment Note           Name: Bhupendra Park    : 1960 (63 y.o.)  MRN: [id number]           TREATMENT SUMMARY AND RECOMMENDATIONS:    PT Received On: 23  PT Start Time: 1000     PT Stop Time: 1028  PT Total Time (min): 28 min     Subjective Assessment:  x No complaints  Lethargic   x Awake, alert, cooperative  Uncooperative    Agitated  c/o pain    Appropriate  c/o fatigue    Confused  Treated at bedside     Emotionally labile x Treated in gym/dept.    Impulsive  Other:    Flat affect       Therapy Precautions:    Cognitive deficits  Spinal precautions    Collar - hard  Sternal precautions    Collar - soft   TLSO   x Fall risk  LSO    Hip precautions - posterior  Knee immobilizer    Hip precautions - anterior  WBAT    Impaired communication  Partial weightbearing    Oxygen  TTWB    PEG tube  NWB    Visual deficits x Other: AFO R LE    Hearing deficits          Treatment Objectives:     Mobility Training:   Assist level Comments    Bed mobility     Transfer     Gait MIN A X 125 feet using LBQC, 3 losses of balance demonstrated needing assistance for recovery. Dynamic stepping patterns completed using a square for lateral, anterior and posterior patterns.    Sit to stand transitions SBA From    Sitting balance     Standing balance      Wheelchair mobility     Car transfer     Other:          Therapeutic Exercise:   Exercise Sets Reps Comments   LE Bike using UBE in sitting  10' 5'F/5'B                         Additional Comments:  More cues needed for safety during ambulation due to fatigue. Further PT warranted to maximize safety for home. PT progressing as able.     Assessment: Patient tolerated session well.    PT Plan: Continue per POC  Revisions made to plan of care: No    GOALS:   Multidisciplinary Problems       Physical Therapy Goals          Problem: Physical Therapy    Goal Priority Disciplines Outcome Goal Variances Interventions   Physical Therapy Goal     PT,  PT/OT Ongoing, Progressing     Description: Goals to be met by: Discharge     Patient will increase functional independence with mobility by performin. Supine to sit with Modified [place]  2. Sit to supine with Modified [place]  3. Sit to stand transfer with Supervision  4. Gait  x 150 feet with Contact Guard Assistance using Quad Cane vs LRAD.                          Skilled PT Minutes Provided: 28   Communication with Treatment Team:     Equipment recommendations:       At end of treatment, patient remained:  x Up in chair     Up in wheelchair in room    In bed    With alarm activated    Bed rails up    Call bell in reach     Family/friends present    Restraints secured properly    In bathroom with CNA/RN notified    Nurse aware   x In gym with therapist/tech    Other:

## 2023-03-03 NOTE — PLAN OF CARE
[facility] - Medical Surgical Unit  Discharge Reassessment    Primary Care Provider: Kelly Meadows MD    Expected Discharge Date:     Reassessment (most recent)       Discharge Reassessment - 03/01/23 1841          Discharge Reassessment    Assessment Type Discharge Planning Reassessment     Did the patient's condition or plan change since previous assessment? No     Discharge Plan discussed with: Spouse/sig other     Name(s) and Number(s) Sharon wife      Communicated ABRAHAM with patient/caregiver Date not available/Unable to determine     Discharge Plan A Home with family;Home Health     DME Needed Upon Discharge  bedside commode;cane, straight     Discharge Barriers Identified None     Why the patient remains in the hospital Requires continued medical care        Post-Acute Status    Post-Acute Authorization Home Health     Home Health Status Pending medical clearance/testing     Hospital Resources/Appts/Education Provided Provided patient/caregiver with written discharge plan information     Patient choice form signed by patient/caregiver List with quality metrics by geographic area provided     Discharge Delays None known at this time

## 2023-03-04 NOTE — PLAN OF CARE
Problem: Adult Inpatient Plan of Care  Goal: Plan of Care Review  Outcome: Ongoing, Progressing  Flowsheets (Taken 3/4/2023 1325)  Plan of Care Reviewed With: patient  Goal: Patient-Specific Goal (Individualized)  Outcome: Ongoing, Progressing  Flowsheets (Taken 3/4/2023 1325)  Anxieties, Fears or Concerns: Not at this time  Individualized Care Needs: allow extra time for communication  Goal: Absence of Hospital-Acquired Illness or Injury  Outcome: Ongoing, Progressing  Intervention: Identify and Manage Fall Risk  Flowsheets (Taken 3/4/2023 1325)  Safety Promotion/Fall Prevention:   assistive device/personal item within reach   bed alarm set   Fall Risk reviewed with patient/family   Fall Risk signage in place   lighting adjusted   medications reviewed   muscle strengthening facilitated   nonskid shoes/socks when out of bed   side rails raised x 3   instructed to call staff for mobility   room near unit station   Roll belt self-releasing   /camera at bedside  Intervention: Prevent Skin Injury  Flowsheets (Taken 3/4/2023 1325)  Body Position:   sitting up in bed   supine   weight shifting   position changed independently  Skin Protection:   adhesive use limited   incontinence pads utilized  Intervention: Prevent and Manage VTE (Venous Thromboembolism) Risk  Flowsheets (Taken 3/4/2023 1325)  Activity Management: Rolling - L1  VTE Prevention/Management:   bleeding precations maintained   ambulation promoted   fluids promoted  Range of Motion: active ROM (range of motion) encouraged  Intervention: Prevent Infection  Flowsheets (Taken 3/4/2023 1325)  Infection Prevention:   cohorting utilized   environmental surveillance performed   equipment surfaces disinfected   hand hygiene promoted   single patient room provided   rest/sleep promoted  Goal: Optimal Comfort and Wellbeing  Outcome: Ongoing, Progressing  Intervention: Monitor Pain and Promote Comfort  Flowsheets (Taken 3/4/2023 1325)  Pain Management  Interventions:   care clustered   medication offered   pillow support provided   position adjusted  Intervention: Provide Person-Centered Care  Flowsheets (Taken 3/4/2023 1325)  Trust Relationship/Rapport:   care explained   questions encouraged   questions answered  Goal: Readiness for Transition of Care  Outcome: Ongoing, Progressing     Problem: Skin Injury Risk Increased  Goal: Skin Health and Integrity  Outcome: Ongoing, Progressing  Intervention: Optimize Skin Protection  Flowsheets (Taken 3/4/2023 1325)  Pressure Reduction Techniques:   frequent weight shift encouraged   weight shift assistance provided  Pressure Reduction Devices: positioning supports utilized  Skin Protection:   adhesive use limited   incontinence pads utilized  Head of Bed (HOB) Positioning:   HOB at 20-30 degrees   HOB at 30-45 degrees   HOB at 60-90 degrees  Intervention: Promote and Optimize Oral Intake  Flowsheets (Taken 3/4/2023 1325)  Oral Nutrition Promotion:   calorie-dense foods provided   calorie-dense liquids provided   physical activity promoted   rest periods promoted   safe use of adaptive equipment encouraged     Problem: Fall Injury Risk  Goal: Absence of Fall and Fall-Related Injury  Outcome: Ongoing, Progressing  Intervention: Identify and Manage Contributors  Flowsheets (Taken 3/4/2023 1325)  Self-Care Promotion:   independence encouraged   BADL personal objects within reach   meal set-up provided   safe use of adaptive equipment encouraged  Intervention: Promote Injury-Free Environment  Flowsheets (Taken 3/4/2023 1325)  Safety Promotion/Fall Prevention:   assistive device/personal item within reach   bed alarm set   Fall Risk reviewed with patient/family   Fall Risk signage in place   lighting adjusted   medications reviewed   muscle strengthening facilitated   nonskid shoes/socks when out of bed   side rails raised x 3   instructed to call staff for mobility   room near unit station   Roll belt self-releasing   safety  attendant/camera at bedside     Problem: Infection  Goal: Absence of Infection Signs and Symptoms  Outcome: Ongoing, Progressing  Intervention: Prevent or Manage Infection  Flowsheets (Taken 3/4/2023 1325)  Infection Management: aseptic technique maintained

## 2023-03-04 NOTE — PLAN OF CARE
Problem: Adult Inpatient Plan of Care  Goal: Plan of Care Review  Outcome: Ongoing, Progressing  Goal: Patient-Specific Goal (Individualized)  Outcome: Ongoing, Progressing  Flowsheets (Taken 3/4/2023 0812)  Anxieties, Fears or Concerns: not at this time  Individualized Care Needs: Fall prevention  Goal: Absence of Hospital-Acquired Illness or Injury  Outcome: Ongoing, Progressing  Intervention: Prevent and Manage VTE (Venous Thromboembolism) Risk  Flowsheets (Taken 3/3/2023 2000)  VTE Prevention/Management:   ambulation promoted   bleeding precations maintained   bleeding risk assessed   fluids promoted   ROM (passive) performed  Intervention: Prevent Infection  Flowsheets (Taken 3/3/2023 2000)  Infection Prevention:   personal protective equipment utilized   hand hygiene promoted   rest/sleep promoted   single patient room provided     Problem: Skin Injury Risk Increased  Goal: Skin Health and Integrity  Outcome: Ongoing, Progressing  Intervention: Promote and Optimize Oral Intake  Flowsheets (Taken 3/3/2023 2000)  Oral Nutrition Promotion:   rest periods promoted   safe use of adaptive equipment encouraged   physical activity promoted     Problem: Fall Injury Risk  Goal: Absence of Fall and Fall-Related Injury  Outcome: Ongoing, Progressing

## 2023-03-04 NOTE — PT/OT/SLP PROGRESS
Name: Bhupendra Park    : 1960 (63 y.o.)  MRN: [id number]           Patient Unavailable      Patient unable to be seen at this time secondary to: pt refused at this time. Will resume on Monday

## 2023-03-04 NOTE — PROGRESS NOTES
[facility]  Hospital Medicine Progress Note        Chief Complaint: Inpatient Follow-up for     HPI:   63-year-old male with HTN, left frontal IPH, prior cervical spine surgery status post spinal cord stimulator who presented to ED with acute encephalopathy and was subsequently found to have recurrent right frontal intraparenchymal hemorrhage. Neurosurgery recommended medical management. Serial CTs showed stability of bleed. Transferred to our facility for continued rehabilitation.   Majority of patient's information obtained from medical records as patient has receptive and expressive aphasia and is able to answer only with yes / no.                                       Overview/Hospital Course:         Patient admitted for rehabilitation with speech therapy, occupational therapy and physical therapy after intracranial hemorrhage. Patient's wife brought  from home for spinal stimulator.  Patient eating with supervision with his left hand.  Somnolence appears to be resolved and pt back to his baseline demeanor. On 2/26 patient had an unwitnessed fall and he was found kneeling on his left knee.  He denies any pain on evaluation, no acute osseous abnormality on x-ray of left knee.                             Interval History:  Patient is ambulating 100ft with quad cane but is making progress on ambulating without a cane and contact guard assistance. He is ambulating his right leg on his own which is significant amount of progress.  Min assistance for dressing.  Diet upgraded to regular diet on 03/02         March 3, 2023, patient was sitting at the chair watching television, no complaint, no fever, no shortness for breath     March 4, 2023, patient is doing well, patient is afebrile       Interval Hx:       Objective/physical exam:  General: In no acute distress, afebrile  Chest: Clear to auscultation bilaterally  Heart: RRR, +S1, S2, no appreciable murmur  Abdomen: Soft,  nontender, BS +  MSK: Warm, no lower extremity edema, no clubbing or cyanosis  Neurologic: Alert and oriented x4, Cranial nerve II-XII intact, right arm strength 3/5    VITAL SIGNS: 24 HRS MIN & MAX LAST   Temp  Min: 97.4 °F (36.3 °C)  Max: 97.6 °F (36.4 °C) 97.6 °F (36.4 °C)   BP  Min: 100/54  Max: 107/65 103/64   Pulse  Min: 56  Max: 67  (!) 56     Resp  Min: 18  Max: 18 18   SpO2  Min: 95 %  Max: 97 % 97 %       Recent Labs   Lab 03/01/23  0325   WBC 4.7   RBC 4.38*   HGB 12.6*   HCT 37.4*   MCV 85.4   MCH 28.8   MCHC 33.7   RDW 12.4   *   MPV 10.4       Recent Labs   Lab 03/01/23  0325      K 3.8   CO2 28   BUN 14.7   CREATININE 0.81   CALCIUM 9.5          Microbiology Results (last 7 days)       ** No results found for the last 168 hours. **             See below for Radiology    Scheduled Med:   atorvastatin  40 mg Oral Daily    famotidine  20 mg Oral BID    levETIRAcetam  500 mg Oral BID    levothyroxine  88 mcg Oral QAM    tamsulosin  0.4 mg Oral Daily        Continuous Infusions:       PRN Meds:  acetaminophen, albuterol **AND** ipratropium, aluminum-magnesium hydroxide-simethicone, bisacodyL, dextrose 10 % in water (D10W), dextrose 10 % in water (D10W), glucagon (human recombinant), glucose, glucose, HYDROcodone-acetaminophen, melatonin, morphine, naloxone, ondansetron, ondansetron, polyethylene glycol, simethicone, sodium chloride 0.9%       Assessment/Plan:  Intraparenchymal hemorrhage of brain         -Continue with Keppra, atorvastatin, blood pressure goal less than 140/90         -on 02/06/2023 recommendation was documented that patient will need an MRI brain with and without contrast in 4-6 weeks to rule out hemorrhagic infarct or mass and patient is to be followed by Dr. Figueroa in [place]     Repeat brain MRI March 6, 2023                        Right spastic hemiplegia         -2/2 history of ICH and CVA         -continue PT/OT                             Hypothyroidism          -continue with levothyroxine 88mcg                             BPH (benign prostatic hyperplasia)         - continue with tamsulosin    VTE prophylaxis:     Patient condition:  Stable/Fair/Guarded/ Serious/ Critical    Anticipated discharge and Disposition:         All diagnosis and differential diagnosis have been reviewed; assessment and plan has been documented; I have personally reviewed the labs and test results that are presently available; I have reviewed the patients medication list; I have reviewed the consulting providers response and recommendations. I have reviewed or attempted to review medical records based upon their availability    All of the patient's questions have been  addressed and answered. Patient's is agreeable to the above stated plan. I will continue to monitor closely and make adjustments to medical management as needed.  _____________________________________________________________________    Nutrition Status:    Radiology:  X-Ray Knee 1 or 2 View Left  Narrative: EXAMINATION:  XR KNEE 1 OR 2 VIEW LEFT    CLINICAL HISTORY:  fall;    TECHNIQUE:  Two-view    COMPARISON:  None available    FINDINGS:  Articular surfaces alignment is preserved.  No acute fracture or dislocation identified.  No significant degenerative osteoarthritic changes.  Impression: No acute osseous abnormality identified.    Electronically signed by: Atif Sandoval  Date:    02/26/2023  Time:    20:57      Alvino Gardner MD   03/04/2023

## 2023-03-05 NOTE — PROGRESS NOTES
[facility] Hospital Medicine Progress Note        Chief Complaint: Inpatient Follow-up for     HPI:   63-year-old male with HTN, left frontal IPH, prior cervical spine surgery status post spinal cord stimulator who presented to ED with acute encephalopathy and was subsequently found to have recurrent right frontal intraparenchymal hemorrhage. Neurosurgery recommended medical management. Serial CTs showed stability of bleed. Transferred to our facility for continued rehabilitation.   Majority of patient's information obtained from medical records as patient has receptive and expressive aphasia and is able to answer only with yes / no.     Patient admitted for rehabilitation with speech therapy, occupational therapy and physical therapy after intracranial hemorrhage. Patient's wife brought  from home for spinal stimulator.  Patient eating with supervision with his left hand.  Somnolence appears to be resolved and pt back to his baseline demeanor. On 2/26 patient had an unwitnessed fall and he was found kneeling on his left knee.  He denies any pain on evaluation, no acute osseous abnormality on x-ray of left knee.     Patient is ambulating 100ft with quad cane but is making progress on ambulating without a cane and contact guard assistance. He is ambulating his right leg on his own which is significant amount of progress.  Min assistance for dressing.  Diet upgraded to regular diet on 03/02     March 3, 2023, patient was sitting at the chair watching television, no complaint, no fever, no shortness for breath    March 4, 2023, patient is doing well, patient is afebrile    March 5, 2023, no complaints, no fever, no shortness for breath        Interval Hx:       Objective/physical exam:  General: In no acute distress, afebrile  Chest: Clear to auscultation bilaterally  Heart: RRR, +S1, S2, no appreciable murmur  Abdomen: Soft, nontender, BS +  MSK: Warm, no lower extremity edema, no  clubbing or cyanosis  Neurologic: Alert and oriented x4, Cranial nerve II-XII intact, right arm strength 3/5    VITAL SIGNS: 24 HRS MIN & MAX LAST   Temp  Min: 97.3 °F (36.3 °C)  Max: 98.2 °F (36.8 °C) 97.3 °F (36.3 °C)   BP  Min: 89/45  Max: 93/61 (!) 89/45     Pulse  Min: 58  Max: 67  60   Resp  Min: 18  Max: 18 18   SpO2  Min: 96 %  Max: 99 % 96 %       Recent Labs   Lab 03/01/23  0325   WBC 4.7   RBC 4.38*   HGB 12.6*   HCT 37.4*   MCV 85.4   MCH 28.8   MCHC 33.7   RDW 12.4   *   MPV 10.4       Recent Labs   Lab 03/01/23  0325      K 3.8   CO2 28   BUN 14.7   CREATININE 0.81   CALCIUM 9.5          Microbiology Results (last 7 days)       ** No results found for the last 168 hours. **             See below for Radiology    Scheduled Med:   atorvastatin  40 mg Oral Daily    famotidine  20 mg Oral BID    levETIRAcetam  500 mg Oral BID    levothyroxine  88 mcg Oral QAM    tamsulosin  0.4 mg Oral Daily        Continuous Infusions:       PRN Meds:  acetaminophen, albuterol **AND** ipratropium, aluminum-magnesium hydroxide-simethicone, bisacodyL, dextrose 10 % in water (D10W), dextrose 10 % in water (D10W), glucagon (human recombinant), glucose, glucose, HYDROcodone-acetaminophen, melatonin, morphine, naloxone, ondansetron, ondansetron, polyethylene glycol, simethicone, sodium chloride 0.9%       Assessment/Plan:    Intraparenchymal hemorrhage of brain  -Continue with Keppra, atorvastatin, blood pressure goal less than 140/90  -on 02/06/2023 recommendation was documented that patient will need an MRI brain with and without contrast in 4-6 weeks to rule out hemorrhagic infarct or mass and patient is to be followed by Dr. Figueroa in [place]  Repeat brain MRI March 6, 2023     Right spastic hemiplegia  -2/2 history of ICH and CVA  -continue PT/OT    Hypothyroidism  -continue with levothyroxine 88mcg     BPH (benign prostatic hyperplasia)  - continue with tamsulosin    VTE prophylaxis:     Patient condition:   Stable/Fair/Guarded/ Serious/ Critical    Anticipated discharge and Disposition:         All diagnosis and differential diagnosis have been reviewed; assessment and plan has been documented; I have personally reviewed the labs and test results that are presently available; I have reviewed the patients medication list; I have reviewed the consulting providers response and recommendations. I have reviewed or attempted to review medical records based upon their availability    All of the patient's questions have been  addressed and answered. Patient's is agreeable to the above stated plan. I will continue to monitor closely and make adjustments to medical management as needed.  _____________________________________________________________________    Nutrition Status:    Radiology:  X-Ray Knee 1 or 2 View Left  Narrative: EXAMINATION:  XR KNEE 1 OR 2 VIEW LEFT    CLINICAL HISTORY:  fall;    TECHNIQUE:  Two-view    COMPARISON:  None available    FINDINGS:  Articular surfaces alignment is preserved.  No acute fracture or dislocation identified.  No significant degenerative osteoarthritic changes.  Impression: No acute osseous abnormality identified.    Electronically signed by: Atif Sandoval  Date:    02/26/2023  Time:    20:57      Alvino Gardner MD   03/05/2023

## 2023-03-05 NOTE — PLAN OF CARE
Problem: Adult Inpatient Plan of Care  Goal: Plan of Care Review  Outcome: Ongoing, Progressing  Flowsheets (Taken 3/5/2023 1049)  Plan of Care Reviewed With: patient  Goal: Patient-Specific Goal (Individualized)  Outcome: Ongoing, Progressing  Flowsheets (Taken 3/5/2023 1049)  Anxieties, Fears or Concerns: none at this time  Individualized Care Needs: give pt extra time to verbalize responses, use alternate ways to communicate with pt  Goal: Absence of Hospital-Acquired Illness or Injury  Outcome: Ongoing, Progressing  Intervention: Identify and Manage Fall Risk  Flowsheets (Taken 3/5/2023 1049)  Safety Promotion/Fall Prevention:   assistive device/personal item within reach   bed alarm set   nonskid shoes/socks when out of bed   room near unit station   Roll belt self-releasing   /camera at bedside   instructed to call staff for mobility  Intervention: Prevent Skin Injury  Flowsheets (Taken 3/5/2023 1049)  Body Position: sitting up in bed  Skin Protection:   adhesive use limited   incontinence pads utilized  Intervention: Prevent and Manage VTE (Venous Thromboembolism) Risk  Flowsheets (Taken 3/5/2023 1049)  Activity Management: Rolling - L1  VTE Prevention/Management:   bleeding risk assessed   bleeding precations maintained   ambulation promoted   fluids promoted  Range of Motion: active ROM (range of motion) encouraged  Intervention: Prevent Infection  Flowsheets (Taken 3/5/2023 1049)  Infection Prevention:   equipment surfaces disinfected   hand hygiene promoted   environmental surveillance performed  Goal: Optimal Comfort and Wellbeing  Outcome: Ongoing, Progressing  Intervention: Provide Person-Centered Care  Flowsheets (Taken 3/5/2023 1049)  Trust Relationship/Rapport:   care explained   choices provided   questions encouraged  Goal: Readiness for Transition of Care  Outcome: Ongoing, Progressing  Intervention: Mutually Develop Transition Plan  Flowsheets (Taken 3/5/2023  1049)  Communicated ABRAHAM with patient/caregiver: Yes     Problem: Skin Injury Risk Increased  Goal: Skin Health and Integrity  Outcome: Ongoing, Progressing  Intervention: Optimize Skin Protection  Flowsheets (Taken 3/5/2023 1049)  Pressure Reduction Techniques:   frequent weight shift encouraged   weight shift assistance provided   pressure points protected  Pressure Reduction Devices: positioning supports utilized  Skin Protection:   adhesive use limited   incontinence pads utilized  Head of Bed (HOB) Positioning: HOB at 30-45 degrees  Intervention: Promote and Optimize Oral Intake  Flowsheets (Taken 3/5/2023 1049)  Oral Nutrition Promotion:   calorie-dense foods provided   calorie-dense liquids provided   safe use of adaptive equipment encouraged     Problem: Fall Injury Risk  Goal: Absence of Fall and Fall-Related Injury  Outcome: Ongoing, Progressing  Intervention: Identify and Manage Contributors  Flowsheets (Taken 3/5/2023 1049)  Self-Care Promotion:   independence encouraged   BADL personal objects within reach   meal set-up provided   safe use of adaptive equipment encouraged  Medication Review/Management: medications reviewed  Intervention: Promote Injury-Free Environment  Flowsheets (Taken 3/5/2023 1049)  Safety Promotion/Fall Prevention:   assistive device/personal item within reach   bed alarm set   nonskid shoes/socks when out of bed   room near unit station   Roll belt self-releasing   /camera at bedside   instructed to call staff for mobility     Problem: Infection  Goal: Absence of Infection Signs and Symptoms  Outcome: Ongoing, Progressing  Intervention: Prevent or Manage Infection  Flowsheets (Taken 3/5/2023 1049)  Fever Reduction/Comfort Measures:   lightweight bedding   lightweight clothing  Infection Management: aseptic technique maintained

## 2023-03-05 NOTE — PLAN OF CARE
Problem: Adult Inpatient Plan of Care  Goal: Plan of Care Review  Outcome: Ongoing, Progressing  Goal: Patient-Specific Goal (Individualized)  Outcome: Ongoing, Progressing  Flowsheets (Taken 3/4/2023 2000)  Anxieties, Fears or Concerns: none at this time  Individualized Care Needs: give pt extra time to verbalize responses, use alternate ways to communicate with pt  Goal: Absence of Hospital-Acquired Illness or Injury  Outcome: Ongoing, Progressing  Intervention: Prevent Skin Injury  Flowsheets (Taken 3/4/2023 2000)  Skin Protection:   adhesive use limited   incontinence pads utilized  Intervention: Prevent and Manage VTE (Venous Thromboembolism) Risk  Flowsheets (Taken 3/4/2023 2000)  VTE Prevention/Management:   fluids promoted   ROM (active) performed   bleeding precations maintained  Intervention: Prevent Infection  Flowsheets (Taken 3/4/2023 2000)  Infection Prevention:   hand hygiene promoted   personal protective equipment utilized   environmental surveillance performed   rest/sleep promoted   single patient room provided     Problem: Skin Injury Risk Increased  Goal: Skin Health and Integrity  Outcome: Ongoing, Progressing  Intervention: Optimize Skin Protection  Flowsheets (Taken 3/4/2023 2000)  Pressure Reduction Techniques:   frequent weight shift encouraged   weight shift assistance provided  Pressure Reduction Devices: positioning supports utilized  Skin Protection:   adhesive use limited   incontinence pads utilized  Intervention: Promote and Optimize Oral Intake  Flowsheets (Taken 3/4/2023 2000)  Oral Nutrition Promotion:   physical activity promoted   rest periods promoted   safe use of adaptive equipment encouraged     Problem: Fall Injury Risk  Goal: Absence of Fall and Fall-Related Injury  Outcome: Ongoing, Progressing

## 2023-03-06 NOTE — DISCHARGE SUMMARY
[facility]  Hospital Medicine  Discharge Summary      Patient Name: Bhupendra Park  MRN: [id number]  GREGORY: [id number]  Patient Class: IP- Swing  Admission Date: 2/9/2023  Hospital Length of Stay: 25 days  Discharge Date and Time:  03/06/2023 11:31 AM  Attending Physician: Thairy G Reyes, DO   Discharging Provider: Thairy G Reyes, DO  Primary Care Provider: Kelly Meadows MD    Primary Care Team: Networked reference to record PCT     HPI:   63-year-old male with HTN, left frontal IPH, prior cervical spine surgery status post spinal cord stimulator who presented to ED with acute encephalopathy and was subsequently found to have recurrent right frontal intraparenchymal hemorrhage. Neurosurgery recommended medical management. Serial CTs showed stability of bleed. Transferred to our facility for continued rehabilitation.   Majority of patient's information obtained from medical records as patient has receptive and expressive aphasia and is able to answer only with yes / no.        * No surgery found *      Hospital Course:   Patient admitted for rehabilitation with speech therapy, occupational therapy and physical therapy after intracranial hemorrhage. Patient's wife brought  from home for spinal stimulator.  Patient eating with supervision with his left hand.  Somnolence appears to be resolved and pt back to his baseline demeanor. On 2/26 patient had an unwitnessed fall and he was found kneeling on his left knee.  He denies any pain on evaluation, no acute osseous abnormality on x-ray of left knee.       Goals of Care Treatment Preferences:  Code Status: Full Code      Consults:     Neuro  * Intraparenchymal hemorrhage of brain  -Continue with Keppra, rosuvastatin upon discharge, blood pressure goal less than 140/90 which he achieves without medications  -on 02/06/2023 recommendation was documented that patient will need an MRI brain with and without contrast in 4-6 weeks to rule  "out hemorrhagic infarct or mass and patient is to be followed by Dr. Figueroa in [place], we could not obtain MRI at our facility 2/2 pt's indwelling spinal stimulator therefore he will need to obtain this outpatient       Right spastic hemiplegia  -2/2 history of ICH and CVA  -continue PT/OT      Renal/  BPH (benign prostatic hyperplasia)  -continue with tamsulosin      Endocrine  Hypothyroidism  -continue with levothyroxine 88mcg        Final Active Diagnoses:    Diagnosis Date Noted POA    PRINCIPAL PROBLEM:  Intraparenchymal hemorrhage of brain [I61.9] 02/09/2023 Yes    Right spastic hemiplegia [G81.11] 05/03/2022 Yes    Hypothyroidism [E03.9] 02/09/2023 Yes    BPH (benign prostatic hyperplasia) [N40.0] 02/09/2023 Yes      Problems Resolved During this Admission:       Discharged Condition: stable    Disposition: Home or Self Care    Follow Up:   Follow-up Information     Alex Crowder MD Follow up on 3/7/2023.    Specialty: Family Medicine  Why: keep current appointment on 03/07/2023 @ 8:45 AM.  Contact information:  [address] [facility] [address]  [facility] [address]  [phone]             [employer] Follow up.    Why: HB & LBQC to be delivered before DC  Contact information:  [address] [place] [id number] [phone]           [facility] Follow up.    Specialty: Outpatient Rehab  Why: Pt's family will be called with appointment date and time.  Contact information:  [address] [place] [address] [phone]           Jerel Figueroa MD. Go on 5/11/2023.    Specialty: Neurology  Why: 5/11/23 @ 11:30 a.m.  Spoke to Melly  Contact information:  [address] [facility] [address] [facility] [address]  [facility] [address]  [phone]                       Patient Instructions:      CANE FOR HOME USE     Order Specific Question Answer Comments   Type of Cane: Wide Quad    Height: 5' 11" (1.803 m)    Weight: 68.7 kg (151 lb 7.3 oz)  " "  Does patient have medical equipment at home? none    Length of need (1-99 months): 99    Please check all that apply: Patient's condition impairs ambulation.    Please check all that apply: Patient is unable to safely ambulate without equipment.    Please check all that apply: Patient needs help to get in and out of chair.      HOSPITAL BED FOR HOME USE     Order Specific Question Answer Comments   Type: Semi-electric    Length of need (1-99 months): 99    Does patient have medical equipment at home? none    Height: 5' 11" (1.803 m)    Weight: 68.7 kg (151 lb 7.3 oz)    Please check all that apply: Patient requires positioning of the body in ways not feasible in an ordinary bed due to a medical condition which is expected to last at least one month.    Please check all that apply: Patient requires, for the alleviation of pain, positioning of the body in ways not feasible in an ordinary bed.    Please check all that apply: Patient requires frequent changes in body position and/or has an immediate need for a change in body position.      Ambulatory referral/consult to Physical/Occupational Therapy   Standing Status: Future   Referral Priority: Routine Referral Type: Physical Medicine   Referral Reason: Specialty Services Required   Number of Visits Requested: 1     Ambulatory referral/consult to Speech Therapy   Standing Status: Future   Referral Priority: Routine Referral Type: Speech Therapy   Referral Reason: Specialty Services Required   Requested Specialty: Speech Pathology   Number of Visits Requested: 1       Significant Diagnostic Studies: Labs:   BMP:   Recent Labs   Lab 03/06/23  0340      K 4.2   CO2 31   BUN 15.6   CREATININE 0.79   CALCIUM 9.5       Pending Diagnostic Studies:     Procedure Component Value Units Date/Time    MRI Brain W WO Contrast [[id number]]     Order Status: Sent Lab Status: No result     MRI Brain W WO Contrast [[id number]]     Order Status: Sent Lab Status: No result        "   Medications:  Reconciled Home Medications:      Medication List      START taking these medications    levETIRAcetam 500 MG Tab  Commonly known as: KEPPRA  Take 1 tablet (500 mg total) by mouth 2 (two) times daily.     tamsulosin 0.4 mg Cap  Commonly known as: FLOMAX  Take 1 capsule (0.4 mg total) by mouth once daily.  Start taking on: March 7, 2023        CHANGE how you take these medications    famotidine 20 MG tablet  Commonly known as: PEPCID  Take 1 tablet (20 mg total) by mouth 2 (two) times daily.  What changed: when to take this     rosuvastatin 40 MG Tab  Commonly known as: CRESTOR  Take 1 tablet (40 mg total) by mouth every evening.  What changed: when to take this        CONTINUE taking these medications    levothyroxine 88 MCG tablet  Commonly known as: SYNTHROID  Take 1 tablet (88 mcg total) by mouth every morning.        STOP taking these medications    acetaminophen 500 MG tablet  Commonly known as: TYLENOL     baclofen 10 MG tablet  Commonly known as: LIORESAL     diclofenac sodium 1 % Gel  Commonly known as: VOLTAREN     FLUoxetine 20 MG capsule     lacosamide 100 mg Tab  Commonly known as: VIMPAT     lacosamide 50 mg Tab  Commonly known as: VIMPAT            Indwelling Lines/Drains at time of discharge:   Lines/Drains/Airways     None                 Time spent on the discharge of patient: 35 minutes         Thairy G Reyes, DO  Department of Hospital Medicine  [facility]

## 2023-03-06 NOTE — ASSESSMENT & PLAN NOTE
-Continue with Keppra, rosuvastatin upon discharge, blood pressure goal less than 140/90 which he achieves without medications  -on 02/06/2023 recommendation was documented that patient will need an MRI brain with and without contrast in 4-6 weeks to rule out hemorrhagic infarct or mass and patient is to be followed by Dr. Figueroa in [place], we could not obtain MRI at our facility 2/2 pt's indwelling spinal stimulator therefore he will need to obtain this outpatient

## 2023-03-06 NOTE — PLAN OF CARE
Problem: Adult Inpatient Plan of Care  Goal: Patient-Specific Goal (Individualized)  Outcome: Ongoing, Progressing  Flowsheets (Taken 3/6/2023 1335)  Anxieties, Fears or Concerns: none  Individualized Care Needs: prepare for discharge  Goal: Optimal Comfort and Wellbeing  Outcome: Ongoing, Progressing  Intervention: Monitor Pain and Promote Comfort  Flowsheets (Taken 3/6/2023 1335)  Pain Management Interventions:   quiet environment facilitated   diversional activity provided   relaxation techniques promoted  Intervention: Provide Person-Centered Care  Flowsheets (Taken 3/6/2023 1335)  Trust Relationship/Rapport:   care explained   emotional support provided   reassurance provided   thoughts/feelings acknowledged   empathic listening provided   questions encouraged  Goal: Readiness for Transition of Care  Outcome: Ongoing, Progressing     Problem: Skin Injury Risk Increased  Goal: Skin Health and Integrity  Outcome: Ongoing, Progressing     Problem: Fall Injury Risk  Goal: Absence of Fall and Fall-Related Injury  Outcome: Ongoing, Progressing  Intervention: Identify and Manage Contributors  Flowsheets (Taken 3/6/2023 1335)  Self-Care Promotion:   independence encouraged   BADL personal objects within reach  Intervention: Promote Injury-Free Environment  Flowsheets (Taken 3/6/2023 1335)  Safety Promotion/Fall Prevention:   assistive device/personal item within reach   room near unit station   gait belt with ambulation   Fall Risk reviewed with patient/family   diversional activities provided   in recliner, wheels locked   instructed to call staff for mobility   chair alarm set     Problem: Infection  Goal: Absence of Infection Signs and Symptoms  Outcome: Ongoing, Progressing  Intervention: Prevent or Manage Infection  Flowsheets (Taken 3/6/2023 1335)  Fever Reduction/Comfort Measures:   lightweight clothing   fluid intake increased   lightweight bedding

## 2023-03-06 NOTE — PT/OT/SLP DISCHARGE
Speech Language Pathology Discharge Summary    Bhupendra Park  MRN: [id number]   Intraparenchymal hemorrhage of brain     Date of Last Treatment Session: 3/3/23    No past medical history on file.    Status at initiation of therapy: minced and moist diet and severe expressive and receptive aphasia.    Treatment Area(s):  Communication and Swallow    Goals:   Multidisciplinary Problems       SLP Goals          Problem: SLP    Goal Priority Disciplines Outcome   SLP Goal     SLP Ongoing, Progressing   Description: LTG:   Pt will tolerate LRD w/o overt s/s of aspiration.  Pt will improve expressive and receptive language skills in order to effectively communicate wants and needs Manish.    STG:  Pt will tolerate minced and moist w/ good oral clearance and w/o overt s/s of aspiration. Goal met  Pt will participate in trials of soft and bite sized w/ good oral clearance and w/o overt s/s of aspiration. Goal met  Pt will answer 2U YNQs w/ 80% acc Manish.  Pt will follow 1-step commands w/ 80% acc Manish.  Pt will ID objects in Fo3 w/ 80% acc Manish. Goal met  Pt will complete auto speech tasks w/ 80% acc Manish. Goal met  Pt will name common objects w/ 80% acc Manish. Goal met  Pt will complete trials of regular w/ good oral clearance and w/o overt s/s of aspiration. Goal met  Pt will answer WH Qs w/ 80% acc Manish.                       Participation in Treatment (at discharge):  Cooperative    Functional Status at time of Discharge:      Communication: Patient demonstrates moderate communication deficits.    Language: Patient demonstrates moderate language deficits.    Motor Speech: Patient demonstrates no motor speech deficits.    Swallow: Patient demonstrates no dysphagia.                     Clinical Bedside assessment was completed on 2/10/23                       Instrumental assessment was not completed at this facility. A MBSS was completed at prior facility following CVA.                                Pt continues to  present expressive and receptive aphasia. Pt tolerating a regular/thin diet given set-up assistance.    Patient is discharged to Home Pt to receive outpatient SLP services.

## 2023-03-07 NOTE — PLAN OF CARE
[facility]  Discharge Final Note    Primary Care Provider: Kelly Meadows MD    Expected Discharge Date: 3/6/2023    Final Discharge Note (most recent)       Final Note - 03/07/23 0648          Final Note    Assessment Type Final Discharge Note     Anticipated Discharge Disposition Home or Self Care     What phone number can be called within the next 1-3 days to see how you are doing after discharge? [phone]     Hospital Resources/Appts/Education Provided Provided patient/caregiver with written discharge plan information        Post-Acute Status    Discharge Delays None known at this time                     Important Message from Medicare             Contact Info       Alex Crowder MD   Specialty: Family Medicine    [address]  [address]  [facility] [address]   Phone: [phone]       Next Steps: Follow up on 3/7/2023    Instructions: keep current appointment on 03/07/2023 @ 8:45 AM.    [employer]    [facility] [facility] [address]  [facility] [address]   Phone: [phone]       Next Steps: Follow up    Instructions: HB & LBQC to be delivered before DC    [facility]   Specialty: Outpatient Rehab    [address] [place] [address]   Phone: [phone]       Next Steps: Follow up    Instructions: Pt's family will be called with appointment date and time.    Jerel Figueroa MD   Specialty: Neurology    [address]  [place]  [facility] [address]   Phone: [phone]       Next Steps: Go on 5/11/2023    Instructions: 5/11/23 @ 11:30 a.m.  Spoke to Melly

## 2023-03-07 NOTE — PROGRESS NOTES
C3 nurse attempted to contact Bhupendra Park for a TCC post hospital discharge follow up call. No answer, left VM, CB# provided.  The patient has a scheduled HOSFU appointment with Alex Crowder MD (Family Medicine) on 03/07/2023 @ 8:45 AM & an appt with Jerel Figueroa MD (Neurology) on 5/11/23 @ 11:30 a.m.

## 2023-03-07 NOTE — PROGRESS NOTES
C3 nurse spoke with Bhupendra Park for a TCC post hospital discharge follow up call, call completed. The patient had a scheduled HOSFU appointment with Alex Crowder MD (Family Medicine) on 03/07/2023 @ 0196.

## 2023-03-08 PROBLEM — I69.398 ABNORMALITY OF GAIT FOLLOWING CEREBROVASCULAR ACCIDENT: Status: ACTIVE | Noted: 2023-01-01

## 2023-03-08 PROBLEM — G81.11 RIGHT SPASTIC HEMIPLEGIA: Status: RESOLVED | Noted: 2022-05-03 | Resolved: 2023-01-01

## 2023-03-08 PROBLEM — R26.89 IMPAIRMENT OF BALANCE: Status: ACTIVE | Noted: 2023-01-01

## 2023-03-08 PROBLEM — R26.9 ABNORMALITY OF GAIT FOLLOWING CEREBROVASCULAR ACCIDENT: Status: ACTIVE | Noted: 2023-01-01

## 2023-03-08 PROBLEM — R29.898 IMPAIRED STRENGTH OF LOWER EXTREMITY: Status: ACTIVE | Noted: 2023-01-01

## 2023-03-08 NOTE — PT/OT/SLP DISCHARGE
Physical Therapy Discharge Summary    Name: Bhupendra Park  MRN: [id number]   Principal Problem: Intraparenchymal hemorrhage of brain     Patient Discharged from acute Physical Therapy on 3/6/23.  Please refer to prior PT noted date on 3/3/23 for functional status.     Assessment:     Patient appropriate for care in another setting.    Objective:     GOALS:   Multidisciplinary Problems       Physical Therapy Goals          Problem: Physical Therapy    Goal Priority Disciplines Outcome Goal Variances Interventions   Physical Therapy Goal     PT, PT/OT Ongoing, Progressing     Description: Goals to be met by: Discharge     Patient will increase functional independence with mobility by performin. Supine to sit with Modified [place]  2. Sit to supine with Modified [place]  3. Sit to stand transfer with Supervision  4. Gait  x 150 feet with Contact Guard Assistance using Quad Cane vs LRAD.                          Reasons for Discontinuation of Therapy Services  Transfer to alternate level of care.      Plan:     Patient Discharged to:  Home with OP services .      3/8/2023

## 2023-03-08 NOTE — PLAN OF CARE
[facility] Occupational Therapy  Initial Neurological Evaluation     Date: 3/8/2023  Patient: Bhupendra Park  Chart Number: [id number]    Therapy Diagnosis:   Encounter Diagnosis   Name Primary?    Right spastic hemiplegia      Physician: Alvino Gardner MD    Physician Orders: eval and treat  Medical Diagnosis: G81.11-- Right upper extremity Hypertonicity   Evaluation Date: 3/8/2023      Time In:1330  Time Out: 1400  Total Billable (one on one) Time: 30 minutes    Precautions: Fall    Subjective     History of Current Condition: Patient recently discharged from OT services at this facility following initial stroke. On January 29, Patient experienced new CVA following noted altered mental status by spouse. Patient was admitted to acute care, transitioning to [facility], where he received OT services by today's evaluating therapist. Due to Patient and Therapist past relation, OT was able to compare Patient's current function to previous baseline.     Involved Side: R  Dominant Side: Right-- however has learned to compensate with L   Date of Onset: 1/23  Surgical Procedure: none  Previous Therapy: yes, for previous CVA     Patient's Goals for Therapy: increased functional use of the Right upper extremity    Functional Limitations/Social History:    Prior Level of Function: Patient was driving, Mod I for all ADL and ambulating with no AD  Current Level of Function:Requires Min A for ADL due to challenges with standing balance and ambulates using a LBQC    Driving: no    Past Medical History/Physical Systems Review:     Past Medical History:  Bhupendra Park  has no past medical history on file.    Past Surgical History:  Bhupendra Park  has no past surgical history on file.    Current Medications:  Bhupendra has a current medication list which includes the following prescription(s): famotidine, levetiracetam, levothyroxine, rosuvastatin, and tamsulosin.    Allergies:  Review of patient's  allergies indicates:  No Known Allergies       Objective       Joint Evaluation  AROM  3/8/2023 PROM   3/8/2023    Right Right   Shoulder flex 0-180 45 95   Shoulder Abd 0-180 trace 40   Elbow flex/ext 0-150 Flexion:90  Extension: 150 110   Wrist flex 0-80 trace 80   Wrist ext 0-70 Neutral from flexed position 70     Fist: tight  - passive ROM for MP joints present with lack of ~15 degrees for extension  - passive ROM for IP joints present with full Range of Motion for extension      Unable to complete  strength and fine motor coordination assessments at this time     Tone:  Modified Keron Scale:   1+ Slight increases in muscle tone, manifested by a catch, followed by minimal resistance throughout the remainder (less than half) of the ROM.  -- OT to noted Patient presents with decreased tone compared to before most recent CVA    Comments: Patient has previous rotator cuff injury, limiting shoulder flexion and abduction with pain. Also to note Patient has previous decreased ROM for shoulder abduction due to pec tightness from initial CVA.                      Functional Status    Patient's functional status is not limited by functional use of Right upper extremity versus decreased balance and standing tolerance. Patient has adapted to using the Right upper extremity as a stabilizer      CMS Impairment/Limitation/Restriction for FOTO stroke Upper Extremity Survey    Therapist reviewed FOTO scores for Bhupendra Park on 3/8/2023.   FOTO documents entered into [employer] - see Media section.    Limitation Score: 59%  Category: Carrying    Current : CK = at least 40% but < 60% impaired, limited or restricted  Goal: CK = at least 40% but < 60% impaired, limited or restricted         Treatment     Home Exercises and Patient Education Provided    Education provided:   -role of OT, goals for OT, scheduling/cancellations, insurance limitations with patient.  -Additional Education provided: OT educated Patient and  Patient's spouse to contact [employer] rep to forego use of hand splint for increased extension      Assessment     Bhupendra Park is a 63 y.o. male referred to outpatient occupational therapy and presents with a medical diagnosis of CVA, resulting in decreased range of motion, decreased muscle strength, and impaired function and demonstrates limitations as described in the chart below. Following medical record review it is determined that patient will benefit from occupational therapy services in order to maximize pain free and/or functional use of right upper extremity.    Patient prognosis is Good due to  motivation and decreased tone in the Right upper extremity   Patient will benefit from skilled outpatient Occupational Therapy to address the deficits stated above and in the chart below, provide patient/family education, and to maximize patient's level of independence.     Plan of care discussed with patient: Yes  Patient's spiritual, cultural and educational needs considered and patient is agreeable to the plan of care and goals as stated below:     Anticipated Barriers for therapy: severity of impiarment    The following goals were discussed with the patient and patient is in agreement with them as to be addressed in the treatment plan.     Goals:  LTG GOALS:  Time frame: 6  - Pt will improve FOTO limitation score to less than or equal to 45% for improved self perception of functional performance with daily activities.     STG Goals:  Time frame: 3  - Pt will increase active shoulder flexion to 90 degrees with RUE to improve participation with dressing, showering, and IADL tasks.   - Gross grasp will improve so that patient can perform tasks such as hold cup for drinking, open door, turn on faucet, and place water bottle on shelf with affected UE.     Plan   Certification Period/Plan of care expiration: 3/8/2023 to TBD.    Outpatient Occupational Therapy 3 times weekly for 6 weeks to include the following  interventions: Neuromuscular Re-ed, Therapeutic Activities, and Therapeutic Exercise.    Kris Lopez, OT      I certify the need for these services furnished under this plan of treatment and while under my care.  ____________________________________ Physician/Referring Practitioner   Date of Signature

## 2023-03-08 NOTE — PLAN OF CARE
[facility]   Physical Therapy Initial Evaluation     Date: 3/8/2023   Name: Bhupendra Park  Clinic Number: [id number]    Therapy Diagnosis:   Encounter Diagnoses   Name Primary?    Right spastic hemiplegia Yes    Impaired strength of lower extremity     Impairment of balance     Abnormality of gait following cerebrovascular accident      Physician: Alvino Gardner MD    Physician Orders: PT eval and treat   Medical Diagnosis from Referral: G81.11 Right spastic hemiplegia   Evaluation Date: 3/8/2023  Authorization Period Expiration: TBD  Plan of Care Expiration: TBD  Reassessment / POC Due: TBD  Visit # / Visits authorized: TBD     Precautions: Fall     Time In: 1402  Time Out: 1436  Total Appointment Time (timed & untimed codes): 34 minutes    SUBJECTIVE     Date of onset / History of current condition - Bhupendra reports: Bhupendra is expressive aphasic and slow with processing words. Patient's wife is the historian at this time. States that  began to demonstrate unusual behaviors and was taken to the [facility] for evaluation. Patient underwent CT scan and found to have a hemorrhagic stroke in the right frontal horn. Bhupendra remained at [facility] for 10 days then transferred to [facility] for continued rehab x 4 weeks. Patient's wife identified primary deficits as bilateral lower extremity weakness, balance , and expressive aphasia. Bhupendra presents with right ankle AFO and a quad cane today       FOTO Stroke Lower extremity Survey    Therapist reviewed FOTO scores for Bhupendra Park on 3/8/2023.   FOTO documents entered into [employer] - see Media section.     Functional Status Score: 45%         Falls: denies recent falls    Imaging, CT scan films @ [facility]     Prior Therapy: Patient received 4 weeks of rehab at [facility]   Social History:  lives with their spouse  Occupation: retired   Prior Level of Function: Mod IND   Current Level of Function: Minimal assist  with functional activities     Pain:  Current 0/10, worst 0/10, best 0/10   Location: none    Description: none  Aggravating Factors: none   Easing Factors:  none     Patients goals: Return to MOD IND prior level of function      Medical History:   No past medical history on file.    Surgical History:   Bhupendra Park  has no past surgical history on file.    Medications:   Bhupendra has a current medication list which includes the following prescription(s): famotidine, levetiracetam, levothyroxine, rosuvastatin, and tamsulosin.    Allergies:   Review of patient's allergies indicates:  No Known Allergies       OBJECTIVE     RANGE OF MOTION :   - Upper Extremity : Right = limited due to moderate flexor tone      Left = WNL  - Lower Extremity : Right = WFL       Left= WNL     STRENGTH:      Right Left   Hip Flexion: 2/5 4/5   Hip ABD: 4/5 5/5   Hip ADD: 4/5 5/5   Hip Extension: 4/5 5/5   Knee Ext: 4+/5 5/5   Knee Flex: 4/5 5/5      GAIT:   - ambulates with wide based Quad cane on level and unlevel surfaces   - decreased stride on Right   - decreased step ht on Right   - normal base of support   - mild path deviation , no LOB noted   Gait speed: .60 m/s with WB quad cane     Functional mobility:   - rolling right and left: IND  - supine to sit : MOD IND  -sit to supine : IND   - sit to stand : SBA    Special test:   - Tinnetti : 13/28   -balance : 6/16   - gait: 7/12    TREATMENT     Total Treatment time (time-based codes) separate from Evaluation: 5 minutes      Bhupendra received the treatments listed below:      therapeutic exercises to develop strength and endurance for 5 minutes including:  - fall prevention   - gait pattern   - safety awareness         PATIENT EDUCATION AND HOME EXERCISES     Education provided / Written Home Exercises Provided  Exercises and stretches demonstrated and initiated, written HEP issued, educated pt on importance and benefits of exercises and stretches, and encouraged pt to continue with  HEP daily. Pt voiced understanding    ASSESSMENT     Bhupendra is a 63 y.o. male referred to outpatient Physical Therapy with a medical diagnosis of G81.11 Right spastic hemiplegia . Patient presents with   Encounter Diagnoses   Name Primary?    Right spastic hemiplegia Yes    Impaired strength of lower extremity     Impairment of balance     Abnormality of gait following cerebrovascular accident        Patient prognosis is Excellent.   Patient will benefit from skilled outpatient Physical Therapy to address the deficits stated above and in the chart below, provide patient /family education, and to maximize patientt's level of independence.     Plan of care discussed with patient: Yes  Patient's spiritual, cultural and educational needs considered and patient is agreeable to the plan of care and goals as stated below:     Anticipated Barriers for therapy: late effects of CVA    Goals:  GOALS:    Short Term Goals: (4 weeks)  1. 5xSTS will decrease to 20 sec or less for improved LE strength and transfer safety.  2. Patient will improve balance as evidenced by MCTSIB score of 2/4 for reduced fall risk.  3. Patient will be able to perform HEP with minimal cues for improved carryover at home.  4. Improve Tinetti balance score to >/= 10/16  5. Improve R hip and knee strength to >/= 4/5  muscle grade for improved safety during household mobility.    Long Term Goals: (8 weeks)  1. Patient will be able to ascend/descend 4 steps with 1 rail foot over foot modified independent.  2. Patient will be able to take symmetrical steps with consistent foot clearance for household ambulation.  3. 5xSTS will decrese to 15 sec or less for independent transfers.  4. Patient will be independent in HEP in order to continue after discharge.  5. Patient will improve Tinetti balance and gait score to >/= 22/28.  6. B LE strength will improve to 4+/5 or > throughout for independent mobility.   7. Patient will improve FOTO score to >/= 63 % to  increase overall mobility and function at home.   PLAN     Outpatient Physical Therapy 3 times weekly for 6 weeks to include the following interventions: Gait Training, Patient Education, Therapeutic Activities, and Therapeutic Exercise.     Mustapha Aleman, PT

## 2023-03-09 PROBLEM — R47.01 COMBINED RECEPTIVE AND EXPRESSIVE APHASIA: Status: ACTIVE | Noted: 2023-01-01

## 2023-03-09 NOTE — PROGRESS NOTES
[facility]  Speech Therapy Outpatient Evaluation -Neurological Rehabilitation    Date: 3/8/2023     Name: Bhupendra Park   MRN: [id number]    Therapy Diagnosis:   Encounter Diagnoses   Name Primary?    Right spastic hemiplegia     Combined receptive and expressive aphasia     Physician: Alvino Gardner MD  Physician Orders: eval and treat  Medical Diagnosis: intraparenchymal hemorrhage of brain      Date of Evaluation:  3/8/2023   Insurance Authorization Period: TBD  Plan of Care Certification:  3/8/2023 to TBD      Time In: 2:36   Time Out: 3:20   Procedure Min.   Speech Language Evaluation   44     Precautions: Standard  Subjective   Date of Onset: 1/29/23; pt suffered a previous stroke on 8/18/20  History of Current Condition:  Pt reports impaired expressive language skills s/p CVA.   Past Medical History: Bhupendra Park  has no past medical history on file.  Bhupendra Park  has no past surgical history on file.  Medical Hx and Allergies: Bhupendra has a current medication list which includes the following prescription(s): famotidine, levetiracetam, levothyroxine, rosuvastatin, and tamsulosin. Review of patient's allergies indicates:  No Known Allergies  Prior Therapy:  acute rehab at [facility] for 2 weeks and then inpatient rehab at [facility] for ~1 month. Pt is known to current SLP from recent inpatient rehab stay at [facility].  Social History: lives with their spouse  Prior Level of Function: pt driving, taking care of animals and attending events via [facility] through [facility].   Current Level of Function: walking w/ cane and some assist for mobility and ADLs. Impaired expressive and receptive language skills.  Pain: 0/10  Pain Location / Description: n/a  Nutrition:  regular.thin diet  Patient's Therapy Goals:  to get language skills back to baseline  Objective     Informal Assessment  Verbal Expression  Automatic speech: pt able to count 1-10 I'ly, pt unable to state Feng or Aidan.  Naming common  "objects: % accuracy  Phrase completion: 100% acc  Sentence completion: 67% acc  Wh- questions: 100% acc  Verbal fluency: pt named x2 animals w/I 1 minute  Speech intelligibility: WFL, greater than 90% to unfamiliar listener  Voice: WFL  Pragmatics: WFL    Auditory Comprehension:  Common object recognition: Fo5 100% acc; Fo10 100% acc  Following commands: 1-step 100% acc; 2-step 50% acc  Yes/no questions: simple YNQs 100% acc; mod-complex YNQs 75% acc  Conversational comprehension: impaired  Processing/response delay: impaired    Visual comprehension:  Letter/number recognition: numbers 75% acc; letters 100% acc  Word level: did not assess  Sentence level: impaired  Visual field deficit: none observed during eval w/ object ID and functional tasks.    Graphic expression (with left non-dominant UE):  Biographical information: pt legibly wrote first and last time given extra time.  Words: pt able to write current month "March"  Legibility: WFL @ word level    Formal Assessment:  MS Aphasia Screening Test: The MAST assesses both expressive and receptive language abilities for 9 language domains which include: Naming, Automatic Speech, Repetition, Yes/No Accuracy, Object Recognition from a Field of Five, Following Verbal Instructions, Reading Instructions, Verbal Fluency, and Writing/Spelling to Dictation. The test yields 9 subtest scores and 2 index scores.  The Mast Total Score ranges from 0 to 100 points.     Expressive Index:  Naming: 10/10  Automatic Speech: 8/10  Repetition: 10/10  Writin/10  Verbal Fluency: 0/10    Receptive Index:   Yes/No Accuracy: 20  Object Recognition: 10/10  Following Instructions: 10  Reading Instructions: 410    Total Index:   Expressive: 30/50  Receptive: 30/50    Total Score: 60/100      Treatment   Total Treatment Time Separate from Evaluation:  44 minutes   no treatment performed 2/2 time to complete evaluation.    Education: Plan of Care and role of SLP in care was " discussed with pt. Patient and/or family members expressed understanding.    Home Program: to be provided during rehab services as appropriate.      Assessment     Bhupendra presents to [facility] s/p medical diagnosis of aphasia s/p CVA.  Demonstrates impairments including limitations as described in the problem list. He presents with aphasia characterized by impaired expressive language via word finding and expanding utterances, complex YNQs and 2-3 step commands, reading comprehension, and written expression.  Positive prognostic factors include family support and motivation. Negative prognostic factors include n/a. No barriers to therapy identified. Patient will benefit from skilled, outpatient neurological rehabilitation speech therapy.    Rehab Potential: good  Pt's spiritual, cultural, and educational needs considered and patient agreeable to plan of care and goals.    Short Term Goals (3x week for 12 weeks):  1) The pt will complete auto speech tasks (stating Feng and Aidan, sentence completion) w/ 90% acc Manish.  2) The pt will name x10 items in a concrete category w/I 1 minute Manish.  3) The pt will formulate sentences (subject+verb+object) to describe picture scenes w/ 80% acc Manish.  4) The pt will answer 3U YNQs w/ 90% acc Manish.  5) The pt will follow 2-step commands w/ 90% acc Manish.  6) The pt will read and comprehend phrases w/ 90% acc Manish.  7) The pt will write 1-2 syllable functional words w/ 80% acc Manish.    Long Term Goals (12 weeks):   The pt will improve expressive and receptive language skills to communicate w/ friends and family.    Plan     Plan of Care Certification Period: 3/8/2023 to [facility]    Recommended Treatment Plan:  Patient will participate in the [facility] rehabilitation program for speech therapy 3 times per week to address his Communication deficits, to educate patient and their family, and to participate in a home exercise program.    Other Recommendations: n/a    Therapist's  Name:   Adamaris Carrion, CCC-SLP   3/8/2023     I CERTIFY THE NEED FOR THESE SERVICES FURNISHED UNDER THIS PLAN OF TREATMENT AND WHILE UNDER MY CARE    Physician Name: _______________________________    Physician Signature: ____________________________

## 2023-03-14 NOTE — PROGRESS NOTES
Daily Note     Today's date: 3/14/2023     Patient name: Riddhi Howard  : 2020  MRN: [id number]  Referring provider: Tomas Myrick MD  Dx:   Encounter Diagnosis     ICD-10-CM    1  Development delay  R62 50           Start Time: 0800  Stop Time: 0845  Total time in clinic (min): 45 minutes      [employer] no auth - used 10 on 23  [facility] 24 visits used 10/24 on 23      Subjective: Presents to skilled PT with mother in waiting room  Mom states they went to a hockey game and he didn't like the loud buzzer but overall she's happy he did well and lasted almost the whole game  Doffed SMO's for session    Objective: Therex  -walking up ramp without assist  -up/down steps with reciprocal pattern and 1 HR - min cues down for reciprocal pattern  -worked on catching with two hands with small basketball - 75% accurate catching bounce passes and 50% catching in air  -leprechaun exercises of marches, squats, arm circles, frog hops - all 2x10     Neuro  -walking across balance beams without assist  -tall kneeling without support in front working on postural control  -jumping on trampoline with apart/together jumps - good motor planning  -worked on motor planning apart/together jumps to spots with min cues   -worked on trunk rotation in tall kneel to side sit B directions with 1 HHA      Assessment: Tolerated treatment well  Patient with good motor planning activities with apart/together jumps today to spots  Continues to struggle with trunk rotation B directions with all activities  Plan: Continue per plan of care  Name: Bhupendra Park    : 1960 (63 y.o.)  MRN: [id number]            Interdisciplinary Team Conference     Case conference held with patient/family and care team to discuss progress, plan of care, barriers to be addressed for safe return home, equipment recommendations, and discharge planning. Communicated therapy progress with MD, RN, therapy clinicians and case management. All questions/concerns answered.

## 2023-03-21 NOTE — PROGRESS NOTES
[facility]   Physical Therapy Treatment Note     Name: Bhupendra Park  Clinic Number: [id number]    Therapy Diagnosis:   Encounter Diagnoses   Name Primary?    Impaired strength of lower extremity Yes    Impairment of balance     Abnormality of gait following cerebrovascular accident      Physician: Casey Maldonado MD    Authorization Period Expiration: 5/25/2023  Plan of Care Expiration: 5/25/2023  Reassessment / POC Due: 4/5/2023  Visit # / Visits authorized: 2/ 18     Visit Date: 3/21/2023    Time In: 1430  Time Out: 1500  Total Billable Time: 30 minutes    SUBJECTIVE     Pt reports: his wife reports pt does wear the R AFO often because he refuses to put it on, denies any falls.  He was compliant with home exercise program.  Response to previous treatment: n/a  Functional change: n/a    Pain: 0/10  Location: n/a     OBJECTIVE     Objective Measures updated at progress report unless specified.     Treatment     Bhupendra received the treatments listed below:      therapeutic exercises to develop strength, endurance, and ROM for 30 minutes including:    neuromuscular re-education activities to improve: Balance and Coordination for 0 minutes. The following activities were included:    gait training to improve functional mobility and safety for 0  minutes, including:    Therapeutic Exercise Grid     Exercise 1  Exercise 2  Exercise 3  Exercise 4    Exercise :    B LE: Standing: 3 way hip Sit to stands R LE: Step-ups: forward, lateral Step-downs   Repetition/Time :    15   15   Fwd- 15  Lat-10      Resist or Assist :       Foam mat, low surface           Comment :    Flexion, abduction            Done :    yes  yes yes   no                  Exercise 5  Exercise 6  Exercise 7  Exercise 8    Exercise :    Single leg stance   squats           Repetition/Time :       15           Resist or Assist :                  Comment :                  Done :       yes                           Exercise  9  Exercise 10  Exercise 11  Exercise 12    Exercise :                  Repetition/Time :                  Resist or Assist :                  Comment :                  Done :                                 Patient Education and Home Exercises     Home Exercises Provided and Patient Education Provided     Education provided: Educated pt on importance of HEP and encouraged pt to continue daily    ASSESSMENT     Pt tolerated initiation of LE strengthening exercises with good effort with cues required for proper technique to isolate specific muscles. Pt does not clear R toe during swing phase even with AFO on increasing risk for falls. Recommended pt's wife contact  for adjustments to be made. Cues when amb to exaggerate R hip flexion during swing phase for toe clearance    Bhupendra Is progressing well towards his goals.   Pt prognosis is Good.     Pt will continue to benefit from skilled outpatient physical therapy to address the deficits listed in the problem list box on initial evaluation, provide pt/family education and to maximize pt's level of independence in the home and community environment.     Pt's spiritual, cultural and educational needs considered and pt agreeable to plan of care and goals.     Anticipated barriers to physical therapy: previous CVA with R LE hemiparesis, R UE hemiplegia, expressive aphasia    Goals:   Short Term Goals: (4 weeks)  1. 5xSTS will decrease to 20 sec or less for improved LE strength and transfer safety.  2. Patient will improve balance as evidenced by MCTSIB score of 2/4 for reduced fall risk.  3. Patient will be able to perform HEP with minimal cues for improved carryover at home.  4. Improve Tinetti balance score to >/= 10/16  5. Improve R hip and knee strength to >/= 4/5  muscle grade for improved safety during household mobility.     Long Term Goals: (8 weeks)  1. Patient will be able to ascend/descend 4 steps with 1 rail foot over foot modified independent.  2.  Patient will be able to take symmetrical steps with consistent foot clearance for household ambulation.  3. 5xSTS will decrese to 15 sec or less for independent transfers.  4. Patient will be independent in HEP in order to continue after discharge.  5. Patient will improve Tinetti balance and gait score to >/= 22/28.  6. B LE strength will improve to 4+/5 or > throughout for independent mobility.   7. Patient will improve FOTO score to >/= 63 % to increase overall mobility and function at home.       PLAN     Continue with current Plan of Care and progress per pt's tolerance    Ayleen Moe, PT

## 2023-03-22 PROBLEM — M62.89 MUSCLE HYPERTONICITY: Status: ACTIVE | Noted: 2023-01-01

## 2023-03-22 PROBLEM — M25.611 DECREASED RIGHT SHOULDER RANGE OF MOTION: Status: ACTIVE | Noted: 2023-01-01

## 2023-03-22 NOTE — PROGRESS NOTES
Speech and Language Therapy Outpatient Progress Note  Date:  3/21/2023     Name: Bhupendra Park   MRN: [id number]   Therapy Diagnosis:   Encounter Diagnosis   Name Primary?    Combined receptive and expressive aphasia Yes   Physician: Casey Maldonado MD  Physician Orders: eval and treat  Medical Diagnosis: CVA    Visit #/Visits authorized: 1/ 36  Date of Evaluation:  3/8/23  Insurance Authorization Period: 3/14/23-6/6/23  Plan of Care Expiration Date:    6/6/23  Extended POC:  TBD     Time In:  2:00  Time Out:  2:30  Total Billable Time: 30         Subjective:   Pt ambulated to SLP office w/ quad cane. Pt's wife present for session. Pt pleasant and in good spirits.        Response to previous treatment: good     Pain Scale:  0/10 on VAS currently.   Pain Location: none expressed  Objective:        UNTIMED  Procedure Min.   {Speech- Language- Voice Therapy  30        Total Untimed Units: 2  Charges Billed/# of units: 2    Long Term Goals (12 weeks):   The pt will improve expressive and receptive language skills to communicate w/ friends and family.    Short Term Goals: 3x week/12 weeks Current Progress: initial   1) The pt will complete auto speech tasks (stating Feng and Aidan, sentence completion) w/ 90% acc Manish.  2) The pt will name x10 items in a concrete category w/I 1 minute Manish.  3) The pt will formulate sentences (subject+verb+object) to describe picture scenes w/ 80% acc Manish.  4) The pt will answer 3U YNQs w/ 90% acc Manish.  5) The pt will follow 2-step commands w/ 90% acc aMnish.  6) The pt will read and comprehend phrases w/ 90% acc Manish.  7) The pt will write 1-2 syllable functional words w/ 80% acc Manish.  8) The pt will answer MU YNQs w/ 90% acc Manish.             Goal met 3/21/23     Pt counted 1-10 I'ly.  Pt stated Feng SBA.  Pt stated Aidan given verbal cue to name 3/12 months.  Pt answered 3U YNQs w/ 100% acc I'ly.  Pt read aloud phrases/short sentences given Manish and then answered Qs appropriately  demonstrating adequate comprehension.    Pt's wife reports pt is making improvement at home in terms of language. Pt's wife also reports pt attempting to read more (I.e., magazine and newspaper).    Overall good session.  Cont SLP POC.    Patient Education/Response:     Written Home Exercises Provided:  to be provided as appropriate throughout course of OP SLP services .  Exercises were reviewed and Bhupendra was able to demonstrate them prior to the end of the session.  Bhupendra demonstrated good  understanding of the education provided.     Assessment:   Bhupendra is progressing well towards his goals. Current goals remain appropriate. Goals to be updated as necessary.     Pt prognosis is Good. Pt will continue to benefit from skilled outpatient speech and language therapy to address the deficits listed in the problem list on initial evaluation, provide pt/family education and to maximize pt's level of independence in the home and community environment.     Barriers to Therapy:   Pt's spiritual, cultural and educational needs considered and pt agreeable to plan of care and goals.    Plan:   Continue POC with focus on expressive language, receptive language, written expression, and reading comprehension.    Adamaris Carrion M.S., CCC-SLP   3/21/2023

## 2023-03-22 NOTE — PROGRESS NOTES
Speech and Language Therapy Outpatient Progress Note  Date:  3/22/2023     Name: Bhupendra Park   MRN: [id number]   Therapy Diagnosis:   Encounter Diagnosis   Name Primary?    Combined receptive and expressive aphasia Yes   Physician: Casey Maldonado MD  Physician Orders: eval and treat  Medical Diagnosis: CVA    Visit #/Visits authorized: 2/36  Date of Evaluation:  3/8/23  Insurance Authorization Period: 3/14/23-6/6/23  Plan of Care Expiration Date:  6/6/23  Extended POC:  TBD     Time In:  2:30  Time Out:  3:00  Total Billable Time: 30         Subjective:   Pt ambulated to SLP office w/ cane. Pt pleasant and in good spirits.        Response to previous treatment: good     Pain Scale:  0/10 on VAS currently.   Pain Location: none expressed  Objective:        UNTIMED  Procedure Min.   {Speech- Language- Voice Therapy  30        Total Untimed Units: 1  Charges Billed/# of units: 1    Long Term Goals (12 weeks):   The pt will improve expressive and receptive language skills to communicate w/ friends and family.    Short Term Goals: 3x week/12 weeks Current Progress: initial   1) The pt will complete auto speech tasks (stating Feng and Aidan, sentence completion) w/ 90% acc Manish.  2) The pt will name x10 items in a concrete category w/I 1 minute Manish.  3) The pt will formulate sentences (subject+verb+object) to describe picture scenes w/ 80% acc Manish.  4) The pt will answer 3U YNQs w/ 90% acc Manish.  5) The pt will follow 2-step commands w/ 90% acc Manish.  6) The pt will read and comprehend phrases w/ 90% acc Manish.  7) The pt will write 1-2 syllable functional words w/ 80% acc Manish.  8) The pt will answer MU YNQs w/ 90% acc Manish.             Goal met 3/21/23     SLP presented picture scenes and pt formulated moderate complexity sentences w/ 33% acc I'ly, increasing to 100% acc modA.  Pt followed 2-step commands given mod-max verbal and visual cueing.  Pt completed following 1-step command worksheet while targeting  reading comprehension at the sentence level and following commands w/ 63%r acc I'ly, increasing to 100% acc modA.    Overall good session.  Cont SLP POC.    Patient Education/Response:     Written Home Exercises Provided:  to be provided as appropriate throughout course of OP SLP services .  Exercises were reviewed and Bhupendra was able to demonstrate them prior to the end of the session.  Bhupendra demonstrated good  understanding of the education provided.     Assessment:   Bhupendra is progressing well towards his goals. Current goals remain appropriate. Goals to be updated as necessary.     Pt prognosis is Good. Pt will continue to benefit from skilled outpatient speech and language therapy to address the deficits listed in the problem list on initial evaluation, provide pt/family education and to maximize pt's level of independence in the home and community environment.     Barriers to Therapy:   Pt's spiritual, cultural and educational needs considered and pt agreeable to plan of care and goals.    Plan:   Continue POC with focus on expressive language, receptive language, written expression, and reading comprehension.    Adamaris Carrion M.S., CCC-SLP   3/22/2023

## 2023-03-22 NOTE — PROGRESS NOTES
[facility]  Occupational Therapy Treatment Note    Date: 3/21/2023  Name: Bhupendra Park  Clinic Number: [id number]    Therapy Diagnosis: No diagnosis found.  Physician: Casey Maldonado MD    Time In: 1500  Time Out: 1530  Total Billable Time: 30 minutes    SUBJECTIVE     Pt reports: no new issues to report at this time     OBJECTIVE     Objective Measures updated at progress report unless specified.    Treatment     Bhupendra received the treatments listed below:     Neuromuscular re-education activities to improve Balance, Kinesthetic, and Proprioception for 30 minutes. The following activities were included:  - In supine, Patient completed AAROM of shoulder flexion/extension and elbow flexion/extension. OT then provided passive Range of Motion of the wrist and MCP/IP joints of the R hand.   - Sitting upright Patient completed 3x 6 reps of weightbearing into R forearm with lateral leaning to complete Left upper extremity functional reaching across body to increase the weightbear into the Right upper extremity.-- OT provided support to the Right upper extremity encouraging Patient to push up back to midline through the Right upper extremity.    Patient Education and Home Exercises      Education provided:   - OT educated Patient and Patient's wife to bring resting hand splint to session tomorrow for Patient to drew an hour prior to OT session to increase extension through hand joints   - Progress towards goals         Assessment     Pt tolerated session well. Right upper extremity presented with fatigue with continued weightbearing, requiring increased assistance from OT for push up back to midline      Pt will continue to benefit from skilled outpatient occupational therapy to address the deficits listed in the problem list on initial evaluation provide pt/family education and to maximize pt's level of independence in the home and community environment.     Pt's spiritual, cultural  and educational needs considered and pt agreeable to plan of care and goals.      PLAN     Continue with current plan of care   Updates/Grading for next session: Pt's wife to contact [facility] rep to continue process to receive hand splint       Kris Lopez OT

## 2023-03-22 NOTE — PROGRESS NOTES
[facility]  Occupational Therapy Treatment Note    Date: 3/22/2023  Name: Bhupendra Park  Clinic Number: [id number]    Therapy Diagnosis:   Encounter Diagnoses   Name Primary?    Decreased right shoulder range of motion Yes    Muscle hypertonicity      Physician: Casey Maldonado MD    Time In: 1500  Time Out: 1530  Total Billable Time: 30 minutes    SUBJECTIVE     Pt reports: no new issues to report at this time     OBJECTIVE     Objective Measures updated at progress report unless specified.    Treatment     Bhupendra received the treatments listed below:     Neuromuscular re-education activities to improve Balance, Kinesthetic, and Proprioception for 30 minutes. The following activities were included:  - Sitting upright wih R hand placed at side, Patient completed 3x 10 reps of functional reaching to floor with Left upper extremity while promoting weightbearing into Right upper extremity.  - Sitting upright Patient completed 3x 8 reps of weightbearing into R forearm with lateral leaning to complete Left upper extremity functional reaching across body to increase the weightbear into the Right upper extremity.-- OT provided support to the Right upper extremity for the push up     Patient Education and Home Exercises      Education provided:   - continue bringing resting hand splint for Patient to wear an hour prior to OT session   - Progress towards goals         Assessment     Pt tolerated session well. Initially, Patient attempted weightbearing into R hand with hand placed on mat. Patient reported increased pain in digits, requiring downgrade to 4inch step placed next to him for decreased stretch    Pt will continue to benefit from skilled outpatient occupational therapy to address the deficits listed in the problem list on initial evaluation provide pt/family education and to maximize pt's level of independence in the home and community environment.     Pt's spiritual, cultural and  educational needs considered and pt agreeable to plan of care and goals.      PLAN     Continue with current plan of care   Updates/Grading for next session: Stephanie to meet OT and Patient Friday morning       Kris Lopez OT

## 2023-03-22 NOTE — PROGRESS NOTES
[facility]   Physical Therapy Treatment Note     Name: Bhupendra Park  Clinic Number: [id number]    Therapy Diagnosis:   Encounter Diagnoses   Name Primary?    Impaired strength of lower extremity Yes    Impairment of balance     Abnormality of gait following cerebrovascular accident      Physician: Casey Maldonado MD    Authorization Period Expiration: 5/25/2023  Plan of Care Expiration: 5/25/2023  Reassessment / POC Due: 4/5/2023  Visit # / Visits authorized: 3/ 18     Visit Date: 3/22/2023    Time In: 1400  Time Out: 1430  Total Billable Time: 30 minutes    SUBJECTIVE     Pt reports: that he's been ambulating in his home without the QC.  He holds onto furniture.  Wife reports that patient has an appt at 's to adjust AFO on Friday.  He was compliant with home exercise program.  Response to previous treatment: n/a  Functional change: n/a    Pain: 0/10  Location: n/a     OBJECTIVE     Objective Measures updated at progress report unless specified.     Treatment     Bhupenrda received the treatments listed below:      therapeutic exercises to develop strength, endurance, and ROM for 30 minutes including:    neuromuscular re-education activities to improve: Balance and Coordination for 0 minutes. The following activities were included:    gait training to improve functional mobility and safety for 0  minutes, including:    Therapeutic Exercise Grid     Exercise 1  Exercise 2  Exercise 3  Exercise 4    Exercise :    B LE: Standing: 3 way hip Sit to stands R LE: Step-ups: forward, lateral Step-downs   Repetition/Time :    15   15   Fwd- 15  Lat-15      Resist or Assist :       Foam mat, low surface           Comment :    Flexion, abduction            Done :    yes  yes yes   no                  Exercise 5  Exercise 6  Exercise 7  Exercise 8    Exercise :    Single leg stance   squats           Repetition/Time :    Left leg-19 sec; right leg-7 sec   15           Resist or Assist :                   Comment :                  Done :    yes   yes                           Exercise 9  Exercise 10  Exercise 11  Exercise 12    Exercise :                  Repetition/Time :                  Resist or Assist :                  Comment :                  Done :                               Gait: with LBQC and standby assistance with focus on right heel strike x 60'.    Patient Education and Home Exercises     Home Exercises Provided and Patient Education Provided     Education provided: Educated pt on importance of HEP and encouraged pt to continue daily    ASSESSMENT     Pt tolerated exercises.  Extensor tone in left leg during gait is causing decreased foot clearance.  AFO adjustments planned for this week.      Bhupendra Is progressing well towards his goals.   Pt prognosis is Good.     Pt will continue to benefit from skilled outpatient physical therapy to address the deficits listed in the problem list box on initial evaluation, provide pt/family education and to maximize pt's level of independence in the home and community environment.     Pt's spiritual, cultural and educational needs considered and pt agreeable to plan of care and goals.     Anticipated barriers to physical therapy: previous CVA with R LE hemiparesis, R UE hemiplegia, expressive aphasia    Goals:   Short Term Goals: (4 weeks)  1. 5xSTS will decrease to 20 sec or less for improved LE strength and transfer safety.  2. Patient will improve balance as evidenced by MCTSIB score of 2/4 for reduced fall risk.  3. Patient will be able to perform HEP with minimal cues for improved carryover at home.  4. Improve Tinetti balance score to >/= 10/16  5. Improve R hip and knee strength to >/= 4/5  muscle grade for improved safety during household mobility.     Long Term Goals: (8 weeks)  1. Patient will be able to ascend/descend 4 steps with 1 rail foot over foot modified independent.  2. Patient will be able to take symmetrical steps with  consistent foot clearance for household ambulation.  3. 5xSTS will decrese to 15 sec or less for independent transfers.  4. Patient will be independent in HEP in order to continue after discharge.  5. Patient will improve Tinetti balance and gait score to >/= 22/28.  6. B LE strength will improve to 4+/5 or > throughout for independent mobility.   7. Patient will improve FOTO score to >/= 63 % to increase overall mobility and function at home.       PLAN     Continue with current Plan of Care and progress per pt's tolerance    Angela Avelar PT

## 2023-03-23 NOTE — PROGRESS NOTES
Speech and Language Therapy Outpatient Progress Note  Date:  3/23/2023     Name: Bhupendra Park   MRN: [id number]   Therapy Diagnosis:   Encounter Diagnosis   Name Primary?    Combined receptive and expressive aphasia Yes   Physician: Casey Maldonado MD  Physician Orders: eval and treat  Medical Diagnosis: CVA    Visit #/Visits authorized: 3/36  Date of Evaluation:  3/8/23  Insurance Authorization Period: 3/14/23-6/6/23  Plan of Care Expiration Date:  6/6/23  Extended POC:  TBD     Time In:  2:00  Time Out:  2:30  Total Billable Time: 30       Subjective:   Pt ambulated to SLP office w/ cane. Pt pleasant and in good spirits.        Response to previous treatment: good     Pain Scale:  0/10 on VAS currently.   Pain Location: none expressed  Objective:        UNTIMED  Procedure Min.   {Speech- Language- Voice Therapy  30        Total Untimed Units: 1  Charges Billed/# of units: 1    Long Term Goals (12 weeks):   The pt will improve expressive and receptive language skills to communicate w/ friends and family.    Short Term Goals: 3x week/12 weeks Current Progress: initial   1) The pt will complete auto speech tasks (stating Feng and Aidan, sentence completion) w/ 90% acc Manish.  2) The pt will name x10 items in a concrete category w/I 1 minute Manish.  3) The pt will formulate sentences (subject+verb+object) to describe picture scenes w/ 80% acc Manish.  4) The pt will answer 3U YNQs w/ 90% acc Manish.  5) The pt will follow 2-step commands w/ 90% acc Manish.  6) The pt will read and comprehend phrases w/ 90% acc Manish.  7) The pt will write 1-2 syllable functional words w/ 80% acc Manish.  8) The pt will answer MU YNQs w/ 90% acc Manish.             Goal met 3/21/23     SLP presented picture scenes and pt formulated moderate complexity sentences w/ 75% acc I'ly, increasing to 88% acc phonemic cueing. Decreased initiation and processing noted to think of a subject. SLP provided visual aid to assist w/ sentence structured  (subject+verb+object).      Overall good session.  Cont SLP POC.    Patient Education/Response:     Written Home Exercises Provided:  to be provided as appropriate throughout course of OP SLP services .  Exercises were reviewed and Bhupendra was able to demonstrate them prior to the end of the session.  Bhupendra demonstrated good  understanding of the education provided.     Assessment:   Bhupendra is progressing well towards his goals. Current goals remain appropriate. Goals to be updated as necessary.     Pt prognosis is Good. Pt will continue to benefit from skilled outpatient speech and language therapy to address the deficits listed in the problem list on initial evaluation, provide pt/family education and to maximize pt's level of independence in the home and community environment.     Barriers to Therapy:   Pt's spiritual, cultural and educational needs considered and pt agreeable to plan of care and goals.    Plan:   Continue POC with focus on expressive language, receptive language, written expression, and reading comprehension.    Adamaris Carrion M.S., CCC-SLP   3/23/2023

## 2023-03-23 NOTE — PROGRESS NOTES
[facility] OUTPATIENT THERAPY AND WELLNESS   Physical Therapy Treatment Note     Name: Bhupendra Park  Clinic Number: [id number]    Therapy Diagnosis:   No diagnosis found.    Physician: Casey Maldonado MD    Authorization Period Expiration: 5/25/2023  Plan of Care Expiration: 5/25/2023  Reassessment / POC Due: 4/5/2023  Visit # / Visits authorized: 4/ 18     Visit Date: 3/23/2023    Time In: 1430  Time Out: 1500  Total Billable Time: 30 minutes    SUBJECTIVE     Pt reports: he did not go to  to get R AFO adjusted yet  He was compliant with home exercise program.  Response to previous treatment: goo  Functional change: none    Pain: 0/10  Location: n/a     OBJECTIVE     Objective Measures updated at progress report unless specified.     Treatment     Bhupendra received the treatments listed below:      therapeutic exercises to develop strength, endurance, and ROM for 20 minutes including:    neuromuscular re-education activities to improve: Balance and Coordination for 0 minutes. The following activities were included:    gait training to improve functional mobility and safety for 10  minutes, including:    Therapeutic Exercise Grid     Exercise 1  Exercise 2  Exercise 3  Exercise 4    Exercise :    B LE: Standing: 3 way hip Sit to stands R LE: Step-ups: forward, lateral Step-downs   Repetition/Time :    20   15   Fwd- 20 (8 inch step)  Lat-10 (4 inch step)      Resist or Assist :       Foam mat, low surface           Comment :    Flexion, abduction            Done :    yes  no yes   no                  Exercise 5  Exercise 6  Exercise 7  Exercise 8    Exercise :    Single leg stance   squats   Amb with LBQC in field (unlevel surfaces)        Repetition/Time :       20           Resist or Assist :                  Comment :                  Done :       yes                           Exercise 9  Exercise 10  Exercise 11  Exercise 12    Exercise :                  Repetition/Time :                  Resist or  Assist :                  Comment :                  Done :                                 Patient Education and Home Exercises     Home Exercises Provided and Patient Education Provided     Education provided: Educated pt on importance of HEP and encouraged pt to continue daily    ASSESSMENT     Pt had significant difficulty with R toe clearance with R AFO during swing phase of gait on both flat and uneven surfaces with minimal improvements when cues given to facilitate increased R step height when amb however pt able to perform R hip flexion to 90 degrees during exercises    Bhupendra Is progressing well towards his goals.   Pt prognosis is Good.     Pt will continue to benefit from skilled outpatient physical therapy to address the deficits listed in the problem list box on initial evaluation, provide pt/family education and to maximize pt's level of independence in the home and community environment.     Pt's spiritual, cultural and educational needs considered and pt agreeable to plan of care and goals.     Anticipated barriers to physical therapy: previous CVA with R LE hemiparesis, R UE hemiplegia, expressive aphasia    Goals:   Short Term Goals: (4 weeks)  1. 5xSTS will decrease to 20 sec or less for improved LE strength and transfer safety.  2. Patient will improve balance as evidenced by MCTSIB score of 2/4 for reduced fall risk.  3. Patient will be able to perform HEP with minimal cues for improved carryover at home.  4. Improve Tinetti balance score to >/= 10/16  5. Improve R hip and knee strength to >/= 4/5  muscle grade for improved safety during household mobility.     Long Term Goals: (8 weeks)  1. Patient will be able to ascend/descend 4 steps with 1 rail foot over foot modified independent.  2. Patient will be able to take symmetrical steps with consistent foot clearance for household ambulation.  3. 5xSTS will decrese to 15 sec or less for independent transfers.  4. Patient will be independent in HEP  in order to continue after discharge.  5. Patient will improve Tinetti balance and gait score to >/= 22/28.  6. B LE strength will improve to 4+/5 or > throughout for independent mobility.   7. Patient will improve FOTO score to >/= 63 % to increase overall mobility and function at home.       PLAN     Continue with current Plan of Care and progress per pt's tolerance    Ayleen oMe PT

## 2023-03-24 NOTE — PROGRESS NOTES
[facility]  Occupational Therapy Treatment Note    Date: 3/23/2023  Name: Bhupendra Park  Clinic Number: [id number]    Therapy Diagnosis:   Encounter Diagnoses   Name Primary?    Decreased right shoulder range of motion Yes    Muscle hypertonicity      Physician: Casey Maldonado MD    Time In: 1500  Time Out: 1530  Total Billable Time: 30 minutes    SUBJECTIVE     Pt reports: no new issues to report at this time     OBJECTIVE     Objective Measures updated at progress report unless specified.    Treatment     Bhupendra received the treatments listed below:     Neuromuscular re-education activities to improve Coordination and Kinesthetic for 30 minutes. The following activities were included:  Patient wore resting hand splint throughout session for blocking of increased tone at wrist and hand:  - in supine, Patient completed 3x10 reps of AAROM shoulder flexion/extension and elbow flexion/extension-- Patient provided good efforts throughout activity   - At table, Patient completed 3x10 reps of forearm supination/pronation-- increased AROM for forearm supination with Patient able to rotate to neutral from full pronation.     The above activity promotes increased ROM for the Right upper extremity allowing Patient to gain increased functional use and increased independence with all ADL and IADL.     Patient Education and Home Exercises      Education provided:   - OT encouraged Patient to continue wearing resting hand splint throughout weekend while attempting ROM   - Progress towards goals        Assessment     Pt tolerated session well     Bhupendra is progressing well towards his goals and there are no updates to goals at this time. Pt prognosis is Good.     Pt will continue to benefit from skilled outpatient occupational therapy to address the deficits listed in the problem list on initial evaluation provide pt/family education and to maximize pt's level of independence in the home and  community environment.     Pt's spiritual, cultural and educational needs considered and pt agreeable to plan of care and goals.      PLAN     Continue with current plan of care       Kris Lopez OT

## 2023-03-28 NOTE — PROGRESS NOTES
"  [facility] OUTPATIENT THERAPY AND WELL[facility]  Occupational Therapy Treatment Note    Date: 3/28/2023  Name: Bhupendra Park  Clinic Number: [id number]    Therapy Diagnosis:   No diagnosis found.    Physician: Casey Maldonado MD    Time In: 1500  Time Out: 1530  Total Billable Time: 30 minutes    SUBJECTIVE     Pt reports: feeling "off" today. After having an MRI done this AM with concern for possible new TIA.      OBJECTIVE     Objective Measures updated at progress report unless specified.    Treatment     Bhupendra received the treatments listed below:     Neuromuscular re-education activities to improve Coordination and Kinesthetic for 30 minutes. The following activities were included:  Patient presented to Occupational Therapist session with MCP extension splint donned. Patient had been wearing splint for an hour.   Occupational Therapist doffed splint to begin session, noting increased pain with stretching for elbow extension   - in supine, Occupational Therapist attempted stretching of Right upper extremity with increased tone noted when Patient experiencing increase of pain.    The above activity promotes increased ROM for the Right upper extremity allowing Patient to gain increased functional use and increased independence with all ADL and IADL.     Patient Education and Home Exercises      Education provided:   - OT encouraged Patient to continue wearing resting hand splint throughout weekend while attempting ROM   - Progress towards goals        Assessment     Pt tolerated session poorly.     Bhupendra is progressing well towards his goals and there are no updates to goals at this time. Pt prognosis is Good.     Pt will continue to benefit from skilled outpatient occupational therapy to address the deficits listed in the problem list on initial evaluation provide pt/family education and to maximize pt's level of independence in the home and community environment.     Pt's spiritual, cultural and educational " needs considered and pt agreeable to plan of care and goals.      PLAN     Continue with current plan of care       Kris Lopez, OT

## 2023-03-28 NOTE — PROGRESS NOTES
Speech and Language Therapy Outpatient Progress Note  Date:  3/28/2023     Name: Bhupendra Park   MRN: [id number]   Therapy Diagnosis:   Encounter Diagnosis   Name Primary?    Combined receptive and expressive aphasia Yes   Physician: Casey Maldonado MD  Physician Orders: eval and treat  Medical Diagnosis: CVA    Visit #/Visits authorized: 4/36  Date of Evaluation:  3/8/23  Insurance Authorization Period: 3/14/23-6/6/23  Plan of Care Expiration Date: 6/6/23  Extended POC: TBD     Time In:  2:15  Time Out:  2:30  Total Billable Time: 15       Subjective:   Pt ambulated to SLP office w/ cane. Pt pleasant and in good spirits.   Pt's wife reports slurred speech and decrease in mobility over the weekend. Pt's wife suspects TIA. Pt underwent a MRI this AM at [facility].       Response to previous treatment: good     Pain Scale:  0/10 on VAS currently.   Pain Location: none expressed  Objective:        UNTIMED  Procedure Min.   {Speech- Language- Voice Therapy  15        Total Untimed Units: 1  Charges Billed/# of units: 1    Long Term Goals (12 weeks):   The pt will improve expressive and receptive language skills to communicate w/ friends and family.    Short Term Goals: 3x week/12 weeks Current Progress: initial   1) The pt will complete auto speech tasks (stating Feng and Aidan, sentence completion) w/ 90% acc Manish.  2) The pt will name x10 items in a concrete category w/I 1 minute Manish.  3) The pt will formulate sentences (subject+verb+object) to describe picture scenes w/ 80% acc Manish.  4) The pt will answer 3U YNQs w/ 90% acc Manish.  5) The pt will follow 2-step commands w/ 90% acc Manish.  6) The pt will read and comprehend phrases w/ 90% acc Manish.  7) The pt will write 1-2 syllable functional words w/ 80% acc Manish.  8) The pt will answer MU YNQs w/ 90% acc Manish.             Goal met 3/21/23     SLP presented picture scenes and pt formulated moderate complexity sentences w/ 75% acc I'ly, increasing to 100% acc  modA, repetition required due to mumbled speech and fast rate. D  [place] category naming w/I 1 minute: Animals x3 I'ly, increasing to x4 Manish. States x5 I'ly.    15 minute session completed this date due to pt arriving late to appointment.     Overall good session.  Cont SLP POC.    Patient Education/Response:     Written Home Exercises Provided:  to be provided as appropriate throughout course of OP SLP services .  Exercises were reviewed and Bhupendra was able to demonstrate them prior to the end of the session.  Bhupendra demonstrated good  understanding of the education provided.     Assessment:   Bhupendra is progressing well towards his goals. Current goals remain appropriate. Goals to be updated as necessary.     Pt prognosis is Good. Pt will continue to benefit from skilled outpatient speech and language therapy to address the deficits listed in the problem list on initial evaluation, provide pt/family education and to maximize pt's level of independence in the home and community environment.     Barriers to Therapy:   Pt's spiritual, cultural and educational needs considered and pt agreeable to plan of care and goals.    Plan:   Continue POC with focus on expressive language, receptive language, written expression, and reading comprehension.    Adamaris Carrion M.S., CCC-SLP   3/28/2023

## 2023-03-28 NOTE — PROGRESS NOTES
"[facility]   Physical Therapy Treatment Note     Name: Bhupendra Park  Clinic Number: [id number]    Therapy Diagnosis:   Encounter Diagnoses   Name Primary?    Impaired strength of lower extremity Yes    Impairment of balance     Abnormality of gait following cerebrovascular accident        Physician: Casey Maldonado MD    Authorization Period Expiration: 5/25/2023  Plan of Care Expiration: 5/25/2023  Reassessment / POC Due: 4/5/2023  Visit # / Visits authorized: 5/ 18     Visit Date: 3/28/2023    Time In: 1430  Time Out: 1500  Total Billable Time: 22 minutes    SUBJECTIVE     Pt reports: Patient had repeat MRI today after experiencing increased slurred speech and right sided weakness over the weekend.  Patient states he does not feel as good today as last week.  He states he has an appointment at [facility] tomorrow for brace adjustment.  He was compliant with home exercise program.  Response to previous treatment: good  Functional change: none    Pain: 0/10  Location: n/a     OBJECTIVE     FOTO score = 45% on Stroke lower extremity survey; unchanged from initial survey    Treatment     Bhupendra received the treatments listed below:      therapeutic exercises to develop strength, endurance, and ROM for 20 minutes including:    neuromuscular re-education activities to improve: Balance and Coordination for 0 minutes. The following activities were included:    gait training to improve functional mobility and safety for 10  minutes, including:    Therapeutic Exercise Grid     Exercise 1  Exercise 2  Exercise 3  Exercise 4    Exercise :    B LE: Standing: 3 way hip Sit to stands R LE: Step-ups: forward, lateral Step-downs   Repetition/Time :    20   15   Fwd- 20 (8 inch step)  Lat-10 (4 inch step)      Resist or Assist :       Foam mat, low surface           Comment :    Flexion, abduction      Forward only on 6" step today x 10 reps      Done :    yes  yes yes   no                  Exercise " 5  Exercise 6  Exercise 7  Exercise 8    Exercise :    Single leg stance   squats   Amb with LBQC in field (unlevel surfaces)   Gait on indoor surface with LBQC and AFO      Repetition/Time :       20      100'     Resist or Assist :             CGA due to poor right foot clearance     Comment :                  Done :    no no no yes                     Exercise 9  Exercise 10  Exercise 11  Exercise 12    Exercise :    Up / down 4 steps with one handrail              Repetition/Time :                  Resist or Assist :    SBA              Comment :    Cues for sequencing              Done :    yes                             Patient Education and Home Exercises     Home Exercises Provided and Patient Education Provided     Education provided: Educated pt on importance of HEP and encouraged pt to continue daily    ASSESSMENT     Pt is more sluggish than last week but participated satisfactorily.  Activities modified to his need due to weekend events and AFO adjustments not yet completed.  When questioned, patient acknowledged that he can't feel his right leg well during gait.  1# ankle weight added to see if that would help proprioception but it did not.  Extensor tone in his leg is definitely interfering in gait safety.    Bhupendra Is progressing well towards his goals.   Pt prognosis is Good.     Pt will continue to benefit from skilled outpatient physical therapy to address the deficits listed in the problem list box on initial evaluation, provide pt/family education and to maximize pt's level of independence in the home and community environment.     Pt's spiritual, cultural and educational needs considered and pt agreeable to plan of care and goals.     Anticipated barriers to physical therapy: previous CVA with R LE hemiparesis, R UE hemiplegia, expressive aphasia    Goals:   Short Term Goals: (4 weeks)  1. 5xSTS will decrease to 20 sec or less for improved LE strength and transfer safety.  2. Patient will  improve balance as evidenced by MCTSIB score of 2/4 for reduced fall risk.  3. Patient will be able to perform HEP with minimal cues for improved carryover at home.  4. Improve Tinetti balance score to >/= 10/16  5. Improve R hip and knee strength to >/= 4/5  muscle grade for improved safety during household mobility.     Long Term Goals: (8 weeks)  1. Patient will be able to ascend/descend 4 steps with 1 rail foot over foot modified independent.  2. Patient will be able to take symmetrical steps with consistent foot clearance for household ambulation.  3. 5xSTS will decrese to 15 sec or less for independent transfers.  4. Patient will be independent in HEP in order to continue after discharge.  5. Patient will improve Tinetti balance and gait score to >/= 22/28.  6. B LE strength will improve to 4+/5 or > throughout for independent mobility.   7. Patient will improve FOTO score to >/= 63 % to increase overall mobility and function at home.       PLAN     Continue with current Plan of Care and progress per pt's tolerance    Angela Avelar PT

## 2023-03-29 NOTE — PROGRESS NOTES
"[facility]   Physical Therapy Treatment Note     Name: Bhupendra Park  Clinic Number: [id number]    Therapy Diagnosis:   Encounter Diagnoses   Name Primary?    Impaired strength of lower extremity Yes    Impairment of balance     Abnormality of gait following cerebrovascular accident        Physician: Casey Maldonado MD    Authorization Period Expiration: 5/25/2023  Plan of Care Expiration: 5/25/2023  Reassessment / POC Due: 4/5/2023  Visit # / Visits authorized: 6/ 18     PTA Visit: 1/5    Visit Date: 3/29/2023    Time In: 1430  Time Out: 1500  Total Billable Time: 30 minutes    SUBJECTIVE     Pt reports: he did not go to [facility] for brace adjustment, but is going tomorrow. Feels a little better than he did last session.  He was compliant with home exercise program.  Response to previous treatment: good  Functional change: none    Pain: 0/10  Location: n/a     OBJECTIVE     FOTO score = 45% on Stroke lower extremity survey; unchanged from initial survey    Treatment     Bhupendra received the treatments listed below:      therapeutic exercises to develop strength, endurance, and ROM for 20 minutes including:    neuromuscular re-education activities to improve: Balance and Coordination for 0 minutes. The following activities were included:    gait training to improve functional mobility and safety for 10  minutes, including:    Therapeutic Exercise Grid     Exercise 1  Exercise 2  Exercise 3  Exercise 4    Exercise :    B LE: Standing: 3 way hip Sit to stands R LE: Step-ups: forward, lateral Step-downs   Repetition/Time :    20   15   Fwd- 20 (8 inch step)  Lat-10 (4 inch step)      Resist or Assist :       Foam mat, low surface           Comment :    Flexion, abduction      Forward only on 6" step today x 10 reps      Done :    yes  yes yes   no                  Exercise 5  Exercise 6  Exercise 7  Exercise 8    Exercise :    Single leg stance   squats   Amb with LBQC in field (unlevel " surfaces)   Gait on indoor surface with LBQC and AFO      Repetition/Time :       20      100'     Resist or Assist :             CGA due to poor right foot clearance     Comment :                  Done :    no no no yes                     Exercise 9  Exercise 10  Exercise 11  Exercise 12    Exercise :    Up / down 4 steps with one handrail              Repetition/Time :                  Resist or Assist :    SBA              Comment :    Cues for sequencing              Done :    yes                             Patient Education and Home Exercises     Home Exercises Provided and Patient Education Provided     Education provided: Educated pt on importance of HEP and encouraged pt to continue daily    ASSESSMENT     Bhupendra continues to have difficulty with R foot drop and toe drag despite wearing AFO. Tone remains present in R lower extremity. Constant cues needed to perform complete swing of R lower extremity when walking. Several short steps and one therapist assisted correction during gait.    Bhupendra Is progressing well towards his goals.   Pt prognosis is Good.     Pt will continue to benefit from skilled outpatient physical therapy to address the deficits listed in the problem list box on initial evaluation, provide pt/family education and to maximize pt's level of independence in the home and community environment.     Pt's spiritual, cultural and educational needs considered and pt agreeable to plan of care and goals.     Anticipated barriers to physical therapy: previous CVA with R LE hemiparesis, R UE hemiplegia, expressive aphasia    Goals:   Short Term Goals: (4 weeks)  1. 5xSTS will decrease to 20 sec or less for improved LE strength and transfer safety.  2. Patient will improve balance as evidenced by MCTSIB score of 2/4 for reduced fall risk.  3. Patient will be able to perform HEP with minimal cues for improved carryover at home.  4. Improve Tinetti balance score to >/= 10/16  5. Improve R hip and knee  strength to >/= 4/5  muscle grade for improved safety during household mobility.     Long Term Goals: (8 weeks)  1. Patient will be able to ascend/descend 4 steps with 1 rail foot over foot modified independent.  2. Patient will be able to take symmetrical steps with consistent foot clearance for household ambulation.  3. 5xSTS will decrese to 15 sec or less for independent transfers.  4. Patient will be independent in HEP in order to continue after discharge.  5. Patient will improve Tinetti balance and gait score to >/= 22/28.  6. B LE strength will improve to 4+/5 or > throughout for independent mobility.   7. Patient will improve FOTO score to >/= 63 % to increase overall mobility and function at home.       PLAN     Continue with current Plan of Care and progress per pt's tolerance    Holland Vivas, PTA

## 2023-03-29 NOTE — PROGRESS NOTES
[facility]  Occupational Therapy Treatment Note    Date: 3/29/2023  Name: Bhupendra Park  Clinic Number: [id number]    Therapy Diagnosis:   Encounter Diagnosis   Name Primary?    Decreased right shoulder range of motion Yes       Physician: Casey Maldonado MD    Time In: 1500  Time Out: 1530  Total Billable Time: 30 minutes    SUBJECTIVE     Pt reports: wearing Dynasplint this AM, experiencing pain during wear with residual pain during session this PM.     OBJECTIVE     Objective Measures updated at progress report unless specified.    Treatment     Bhupendra received the treatments listed below:     Neuromuscular re-education activities to improve Coordination and Kinesthetic for 30 minutes. The following activities were included:  - in supine, Occupational Therapist completed stretching of Right upper extremity in all planes. Occupational Therapist noted minimal external rotation due to hypertonicity and ~15 degrees of tolerated shoulder abduction.     The above activity promotes increased ROM for the Right upper extremity allowing Patient to gain increased functional use and increased independence with all ADL and IADL.     Patient Education and Home Exercises      Education provided:   -Occupational Therapist educated Patient and Patient's wife to decrease tension on Dynasplint to 1 due to Patient's poor tolerance   - Progress towards goals        Assessment     Pt tolerated session poorly.     Bhupendra is progressing well towards his goals and there are no updates to goals at this time. Pt prognosis is Good.     Pt will continue to benefit from skilled outpatient occupational therapy to address the deficits listed in the problem list on initial evaluation provide pt/family education and to maximize pt's level of independence in the home and community environment.     Pt's spiritual, cultural and educational needs considered and pt agreeable to plan of care and goals.      PLAN      Continue with current plan of care       Kris Lopez, OT

## 2023-03-29 NOTE — PROGRESS NOTES
Speech and Language Therapy Outpatient Progress Note  Date:  3/29/2023     Name: Bhupendra Park   MRN: [id number]   Therapy Diagnosis:   Encounter Diagnosis   Name Primary?    Combined receptive and expressive aphasia Yes   Physician: Casey Maldonado MD  Physician Orders: eval and treat  Medical Diagnosis: CVA    Visit #/Visits authorized: 5/36  Date of Evaluation:  3/8/23  Insurance Authorization Period: 3/14/23-6/6/23  Plan of Care Expiration Date: 6/6/23  Extended POC: TBD     Time In:  2:00  Time Out:  2:30  Total Billable Time: 30       Subjective:   Pt ambulated to SLP office w/ cane. Pt pleasant and in good spirits.        Response to previous treatment: good     Pain Scale:  0/10 on VAS currently.   Pain Location: none expressed  Objective:        UNTIMED  Procedure Min.   {Speech- Language- Voice Therapy  30        Total Untimed Units: 1  Charges Billed/# of units: 1    Long Term Goals (12 weeks):   The pt will improve expressive and receptive language skills to communicate w/ friends and family.    Short Term Goals: 3x week/12 weeks Current Progress: initial   1) The pt will complete auto speech tasks (stating Feng and Aidan, sentence completion) w/ 90% acc Manish.  2) The pt will name x10 items in a concrete category w/I 1 minute Manish.  3) The pt will formulate sentences (subject+verb+object) to describe picture scenes w/ 80% acc Manish.  4) The pt will answer 3U YNQs w/ 90% acc Manish.  5) The pt will follow 2-step commands w/ 90% acc Manish.  6) The pt will read and comprehend phrases w/ 90% acc Manish.  7) The pt will write 1-2 syllable functional words w/ 80% acc Manish.  8) The pt will answer MU YNQs w/ 90% acc Manish.             Goal met 3/21/23     2-step command following completed w/ 57% acc I'ly, increasing ot 71% acc modA.  SLP presented common picture cards and targeted written expression at the 1 and 2 syllable word level. Tack completed w/ 40% acc I'ly, increasing to 100% acc mod verbal cueing.        Overall good session.  Cont SLP POC.    Patient Education/Response:     Written Home Exercises Provided:  to be provided as appropriate throughout course of OP SLP services .  Exercises were reviewed and Bhupendra was able to demonstrate them prior to the end of the session.  Bhupendra demonstrated good  understanding of the education provided.     Assessment:   Bhupendra is progressing well towards his goals. Current goals remain appropriate. Goals to be updated as necessary.     Pt prognosis is Good. Pt will continue to benefit from skilled outpatient speech and language therapy to address the deficits listed in the problem list on initial evaluation, provide pt/family education and to maximize pt's level of independence in the home and community environment.     Barriers to Therapy:   Pt's spiritual, cultural and educational needs considered and pt agreeable to plan of care and goals.    Plan:   Continue POC with focus on expressive language, receptive language, written expression, and reading comprehension.    Adamaris Carrion M.S., CCC-SLP   3/29/2023

## 2023-03-30 NOTE — PROGRESS NOTES
[facility]  Occupational Therapy Treatment Note    Date: 3/30/2023  Name: Bhupendra Park  Clinic Number: [id number]    Therapy Diagnosis:   Encounter Diagnosis   Name Primary?    Decreased right shoulder range of motion Yes       Physician: Casey Maldonado MD    Time In: 1400   Time Out: 1430  Total Billable Time: 30 minutes    SUBJECTIVE     Pt reports: mild pain while wearing Dynasplint for increased wrist extension.      OBJECTIVE     Objective Measures updated at progress report unless specified.    Treatment     Bhupendra received the treatments listed below:     Neuromuscular re-education activities to improve Coordination and Kinesthetic for 30 minutes. The following activities were included:  - in supine, Occupational Therapist completed stretching of Right upper extremity in all planes. -- Increased ROM noted compared to yesterday's increased tone   - In upright sitting, Patient completed weightbearing into R hand while completing functional reaching with Left upper extremity. 3 rounds completed-- Occupational Therapist provided blocking at wrist and hand to improve weightbearing into R hand.  - Occupational Therapist donned resting hand splint to R hand at end of session      The above activity promotes increased ROM for the Right upper extremity allowing Patient to gain increased functional use and increased independence with all ADL and IADL.     Patient Education and Home Exercises      Education provided:    - Progress towards goals        Assessment     Pt tolerated session well.     Bhupendra is progressing well towards his goals and there are no updates to goals at this time. Pt prognosis is Good.     Pt will continue to benefit from skilled outpatient occupational therapy to address the deficits listed in the problem list on initial evaluation provide pt/family education and to maximize pt's level of independence in the home and community environment.     Pt's spiritual,  cultural and educational needs considered and pt agreeable to plan of care and goals.      PLAN     Continue with current plan of care       Kris Lopez, OT

## 2023-03-30 NOTE — PROGRESS NOTES
Speech and Language Therapy Outpatient Progress Note  Date:  3/30/2023     Name: Bhupendra Park   MRN: [id number]   Therapy Diagnosis:   Encounter Diagnosis   Name Primary?    Combined receptive and expressive aphasia Yes   Physician: Casey Maldonado MD  Physician Orders: eval and treat  Medical Diagnosis: CVA    Visit #/Visits authorized: 6/36  Date of Evaluation:  3/8/23  Insurance Authorization Period: 3/14/23-6/6/23  Plan of Care Expiration Date: 6/6/23  Extended POC: TBD     Time In:  1:30  Time Out:  2:00  Total Billable Time: 30       Subjective:   Pt ambulated to SLP office w/ cane. Pt pleasant and in good spirits.   Pt's wife reports pt recently starting Baclofen and that a side effect is drowsiness. Information passed along to OT to keep an eye out and inform wife of any noticeable changes.       Response to previous treatment: good     Pain Scale:  0/10 on VAS currently.   Pain Location: none expressed  Objective:        UNTIMED  Procedure Min.   {Speech- Language- Voice Therapy  30        Total Untimed Units: 1  Charges Billed/# of units: 1    Long Term Goals (12 weeks):   The pt will improve expressive and receptive language skills to communicate w/ friends and family.    Short Term Goals: 3x week/12 weeks Current Progress: initial   1) The pt will complete auto speech tasks (stating Feng and Aidan, sentence completion) w/ 90% acc Manish.  2) The pt will name x10 items in a concrete category w/I 1 minute Manish.  3) The pt will formulate sentences (subject+verb+object) to describe picture scenes w/ 80% acc Manish.  4) The pt will answer 3U YNQs w/ 90% acc Manish.  5) The pt will follow 2-step commands w/ 90% acc Manish.  6) The pt will read and comprehend phrases w/ 90% acc Manish.  7) The pt will write 1-2 syllable functional words w/ 80% acc Manish.  8) The pt will answer MU YNQs w/ 90% acc Manish.             Goal met 3/21/23     MU YNQS: 70% acc I'ly  Convergent naming worksheet completed verbally w/70% acc  I'ly, increasing to 100% acc given phonemic cueing for word finding and to increase response time.      Overall good session.  Cont SLP POC.    Patient Education/Response:     Written Home Exercises Provided:  to be provided as appropriate throughout course of OP SLP services .  Exercises were reviewed and Bhupendra was able to demonstrate them prior to the end of the session.  Bhupendra demonstrated good  understanding of the education provided.     Assessment:   Bhupendra is progressing well towards his goals. Current goals remain appropriate. Goals to be updated as necessary.     Pt prognosis is Good. Pt will continue to benefit from skilled outpatient speech and language therapy to address the deficits listed in the problem list on initial evaluation, provide pt/family education and to maximize pt's level of independence in the home and community environment.     Barriers to Therapy:   Pt's spiritual, cultural and educational needs considered and pt agreeable to plan of care and goals.    Plan:   Continue POC with focus on expressive language, receptive language, written expression, and reading comprehension.    Adamaris Carrion M.S., CCC-SLP   3/30/2023

## 2023-03-30 NOTE — PROGRESS NOTES
"[facility]   Physical Therapy Treatment Note     Name: Bhupendra Park  Clinic Number: [id number]    Therapy Diagnosis:   Encounter Diagnoses   Name Primary?    Impaired strength of lower extremity Yes    Impairment of balance     Abnormality of gait following cerebrovascular accident        Physician: Casey Maldonado MD    Authorization Period Expiration: 5/25/2023  Plan of Care Expiration: 5/25/2023  Reassessment / POC Due: 4/5/2023  Visit # / Visits authorized: 6/ 18     PTA Visit: 1/5    Visit Date: 3/30/2023    Time In: 1430  Time Out: 1500  Total Billable Time: 30 minutes    SUBJECTIVE     Pt reports: he went to [facility] today for brace adjustment.   He was compliant with home exercise program.  Response to previous treatment: good  Functional change: none    Pain: 0/10  Location: n/a     OBJECTIVE     N/A    Treatment     Bhupendra received the treatments listed below:      therapeutic exercises to develop strength, endurance, and ROM for 20 minutes including:    neuromuscular re-education activities to improve: Balance and Coordination for 0 minutes. The following activities were included:    gait training to improve functional mobility and safety for 10  minutes, including:    Therapeutic Exercise Grid     Exercise 1  Exercise 2  Exercise 3  Exercise 4    Exercise :    B LE: Standing: 3 way hip Sit to stands R LE: Step-ups: forward, lateral Step-downs   Repetition/Time :    20   15   Fwd- 20 (8 inch step)  Lat-10 (4 inch step)      Resist or Assist :       Foam mat, low surface           Comment :    Flexion, abduction      Forward only on 6" step today x 10 reps      Done :    yes  yes yes   no                  Exercise 5  Exercise 6  Exercise 7  Exercise 8    Exercise :    Single leg stance   squats   Amb with LBQC in field (unlevel surfaces)   Gait on indoor surface with LBQC and AFO      Repetition/Time :       20      100'     Resist or Assist :             CGA due to poor " right foot clearance     Comment :                  Done :    no no no yes                     Exercise 9  Exercise 10  Exercise 11  Exercise 12    Exercise :    Up / down 4 steps with one handrail              Repetition/Time :                  Resist or Assist :    SBA              Comment :    Cues for sequencing              Done :    yes                             Patient Education and Home Exercises     Home Exercises Provided and Patient Education Provided     Education provided: Educated pt on importance of HEP and encouraged pt to continue daily    ASSESSMENT     Bhupendra continues to have difficulty with R foot drop and toe drag despite adjustment to AFO, although there is slight improvement.  Tone remains present in R lower extremity. Constant cues needed to perform complete swing of R lower extremity when walking.     Bhupendra Is progressing well towards his goals.   Pt prognosis is Good.     Pt will continue to benefit from skilled outpatient physical therapy to address the deficits listed in the problem list box on initial evaluation, provide pt/family education and to maximize pt's level of independence in the home and community environment.     Pt's spiritual, cultural and educational needs considered and pt agreeable to plan of care and goals.     Anticipated barriers to physical therapy: previous CVA with R LE hemiparesis, R UE hemiplegia, expressive aphasia    Goals:   Short Term Goals: (4 weeks)  1. 5xSTS will decrease to 20 sec or less for improved LE strength and transfer safety.  2. Patient will improve balance as evidenced by MCTSIB score of 2/4 for reduced fall risk.  3. Patient will be able to perform HEP with minimal cues for improved carryover at home.  4. Improve Tinetti balance score to >/= 10/16  5. Improve R hip and knee strength to >/= 4/5  muscle grade for improved safety during household mobility.     Long Term Goals: (8 weeks)  1. Patient will be able to ascend/descend 4 steps with 1  rail foot over foot modified independent.  2. Patient will be able to take symmetrical steps with consistent foot clearance for household ambulation.  3. 5xSTS will decrese to 15 sec or less for independent transfers.  4. Patient will be independent in HEP in order to continue after discharge.  5. Patient will improve Tinetti balance and gait score to >/= 22/28.  6. B LE strength will improve to 4+/5 or > throughout for independent mobility.   7. Patient will improve FOTO score to >/= 63 % to increase overall mobility and function at home.       PLAN     Continue with current Plan of Care and progress per pt's tolerance    Holland Vivas, PTA

## 2023-04-04 NOTE — PROGRESS NOTES
Speech and Language Therapy Outpatient Progress Note  Date:  4/4/2023     Name: Bhupendra Park   MRN: [id number]   Therapy Diagnosis:   Encounter Diagnosis   Name Primary?    Combined receptive and expressive aphasia Yes   Physician: Casey Maldonado MD  Physician Orders: eval and treat  Medical Diagnosis: CVA    Visit #/Visits authorized: 7/36  Date of Evaluation:  3/8/23  Insurance Authorization Period: 3/14/23-6/6/23  Plan of Care Expiration Date: 6/6/23  Extended POC: TBD     Time In:  1:30  Time Out:  2:00  Total Billable Time: 30       Subjective:   Pt ambulated to SLP office w/ cane. Pt pleasant and in good spirits.   Pt's wife reports pt recently starting Baclofen and that a side effect is drowsiness. Information passed along to OT to keep an eye out and inform wife of any noticeable changes.       Response to previous treatment: good     Pain Scale:  0/10 on VAS currently.   Pain Location: none expressed  Objective:        UNTIMED  Procedure Min.   {Speech- Language- Voice Therapy  30        Total Untimed Units: 1  Charges Billed/# of units: 1    Long Term Goals (12 weeks):   The pt will improve expressive and receptive language skills to communicate w/ friends and family.    Short Term Goals: 3x week/12 weeks Current Progress: initial   1) The pt will complete auto speech tasks (stating Feng and Aidan, sentence completion) w/ 90% acc Manish.  2) The pt will name x10 items in a concrete category w/I 1 minute Manish.  3) The pt will formulate sentences (subject+verb+object) to describe picture scenes w/ 80% acc Manish.  4) The pt will answer 3U YNQs w/ 90% acc Manish.  5) The pt will follow 2-step commands w/ 90% acc Manish.  6) The pt will read and comprehend phrases w/ 90% acc Manish.  7) The pt will write 1-2 syllable functional words w/ 80% acc Manish.  8) The pt will answer MU YNQs w/ 90% acc Manish.             Goal met 3/21/23            Goal met 4/4/23     MU YNQS: 90% acc I'ly.  2-step command following  completed w/ 10% acc I'ly, increasing to 50% acc modA.  Picture scene description formulating mod-complex sentences completed w/ 50% acc I'ly, increasing to 100% acc modA.      Overall good session.  Cont SLP POC.    Patient Education/Response:     Written Home Exercises Provided:  to be provided as appropriate throughout course of OP SLP services .  Exercises were reviewed and Bhupendra was able to demonstrate them prior to the end of the session.  Bhupendra demonstrated good  understanding of the education provided.     Assessment:   Bhupendra is progressing well towards his goals. Current goals remain appropriate. Goals to be updated as necessary.     Pt prognosis is Good. Pt will continue to benefit from skilled outpatient speech and language therapy to address the deficits listed in the problem list on initial evaluation, provide pt/family education and to maximize pt's level of independence in the home and community environment.     Barriers to Therapy:   Pt's spiritual, cultural and educational needs considered and pt agreeable to plan of care and goals.    Plan:   Continue POC with focus on expressive language, receptive language, written expression, and reading comprehension.    Adamaris Carrion M.S., CCC-SLP   4/4/2023

## 2023-04-04 NOTE — PROGRESS NOTES
[facility]  Occupational Therapy Treatment Note    Date: 4/4/2023  Name: Bhupendra Park  Clinic Number: [id number]    Therapy Diagnosis: No diagnosis found.  Physician: Casey Maldonado MD    Time In: 1500  Time Out: 1530  Total Billable Time: 30 minutes    SUBJECTIVE     Pt reports: no new issues to report at this time     OBJECTIVE     Objective Measures updated at progress report unless specified.    Treatment     Bhupendra received the treatments listed below:       Neuromuscular re-education activities to improve Kinesthetic for 30 minutes. The following activities were included:  With use of yoga ball, Patient completed weightbearing and stretching of shoulder capsule by rolling ball out to end range with paused stretch for 3x10 reps.   With flexbar positioned in R hand, Patient completed AAROM for forearm pronation/supination-- good effort provided by Patient with progress noted for active forearm supination. Increased ROM noted for ~15 degrees of active supination from full pronation.     Patient Education and Home Exercises      Education provided:   - Progress towards goals     Assessment     Pt tolerated session well     Bhupendra is progressing well towards his goals and there are no updates to goals at this time. Pt prognosis is Fair.     Pt will continue to benefit from skilled outpatient occupational therapy to address the deficits listed in the problem list on initial evaluation provide pt/family education and to maximize pt's level of independence in the home and community environment.     Pt's spiritual, cultural and educational needs considered and pt agreeable to plan of care and goals.      PLAN     Continue with current plan of care       Kris Lopez OT

## 2023-04-04 NOTE — PROGRESS NOTES
"[facility]   Physical Therapy Treatment Note     Name: Bhupendra Park  Clinic Number: [id number]    Therapy Diagnosis:   Encounter Diagnoses   Name Primary?    Impaired strength of lower extremity Yes    Impairment of balance     Abnormality of gait following cerebrovascular accident        Physician: Casey Maldonado MD    Authorization Period Expiration: 5/25/2023  Plan of Care Expiration: 5/25/2023  Reassessment / POC Due: 4/5/2023  Visit # / Visits authorized: 6/ 18     PTA Visit: 1/5    Visit Date: 4/4/2023    Time In: 1430  Time Out: 1500  Total Billable Time: 30 minutes    SUBJECTIVE     Pt reports: he went to [facility] today for brace adjustment.   He was compliant with home exercise program.  Response to previous treatment: good  Functional change: none    Pain: 0/10  Location: n/a     OBJECTIVE     N/A    Treatment     Bhupendra received the treatments listed below:      therapeutic exercises to develop strength, endurance, and ROM for 20 minutes including:    neuromuscular re-education activities to improve: Balance and Coordination for 0 minutes. The following activities were included:    gait training to improve functional mobility and safety for 10  minutes, including:    Therapeutic Exercise Grid     Exercise 1  Exercise 2  Exercise 3  Exercise 4    Exercise :    B LE: Standing: 3 way hip Sit to stands R LE: Step-ups: forward, lateral Step-downs   Repetition/Time :    20   15   Fwd- 20 (8 inch step)  Lat-10 (4 inch step)      Resist or Assist :       Foam mat, low surface           Comment :    Flexion, abduction      Forward only on 6" step today x 10 reps      Done :    yes  yes yes   no                  Exercise 5  Exercise 6  Exercise 7  Exercise 8    Exercise :    Single leg stance   squats   Amb with LB[employer] in field (unlevel surfaces)   Gait on indoor surface with LBQC and AFO      Repetition/Time :       20      100'     Resist or Assist :             CGA due to poor " right foot clearance     Comment :                  Done :    no no no yes                     Exercise 9  Exercise 10  Exercise 11  Exercise 12    Exercise :    Up / down 4 steps with one handrail              Repetition/Time :                  Resist or Assist :    SBA              Comment :    Cues for sequencing              Done :    yes                             Patient Education and Home Exercises     Home Exercises Provided and Patient Education Provided     Education provided: Educated pt on importance of HEP and encouraged pt to continue daily    ASSESSMENT     Bhupendra continues to have difficulty with R foot drop and toe drag despite adjustment to AFO, although there is slight improvement.  Tone remains present in R lower extremity. Added a moving obstacle for R foot to clear when ambulating with marginal improvement.    Bhupendra Is progressing well towards his goals.   Pt prognosis is Good.     Pt will continue to benefit from skilled outpatient physical therapy to address the deficits listed in the problem list box on initial evaluation, provide pt/family education and to maximize pt's level of independence in the home and community environment.     Pt's spiritual, cultural and educational needs considered and pt agreeable to plan of care and goals.     Anticipated barriers to physical therapy: previous CVA with R LE hemiparesis, R UE hemiplegia, expressive aphasia    Goals:   Short Term Goals: (4 weeks)  1. 5xSTS will decrease to 20 sec or less for improved LE strength and transfer safety.  2. Patient will improve balance as evidenced by MCTSIB score of 2/4 for reduced fall risk.  3. Patient will be able to perform HEP with minimal cues for improved carryover at home.  4. Improve Tinetti balance score to >/= 10/16  5. Improve R hip and knee strength to >/= 4/5  muscle grade for improved safety during household mobility.     Long Term Goals: (8 weeks)  1. Patient will be able to ascend/descend 4 steps with  1 rail foot over foot modified independent.  2. Patient will be able to take symmetrical steps with consistent foot clearance for household ambulation.  3. 5xSTS will decrese to 15 sec or less for independent transfers.  4. Patient will be independent in HEP in order to continue after discharge.  5. Patient will improve Tinetti balance and gait score to >/= 22/28.  6. B LE strength will improve to 4+/5 or > throughout for independent mobility.   7. Patient will improve FOTO score to >/= 63 % to increase overall mobility and function at home.       PLAN     Continue with current Plan of Care and progress per pt's tolerance    Holland Vivas, PTA

## 2023-04-05 NOTE — PLAN OF CARE
[facility]  Physical Therapy Plan of Care Note    Name: Bhupendra Park  Clinic Number: [id number]    Therapy Diagnosis:   Encounter Diagnoses   Name Primary?    Impaired strength of lower extremity Yes    Impairment of balance     Abnormality of gait following cerebrovascular accident      Physician: Casey Maldonado MD    Visit Date: 4/5/2023  Time In: 1430  Time Out: 1500  Total billable minutes: 30    Physician Orders: PT eval and treat  Medical Diagnosis from Referral:  Physician Orders: PT eval and treat   Medical Diagnosis from Referral: G81.11 Right spastic hemiplegia   Evaluation Date: 3/8/2023  Authorization Period Expiration: 5/25/23  Plan of Care Expiration: 5/05/23  Reassessment / POC Due: Completed 4/5/23  Visit # / Visits authorized: 9/18   Evaluation Date:3/08/23    Precautions: Standard and Fall  Functional Level Prior to Evaluation:   Minimal assist with functional activities    SUBJECTIVE     Pt reports: that he had a fall in the kitchen this morning. Was turning to reach for the AC thermostat and lost his balance. Bhupendra states that he had to be assisted up from the floor. .  He was compliant with home exercise program.  Response to previous treatment: good   Functional change: improved lower extremity strength     Functional Stroke LE FOTO score: 56%    Pain: 0/10  Location: N/A    OBJECTIVE     Update:   RANGE OF MOTION :   - Upper Extremity : Right = limited due to moderate flexor tone                                       Left = WNL  - Lower Extremity : Right = WFL                                           Left= WNL      STRENGTH:        Right Left   Hip Flexion: 3+/5 4+/5   Hip ABD: 4/5 5/5   Hip ADD: 4/5 5/5   Hip Extension: 4/5 5/5   Knee Ext: 4+/5 5/5   Knee Flex: 4+/5 5/5      GAIT:   - ambulates with wide based Quad cane on level and unlevel surfaces   - improved stride on the Right with minimal difference from left  - decreased step ht on Right   - normal  "base of support   - mild path deviation , no LOB noted   Gait speed: .71 m/s with WB quad cane      Functional mobility:   - rolling right and left: IND  - supine to sit : MOD IND  -sit to supine : IND   - sit to stand : IND     Special test:   - Tinnetti : 22/28              -balance : 15/16              - gait: 7/12  - 5XSTS: 15.95 sec     Treatment:   Therapeutic Exercise Grid     Exercise 1  Exercise 2  Exercise 3  Exercise 4    Exercise :    B LE: Standing: 3 way hip Sit to stands R LE: Step-ups: forward, lateral Step-downs   Repetition/Time :    20   15   Fwd- 20 (8 inch step)  Lat-10 (4 inch step)      Resist or Assist :       Foam mat, low surface           Comment :    Flexion, abduction      Forward only on 6" step today x 10 reps      Done :                           Exercise 5  Exercise 6  Exercise 7  Exercise 8    Exercise :    Single leg stance   squats   Amb with LBQC in field (unlevel surfaces)   Gait on indoor surface with LBQC and AFO      Repetition/Time :       20      100'     Resist or Assist :             CGA due to poor right foot clearance     Comment :                  Done :                           Exercise 9  Exercise 10  Exercise 11  Exercise 12    Exercise :    Up / down 4 steps with one handrail              Repetition/Time :                  Resist or Assist :    SBA              Comment :    Cues for sequencing              Done :                               ASSESSMENT     Update:   - Bhupendra has progressed well with therapy as demonstrated by improved gait speed, tinetti balance score , and improved gait stability. Overall lower extremity strength has improved throughout. Functional mobility has improved to modified independent.    Anticipated Barriers for therapy: multiple strokes     Goals:    Short Term Goals: (4 weeks)  1. 5xSTS will decrease to 20 sec or less for improved LE strength and transfer safety.- MET  2. Patient will improve balance as evidenced by MCTSIB score of " 2/4 for reduced fall risk.- In Progress   3. Patient will be able to perform HEP with minimal cues for improved carryover at home.- In Progress   4. Improve Tinetti balance score to >/= 10/16 - MET (22/28)  5. Improve R hip and knee strength to >/= 4/5  muscle grade for improved safety during household mobility.- In Progress      Long Term Goals: (8 weeks)  1. Patient will be able to ascend/descend 4 steps with 1 rail foot over foot modified independent.  2. Patient will be able to take symmetrical steps with consistent foot clearance for household ambulation.  3. 5xSTS will decrese to 15 sec or less for independent transfers.  4. Patient will be independent in HEP in order to continue after discharge.  5. Patient will improve Tinetti balance and gait score to >/= 22/28.  6. B LE strength will improve to 4+/5 or > throughout for independent mobility.   7. Patient will improve FOTO score to >/= 63 % to increase overall mobility and function at home.     Reasons for Recertification of Therapy:   Reassessment only    PLAN     Recommended Treatment Plan: Continue 2 times per week for:  Gait Training, Patient Education, Therapeutic Activities, and Therapeutic Exercise  Other Recommendations: Advanced balance and gait     Mustapha Aleman, PT

## 2023-04-05 NOTE — PROGRESS NOTES
[facility]  Occupational Therapy Treatment Note    Date: 4/5/2023  Name: Bhupendra Park  Clinic Number: [id number]    Therapy Diagnosis:   Encounter Diagnosis   Name Primary?    Decreased right shoulder range of motion Yes       Physician: Casey Maldonado MD    Time In: 1500  Time Out: 1530  Total Billable Time: 30 minutes    SUBJECTIVE     Pt reports: wearing Dynasplint during ST and Physical Therapy session for an hour prior to Occupational Therapy session .     OBJECTIVE     Objective Measures updated at progress report unless specified.    Treatment     Bhupendra received the treatments listed below:     Neuromuscular re-education activities to improve Coordination and Kinesthetic for 30 minutes. The following activities were included:  - in supine, Occupational Therapist completed stretching of Right upper extremity in all planes     The above activity promotes increased ROM for the Right upper extremity allowing Patient to gain increased functional use and increased independence with all ADL and IADL.     Patient Education and Home Exercises      Education provided:   - Progress towards goals        Assessment     Pt tolerated session well..     Bhupendra is progressing well towards his goals and there are no updates to goals at this time. Pt prognosis is Good.     Pt will continue to benefit from skilled outpatient occupational therapy to address the deficits listed in the problem list on initial evaluation provide pt/family education and to maximize pt's level of independence in the home and community environment.     Pt's spiritual, cultural and educational needs considered and pt agreeable to plan of care and goals.      PLAN     Continue with current plan of care       Kris Lopez OT

## 2023-04-05 NOTE — PROGRESS NOTES
Speech and Language Therapy Outpatient Progress Note  Date:  4/5/2023     Name: Bhupendra Park   MRN: [id number]   Therapy Diagnosis:   Encounter Diagnosis   Name Primary?    Combined receptive and expressive aphasia Yes   Physician: Casey Maldonado MD  Physician Orders: eval and treat  Medical Diagnosis: CVA    Visit #/Visits authorized: 8/36  Date of Evaluation:  3/8/23  Insurance Authorization Period: 3/14/23-6/6/23  Plan of Care Expiration Date: 6/6/23  Extended POC: TBD     Time In:  2:00  Time Out:  2:30  Total Billable Time: 30       Subjective:   Pt ambulated to SLP office w/ cane. Pt pleasant and in good spirits.   Pt's wife reports that pt fell at home this morning.       Response to previous treatment: good     Pain Scale:  0/10 on VAS currently.   Pain Location: none expressed  Objective:        UNTIMED  Procedure Min.   {Speech- Language- Voice Therapy  30        Total Untimed Units: 1  Charges Billed/# of units: 1    Long Term Goals (12 weeks):   The pt will improve expressive and receptive language skills to communicate w/ friends and family.    Short Term Goals: 3x week/12 weeks Current Progress: initial   1) The pt will complete auto speech tasks (stating Feng and Aidan, sentence completion) w/ 90% acc Manish.  2) The pt will name x10 items in a concrete category w/I 1 minute Manish.  3) The pt will formulate sentences (subject+verb+object) to describe picture scenes w/ 80% acc Manish.  4) The pt will answer 3U YNQs w/ 90% acc Manish.  5) The pt will follow 2-step commands w/ 90% acc Manish.  6) The pt will read and comprehend phrases w/ 90% acc Manish.  7) The pt will write 1-2 syllable functional words w/ 80% acc Manish.  8) The pt will answer MU YNQs w/ 90% acc Manish.             Goal met 3/21/23            Goal met 4/4/23     Pt answered WH Qs aloud and then wrote 1 and 2 syllable words via LUE using pencil and paper. Written expression task completed w/ 67% acc I'ly, increasing to 75% acc Manish.  Increased time for initiation and processing required across structured task.      Overall good session.  Cont SLP POC.    Patient Education/Response:     Written Home Exercises Provided:  to be provided as appropriate throughout course of OP SLP services .  Exercises were reviewed and Bhupendra was able to demonstrate them prior to the end of the session.  Bhupendra demonstrated good  understanding of the education provided.     Assessment:   Bhupendra is progressing well towards his goals. Current goals remain appropriate. Goals to be updated as necessary.     Pt prognosis is Good. Pt will continue to benefit from skilled outpatient speech and language therapy to address the deficits listed in the problem list on initial evaluation, provide pt/family education and to maximize pt's level of independence in the home and community environment.     Barriers to Therapy:   Pt's spiritual, cultural and educational needs considered and pt agreeable to plan of care and goals.    Plan:   Continue POC with focus on expressive language, receptive language, written expression, and reading comprehension.    Adamaris Carrion M.S., CCC-SLP   4/5/2023

## 2023-04-06 NOTE — PROGRESS NOTES
Speech and Language Therapy Outpatient Progress Note  Date:  4/6/2023     Name: Bhupendra Park   MRN: [id number]   Therapy Diagnosis:   Encounter Diagnosis   Name Primary?    Combined receptive and expressive aphasia Yes   Physician: Casey Maldonado MD  Physician Orders: eval and treat  Medical Diagnosis: CVA    Visit #/Visits authorized: 9/36  Date of Evaluation:  3/8/23  Insurance Authorization Period: 3/14/23-6/6/23  Plan of Care Expiration Date: 6/6/23  Extended POC: TBD     Time In:  2:00  Time Out:  2:30  Total Billable Time: 30       Subjective:   Pt ambulated to SLP office w/ cane. Pt pleasant and in good spirits.   Pt's wife reports that pt fell at home this morning.       Response to previous treatment: good     Pain Scale:  0/10 on VAS currently.   Pain Location: none expressed  Objective:        UNTIMED  Procedure Min.   {Speech- Language- Voice Therapy  30        Total Untimed Units: 1  Charges Billed/# of units: 1    Long Term Goals (12 weeks):   The pt will improve expressive and receptive language skills to communicate w/ friends and family.    Short Term Goals: 3x week/12 weeks Current Progress: initial   1) The pt will complete auto speech tasks (stating Feng and Aidan, sentence completion) w/ 90% acc Manish.  2) The pt will name x10 items in a concrete category w/I 1 minute Manish.  3) The pt will formulate sentences (subject+verb+object) to describe picture scenes w/ 80% acc Manish.  4) The pt will answer 3U YNQs w/ 90% acc Manish.  5) The pt will follow 2-step commands w/ 90% acc Manish.  6) The pt will read and comprehend phrases w/ 90% acc Manish.  7) The pt will write 1-2 syllable functional words w/ 80% acc Manish.  8) The pt will answer MU YNQs w/ 90% acc Manish.             Goal met 3/21/23            Goal met 4/4/23     Pt read aloud phrases targeting vocabulary and pt then wrote 1 and 2 syllable words using LUE. Task completed SBA for reading comprehension, SBA for word finding, and min-modA for  "written expression. Pt took worksheet home to complete as "homework."      Overall good session.  Cont SLP POC.    Patient Education/Response:     Written Home Exercises Provided:  to be provided as appropriate throughout course of OP SLP services .  Exercises were reviewed and Bhupendra was able to demonstrate them prior to the end of the session.  Bhupendra demonstrated good  understanding of the education provided.     Assessment:   Bhupendra is progressing well towards his goals. Current goals remain appropriate. Goals to be updated as necessary.     Pt prognosis is Good. Pt will continue to benefit from skilled outpatient speech and language therapy to address the deficits listed in the problem list on initial evaluation, provide pt/family education and to maximize pt's level of independence in the home and community environment.     Barriers to Therapy:   Pt's spiritual, cultural and educational needs considered and pt agreeable to plan of care and goals.    Plan:   Continue POC with focus on expressive language, receptive language, written expression, and reading comprehension.    Adamaris Carrion M.S., CCC-SLP   4/6/2023              "

## 2023-04-06 NOTE — PROGRESS NOTES
PT/PTA met face to face to discuss patient's treatment plan and progress towards established goals.  Treatment will be continued as described in initial report/eval and progress notes.  Patient will be seen by physical therapist every sixth visit and minimally once per month.    Additional information:

## 2023-04-06 NOTE — PROGRESS NOTES
[facility]  Occupational Therapy Treatment Note    Date: 4/6/2023  Name: Bhupendra Park  Clinic Number: [id number]    Therapy Diagnosis:   Encounter Diagnosis   Name Primary?    Decreased right shoulder range of motion Yes     Physician: Casey Maldonado MD    Time In: 1500  Time Out: 1530  Total Billable Time: 30 minutes    SUBJECTIVE     Pt reports: no new issues to report at this time     OBJECTIVE     Objective Measures updated at progress report unless specified.    Treatment     Bhupendra received the treatments listed below:       Neuromuscular re-education activities to improve Kinesthetic for 30 minutes. The following activities were included:  With use of yoga ball, Patient completed weightbearing and stretching of shoulder capsule by rolling ball out to end range with paused stretch for 3x10 reps.   With flexbar positioned in R hand, Patient completed AAROM for forearm pronation/supination-- good effort provided by Patient with progress noted for active forearm supination. Increased ROM noted for ~90 degrees of supination from full pronation.     Patient Education and Home Exercises      Education provided:   - Progress towards goals     Assessment     Pt tolerated session well     Bhupendra is progressing well towards his goals and there are no updates to goals at this time. Pt prognosis is Fair.     Pt will continue to benefit from skilled outpatient occupational therapy to address the deficits listed in the problem list on initial evaluation provide pt/family education and to maximize pt's level of independence in the home and community environment.     Pt's spiritual, cultural and educational needs considered and pt agreeable to plan of care and goals.      PLAN     Continue with current plan of care       Kris Lopez OT

## 2023-04-11 NOTE — PROGRESS NOTES
"    Speech and Language Therapy Outpatient Progress Note  Date:  4/11/2023     Name: Bhupendra Park   MRN: [id number]   Therapy Diagnosis:   Encounter Diagnosis   Name Primary?    Combined receptive and expressive aphasia Yes   Physician: Casey Maldonado MD  Physician Orders: eval and treat  Medical Diagnosis: CVA    Visit #/Visits authorized: 10/36  Date of Evaluation:  3/8/23  Insurance Authorization Period: 3/14/23-6/6/23  Plan of Care Expiration Date: 6/6/23  Extended POC: TBD     Time In:  2:00  Time Out:  2:30  Total Billable Time: 30       Subjective:   Pt ambulated to SLP office w/ cane. Pt pleasant and in good spirits.        Response to previous treatment: good     Pain Scale:  0/10 on VAS currently.   Pain Location: none expressed  Objective:        UNTIMED  Procedure Min.   {Speech- Language- Voice Therapy  30        Total Untimed Units: 1  Charges Billed/# of units: 1    Long Term Goals (12 weeks):   The pt will improve expressive and receptive language skills to communicate w/ friends and family.    Short Term Goals: 3x week/12 weeks Current Progress: initial   1) The pt will complete auto speech tasks (stating Feng and Aidan, sentence completion) w/ 90% acc Manish.  2) The pt will name x10 items in a concrete category w/I 1 minute Manish.  3) The pt will formulate sentences (subject+verb+object) to describe picture scenes w/ 80% acc Manish.  4) The pt will answer 3U YNQs w/ 90% acc Manish.  5) The pt will follow 2-step commands w/ 90% acc Manish.  6) The pt will read and comprehend phrases w/ 90% acc Manish.  7) The pt will write 1-2 syllable functional words w/ 80% acc Manish.  8) The pt will answer MU YNQs w/ 90% acc Manish.             Goal met 3/21/23            Goal met 4/4/23     Pt brought in completed "homework" that was provided during last session.  Describing nouns and actions task completed w/ 50% acc given modA. Pt requiring additional time to provide descriptions and SLP providing cueing of various " descriptors (I.e., look, function, material, etc).    Overall good session.  Cont SLP POC.    Patient Education/Response:     Written Home Exercises Provided:  to be provided as appropriate throughout course of OP SLP services .  Exercises were reviewed and Bhupendra was able to demonstrate them prior to the end of the session.  Bhupendra demonstrated good  understanding of the education provided.     Assessment:   Bhupendra is progressing well towards his goals. Current goals remain appropriate. Goals to be updated as necessary.     Pt prognosis is Good. Pt will continue to benefit from skilled outpatient speech and language therapy to address the deficits listed in the problem list on initial evaluation, provide pt/family education and to maximize pt's level of independence in the home and community environment.     Barriers to Therapy:   Pt's spiritual, cultural and educational needs considered and pt agreeable to plan of care and goals.    Plan:   Continue POC with focus on expressive language, receptive language, written expression, and reading comprehension.    Adamaris Carrion M.S., CCC-SLP   4/11/2023

## 2023-04-11 NOTE — PROGRESS NOTES
[facility]   Physical Therapy Treatment Note     Name: Bhupendra Park  Clinic Number: [id number]    Therapy Diagnosis:   Encounter Diagnoses   Name Primary?    Impaired strength of lower extremity Yes    Impairment of balance     Abnormality of gait following cerebrovascular accident        Physician: Casey Maldonado MD    Authorization Period Expiration: 5/25/2023  Plan of Care Expiration: 5/25/2023  Reassessment / POC Due: 4/5/2023  Visit # / Visits authorized: 10/ 18     PTA Visit: 1/5    Visit Date: 4/11/2023    Time In: 1430  Time Out: 1500  Total Billable Time: 30 minutes    SUBJECTIVE     Pt reports: that he had a good weekend with no falls.   He was compliant with home exercise program.  Response to previous treatment: good  Functional change: improved gait pattern     Pain: 0/10  Location: n/a     OBJECTIVE     N/A    Treatment     Bhupendra received the treatments listed below:      therapeutic exercises to develop strength, endurance, and ROM for 20 minutes including:    neuromuscular re-education activities to improve: Balance and Coordination for 0 minutes. The following activities were included:    gait training to improve functional mobility and safety for 10  minutes, including:    Therapeutic Exercise Grid     Exercise 1  Exercise 2  Exercise 3  Exercise 4    Exercise :    B LE: Standing: 3 way hip Sit to stands R LE: Step-ups: forward, lateral Step-downs   Repetition/Time :    20   2 x10   Fwd- 10 (6 inch step)  Lat-10 (6 inch step)      Resist or Assist :                  Comment :    Flexion, abduction            Done :    yes  yes yes   no                  Exercise 5  Exercise 6  Exercise 7  Exercise 8    Exercise :    Single leg stance   squats   Amb with LBQC in field (unlevel surfaces)   Gait on indoor surface with LBQC and AFO      Repetition/Time :       10      100'     Resist or Assist :             CGA due to poor right foot clearance     Comment :                   Done :    no YES  N   yes                     Exercise 9  Exercise 10  Exercise 11  Exercise 12    Exercise :    Up / down 4 steps with one handrail              Repetition/Time :                  Resist or Assist :    SBA              Comment :    Cues for sequencing              Done :    N                              Patient Education and Home Exercises     Home Exercises Provided and Patient Education Provided     Education provided: Educated pt on importance of HEP and encouraged pt to continue daily    ASSESSMENT     Bhupendra tolerated treatment well today as demonstrated by improved gait patten with decreased foot drag on right ( only approximately 20% of steps). Patient showing increased strength in Right lower extremity during dynamic standing activities.     Bhupendra Is progressing well towards his goals.   Pt prognosis is Good.     Pt will continue to benefit from skilled outpatient physical therapy to address the deficits listed in the problem list box on initial evaluation, provide pt/family education and to maximize pt's level of independence in the home and community environment.     Pt's spiritual, cultural and educational needs considered and pt agreeable to plan of care and goals.     Anticipated barriers to physical therapy: previous CVA with R LE hemiparesis, R UE hemiplegia, expressive aphasia    Goals:   Short Term Goals: (4 weeks)  1. 5xSTS will decrease to 20 sec or less for improved LE strength and transfer safety.  2. Patient will improve balance as evidenced by MCTSIB score of 2/4 for reduced fall risk.  3. Patient will be able to perform HEP with minimal cues for improved carryover at home.  4. Improve Tinetti balance score to >/= 10/16  5. Improve R hip and knee strength to >/= 4/5  muscle grade for improved safety during household mobility.     Long Term Goals: (8 weeks)  1. Patient will be able to ascend/descend 4 steps with 1 rail foot over foot modified independent.  2. Patient  will be able to take symmetrical steps with consistent foot clearance for household ambulation.  3. 5xSTS will decrese to 15 sec or less for independent transfers.  4. Patient will be independent in HEP in order to continue after discharge.  5. Patient will improve Tinetti balance and gait score to >/= 22/28.  6. B LE strength will improve to 4+/5 or > throughout for independent mobility.   7. Patient will improve FOTO score to >/= 63 % to increase overall mobility and function at home.       PLAN     Continue with current Plan of Care and progress per pt's tolerance    Mustapha Aleman, PT

## 2023-04-12 NOTE — PROGRESS NOTES
[facility]  Occupational Therapy Treatment Note    Date: 4/12/2023  Name: Bhupendra Park  Clinic Number: [id number]    Therapy Diagnosis:   Encounter Diagnosis   Name Primary?    Decreased right shoulder range of motion Yes     Physician: Casey Maldonado MD    Time In: 1500  Time Out: 1530  Total Billable Time: 30 minutes    SUBJECTIVE     Pt reports: he had not worn a splint to R hand today with noted tightness throughout digits.     OBJECTIVE     Objective Measures updated at progress report unless specified.    Treatment     Bhupendra received the treatments listed below:     Neuromuscular re-education activities to improve Kinesthetic for 30 minutes. The following activities were included:  With use of yoga ball, Patient completed weightbearing and stretching of shoulder capsule by rolling ball out to end range with paused stretch for 3x10 reps.   With cone positioned in R hand, Patient completed AAROM for forearm pronation/supination--with great effort Patient able to complete ~20 degrees of supination from neutral following a rest break due to significant effort to complete supination to neutral from full pronation.      Patient Education and Home Exercises      Education provided:   - Progress towards goals     Assessment     Pt tolerated session well     Bhupendra is progressing well towards his goals and there are no updates to goals at this time. Pt prognosis is Fair.     Pt will continue to benefit from skilled outpatient occupational therapy to address the deficits listed in the problem list on initial evaluation provide pt/family education and to maximize pt's level of independence in the home and community environment.     Pt's spiritual, cultural and educational needs considered and pt agreeable to plan of care and goals.      PLAN     Continue with current plan of care       Kris Lopez OT

## 2023-04-12 NOTE — PROGRESS NOTES
[facility]   Physical Therapy Treatment Note     Name: Bhupendra Park  Clinic Number: [id number]    Therapy Diagnosis:   Encounter Diagnoses   Name Primary?    Impaired strength of lower extremity Yes    Impairment of balance     Abnormality of gait following cerebrovascular accident        Physician: Casey Maldonado MD    Authorization Period Expiration: 5/25/2023  Plan of Care Expiration: 5/25/2023  Reassessment / POC Due: 4/5/2023  Visit # / Visits authorized: 11/ 18     PTA Visit: 1/5    Visit Date: 4/12/2023    Time In: 1430  Time Out: 1500  Total Billable Time: 30 minutes    SUBJECTIVE     Pt reports: that he is doing well today. Denies falls.   He was compliant with home exercise program.  Response to previous treatment: good  Functional change: improved gait pattern     Pain: 0/10  Location: n/a     OBJECTIVE     N/A    Treatment     Bhupendra received the treatments listed below:      therapeutic exercises to develop strength, endurance, and ROM for 20 minutes including:    neuromuscular re-education activities to improve: Balance and Coordination for 0 minutes. The following activities were included:    gait training to improve functional mobility and safety for 10  minutes, including:    Therapeutic Exercise Grid     Exercise 1  Exercise 2  Exercise 3  Exercise 4    Exercise :    B LE: Standing: 3 way hip Sit to stands R LE: Step-ups: forward, lateral Step-downs   Repetition/Time :    20   2 x10   Fwd- 10 (6 inch step)  Lat-10 (6 inch step)      Resist or Assist :                  Comment :    Flexion, abduction            Done :                         Exercise 5  Exercise 6  Exercise 7  Exercise 8    Exercise :    Single leg stance   squats   Amb with LBQC in field (unlevel surfaces)   Gait on indoor surface with LBQC and AFO      Repetition/Time :       10      150 ft   Resist or Assist :             CGA due to poor right foot clearance     Comment :                  Done  :       yes                     Exercise 9  Exercise 10  Exercise 11  Exercise 12    Exercise :    Up / down 4 steps with one handrail   Bridging   - single leg left    SLR  S/L HIP ABD   Wt shift with step thru      Repetition/Time :       2 x10   2 x10   X 10     Resist or Assist :    SBA      1.5#        Comment :    Cues for sequencing      SLR wt only    Bilateral      Done :    N    YES    Yes    Yes                     Patient Education and Home Exercises     Home Exercises Provided and Patient Education Provided     Education provided: Educated pt on importance of HEP and encouraged pt to continue daily    ASSESSMENT     Bhupendra tolerated treatment well today as demonstrated by completion of new exercises focused on strengthening of the R lower extremity with and without resistance. Patient demonstrated a heightened awareness around increasing step ht on Right to avoid foot drag. Required constant verbal cueing.      Bhupendra Is progressing well towards his goals.   Pt prognosis is Good.     Pt will continue to benefit from skilled outpatient physical therapy to address the deficits listed in the problem list box on initial evaluation, provide pt/family education and to maximize pt's level of independence in the home and community environment.     Pt's spiritual, cultural and educational needs considered and pt agreeable to plan of care and goals.     Anticipated barriers to physical therapy: previous CVA with R LE hemiparesis, R UE hemiplegia, expressive aphasia    Goals:   Short Term Goals: (4 weeks)  1. 5xSTS will decrease to 20 sec or less for improved LE strength and transfer safety.  2. Patient will improve balance as evidenced by MCTSIB score of 2/4 for reduced fall risk.  3. Patient will be able to perform HEP with minimal cues for improved carryover at home.  4. Improve Tinetti balance score to >/= 10/16  5. Improve R hip and knee strength to >/= 4/5  muscle grade for improved safety during household  mobility.     Long Term Goals: (8 weeks)  1. Patient will be able to ascend/descend 4 steps with 1 rail foot over foot modified independent.  2. Patient will be able to take symmetrical steps with consistent foot clearance for household ambulation.  3. 5xSTS will decrese to 15 sec or less for independent transfers.  4. Patient will be independent in HEP in order to continue after discharge.  5. Patient will improve Tinetti balance and gait score to >/= 22/28.  6. B LE strength will improve to 4+/5 or > throughout for independent mobility.   7. Patient will improve FOTO score to >/= 63 % to increase overall mobility and function at home.       PLAN     Continue with current Plan of Care and progress per pt's tolerance    Mustapha Aleman, PT

## 2023-04-12 NOTE — PLAN OF CARE
Outpatient Therapy Updated Plan of Care     Visit Date: 4/11/2023  Name: Bhupendra Park  Clinic Number: [id number]    Therapy Diagnosis:   Encounter Diagnosis   Name Primary?    Decreased right shoulder range of motion Yes     Physician: Casey Maldonado MD    Physician Orders: eval and treat  Medical Diagnosis: G81.11-- Right upper extremity Hypertonicity  Evaluation Date: 3/8/23    Total Visits Received: 10  Cancelled Visits: 0  No Show Visits: 0    Current Certification Period:  3/14/23 to 4/25/23  Functional Level Prior to Evaluation:  Patient required Min to Mod A for all ADL following CVA.     Precautions: Fall    Subjective     Update: Patient reports he has noted the increase in tone compared to initial evaluation. Patient is now set up with Dynasplint for progressive stretching of Right upper extremity.     Patient reports:  increased tone, limiting use of RUE  since initial evaluation/last OT assessment.       Patient's spiritual, cultural and educational needs considered and patient agreeable to plan of care and goals.    Objective     Update:      Joint Evaluation  AROM  3/8/2023 AROM   4/11/23 PROM   3/8/2023 PROM   4/11/23     Right Right Right Right   Shoulder flex 0-180 45 ~ 15 degrees 95 90   Shoulder Abd 0-180 trace ~20 degrees 40 No change   Elbow flex/ext 0-150 Flexion:90  Extension: 150 Flexion: 85  Extension: 130 110 Flexion: 119  Extension: 153   Wrist flex 0-80 trace  80 Unable to assess wrist due to tone   Wrist ext 0-70 Neutral from flexed position  70         Tone:  Modified Keron Scale:   3 Considerable increase in muscle tone, passive movement difficult      Assessment     Update: Overall, Patient has demonstrated a decrease in functional ROM of Right upper extremity due to an increase in hypertonicity. Despite decreased ROM, Patient has demonstrated an increase in active supination and pronation with maximal effort to perform ROM.   Reasons for Recertification of Therapy:    Patient would benefit from continued Occupational Therapist services with plan to receive botox injection in conjunction with Occupational Therapist to optimize Right upper extremity ROM.     Patient prognosis is Good.     Patient's spiritual, cultural and educational needs considered and pt agreeable to plan of care and goals.    Anticipated barriers to physical therapy: severity of tone throughout Right upper extremity.    Goals:      Goals:  LTG GOALS:  Time frame: 6  - Pt will improve FOTO limitation score to less than or equal to 45% for improved self perception of functional performance with daily activities. -- Ongoing, progressing     STG Goals:  Time frame: 3  - Pt will increase active shoulder flexion to 90 degrees with RUE to improve participation with dressing, showering, and IADL tasks.-- ongoing, progressing  - Gross grasp will improve so that patient can perform tasks such as hold cup for drinking, open door, turn on faucet, and place water bottle on shelf with affected UE. -- ongoing, progressing    Plan     Updated Certification Period: 4/11/2023 to [facility]   Recommended Treatment Plan: 2 times per week for 6 weeks: Neuromuscular Re-ed, Therapeutic Activities, and Therapeutic Exercise  Other Recommendations: Patient and Occupational Therapist discussed trial combination of botox with Occupational Therapy for opportunity to progress. If Patient does not demonstrate progress following botox, plan to D/C.     Kris Lopez OT  4/11/2023      I CERTIFY THE NEED FOR THESE SERVICES FURNISHED UNDER THIS PLAN OF TREATMENT AND WHILE UNDER MY CARE    Physician's comments:        Physician's Signature: ___________________________________________________

## 2023-04-12 NOTE — PROGRESS NOTES
Speech and Language Therapy Outpatient Progress Note  Date:  4/12/2023     Name: Bhupendra Park   MRN: [id number]   Therapy Diagnosis:   Encounter Diagnosis   Name Primary?    Combined receptive and expressive aphasia Yes   Physician: Casey Maldonado MD  Physician Orders: eval and treat  Medical Diagnosis: CVA    Visit #/Visits authorized: 11/36  Date of Evaluation:  3/8/23  Insurance Authorization Period: 3/14/23-6/6/23  Plan of Care Expiration Date: 6/6/23  Extended POC: TBD     Time In:  2:00  Time Out:  2:30  Total Billable Time: 30       Subjective:   Pt ambulated to SLP office w/ cane. Pt pleasant and in good spirits.        Response to previous treatment: good     Pain Scale:  0/10 on VAS currently.   Pain Location: none expressed  Objective:        UNTIMED  Procedure Min.   {Speech- Language- Voice Therapy  30        Total Untimed Units: 1  Charges Billed/# of units: 1    Long Term Goals (12 weeks):   The pt will improve expressive and receptive language skills to communicate w/ friends and family.    Short Term Goals: 3x week/12 weeks Current Progress: initial   1) The pt will complete auto speech tasks (stating Feng and Aidan, sentence completion) w/ 90% acc Manish.  2) The pt will name x10 items in a concrete category w/I 1 minute Manish.  3) The pt will formulate sentences (subject+verb+object) to describe picture scenes w/ 80% acc Manish.  4) The pt will answer 3U YNQs w/ 90% acc Manish.  5) The pt will follow 2-step commands w/ 90% acc Manish.  6) The pt will read and comprehend phrases w/ 90% acc Manish.  7) The pt will write 1-2 syllable functional words w/ 80% acc Manish.  8) The pt will answer MU YNQs w/ 90% acc Manish.             Goal met 3/21/23            Goal met 4/4/23     Describing nouns and actions task completed given mod-max verbal cueing. SLP providing cueing to increase task initiation. SLP also providing cueing for various descriptors (I.e., look, function, material, etc). Difficulty noted w/  this task targeting word finding, phrase formulation and abstract thinking.    Overall good session.  Cont SLP POC.    Patient Education/Response:     Written Home Exercises Provided:  to be provided as appropriate throughout course of OP SLP services .  Exercises were reviewed and Bhupendra was able to demonstrate them prior to the end of the session.  Bhupendra demonstrated good  understanding of the education provided.     Assessment:   Bhupendra is progressing well towards his goals. Current goals remain appropriate. Goals to be updated as necessary.     Pt prognosis is Good. Pt will continue to benefit from skilled outpatient speech and language therapy to address the deficits listed in the problem list on initial evaluation, provide pt/family education and to maximize pt's level of independence in the home and community environment.     Barriers to Therapy:   Pt's spiritual, cultural and educational needs considered and pt agreeable to plan of care and goals.    Plan:   Continue POC with focus on expressive language, receptive language, written expression, and reading comprehension.    Adamaris Carrion M.S., CCC-SLP   4/12/2023

## 2023-04-13 NOTE — PROGRESS NOTES
[facility]   Physical Therapy Treatment Note     Name: Bhupendra Park  Clinic Number: [id number]    Therapy Diagnosis:   Encounter Diagnoses   Name Primary?    Impaired strength of lower extremity Yes    Impairment of balance     Abnormality of gait following cerebrovascular accident        Physician: Casey Maldonado MD    Authorization Period Expiration: 5/25/2023  Plan of Care Expiration: 5/25/2023  Reassessment / POC Due: 4/5/2023  Visit # / Visits authorized: 12/ 18     PTA Visit: 1/5    Visit Date: 4/13/2023    Time In: 1430  Time Out: 1500  Total Billable Time: 30 minutes    SUBJECTIVE     Pt reports: that he is doing well today. Denies falls.   He was compliant with home exercise program.  Response to previous treatment: good  Functional change: improved gait pattern     Pain: 0/10  Location: n/a     OBJECTIVE     N/A    Treatment     Bhupendra received the treatments listed below:      therapeutic exercises to develop strength, endurance, and ROM for 20 minutes including:    neuromuscular re-education activities to improve: Balance and Coordination for 0 minutes. The following activities were included:    gait training to improve functional mobility and safety for 10  minutes, including:    Therapeutic Exercise Grid     Exercise 1  Exercise 2  Exercise 3  Exercise 4    Exercise :    B LE: Standing: 3 way hip Sit to stands R LE: Step-ups: forward, lateral Step-downs   Repetition/Time :    20   2 x10   Fwd- 10 (6 inch step)  Lat-10 (6 inch step)      Resist or Assist :                  Comment :    Flexion, abduction            Done :                         Exercise 5  Exercise 6  Exercise 7  Exercise 8    Exercise :    Single leg stance   squats   Amb with LBQC in field (unlevel surfaces)   Gait on indoor surface with LBQC and AFO      Repetition/Time :       10      150 ft   Resist or Assist :             CGA due to poor right foot clearance     Comment :                  Done  :       yes                     Exercise 9  Exercise 10  Exercise 11  Exercise 12    Exercise :    Up / down 4 steps with one handrail   Bridging   - single leg left    SLR  S/L HIP ABD   Wt shift with step thru      Repetition/Time :       2 x10   2 x10   X 10     Resist or Assist :    SBA      1.5#        Comment :    Cues for sequencing      SLR wt only    Bilateral      Done :    N    YES    Yes    Yes                     Patient Education and Home Exercises     Home Exercises Provided and Patient Education Provided     Education provided: Educated pt on importance of HEP and encouraged pt to continue daily    ASSESSMENT     Bhupendra tolerated treatment well today as demonstrated by completion of new exercises focused on strengthening of the R lower extremity with and without resistance. Patient demonstrated a heightened awareness around increasing step ht on Right to avoid foot drag. Required constant verbal cueing.      Bhupendra Is progressing well towards his goals.   Pt prognosis is Good.     Pt will continue to benefit from skilled outpatient physical therapy to address the deficits listed in the problem list box on initial evaluation, provide pt/family education and to maximize pt's level of independence in the home and community environment.     Pt's spiritual, cultural and educational needs considered and pt agreeable to plan of care and goals.     Anticipated barriers to physical therapy: previous CVA with R LE hemiparesis, R UE hemiplegia, expressive aphasia    Goals:   Short Term Goals: (4 weeks)  1. 5xSTS will decrease to 20 sec or less for improved LE strength and transfer safety.  2. Patient will improve balance as evidenced by MCTSIB score of 2/4 for reduced fall risk.  3. Patient will be able to perform HEP with minimal cues for improved carryover at home.  4. Improve Tinetti balance score to >/= 10/16  5. Improve R hip and knee strength to >/= 4/5  muscle grade for improved safety during household  mobility.     Long Term Goals: (8 weeks)  1. Patient will be able to ascend/descend 4 steps with 1 rail foot over foot modified independent.  2. Patient will be able to take symmetrical steps with consistent foot clearance for household ambulation.  3. 5xSTS will decrese to 15 sec or less for independent transfers.  4. Patient will be independent in HEP in order to continue after discharge.  5. Patient will improve Tinetti balance and gait score to >/= 22/28.  6. B LE strength will improve to 4+/5 or > throughout for independent mobility.   7. Patient will improve FOTO score to >/= 63 % to increase overall mobility and function at home.       PLAN     Continue with current Plan of Care and progress per pt's tolerance    Holland Vivas, PTA

## 2023-04-13 NOTE — PROGRESS NOTES
[facility]  Occupational Therapy Treatment Note    Date: 4/13/2023  Name: Bhupendra Park  Clinic Number: [id number]    Therapy Diagnosis:   No diagnosis found.      Physician: Casey Maldonado MD    Time In: 1500  Time Out: 1530  Total Billable Time: 30 minutes    SUBJECTIVE     Pt reports: no new issues to report at this time     OBJECTIVE     Objective Measures updated at progress report unless specified.    Treatment     Bhupendra received the treatments listed below:     Neuromuscular re-education activities to improve Coordination and Kinesthetic for 30 minutes. The following activities were included:  - in supine with air cast donned to elbow region to prevent elbow flexion with tone, Patient completed AAROM shoulder flexion.   - AAROM completed for eblow flexion, shoulder adduction, and wrist flexion/extension.     The above activity promotes increased ROM for the Right upper extremity allowing Patient to gain increased functional use and increased independence with all ADL and IADL.     Patient Education and Home Exercises      Education provided:   - Progress towards goals        Assessment     Pt tolerated session well..     Bhupendra is progressing well towards his goals and there are no updates to goals at this time. Pt prognosis is Good.     Pt will continue to benefit from skilled outpatient occupational therapy to address the deficits listed in the problem list on initial evaluation provide pt/family education and to maximize pt's level of independence in the home and community environment.     Pt's spiritual, cultural and educational needs considered and pt agreeable to plan of care and goals.      PLAN     Continue with current plan of care       Kris Lopez OT

## 2023-04-13 NOTE — PROGRESS NOTES
Speech and Language Therapy Outpatient Progress Note  Date:  4/13/2023     Name: Bhupendra Park   MRN: [id number]   Therapy Diagnosis:   Encounter Diagnosis   Name Primary?    Combined receptive and expressive aphasia Yes   Physician: Casey Maldonado MD  Physician Orders: eval and treat  Medical Diagnosis: CVA    Visit #/Visits authorized: 12/36  Date of Evaluation:  3/8/23  Insurance Authorization Period: 3/14/23-6/6/23  Plan of Care Expiration Date: 6/6/23  Extended POC: TBD     Time In:  2:00  Time Out:  2:30  Total Billable Time: 30       Subjective:   Pt ambulated to SLP office w/ cane. Pt pleasant and in good spirits.        Response to previous treatment: good     Pain Scale:  0/10 on VAS currently.   Pain Location: none expressed  Objective:        UNTIMED  Procedure Min.   {Speech- Language- Voice Therapy  30        Total Untimed Units: 1  Charges Billed/# of units: 1    Long Term Goals (12 weeks):   The pt will improve expressive and receptive language skills to communicate w/ friends and family.    Short Term Goals: 3x week/12 weeks Current Progress: initial   1) The pt will complete auto speech tasks (stating Feng and Aidan, sentence completion) w/ 90% acc Manish.  2) The pt will name x10 items in a concrete category w/I 1 minute Manish.  3) The pt will formulate sentences (subject+verb+object) to describe picture scenes w/ 80% acc Manish.  4) The pt will answer 3U YNQs w/ 90% acc Manish.  5) The pt will follow 2-step commands w/ 90% acc Manish.  6) The pt will read and comprehend phrases w/ 90% acc Manish.  7) The pt will write 1-2 syllable functional words w/ 80% acc Manish.  8) The pt will answer MU YNQs w/ 90% acc Manish.             Goal met 3/21/23            Goal met 4/4/23     Provide a category member given first name worksheet completed w/ 90% acc for word finding and reading at the sentence level. Extra time needed for processing and to provide answer. Mod-maxA for spelling 1 and 2 syllable  words.    Overall good session.  Cont SLP POC.    Patient Education/Response:     Written Home Exercises Provided:  to be provided as appropriate throughout course of OP SLP services .  Exercises were reviewed and Bhupendra was able to demonstrate them prior to the end of the session.  Bhupendra demonstrated good  understanding of the education provided.     Assessment:   Bhupendra is progressing well towards his goals. Current goals remain appropriate. Goals to be updated as necessary.     Pt prognosis is Good. Pt will continue to benefit from skilled outpatient speech and language therapy to address the deficits listed in the problem list on initial evaluation, provide pt/family education and to maximize pt's level of independence in the home and community environment.     Barriers to Therapy:   Pt's spiritual, cultural and educational needs considered and pt agreeable to plan of care and goals.    Plan:   Continue POC with focus on expressive language, receptive language, written expression, and reading comprehension.    Adamaris Carrion M.S., CCC-SLP   4/13/2023

## 2023-04-17 NOTE — PROGRESS NOTES
[facility]   Physical Therapy Treatment Note     Name: Bhupendra Park  Clinic Number: [id number]    Therapy Diagnosis:   Encounter Diagnoses   Name Primary?    Impaired strength of lower extremity Yes    Impairment of balance     Abnormality of gait following cerebrovascular accident        Physician: Casey Maldonado MD    Authorization Period Expiration: 5/25/2023  Plan of Care Expiration: 5/25/2023  Reassessment / POC Due: 4/5/2023  Visit # / Visits authorized: 13/ 18     PTA Visit: 2/5    Visit Date: 4/18/2023    Time In: 1430  Time Out: 1500  Total Billable Time: 30 minutes    SUBJECTIVE     Pt reports: that he is doing well today. Denies any falls.   He was compliant with home exercise program.  Response to previous treatment: good  Functional change: improved gait pattern     Pain: 0/10  Location: n/a     OBJECTIVE     N/A    Treatment     Bhupendra received the treatments listed below:      therapeutic exercises to develop strength, endurance, and ROM for 20 minutes including:    neuromuscular re-education activities to improve: Balance and Coordination for 0 minutes. The following activities were included:    gait training to improve functional mobility and safety for 10  minutes, including:    Therapeutic Exercise Grid     Exercise 1  Exercise 2  Exercise 3  Exercise 4    Exercise :    B LE: Standing: 3 way hip Sit to stands R LE: Step-ups: forward, lateral Step-downs   Repetition/Time :    20   2 x10   Fwd- 10 (6 inch step)  Lat-10 (6 inch step)      Resist or Assist :                  Comment :    Flexion, abduction            Done :                         Exercise 5  Exercise 6  Exercise 7  Exercise 8    Exercise :    Single leg stance   squats   Amb with LBQC in field (unlevel surfaces)   Gait on indoor surface with LBQC and AFO      Repetition/Time :       10      150 ft   Resist or Assist :             CGA due to poor right foot clearance     Comment :                   Done :       yes                     Exercise 9  Exercise 10  Exercise 11  Exercise 12    Exercise :    Up / down 4 steps with one handrail   Bridging   - single leg left    SLR  S/L HIP ABD   Wt shift with step thru      Repetition/Time :       2 x10   2 x10   X 10     Resist or Assist :    SBA      1.5#        Comment :    Cues for sequencing      SLR wt only    Bilateral      Done :    N    YES    Yes    Yes                     Patient Education and Home Exercises     Home Exercises Provided and Patient Education Provided     Education provided: Educated pt on importance of HEP and encouraged pt to continue daily    ASSESSMENT      Patient demonstrated a heightened awareness around increasing step ht on Right to avoid foot drag, but form degrades as ambulation progresses. Required constant verbal cueing.      Bhupendra Is progressing well towards his goals.   Pt prognosis is Good.     Pt will continue to benefit from skilled outpatient physical therapy to address the deficits listed in the problem list box on initial evaluation, provide pt/family education and to maximize pt's level of independence in the home and community environment.     Pt's spiritual, cultural and educational needs considered and pt agreeable to plan of care and goals.     Anticipated barriers to physical therapy: previous CVA with R LE hemiparesis, R UE hemiplegia, expressive aphasia    Goals:   Short Term Goals: (4 weeks)  1. 5xSTS will decrease to 20 sec or less for improved LE strength and transfer safety.  2. Patient will improve balance as evidenced by MCTSIB score of 2/4 for reduced fall risk.  3. Patient will be able to perform HEP with minimal cues for improved carryover at home.  4. Improve Tinetti balance score to >/= 10/16  5. Improve R hip and knee strength to >/= 4/5  muscle grade for improved safety during household mobility.     Long Term Goals: (8 weeks)  1. Patient will be able to ascend/descend 4 steps with 1 rail foot over  foot modified independent.  2. Patient will be able to take symmetrical steps with consistent foot clearance for household ambulation.  3. 5xSTS will decrese to 15 sec or less for independent transfers.  4. Patient will be independent in HEP in order to continue after discharge.  5. Patient will improve Tinetti balance and gait score to >/= 22/28.  6. B LE strength will improve to 4+/5 or > throughout for independent mobility.   7. Patient will improve FOTO score to >/= 63 % to increase overall mobility and function at home.       PLAN     Continue with current Plan of Care and progress per pt's tolerance    Holland Vivas, PTA

## 2023-04-18 NOTE — PROGRESS NOTES
Speech and Language Therapy Outpatient Progress Note  Date:  4/18/2023     Name: Bhupendra Park   MRN: [id number]   Therapy Diagnosis:   Encounter Diagnosis   Name Primary?    Combined receptive and expressive aphasia Yes   Physician: Casey Maldonado MD  Physician Orders: eval and treat  Medical Diagnosis: CVA    Visit #/Visits authorized: 13/36  Date of Evaluation:  3/8/23  Insurance Authorization Period: 3/14/23-6/6/23  Plan of Care Expiration Date: 6/6/23  Extended POC: TBD     Time In:  2:00  Time Out:  2:30  Total Billable Time: 30       Subjective:   Pt ambulated to SLP office w/ cane. Pt pleasant and in good spirits.        Response to previous treatment: good     Pain Scale:  0/10 on VAS currently.   Pain Location: none expressed  Objective:        UNTIMED  Procedure Min.   {Speech- Language- Voice Therapy  30        Total Untimed Units: 1  Charges Billed/# of units: 1    Long Term Goals (12 weeks):   The pt will improve expressive and receptive language skills to communicate w/ friends and family.    Short Term Goals: 3x week/12 weeks Current Progress: initial   1) The pt will complete auto speech tasks (stating Feng and Aidan, sentence completion) w/ 90% acc Manish.  2) The pt will name x10 items in a concrete category w/I 1 minute Manish.  3) The pt will formulate sentences (subject+verb+object) to describe picture scenes w/ 80% acc Manish.  4) The pt will answer 3U YNQs w/ 90% acc Manish.  5) The pt will follow 2-step commands w/ 90% acc Manish.  6) The pt will read and comprehend phrases w/ 90% acc Manish.  7) The pt will write 1-2 syllable functional words w/ 80% acc Manish.  8) The pt will answer MU YNQs w/ 90% acc Manish.             Goal met 3/21/23            Goal met 4/4/23     Provide a category member given first name worksheet completed w/ 100% acc for word finding and reading at the sentence level. Extra time needed for processing and to provide answer. Written expression targeted at the word level w/  33% acc I'ly, increasing to 100% acc modA.    Overall good session.  Cont SLP POC.    Patient Education/Response:     Written Home Exercises Provided:  to be provided as appropriate throughout course of OP SLP services .  Exercises were reviewed and Bhupendra was able to demonstrate them prior to the end of the session.  Bhupendra demonstrated good  understanding of the education provided.     Assessment:   Bhupendra is progressing well towards his goals. Current goals remain appropriate. Goals to be updated as necessary.     Pt prognosis is Good. Pt will continue to benefit from skilled outpatient speech and language therapy to address the deficits listed in the problem list on initial evaluation, provide pt/family education and to maximize pt's level of independence in the home and community environment.     Barriers to Therapy:   Pt's spiritual, cultural and educational needs considered and pt agreeable to plan of care and goals.    Plan:   Continue POC with focus on expressive language, receptive language, written expression, and reading comprehension.    Adamaris Carrion M.S., CCC-SLP   4/18/2023

## 2023-04-18 NOTE — PROGRESS NOTES
[facility]  Occupational Therapy Treatment Note    Date: 4/18/2023  Name: Bhupendra Park  Clinic Number: [id number]    Therapy Diagnosis:   Encounter Diagnosis   Name Primary?    Decreased right shoulder range of motion Yes     Physician: Casey Maldonado MD    Time In: 1500  Time Out: 1530  Total Billable Time: 30 minutes    SUBJECTIVE     Pt reports: no new issues to report at this time     OBJECTIVE     Objective Measures updated at progress report unless specified.    Treatment     Bhupendra received the treatments listed below:     Neuromuscular re-education activities to improve Kinesthetic for 30 minutes. The following activities were included:  With use of yoga ball, Patient completed weightbearing and stretching of shoulder capsule by rolling ball out to end range with paused stretch for 3x10 reps.   With yellow flex bar positioned in R hand using L hand to anchor the bar, Patient completed 3x 10 reps AAROM for forearm pronation/supination     Patient Education and Home Exercises      Education provided:   - Progress towards goals     Assessment     Pt tolerated session well.     Bhupendra is progressing well towards his goals and there are no updates to goals at this time. Pt prognosis is Fair.     Pt will continue to benefit from skilled outpatient occupational therapy to address the deficits listed in the problem list on initial evaluation provide pt/family education and to maximize pt's level of independence in the home and community environment.     Pt's spiritual, cultural and educational needs considered and pt agreeable to plan of care and goals.      PLAN     Continue with current plan of care       Kris Lopez OT

## 2023-04-19 NOTE — PROGRESS NOTES
Speech and Language Therapy Outpatient Progress Note  Date:  4/19/2023     Name: Bhupendra Park   MRN: [id number]   Therapy Diagnosis:   Encounter Diagnosis   Name Primary?    Combined receptive and expressive aphasia Yes   Physician: Casey Maldonado MD  Physician Orders: eval and treat  Medical Diagnosis: CVA    Visit #/Visits authorized: 14/36  Date of Evaluation:  3/8/23  Insurance Authorization Period: 3/14/23-6/6/23  Plan of Care Expiration Date: 6/6/23  Extended POC: TBD     Time In:  2:00  Time Out:  2:30  Total Billable Time: 30       Subjective:   Pt ambulated to SLP office w/ cane. Pt pleasant and in good spirits.        Response to previous treatment: good     Pain Scale:  0/10 on VAS currently.   Pain Location: none expressed  Objective:        UNTIMED  Procedure Min.   {Speech- Language- Voice Therapy  30        Total Untimed Units: 1  Charges Billed/# of units: 1    Long Term Goals (12 weeks):   The pt will improve expressive and receptive language skills to communicate w/ friends and family.    Short Term Goals: 3x week/12 weeks Current Progress: initial   1) The pt will complete auto speech tasks (stating Feng and Aidan, sentence completion) w/ 90% acc Manish.  2) The pt will name x10 items in a concrete category w/I 1 minute Manish.  3) The pt will formulate sentences (subject+verb+object) to describe picture scenes w/ 80% acc Manish.  4) The pt will answer 3U YNQs w/ 90% acc Manish.  5) The pt will follow 2-step commands w/ 90% acc Manish.  6) The pt will read and comprehend phrases w/ 90% acc Manish.  7) The pt will write 1-2 syllable functional words w/ 80% acc Manish.  8) The pt will answer MU YNQs w/ 90% acc Manish.             Goal met 3/21/23            Goal met 4/4/23     Pt participated in structured conversation w/ SLP regarding family and recent events. Increased time for processing and word finding needed. Occasional word finding difficulties which requires verbal cueing from SLP to locate  words.  2-step command following completed w/ 40% acc I'ly, increasing to 100% acc mod-maxA.    Overall good session.  Cont SLP POC.    Patient Education/Response:     Written Home Exercises Provided:  to be provided as appropriate throughout course of OP SLP services .  Exercises were reviewed and Bhupendra was able to demonstrate them prior to the end of the session.  Bhupendra demonstrated good  understanding of the education provided.     Assessment:   Bhupendra is progressing well towards his goals. Current goals remain appropriate. Goals to be updated as necessary.     Pt prognosis is Good. Pt will continue to benefit from skilled outpatient speech and language therapy to address the deficits listed in the problem list on initial evaluation, provide pt/family education and to maximize pt's level of independence in the home and community environment.     Barriers to Therapy:   Pt's spiritual, cultural and educational needs considered and pt agreeable to plan of care and goals.    Plan:   Continue POC with focus on expressive language, receptive language, written expression, and reading comprehension.    Adamaris Carrion M.S., CCC-SLP   4/19/2023

## 2023-04-19 NOTE — PROGRESS NOTES
[facility] OUTPATIENT THERAPY AND WELLNESS   Physical Therapy Treatment Note     Name: Bhupendra Park  Clinic Number: [id number]    Therapy Diagnosis:   No diagnosis found.      Physician: Casey Maldonado MD    Authorization Period Expiration: 5/25/2023  Plan of Care Expiration: 5/25/2023  Reassessment / POC Due: 4/5/2023  Visit # / Visits authorized: 14/ 18     PTA Visit: 0/5    Visit Date: 4/19/2023    Time In: 1430  Time Out: 1500  Total Billable Time: 30 minutes    SUBJECTIVE     Pt reports: that he is doing well today. Denies any falls.   He was compliant with home exercise program.  Response to previous treatment: good  Functional change: improved gait pattern     Pain: 0/10  Location: n/a     OBJECTIVE     FOTO survey completed.  Score=54%.    Treatment     Bhupendra received the treatments listed below:      therapeutic exercises to develop strength, endurance, and ROM for 20 minutes including:    neuromuscular re-education activities to improve: Balance and Coordination for 0 minutes. The following activities were included:    gait training to improve functional mobility and safety for 5  minutes, including:    Therapeutic Exercise Grid     Exercise 1  Exercise 2  Exercise 3  Exercise 4    Exercise :    B LE: Standing: 3 way hip Sit to stands R LE: Step-ups: forward, lateral Step-downs   Repetition/Time :    20   2 x10   Fwd- 10 (6 inch step)  Lat-10 (6 inch step)      Resist or Assist :                  Comment :    Flexion, abduction            Done :      yes                   Exercise 5  Exercise 6  Exercise 7  Exercise 8    Exercise :    Single leg stance   squats   Amb with [facility] in field (unlevel surfaces)   Gait on indoor surface with straight cane and AFO      Repetition/Time :       10      150 ft   Resist or Assist :             CGA to SBA     Comment :                  Done :       yes                     Exercise 9  Exercise 10  Exercise 11  Exercise 12    Exercise :    Up / down 4 steps with one  handrail   Bridging   - single leg left    SLR  S/L HIP ABD   Wt shift with step thru      Repetition/Time :       2 x15   2 x10   X 10     Resist or Assist :    SBA      Assist with SL hip abd        Comment :    Cues for sequencing          Bilateral      Done :    N    YES    Yes    no                  Patient Education and Home Exercises     Home Exercises Provided and Patient Education Provided     Education provided: Educated pt on importance of HEP and encouraged pt to continue daily    ASSESSMENT      Patient demonstrated improved heel strike with cues provided.  He was initially unsteady with cane but stabilized after a few steps.  The recommendation was made to purchase the 4 point quad tip to increase his stability.  He will soon be ready to progress from the [facility].    Bhupendra Is progressing well towards his goals.   Pt prognosis is Good.     Pt will continue to benefit from skilled outpatient physical therapy to address the deficits listed in the problem list box on initial evaluation, provide pt/family education and to maximize pt's level of independence in the home and community environment.     Pt's spiritual, cultural and educational needs considered and pt agreeable to plan of care and goals.     Anticipated barriers to physical therapy: previous CVA with R LE hemiparesis, R UE hemiplegia, expressive aphasia    Goals:   Short Term Goals: (4 weeks)  1. 5xSTS will decrease to 20 sec or less for improved LE strength and transfer safety.  2. Patient will improve balance as evidenced by MCTSIB score of 2/4 for reduced fall risk.  3. Patient will be able to perform HEP with minimal cues for improved carryover at home.  4. Improve Tinetti balance score to >/= 10/16  5. Improve R hip and knee strength to >/= 4/5  muscle grade for improved safety during household mobility.     Long Term Goals: (8 weeks)  1. Patient will be able to ascend/descend 4 steps with 1 rail foot over foot modified independent.  2.  Patient will be able to take symmetrical steps with consistent foot clearance for household ambulation.  3. 5xSTS will decrese to 15 sec or less for independent transfers.  4. Patient will be independent in HEP in order to continue after discharge.  5. Patient will improve Tinetti balance and gait score to >/= 22/28.  6. B LE strength will improve to 4+/5 or > throughout for independent mobility.   7. Patient will improve FOTO score to >/= 63 % to increase overall mobility and function at home.       PLAN     Continue with current Plan of Care and progress per pt's tolerance    Angela Avelar PT

## 2023-04-19 NOTE — PROGRESS NOTES
[facility]  Occupational Therapy Treatment Note    Date: 4/19/2023  Name: Bhupendra Park  Clinic Number: [id number]    Therapy Diagnosis:   Encounter Diagnosis   Name Primary?    Decreased right shoulder range of motion Yes         Physician: Casey Maldonado MD    Time In: 1500  Time Out: 1530  Total Billable Time: 30 minutes    SUBJECTIVE     Pt reports: no new issues to report at this time     OBJECTIVE     Objective Measures updated at progress report unless specified.    Treatment     Bhupendra received the treatments listed below:     Neuromuscular re-education activities to improve Coordination and Kinesthetic for 30 minutes. The following activities were included:  - in supine with air cast donned to elbow region to prevent elbow flexion with tone, Patient completed AAROM shoulder flexion.   - AAROM completed for eblow flexion, shoulder adduction, and wrist flexion/extension.     The above activity promotes increased ROM for the Right upper extremity allowing Patient to gain increased functional use and increased independence with all ADL and IADL.     Patient Education and Home Exercises      Education provided:   - Progress towards goals        Assessment     Pt tolerated session well..     Bhupnedra is progressing well towards his goals and there are no updates to goals at this time. Pt prognosis is Good.     Pt will continue to benefit from skilled outpatient occupational therapy to address the deficits listed in the problem list on initial evaluation provide pt/family education and to maximize pt's level of independence in the home and community environment.     Pt's spiritual, cultural and educational needs considered and pt agreeable to plan of care and goals.      PLAN     Continue with current plan of care       Kris Lopez OT

## 2023-04-20 NOTE — PROGRESS NOTES
[facility]   Physical Therapy Treatment Note     Name: Bhupendra Park  Clinic Number: [id number]    Therapy Diagnosis:   Encounter Diagnoses   Name Primary?    Impaired strength of lower extremity Yes    Impairment of balance     Abnormality of gait following cerebrovascular accident          Physician: Casey Maldonado MD    Authorization Period Expiration: 5/25/2023  Plan of Care Expiration: 5/25/2023  Reassessment / POC Due: 4/5/2023 NEXT 4/25  Visit # / Visits authorized: 15/ 18     PTA Visit: 0/5    Visit Date: 4/20/2023    Time In: 1431  Time Out: 1500  Total Billable Time: 29 minutes    SUBJECTIVE     Pt reports: doing well today . Wants to  advance to hurrycane type vs quad cane.   He was compliant with home exercise program.  Response to previous treatment: good  Functional change: improving wt shift     Pain: 0/10  Location: n/a     OBJECTIVE     FOTO survey completed.  Score=54%.    Treatment     Bhupendra received the treatments listed below:      therapeutic exercises to develop strength, endurance, and ROM for 20 minutes including:    neuromuscular re-education activities to improve: Balance and Coordination for 0 minutes. The following activities were included:    gait training to improve functional mobility and safety for 9  minutes, including:    Therapeutic Exercise Grid     Exercise 1  Exercise 2  Exercise 3  Exercise 4    Exercise :    B LE: Standing: 3 way hip Sit to stands R LE: Step-ups: forward, lateral Step-downs   Repetition/Time :    20   2 x10   Fwd- 10 (6 inch step)  Lat-10 (6 inch step)      Resist or Assist :                  Comment :    Flexion, abduction      Bilateral      Done :      yes                   Exercise 5  Exercise 6  Exercise 7  Exercise 8    Exercise :    Wt shift with step over on Right    squats   Amb with Hurrycane  in field (unlevel surfaces)   Gait on indoor surface with straight cane and AFO      Repetition/Time :    X 10   10      3  x 150 ft   Resist or Assist :             CGA to SBA     Comment :             Moderate VC     Done :    Yes    yes                     Exercise 9  Exercise 10  Exercise 11  Exercise 12    Exercise :    Up / down 4 steps with one handrail   Bridging   - single leg left    SLR  S/L HIP ABD   Wt shift with step thru      Repetition/Time :       2 x15   2 x10   X 10     Resist or Assist :    SBA      Assist with SL hip abd        Comment :    Cues for sequencing          Bilateral      Done :    N            no                  Patient Education and Home Exercises     Home Exercises Provided and Patient Education Provided     Education provided: Educated pt on importance of HEP and encouraged pt to continue daily    ASSESSMENT      Bhupendra tolerated treatment well today requiring moderate verbal cues and physical cues to achieve full weight shifting and step thru during gait. Noted that patient has more difficulty with step thru on second half of ambulation lap. Provided increased cues both verbal and tactile to assist.   Patient had 3 self corrected LOB episodes during gait in attempts to improve wt shift to left side.   Bhupendra Is progressing well towards his goals.   Pt prognosis is Good.     Pt will continue to benefit from skilled outpatient physical therapy to address the deficits listed in the problem list box on initial evaluation, provide pt/family education and to maximize pt's level of independence in the home and community environment.     Pt's spiritual, cultural and educational needs considered and pt agreeable to plan of care and goals.     Anticipated barriers to physical therapy: previous CVA with R LE hemiparesis, R UE hemiplegia, expressive aphasia    Goals:   Short Term Goals: (4 weeks)  1. 5xSTS will decrease to 20 sec or less for improved LE strength and transfer safety.  2. Patient will improve balance as evidenced by MCTSIB score of 2/4 for reduced fall risk.  3. Patient will be able to perform HEP  with minimal cues for improved carryover at home.  4. Improve Tinetti balance score to >/= 10/16  5. Improve R hip and knee strength to >/= 4/5  muscle grade for improved safety during household mobility.     Long Term Goals: (8 weeks)  1. Patient will be able to ascend/descend 4 steps with 1 rail foot over foot modified independent.  2. Patient will be able to take symmetrical steps with consistent foot clearance for household ambulation.  3. 5xSTS will decrese to 15 sec or less for independent transfers.  4. Patient will be independent in HEP in order to continue after discharge.  5. Patient will improve Tinetti balance and gait score to >/= 22/28.  6. B LE strength will improve to 4+/5 or > throughout for independent mobility.   7. Patient will improve FOTO score to >/= 63 % to increase overall mobility and function at home.       PLAN     Continue with current Plan of Care and progress per pt's tolerance    Mustapha Aleman, PT

## 2023-04-20 NOTE — PROGRESS NOTES
[facility]  Occupational Therapy Treatment Note    Date: 4/20/2023  Name: Bhupendra Park  Clinic Number: [id number]    Therapy Diagnosis:   Encounter Diagnosis   Name Primary?    Decreased right shoulder range of motion Yes     Physician: Casey Maldonado MD    Time In: 1500  Time Out: 1530  Total Billable Time: 30 minutes    SUBJECTIVE     Pt reports: no new issues to report at this time     OBJECTIVE     Objective Measures updated at progress report unless specified.    Treatment     Bhupendra received the treatments listed below:     Neuromuscular re-education activities to improve Kinesthetic for 30 minutes. The following activities were included:  With use of yoga ball, Patient completed weightbearing and stretching of shoulder capsule by rolling ball out to end range with paused stretch for 3x10 reps.   With yellow flex bar positioned in R hand using L hand to anchor the bar, Patient completed 3x 6 reps AAROM for forearm pronation/supination   - attempted functional movement of forearm pronation and supination with a coffee mug to scoop pom pom balls, however Patient unable to maintain  around handle at this time     Patient Education and Home Exercises      Education provided:   - Progress towards goals     Assessment     Pt tolerated session well. Patient became quickly fatigued today with forearm strengthening.     Bhupendra is progressing well towards his goals and there are no updates to goals at this time. Pt prognosis is Fair.     Pt will continue to benefit from skilled outpatient occupational therapy to address the deficits listed in the problem list on initial evaluation provide pt/family education and to maximize pt's level of independence in the home and community environment.     Pt's spiritual, cultural and educational needs considered and pt agreeable to plan of care and goals.      PLAN     Continue with current plan of care       Kris Lopez OT

## 2023-04-20 NOTE — PROGRESS NOTES
Speech and Language Therapy Outpatient Progress Note  Date:  4/20/2023     Name: Bhupendra Park   MRN: [id number]   Therapy Diagnosis:   Encounter Diagnosis   Name Primary?    Combined receptive and expressive aphasia Yes   Physician: Casey Maldonado MD  Physician Orders: eval and treat  Medical Diagnosis: CVA    Visit #/Visits authorized: 15/36  Date of Evaluation:  3/8/23  Insurance Authorization Period: 3/14/23-6/6/23  Plan of Care Expiration Date: 6/6/23  Extended POC: TBD     Time In:  2:00  Time Out:  2:30  Total Billable Time: 30       Subjective:   Pt ambulated to SLP office w/ cane. Pt pleasant and in good spirits.        Response to previous treatment: good     Pain Scale:  0/10 on VAS currently.   Pain Location: none expressed  Objective:        UNTIMED  Procedure Min.   {Speech- Language- Voice Therapy  30        Total Untimed Units: 1  Charges Billed/# of units: 1    Long Term Goals (12 weeks):   The pt will improve expressive and receptive language skills to communicate w/ friends and family.    Short Term Goals: 3x week/12 weeks Current Progress: initial   1) The pt will complete auto speech tasks (stating Feng and Aidan, sentence completion) w/ 90% acc Manish.  2) The pt will name x10 items in a concrete category w/I 1 minute Manish.  3) The pt will formulate sentences (subject+verb+object) to describe picture scenes w/ 80% acc Manish.  4) The pt will answer 3U YNQs w/ 90% acc Manish.  5) The pt will follow 2-step commands w/ 90% acc Manish.  6) The pt will read and comprehend phrases w/ 90% acc Manish.  7) The pt will write 1-2 syllable functional words w/ 80% acc Manish.  8) The pt will answer MU YNQs w/ 90% acc Manish.             Goal met 3/21/23            Goal met 4/4/23     Pt read aloud first sentence in news article. modA for reading fluency and pt unable to demonstrate comprehension of information just read aloud. Task too difficult, therefore downgraded.  Pt then read aloud 2 step commands w/ 80%  for reading. Pt then followed commands w/ 75% acc mod-maxA. Pt demonstrated and verbalized difficulty w/ comprehending steps and completing steps. Task broken down to 1 steps and SLP providing visual and verbal cueing.    Overall good session.  Cont SLP POC.    Patient Education/Response:     Written Home Exercises Provided:  to be provided as appropriate throughout course of OP SLP services .  Exercises were reviewed and Bhupendra was able to demonstrate them prior to the end of the session.  Bhupendra demonstrated good  understanding of the education provided.     Assessment:   Bhupendra is progressing well towards his goals. Current goals remain appropriate. Goals to be updated as necessary.     Pt prognosis is Good. Pt will continue to benefit from skilled outpatient speech and language therapy to address the deficits listed in the problem list on initial evaluation, provide pt/family education and to maximize pt's level of independence in the home and community environment.     Barriers to Therapy:   Pt's spiritual, cultural and educational needs considered and pt agreeable to plan of care and goals.    Plan:   Continue POC with focus on expressive language, receptive language, written expression, and reading comprehension.    Adamaris Carrion M.S., CCC-SLP   4/20/2023

## 2023-04-25 NOTE — PLAN OF CARE
[facility]  Physical Therapy Plan of Care Note    Name: Bhupendra Park  Clinic Number: [id number]    Therapy Diagnosis:   Encounter Diagnoses   Name Primary?    Impaired strength of lower extremity Yes    Impairment of balance     Abnormality of gait following cerebrovascular accident      Physician: Casey Maldonado MD    Visit Date: 4/25/2023  Time In: 1430  Time Out: 1500  Total billable minutes: 30    Physician Orders: PT eval and treat  Medical Diagnosis from Referral:  Physician Orders: PT eval and treat   Medical Diagnosis from Referral: G81.11 Right spastic hemiplegia   Evaluation Date: 3/8/2023  Authorization Period Expiration: 5/25/23  Plan of Care Expiration: 5/05/23  Reassessment / POC Due: Completed 4/5/23, 4/25/23  Visit # / Visits authorized: 16/18   Evaluation Date:3/08/23    Precautions: Standard and Fall  Functional Level Prior to Evaluation:   Minimal assist with functional activities    SUBJECTIVE     Pt reports: that he has not fallen recently. Overall , he is doing well at home. Would like to improve his walking.   He was compliant with home exercise program.  Response to previous treatment: good   Functional change: improved lower extremity strength     Functional Stroke LE FOTO score: 54% (4/19/23)    Pain: 0/10  Location: N/A    OBJECTIVE     Update:   RANGE OF MOTION :   - Upper Extremity : Right = limited due to moderate flexor tone                                       Left = WNL  - Lower Extremity : Right = WFL                                           Left= WNL      STRENGTH:        Right Left   Hip Flexion: 4/5 5/5   Hip ABD: 4/5 5/5   Hip ADD: 4/5 5/5   Hip Extension: 2/5 5/5   Knee Ext: 5/5 5/5   Knee Flex: 4+/5 5/5      GAIT:   - ambulates with standard cane with 4 point quad base on level and unlevel surfaces   - decreased step ht on Right with moderate foot drag   - normal base of support   - mild path deviation , no LOB noted   Gait speed: .73 m/s  "with SC with 4 point quad base      STEPS:    - UP : reciprocal pattern leading with affected leg utilizing hand rails   - DWN: reciprocal pattern leading with unaffected leg utilizing hand rails      Functional mobility:   - rolling right and left: IND  - supine to sit : MOD IND  -sit to supine : IND   - sit to stand : IND     Special test:   - Tinnetti : 21/28              -balance : 15/16              - gait: 6/12  - 5XSTS: 15 sec     Treatment:   Therapeutic Exercise Grid     Exercise 1  Exercise 2  Exercise 3  Exercise 4    Exercise :    B LE: Standing: 3 way hip Sit to stands R LE: Step-ups: forward, lateral Step-downs   Repetition/Time :    20   15   Fwd- 20 (8 inch step)  Lat-10 (4 inch step)      Resist or Assist :       Foam mat, low surface           Comment :    Flexion, abduction      Forward only on 6" step today x 10 reps      Done :                           Exercise 5  Exercise 6  Exercise 7  Exercise 8    Exercise :    Single leg stance   squats   Amb with LBQC in field (unlevel surfaces)   Gait on indoor surface with LBQC and AFO      Repetition/Time :       20      100'     Resist or Assist :             CGA due to poor right foot clearance     Comment :                  Done :                           Exercise 9  Exercise 10  Exercise 11  Exercise 12    Exercise :    Up / down 4 steps with one handrail              Repetition/Time :                  Resist or Assist :    SBA              Comment :                  Done :                               ASSESSMENT     Update:   - Bhupendra has progressed well with therapy as demonstrated by improved gait speed and lower extremity strength. Patient continues to have difficulty with swing thru phase and poor foot clearance on right. This is causing near LOB .     Anticipated Barriers for therapy: multiple strokes     Goals:    Short Term Goals: (4 weeks)  1. 5xSTS will decrease to 20 sec or less for improved LE strength and transfer safety.- MET  2. " Patient will improve balance as evidenced by MCTSIB score of 2/4 for reduced fall risk.- In Progress   3. Patient will be able to perform HEP with minimal cues for improved carryover at home.- In Progress   4. Improve Tinetti balance score to >/= 10/16 - MET (21/28)  5. Improve R hip and knee strength to >/= 4/5  muscle grade for improved safety during household mobility.-  MET throughout except hip ext.     Long Term Goals: (8 weeks)  1. Patient will be able to ascend/descend 4 steps with 1 rail foot over foot modified independent.- MET  2. Patient will be able to take symmetrical steps with consistent foot clearance for household ambulation.- In PROGRESS  3. 5xSTS will decrese to 15 sec or less for independent transfers.- MET  4. Patient will be independent in HEP in order to continue after discharge.- IN PROGRESS  5. Patient will improve Tinetti balance and gait score to >/= 22/28. IN PROGRESS (21/28)  6. B LE strength will improve to 4+/5 or > throughout for independent mobility. IN PROGRESS  7. Patient will improve FOTO score to >/= 63 % to increase overall mobility and function at home. - IN PROGRESS    Reasons for Recertification of Therapy:   Deficits remain in R hip extension and gait pattern with decreased right foot clearance. These limitations are prohibiting the patient from reaching safe PLOF     PLAN     Recommended Treatment Plan: Continue 2 times per week for 6 weeks :  Gait Training, Patient Education, Therapeutic Activities, and Therapeutic Exercise  Other Recommendations: Advanced balance and gait     Mustapha Aleman, WILMER Aleman PT    I CERTIFY THE NEED FOR THESE SERVICES FURNISHED UNDER THIS PLAN OF TREATMENT AND WHILE UNDER MY CARE  Physician's comments:      Physician's Signature: ___________________________________________________

## 2023-04-25 NOTE — PROGRESS NOTES
"    Speech and Language Therapy Outpatient Progress Note  Date:  4/25/2023     Name: Bhupendra Park   MRN: [id number]   Therapy Diagnosis:   Encounter Diagnosis   Name Primary?    Combined receptive and expressive aphasia Yes   Physician: Casey Maldonado MD  Physician Orders: eval and treat  Medical Diagnosis: CVA    Visit #/Visits authorized: 16/36  Date of Evaluation: 3/8/23  Insurance Authorization Period: 3/14/23-6/6/23  Plan of Care Expiration Date: 6/6/23  Extended POC: TBD     Time In:  2:00  Time Out:  2:30  Total Billable Time: 30       Subjective:   Pt ambulated to SLP office w/ straight cane. Pt pleasant and in good spirits.        Response to previous treatment: good     Pain Scale:  0/10 on VAS currently.   Pain Location: none expressed  Objective:        UNTIMED  Procedure Min.   {Speech- Language- Voice Therapy  30        Total Untimed Units: 1  Charges Billed/# of units: 1    Long Term Goals (12 weeks):   The pt will improve expressive and receptive language skills to communicate w/ friends and family.    Short Term Goals: 3x week/12 weeks Current Progress: initial   1) The pt will complete auto speech tasks (stating Feng and Aidan, sentence completion) w/ 90% acc Manish.  2) The pt will name x10 items in a concrete category w/I 1 minute Manish.  3) The pt will formulate sentences (subject+verb+object) to describe picture scenes w/ 80% acc Manish.  4) The pt will answer 3U YNQs w/ 90% acc Manish.  5) The pt will follow 2-step commands w/ 90% acc Manish.  6) The pt will read and comprehend phrases w/ 90% acc Manish.  7) The pt will write 1-2 syllable functional words w/ 80% acc Manish.  8) The pt will answer MU YNQs w/ 90% acc Manish.             Goal met 3/21/23            Goal met 4/4/23     Divergent naming via categories level hard worksheet completed w/ 75% acc I'ly for word finding and mod-maxA for spelling at the word level. Pt reports feeling fine, though just feels "blah" today, therefore required " additional time to complete structured task.    Overall good session.  Cont SLP POC.    Patient Education/Response:     Written Home Exercises Provided:  to be provided as appropriate throughout course of OP SLP services .  Exercises were reviewed and Bhupendra was able to demonstrate them prior to the end of the session.  Bhupendra demonstrated good  understanding of the education provided.     Assessment:   Bhupendra is progressing well towards his goals. Current goals remain appropriate. Goals to be updated as necessary.     Pt prognosis is Good. Pt will continue to benefit from skilled outpatient speech and language therapy to address the deficits listed in the problem list on initial evaluation, provide pt/family education and to maximize pt's level of independence in the home and community environment.     Barriers to Therapy:   Pt's spiritual, cultural and educational needs considered and pt agreeable to plan of care and goals.    Plan:   Continue POC with focus on expressive language, receptive language, written expression, and reading comprehension.    Adamaris Carrion M.S., CCC-SLP   4/25/2023

## 2023-04-26 NOTE — PROGRESS NOTES
[facility]  Occupational Therapy Treatment Note    Date: 4/25/2023  Name: Bhupendra Park  Clinic Number: [id number]    Therapy Diagnosis:   Encounter Diagnosis   Name Primary?    Decreased right shoulder range of motion Yes     Physician: Casey Maldonado MD    Time In: 1500  Time Out: 1530  Total Billable Time: 30 minutes    SUBJECTIVE     Pt reports: tightness through pectoralis during yoga bell stretch    OBJECTIVE     Objective Measures updated at progress report unless specified.    Treatment     Bhupendra received the treatments listed below:     Neuromuscular re-education activities to improve Kinesthetic for 30 minutes. The following activities were included:  With use of yoga ball, Patient completed weightbearing and stretching of shoulder capsule by rolling ball out to end range with paused stretch for 3x10 reps. Occupational Therapist provided release to pec while Patient held end range of stretch.   With yellow flex bar positioned in R hand using L hand to anchor the bar, Patient completed 3x 6 reps AAROM for forearm pronation/supination        Patient Education and Home Exercises      Education provided:   - Progress towards goals     Assessment     Pt tolerated session well.     Bhupendra is progressing well towards his goals and there are no updates to goals at this time. Pt prognosis is Fair.     Pt will continue to benefit from skilled outpatient occupational therapy to address the deficits listed in the problem list on initial evaluation provide pt/family education and to maximize pt's level of independence in the home and community environment.     Pt's spiritual, cultural and educational needs considered and pt agreeable to plan of care and goals.      PLAN     Continue with current plan of care       Kris Lopez OT

## 2023-04-26 NOTE — PROGRESS NOTES
Speech and Language Therapy Outpatient Progress Note  Date:  4/26/2023     Name: Bhupendra Park   MRN: [id number]   Therapy Diagnosis:   Encounter Diagnosis   Name Primary?    Combined receptive and expressive aphasia Yes   Physician: Casey Maldonado MD  Physician Orders: eval and treat  Medical Diagnosis: CVA    Visit #/Visits authorized: 17/36  Date of Evaluation: 3/8/23  Insurance Authorization Period: 3/14/23-6/6/23  Plan of Care Expiration Date: 6/6/23  Extended POC: TBD     Time In:  2:10  Time Out:  2:30  Total Billable Time: 20       Subjective:   Pt ambulated to SLP office w/ straight cane. Pt pleasant and in good spirits.        Response to previous treatment: good     Pain Scale:  0/10 on VAS currently.   Pain Location: none expressed  Objective:        UNTIMED  Procedure Min.   {Speech- Language- Voice Therapy  30        Total Untimed Units: 1  Charges Billed/# of units: 1    Long Term Goals (12 weeks):   The pt will improve expressive and receptive language skills to communicate w/ friends and family.    Short Term Goals: 3x week/12 weeks Current Progress: initial   1) The pt will complete auto speech tasks (stating Feng and Aidan, sentence completion) w/ 90% acc Manish.  2) The pt will name x10 items in a concrete category w/I 1 minute Manish.  3) The pt will formulate sentences (subject+verb+object) to describe picture scenes w/ 80% acc Manish.  4) The pt will answer 3U YNQs w/ 90% acc Manish.  5) The pt will follow 2-step commands w/ 90% acc Manish.  6) The pt will read and comprehend phrases w/ 90% acc Manish.  7) The pt will write 1-2 syllable functional words w/ 80% acc Manish.  8) The pt will answer MU YNQs w/ 90% acc Manish.             Goal met 3/21/23            Goal met 4/4/23     Pt participated in structured conversation regarding home and layout. Pt experienced frequent word finding difficulty which resulted in frequent pauses. Pt unable to express ideas, therefore pt ultimately resulted to drawing  an image to relay information. Increased time required for expressive language and an increase in word finding difficulty noted w/ uncommon topic.    Overall good session.  Cont SLP POC.    Patient Education/Response:     Written Home Exercises Provided:  to be provided as appropriate throughout course of OP SLP services .  Exercises were reviewed and Bhupendra was able to demonstrate them prior to the end of the session.  Bhupendra demonstrated good  understanding of the education provided.     Assessment:   Bhupendra is progressing well towards his goals. Current goals remain appropriate. Goals to be updated as necessary.     Pt prognosis is Good. Pt will continue to benefit from skilled outpatient speech and language therapy to address the deficits listed in the problem list on initial evaluation, provide pt/family education and to maximize pt's level of independence in the home and community environment.     Barriers to Therapy:   Pt's spiritual, cultural and educational needs considered and pt agreeable to plan of care and goals.    Plan:   Continue POC with focus on expressive language, receptive language, written expression, and reading comprehension.    Adamaris Carrion M.S., CCC-SLP   4/26/2023

## 2023-04-26 NOTE — PROGRESS NOTES
[facility]   Physical Therapy Treatment Note     Name: Bhupendra Park  Clinic Number: [id number]    Therapy Diagnosis:   Encounter Diagnoses   Name Primary?    Impaired strength of lower extremity Yes    Impairment of balance     Abnormality of gait following cerebrovascular accident          Physician: Casey Maldonado MD    Physician Orders: PT eval and treat  Medical Diagnosis from Referral:  Physician Orders: PT eval and treat   Medical Diagnosis from Referral: G81.11 Right spastic hemiplegia   Evaluation Date: 3/8/2023  Authorization Period Expiration: 5/25/23  Plan of Care Expiration: 5/05/23  Reassessment / POC Due: Completed 4/5/23, 4/25/23  Visit # / Visits authorized: 17/18   PTA Visit: 1/5    Visit Date: 4/26/2023    Time In: 1431  Time Out: 1500  Total Billable Time: 29 minutes    SUBJECTIVE     Pt reports: doing well today . Denies any falls.   He was compliant with home exercise program.  Response to previous treatment: good  Functional change: improving wt shift     Pain: 0/10  Location: n/a     OBJECTIVE     N/A    Treatment     Bhupendra received the treatments listed below:      therapeutic exercises to develop strength, endurance, and ROM for 20 minutes including:    neuromuscular re-education activities to improve: Balance and Coordination for 0 minutes. The following activities were included:    gait training to improve functional mobility and safety for 9  minutes, including:    Therapeutic Exercise Grid     Exercise 1  Exercise 2  Exercise 3  Exercise 4    Exercise :    B LE: Standing: 3 way hip Sit to stands R LE: Step-ups: forward, lateral Step-downs   Repetition/Time :    20   2 x10   Fwd- 10 (6 inch step)  Lat-10 (6 inch step)      Resist or Assist :                  Comment :    Flexion, abduction      Bilateral      Done :      yes                   Exercise 5  Exercise 6  Exercise 7  Exercise 8    Exercise :    Wt shift with step over on Right    squats    Amb with Hurrycane  in field (unlevel surfaces)   Gait on indoor surface with straight cane and AFO      Repetition/Time :    X 10   10      3 x 150 ft   Resist or Assist :             CGA to SBA     Comment :             Moderate VC     Done :    Yes    yes                     Exercise 9  Exercise 10  Exercise 11  Exercise 12    Exercise :    Up / down 4 steps with one handrail   Bridging   - single leg left    SLR  S/L HIP ABD   Wt shift with step thru      Repetition/Time :       2 x15   2 x10   X 10     Resist or Assist :    SBA      Assist with SL hip abd        Comment :    Cues for sequencing          Bilateral      Done :    N            no                  Patient Education and Home Exercises     Home Exercises Provided and Patient Education Provided     Education provided: Educated pt on importance of HEP and encouraged pt to continue daily    ASSESSMENT      Bhupendra tolerated treatment well today requiring moderate verbal cues and physical cues to achieve full weight shifting and step thru during gait. Noted that patient has more difficulty with step thru on second half of ambulation lap. Provided increased cues both verbal and tactile to assist. No loss of balance today, but continues to have toe drag.     Bhupendra Is progressing well towards his goals.   Pt prognosis is Good.     Pt will continue to benefit from skilled outpatient physical therapy to address the deficits listed in the problem list box on initial evaluation, provide pt/family education and to maximize pt's level of independence in the home and community environment.     Pt's spiritual, cultural and educational needs considered and pt agreeable to plan of care and goals.     Anticipated barriers to physical therapy: previous CVA with R LE hemiparesis, R UE hemiplegia, expressive aphasia    Goals:   Short Term Goals: (4 weeks)  1. 5xSTS will decrease to 20 sec or less for improved LE strength and transfer safety.- MET  2. Patient will improve  balance as evidenced by MCTSIB score of 2/4 for reduced fall risk.- In Progress   3. Patient will be able to perform HEP with minimal cues for improved carryover at home.- In Progress   4. Improve Tinetti balance score to >/= 10/16 - MET (21/28)  5. Improve R hip and knee strength to >/= 4/5  muscle grade for improved safety during household mobility.-  MET throughout except hip ext.     Long Term Goals: (8 weeks)  1. Patient will be able to ascend/descend 4 steps with 1 rail foot over foot modified independent.- MET  2. Patient will be able to take symmetrical steps with consistent foot clearance for household ambulation.- In PROGRESS  3. 5xSTS will decrese to 15 sec or less for independent transfers.- MET  4. Patient will be independent in HEP in order to continue after discharge.- IN PROGRESS  5. Patient will improve Tinetti balance and gait score to >/= 22/28. IN PROGRESS (21/28)  6. B LE strength will improve to 4+/5 or > throughout for independent mobility. IN PROGRESS  7. Patient will improve FOTO score to >/= 63 % to increase overall mobility and function at home. - IN PROGRESS    PLAN     Continue with current Plan of Care and progress per pt's tolerance    Holland Vivas, PTA

## 2023-04-27 NOTE — PROGRESS NOTES
"    Speech and Language Therapy Outpatient Progress Note  Date:  4/27/2023     Name: Bhupendra Park   MRN: [id number]   Therapy Diagnosis:   Encounter Diagnosis   Name Primary?    Combined receptive and expressive aphasia Yes   Physician: Casey Maldonado MD  Physician Orders: eval and treat  Medical Diagnosis: CVA    Visit #/Visits authorized: 18/36  Date of Evaluation: 3/8/23  Insurance Authorization Period: 3/14/23-6/6/23  Plan of Care Expiration Date: 6/6/23  Extended POC: TBD     Time In:  2:00  Time Out:  2:30  Total Billable Time: 30       Subjective:   Pt ambulated to SLP office w/ straight cane. Pt pleasant and in good spirits.        Response to previous treatment: good     Pain Scale:  0/10 on VAS currently.   Pain Location: none expressed  Objective:        UNTIMED  Procedure Min.   {Speech- Language- Voice Therapy  30        Total Untimed Units: 1  Charges Billed/# of units: 1    Long Term Goals (12 weeks):   The pt will improve expressive and receptive language skills to communicate w/ friends and family.    Short Term Goals: 3x week/12 weeks Current Progress: initial   1) The pt will complete auto speech tasks (stating Feng and Aidan, sentence completion) w/ 90% acc Manish.  2) The pt will name x10 items in a concrete category w/I 1 minute Manish.  3) The pt will formulate sentences (subject+verb+object) to describe picture scenes w/ 80% acc Manish.  4) The pt will answer 3U YNQs w/ 90% acc Manish.  5) The pt will follow 2-step commands w/ 90% acc Manish.  6) The pt will read and comprehend phrases w/ 90% acc Manish.  7) The pt will write 1-2 syllable functional words w/ 80% acc Manish.  8) The pt will answer MU YNQs w/ 90% acc Manish.             Goal met 3/21/23            Goal met 4/4/23     SLP presented pt w/ "Describing nouns and actions" worksheet targeting expressive language at the connected speech level. Pt exhibited word finding difficulties, frequent pauses, decreased processing, and decreased initiation " "via structured task. Pt exhibited difficulty w/ abstract thinking and thought organization. SLP provided pt w/ a copy of task to complete as "homework" w/ his wife over the weekend-pt verbalized understanding. SLP to continue to target word finding and categorization.    Overall good session.  Cont SLP POC.    Patient Education/Response:     Written Home Exercises Provided:  to be provided as appropriate throughout course of OP SLP services .  Exercises were reviewed and Bhupendra was able to demonstrate them prior to the end of the session.  Bhupendra demonstrated good  understanding of the education provided.     Assessment:   Bhupendra is progressing well towards his goals. Current goals remain appropriate. Goals to be updated as necessary.     Pt prognosis is Good. Pt will continue to benefit from skilled outpatient speech and language therapy to address the deficits listed in the problem list on initial evaluation, provide pt/family education and to maximize pt's level of independence in the home and community environment.     Barriers to Therapy:   Pt's spiritual, cultural and educational needs considered and pt agreeable to plan of care and goals.    Plan:   Continue POC with focus on expressive language, receptive language, written expression, and reading comprehension.    Adamaris Carrion M.S., CCC-SLP   4/27/2023            "

## 2023-04-27 NOTE — PROGRESS NOTES
[facility]   Physical Therapy Treatment Note     Name: Bhupendra Park  Clinic Number: [id number]    Therapy Diagnosis:   Encounter Diagnoses   Name Primary?    Impaired strength of lower extremity Yes    Impairment of balance     Abnormality of gait following cerebrovascular accident          Physician: Casey Maldonado MD    Physician Orders: PT eval and treat  Medical Diagnosis from Referral:  Physician Orders: PT eval and treat   Medical Diagnosis from Referral: G81.11 Right spastic hemiplegia   Evaluation Date: 3/8/2023  Authorization Period Expiration: 5/25/23  Plan of Care Expiration: 5/05/23  Reassessment / POC Due: Completed 4/5/23, 4/25/23  Visit # / Visits authorized: 18/18   PTA Visit: 1/5    Visit Date: 4/27/2023    Time In: 1430  Time Out: 1500  Total Billable Time: 30 minutes    SUBJECTIVE     Pt reports: no problems today. States that his walking is improving   He was compliant with home exercise program.  Response to previous treatment: good  Functional change: improved Right hip strength     Pain: 0/10  Location: n/a     OBJECTIVE     N/A    Treatment     Bhupendra received the treatments listed below:      therapeutic exercises to develop strength, endurance, and ROM for 20 minutes including:    neuromuscular re-education activities to improve: Balance and Coordination for 0 minutes. The following activities were included:    gait training to improve functional mobility and safety for 9  minutes, including:    Therapeutic Exercise Grid     Exercise 1  Exercise 2  Exercise 3  Exercise 4    Exercise :    B LE: Standing: 3 way hip Sit to stands R LE: Step-ups: forward, lateral Step-downs   Repetition/Time :    20   2 x10   Fwd- 10 (6 inch step)  Lat-10 (6 inch step)      Resist or Assist :                  Comment :    Flexion, abduction      Bilateral      Done :                         Exercise 5  Exercise 6  Exercise 7  Exercise 8    Exercise :    Wt shift with step  over on Right    squats   Amb with Hurrycane  in field (unlevel surfaces)   Gait on indoor surface with straight cane and AFO      Repetition/Time :    X 10   10      1 x 150 ft   Resist or Assist :             CGA to SBA     Comment :             Moderate VC     Done :      YEs yes                     Exercise 9  Exercise 10  Exercise 11  Exercise 12    Exercise :    Up / down 4 steps with one handrail   Bridging   - single leg left    SLR  S/L HIP ABD   Wt shift with step thru      Repetition/Time :       3 x10   2 x10   X 10     Resist or Assist :    SBA      Assist with SL hip abd        Comment :    Cues for sequencing          Bilateral      Done :    N    YES         no                  Exercise 13  Exercise 14  Exercise 15  Exercise 16    Exercise :    Seated Ham curls Seated Hip Flex      Repetition/Time :    2 x 10  2 x 10      Resist or Assist :    Red TB      Comment :    Yes  Yes          Patient Education and Home Exercises     Home Exercises Provided and Patient Education Provided     Education provided: Educated pt on importance of HEP and encouraged pt to continue daily    ASSESSMENT      Bhupendra tolerated treatment well today requiring constant  verbal cues and moderate physical cues to facilitate  wt shift for improved foot clearance on Right . Foot clearance worsens with fatigue after approximately 50-75 ft of ambulation. Patient not transferring weight to cane for assist with wt shift. .     Bhupendra Is progressing well towards his goals.   Pt prognosis is Good.     Pt will continue to benefit from skilled outpatient physical therapy to address the deficits listed in the problem list box on initial evaluation, provide pt/family education and to maximize pt's level of independence in the home and community environment.     Pt's spiritual, cultural and educational needs considered and pt agreeable to plan of care and goals.     Anticipated barriers to physical therapy: previous CVA with EDDIE LANDEROS  hemiparesis, R UE hemiplegia, expressive aphasia    Goals:   Short Term Goals: (4 weeks)  1. 5xSTS will decrease to 20 sec or less for improved LE strength and transfer safety.- MET  2. Patient will improve balance as evidenced by MCTSIB score of 2/4 for reduced fall risk.- In Progress   3. Patient will be able to perform HEP with minimal cues for improved carryover at home.- In Progress   4. Improve Tinetti balance score to >/= 10/16 - MET (21/28)  5. Improve R hip and knee strength to >/= 4/5  muscle grade for improved safety during household mobility.-  MET throughout except hip ext.     Long Term Goals: (8 weeks)  1. Patient will be able to ascend/descend 4 steps with 1 rail foot over foot modified independent.- MET  2. Patient will be able to take symmetrical steps with consistent foot clearance for household ambulation.- In PROGRESS  3. 5xSTS will decrese to 15 sec or less for independent transfers.- MET  4. Patient will be independent in HEP in order to continue after discharge.- IN PROGRESS  5. Patient will improve Tinetti balance and gait score to >/= 22/28. IN PROGRESS (21/28)  6. B LE strength will improve to 4+/5 or > throughout for independent mobility. IN PROGRESS  7. Patient will improve FOTO score to >/= 63 % to increase overall mobility and function at home. - IN PROGRESS    PLAN     Continue with current Plan of Care and progress per pt's tolerance    Mustapha Aleman, PT

## 2023-05-02 NOTE — PROGRESS NOTES
[facility]   Physical Therapy Treatment Note     Name: Bhupendra Park  Clinic Number: [id number]    Therapy Diagnosis:   Encounter Diagnoses   Name Primary?    Impaired strength of lower extremity Yes    Impairment of balance     Abnormality of gait following cerebrovascular accident          Physician: Casey Maldonado MD    Physician Orders: PT eval and treat  Medical Diagnosis from Referral:  Physician Orders: PT eval and treat   Medical Diagnosis from Referral: G81.11 Right spastic hemiplegia   Evaluation Date: 3/8/2023  Authorization Period Expiration: 5/25/23  Plan of Care Expiration: 5/05/23  Reassessment / POC Due: Completed 4/5/23, 4/25/23  Visit # / Visits authorized: 18/18   PTA Visit: 1/5    Visit Date: 5/2/2023    Time In: 1434  Time Out: 1510  Total Billable Time: 36 minutes    SUBJECTIVE     Pt reports: Denies falls over the past week. States that he had a good weekend. Wife reports that she has been assisting in walking at home.   He was compliant with home exercise program.  Response to previous treatment: good  Functional change: improved stride     Pain: 0/10  Location: n/a     OBJECTIVE     N/A    Treatment     Bhupendra received the treatments listed below:      therapeutic exercises to develop strength, endurance, and ROM for 20 minutes including:    neuromuscular re-education activities to improve: Balance and Coordination for 0 minutes. The following activities were included:    gait training to improve functional mobility and safety for 10  minutes, including:    Therapeutic Exercise Grid     Exercise 1  Exercise 2  Exercise 3  Exercise 4    Exercise :    B LE: Standing: 3 way hip Sit to stands R LE: Step-ups: forward, lateral Step-downs   Repetition/Time :    20   2 x10   Fwd- 10 (6 inch step)  Lat-10 (6 inch step)      Resist or Assist :                  Comment :    Flexion, abduction      Bilateral      Done :                         Exercise 5  Exercise 6   Exercise 7  Exercise 8    Exercise :    Wt shift with step over on Right    squats   Amb with Hurrycane  in field (unlevel surfaces)   Gait on indoor surface with straight cane and AFO      Repetition/Time :    X 10   10       x 150 ft   Resist or Assist :             CGA to SBA     Comment :             Moderate VC     Done :      YEs yes                     Exercise 9  Exercise 10  Exercise 11  Exercise 12    Exercise :    Up / down 4 steps with one handrail   Bridging   - single leg left    SLR  S/L HIP ABD   Wt shift with step thru      Repetition/Time :       3 x10   2 x10   X 10     Resist or Assist :    SBA      Assist with SL hip abd        Comment :    Cues for sequencing          Bilateral      Done :    N    YES         no                  Exercise 13  Exercise 14  Exercise 15  Exercise 16    Exercise :    Seated Ham curls Seated Hip Flex  Seated LAQ    Repetition/Time :    2 x 10  2 x 10  2 x 10    Resist or Assist :    Red TB  Red TB    Comment :    Yes    Yes         Patient Education and Home Exercises     Home Exercises Provided and Patient Education Provided     Education provided: Educated pt on importance of HEP and encouraged pt to continue daily    ASSESSMENT      Bhupendra tolerated treatment well today completing all exercises with good effort and requiring moderate-constant VC and moderate physical cueing for improved stride and step height. Added resisted LAQ to counter hamstrings tone. Patient's right foot clearing approximately 50% of the time. Slight improvement noted with physical facilitation of right hip hiking. No LOB noted on level or unlevel surfaces.      Bhupendra Is progressing well towards his goals.   Pt prognosis is Good.     Pt will continue to benefit from skilled outpatient physical therapy to address the deficits listed in the problem list box on initial evaluation, provide pt/family education and to maximize pt's level of independence in the home and community environment.      Pt's spiritual, cultural and educational needs considered and pt agreeable to plan of care and goals.     Anticipated barriers to physical therapy: previous CVA with R LE hemiparesis, R UE hemiplegia, expressive aphasia    Goals:   Short Term Goals: (4 weeks)  1. 5xSTS will decrease to 20 sec or less for improved LE strength and transfer safety.- MET  2. Patient will improve balance as evidenced by MCTSIB score of 2/4 for reduced fall risk.- In Progress   3. Patient will be able to perform HEP with minimal cues for improved carryover at home.- In Progress   4. Improve Tinetti balance score to >/= 10/16 - MET (21/28)  5. Improve R hip and knee strength to >/= 4/5  muscle grade for improved safety during household mobility.-  MET throughout except hip ext.     Long Term Goals: (8 weeks)  1. Patient will be able to ascend/descend 4 steps with 1 rail foot over foot modified independent.- MET  2. Patient will be able to take symmetrical steps with consistent foot clearance for household ambulation.- In PROGRESS  3. 5xSTS will decrese to 15 sec or less for independent transfers.- MET  4. Patient will be independent in HEP in order to continue after discharge.- IN PROGRESS  5. Patient will improve Tinetti balance and gait score to >/= 22/28. IN PROGRESS (21/28)  6. B LE strength will improve to 4+/5 or > throughout for independent mobility. IN PROGRESS  7. Patient will improve FOTO score to >/= 63 % to increase overall mobility and function at home. - IN PROGRESS    PLAN     Continue with current Plan of Care and progress per pt's tolerance.   Patient will be on hold while awaiting insurance authorization. Patient will be receiving botox and assessed by Holly to adjust AFO.     Mustapha Aleman, PT

## 2023-05-02 NOTE — PROGRESS NOTES
Speech and Language Therapy Outpatient Progress Note  Date:  5/2/2023     Name: Bhupendra Park   MRN: [id number]   Therapy Diagnosis:   Encounter Diagnosis   Name Primary?    Combined receptive and expressive aphasia Yes   Physician: Casey Maldonado MD  Physician Orders: eval and treat  Medical Diagnosis: CVA    Visit #/Visits authorized: 19/36  Date of Evaluation: 3/8/23  Insurance Authorization Period: 3/14/23-6/6/23  Plan of Care Expiration Date: 6/6/23  Extended POC: TBD     Time In:  2:00  Time Out:  2:30  Total Billable Time: 30       Subjective:   Pt ambulated to SLP office w/ straight cane. Pt pleasant and in good spirits. Pt's wife present for session.       Response to previous treatment: good     Pain Scale:  0/10 on VAS currently.   Pain Location: none expressed  Objective:        UNTIMED  Procedure Min.   {Speech- Language- Voice Therapy  30        Total Untimed Units: 1  Charges Billed/# of units: 1    Long Term Goals (12 weeks):   The pt will improve expressive and receptive language skills to communicate w/ friends and family.    Short Term Goals: 3x week/12 weeks Current Progress: initial   1) The pt will complete auto speech tasks (stating Feng and Aidan, sentence completion) w/ 90% acc Manish.  2) The pt will name x10 items in a concrete category w/I 1 minute Manish.  3) The pt will formulate sentences (subject+verb+object) to describe picture scenes w/ 80% acc Manish.  4) The pt will answer 3U YNQs w/ 90% acc Manish.  5) The pt will follow 2-step commands w/ 90% acc Manish.  6) The pt will read and comprehend phrases w/ 90% acc Manish.  7) The pt will write 1-2 syllable functional words w/ 80% acc Manish.  8) The pt will answer MU YNQs w/ 90% acc Manish.             Goal met 3/21/23            Goal met 4/4/23     SLP discussed goals and tasks targeted in recent speech therapy sessions w/ pt's wife.  Pt completed 1 trial in ~12 minutes of Provide a Category Member Given First Letter worksheet. Pt required  "additional time for initiation and processing to complete 1 trial. Pt able to spell 2 syllable word modA.  Pt's wife reports that pt has verbalized to her lately that he feels like a "burden."  SLP to continue to focus on communication skills and promote a return to PLOF.    Overall good session.  Cont SLP POC.    Patient Education/Response:     Written Home Exercises Provided:  to be provided as appropriate throughout course of OP SLP services .  Exercises were reviewed and Bhupendra was able to demonstrate them prior to the end of the session.  Bhupendra demonstrated good  understanding of the education provided.     Assessment:   Bhupendra is progressing well towards his goals. Current goals remain appropriate. Goals to be updated as necessary.     Pt prognosis is Good. Pt will continue to benefit from skilled outpatient speech and language therapy to address the deficits listed in the problem list on initial evaluation, provide pt/family education and to maximize pt's level of independence in the home and community environment.     Barriers to Therapy:   Pt's spiritual, cultural and educational needs considered and pt agreeable to plan of care and goals.    Plan:   Continue POC with focus on expressive language, receptive language, written expression, and reading comprehension.    Adamaris Carrion M.S., CCC-SLP   5/2/2023              "

## 2023-05-03 NOTE — PROGRESS NOTES
"    Speech and Language Therapy Outpatient Progress Note  Date:  5/3/2023     Name: Bhupendra Park   MRN: [id number]   Therapy Diagnosis:   No diagnosis found.  Physician: Casey Maldonado MD  Physician Orders: eval and treat  Medical Diagnosis: CVA    Visit #/Visits authorized: 19/36  Date of Evaluation: 3/8/23  Insurance Authorization Period: 3/14/23-6/6/23  Plan of Care Expiration Date: 6/6/23  Extended POC: TBD     Time In:  2:00  Time Out:  2:30  Total Billable Time: 30       Subjective:   Pt ambulated to SLP office w/ straight cane. Pt pleasant and in good spirits. Handoff to pt's wife after session and word finding strategy handout was provided.       Response to previous treatment: good     Pain Scale:  0/10 on VAS currently.   Pain Location: none expressed  Objective:        UNTIMED  Procedure Min.   {Speech- Language- Voice Therapy  30        Total Untimed Units: 1  Charges Billed/# of units: 1    Long Term Goals (12 weeks):   The pt will improve expressive and receptive language skills to communicate w/ friends and family.    Short Term Goals: 3x week/12 weeks Current Progress: initial   1) The pt will complete auto speech tasks (stating Feng and Aidan, sentence completion) w/ 90% acc Manish.  2) The pt will name x10 items in a concrete category w/I 1 minute Manish.  3) The pt will formulate sentences (subject+verb+object) to describe picture scenes w/ 80% acc Manish.  4) The pt will answer 3U YNQs w/ 90% acc Manish.  5) The pt will follow 2-step commands w/ 90% acc Manish.  6) The pt will read and comprehend phrases w/ 90% acc Manish.  7) The pt will write 1-2 syllable functional words w/ 80% acc Manish.  8) The pt will answer MU YNQs w/ 90% acc Manish.             Goal met 3/21/23            Goal met 4/4/23     "My Sister Ching" task targeting recall, sentence formulation and word finding given phonemic restraint. Pt able to recall words SBA, though demonstrated increased difficulty w/ word finding across structured " task. SLP attempting to utilize word finding strategies though not successful this date.    Overall good session.  Cont SLP POC.    Patient Education/Response:     Written Home Exercises Provided:  to be provided as appropriate throughout course of OP SLP services .  Exercises were reviewed and Bhupendra was able to demonstrate them prior to the end of the session.  Bhupendra demonstrated good  understanding of the education provided.     Assessment:   Bhupendra is progressing well towards his goals. Current goals remain appropriate. Goals to be updated as necessary.     Pt prognosis is Good. Pt will continue to benefit from skilled outpatient speech and language therapy to address the deficits listed in the problem list on initial evaluation, provide pt/family education and to maximize pt's level of independence in the home and community environment.     Barriers to Therapy:   Pt's spiritual, cultural and educational needs considered and pt agreeable to plan of care and goals.    Plan:   Continue POC with focus on expressive language, receptive language, written expression, and reading comprehension.    Adamaris Carrion M.S., CCC-SLP   5/3/2023

## 2023-05-09 NOTE — PROGRESS NOTES
Speech and Language Therapy Outpatient Progress Note  Date:  5/9/2023     Name: Bhupendra Park   MRN: [id number]   Therapy Diagnosis:   Encounter Diagnosis   Name Primary?    Combined receptive and expressive aphasia Yes     Physician: Casey Maldonado MD  Physician Orders: eval and treat  Medical Diagnosis: CVA    Visit #/Visits authorized: 21/36  Date of Evaluation: 3/8/23  Insurance Authorization Period: 3/14/23-6/6/23  Plan of Care Expiration Date: 6/6/23  Extended POC: TBD     Time In:  2:00  Time Out:  2:30  Total Billable Time: 30       Subjective:   Pt ambulated to SLP office w/ straight cane. Pt pleasant and in good spirits.       Response to previous treatment: good     Pain Scale:  0/10 on VAS currently.   Pain Location: none expressed  Objective:        UNTIMED  Procedure Min.   {Speech- Language- Voice Therapy  30        Total Untimed Units: 1  Charges Billed/# of units: 1    Long Term Goals (12 weeks):   The pt will improve expressive and receptive language skills to communicate w/ friends and family.    Short Term Goals: 3x week/12 weeks Current Progress: initial   1) The pt will complete auto speech tasks (stating Feng and Aidan, sentence completion) w/ 90% acc Manish.  2) The pt will name x10 items in a concrete category w/I 1 minute Manish.  3) The pt will formulate sentences (subject+verb+object) to describe picture scenes w/ 80% acc Manish.  4) The pt will answer 3U YNQs w/ 90% acc Manish.  5) The pt will follow 2-step commands w/ 90% acc Manish.  6) The pt will read and comprehend phrases w/ 90% acc Manish.  7) The pt will write 1-2 syllable functional words w/ 80% acc Manish.  8) The pt will answer MU YNQs w/ 90% acc Manish.             Goal met 3/21/23            Goal met 4/4/23     Reading comprehension task via restaurant coupon worksheet completed w/ 67% acc I'ly, increasing to 100% acc modA. Increased time to complete tasks and SLP providing prompting for task initiation.    Overall good  session.  Cont SLP POC.    Patient Education/Response:     Written Home Exercises Provided:  to be provided as appropriate throughout course of OP SLP services .  Exercises were reviewed and Bhupendra was able to demonstrate them prior to the end of the session.  Bhupendra demonstrated good  understanding of the education provided.     Assessment:   Bhupendra is progressing well towards his goals. Current goals remain appropriate. Goals to be updated as necessary.     Pt prognosis is Good. Pt will continue to benefit from skilled outpatient speech and language therapy to address the deficits listed in the problem list on initial evaluation, provide pt/family education and to maximize pt's level of independence in the home and community environment.     Barriers to Therapy:   Pt's spiritual, cultural and educational needs considered and pt agreeable to plan of care and goals.    Plan:   Continue POC with focus on expressive language, receptive language, written expression, and reading comprehension.    Adamaris Carrion M.S., CCC-SLP   5/9/2023

## 2023-05-10 NOTE — PROGRESS NOTES
Speech and Language Therapy Outpatient Progress Note  Date:  5/10/2023     Name: Bhupendra Park   MRN: [id number]   Therapy Diagnosis:   Encounter Diagnosis   Name Primary?    Combined receptive and expressive aphasia Yes     Physician: Casey Maldonado MD  Physician Orders: eval and treat  Medical Diagnosis: CVA    Visit #/Visits authorized: 22/36  Date of Evaluation: 3/8/23  Insurance Authorization Period: 3/14/23-6/6/23  Plan of Care Expiration Date: 6/6/23  Extended POC: TBD     Time In:  2:00  Time Out:  2:30  Total Billable Time: 30       Subjective:   Pt ambulated to SLP office w/ straight cane. Pt pleasant and in good spirits.       Response to previous treatment: good     Pain Scale:  0/10 on VAS currently.   Pain Location: none expressed  Objective:        UNTIMED  Procedure Min.   {Speech- Language- Voice Therapy  30        Total Untimed Units: 1  Charges Billed/# of units: 1    Long Term Goals (12 weeks):   The pt will improve expressive and receptive language skills to communicate w/ friends and family.    Short Term Goals: 3x week/12 weeks Current Progress: initial   1) The pt will complete auto speech tasks (stating Feng and Aidan, sentence completion) w/ 90% acc Manish.  2) The pt will name x10 items in a concrete category w/I 1 minute Manish.  3) The pt will formulate sentences (subject+verb+object) to describe picture scenes w/ 80% acc Manish.  4) The pt will answer 3U YNQs w/ 90% acc Manish.  5) The pt will follow 2-step commands w/ 90% acc Manish.  6) The pt will read and comprehend phrases w/ 90% acc Manish.  7) The pt will write 1-2 syllable functional words w/ 80% acc Manish.  8) The pt will answer MU YNQs w/ 90% acc Manish.             Goal met 3/21/23            Goal met 4/4/23     Pt participated in structured conversational tasks regarding upcoming events. Occasional verbal cueing for word finding difficulties noted.  Compare/contrast 2 like items completed w/ 100% acc maxA.  Pt continues to  demonstrate word finding difficulties across structured language tasks, as well as increased time for processing.    Overall good session.  Cont SLP POC.    Patient Education/Response:     Written Home Exercises Provided:  to be provided as appropriate throughout course of OP SLP services .  Exercises were reviewed and Bhupendra was able to demonstrate them prior to the end of the session.  Bhupendra demonstrated good  understanding of the education provided.     Assessment:   Bhupendra is progressing well towards his goals. Current goals remain appropriate. Goals to be updated as necessary.     Pt prognosis is Good. Pt will continue to benefit from skilled outpatient speech and language therapy to address the deficits listed in the problem list on initial evaluation, provide pt/family education and to maximize pt's level of independence in the home and community environment.     Barriers to Therapy:   Pt's spiritual, cultural and educational needs considered and pt agreeable to plan of care and goals.    Plan:   Continue POC with focus on expressive language, receptive language, written expression, and reading comprehension.    Adamaris Carrion M.S., CCC-SLP   5/10/2023

## 2023-05-10 NOTE — PROGRESS NOTES
[facility]  Occupational Therapy Treatment Note    Date: 5/9/2023  Name: Bhupendra Park  Clinic Number: [id number]    Therapy Diagnosis:   No diagnosis found.    Physician: Casey Maldonado MD    Time In: 1500  Time Out: 1530  Total Billable Time: 30 minutes    SUBJECTIVE     Pt reports: no new issues to report at this time.     OBJECTIVE     Objective Measures updated at progress report unless specified.    Treatment     Bhupendra received the treatments listed below:     Neuromuscular re-education activities to improve Kinesthetic for 30 minutes. The following activities were included:  With use of yoga ball, Patient completed weightbearing and stretching of shoulder capsule by rolling ball out to end range with paused stretch for 3x10 reps.   - AAROM of forearm pronation/supination and wrist extension/flexion        Patient Education and Home Exercises      Education provided:   - Progress towards goals     Assessment     Pt tolerated session well. Patient reports not wearing his splints consistently with note of increased tightness to R hand and wrist. Occupational Therapist encouraged splint wear prior to next Occupational Therapy session.     Bhupendra is progressing well towards his goals and there are no updates to goals at this time. Pt prognosis is Fair.     Pt will continue to benefit from skilled outpatient occupational therapy to address the deficits listed in the problem list on initial evaluation provide pt/family education and to maximize pt's level of independence in the home and community environment.     Pt's spiritual, cultural and educational needs considered and pt agreeable to plan of care and goals.      PLAN     Continue with current plan of care       Kris Lopez OT

## 2023-05-10 NOTE — PROGRESS NOTES
[facility]ERAPY AND [facility]  Occupational Therapy Treatment Note    Date: 5/10/2023  Name: Bhupendra Park  Clinic Number: [id number]    Therapy Diagnosis:   No diagnosis found.      Physician: Casey Maldonado MD    Time In: 1430  Time Out: 1500  Total Billable Time: 30 minutes    SUBJECTIVE     Pt reports: wearing splint throughout speech session, prior to Occupational Therapy.    OBJECTIVE     Objective Measures updated at progress report unless specified.    Treatment     Bhupendra received the treatments listed below:     Neuromuscular re-education activities to improve Coordination and Kinesthetic for 30 minutes. The following activities were included:  - in supine with air cast donned to elbow region to prevent elbow flexion with tone, Patient completed AAROM shoulder flexion.   - AAROM completed for eblow flexion, shoulder adduction with manual pectoralis massage, and D1 flexion and extension.     The above activity promotes increased ROM for the Right upper extremity allowing Patient to gain increased functional use and increased independence with all ADL and IADL.     Patient Education and Home Exercises      Education provided:   - Progress towards goals        Assessment     Pt tolerated session well.    Bhupendra is progressing well towards his goals and there are no updates to goals at this time. Pt prognosis is Good.     Pt will continue to benefit from skilled outpatient occupational therapy to address the deficits listed in the problem list on initial evaluation provide pt/family education and to maximize pt's level of independence in the home and community environment.     Pt's spiritual, cultural and educational needs considered and pt agreeable to plan of care and goals.      PLAN     Continue with current plan of care       Kris Lopez OT

## 2023-05-11 NOTE — PROGRESS NOTES
Speech and Language Therapy Outpatient Progress Note  Date:  5/11/2023     Name: Bhupendra Park   MRN: [id number]   Therapy Diagnosis:   Encounter Diagnosis   Name Primary?    Combined receptive and expressive aphasia Yes     Physician: Casey Maldonado MD  Physician Orders: eval and treat  Medical Diagnosis: CVA    Visit #/Visits authorized: 23/36  Date of Evaluation: 3/8/23  Insurance Authorization Period: 3/14/23-6/6/23  Plan of Care Expiration Date: 6/6/23  Extended POC: TBD     Time In:  2:00  Time Out:  2:30  Total Billable Time: 30       Subjective:   Pt ambulated to SLP office w/ straight cane. Pt pleasant and in good spirits.       Response to previous treatment: good     Pain Scale:  0/10 on VAS currently.   Pain Location: none expressed  Objective:        UNTIMED  Procedure Min.   {Speech- Language- Voice Therapy  30        Total Untimed Units: 1  Charges Billed/# of units: 1    Long Term Goals (12 weeks):   The pt will improve expressive and receptive language skills to communicate w/ friends and family.    Short Term Goals: 3x week/12 weeks Current Progress: initial   1) The pt will complete auto speech tasks (stating Feng and Aidan, sentence completion) w/ 90% acc Manish.  2) The pt will name x10 items in a concrete category w/I 1 minute Manish.  3) The pt will formulate sentences (subject+verb+object) to describe picture scenes w/ 80% acc Manish.  4) The pt will answer 3U YNQs w/ 90% acc Manish.  5) The pt will follow 2-step commands w/ 90% acc Manish.  6) The pt will read and comprehend phrases w/ 90% acc Manish.  7) The pt will write 1-2 syllable functional words w/ 80% acc Manish.  8) The pt will answer MU YNQs w/ 90% acc Manish.             Goal met 3/21/23            Goal met 4/4/23     Pt participated in structured conversational tasks regarding [facility] event he attended this morning. Occasional word finding difficulties and hesitancies in speech noted.  Compare/contrast 2 like items  completed w/ 50% acc I'ly, increasing to 100% acc modA.  Pt continues to demonstrate word finding difficulties across structured language tasks, as well as increased time for processing.    Overall good session.  Cont SLP POC.    Patient Education/Response:     Written Home Exercises Provided:  to be provided as appropriate throughout course of OP SLP services .  Exercises were reviewed and Bhupendra was able to demonstrate them prior to the end of the session.  Bhupendra demonstrated good  understanding of the education provided.     Assessment:   Bhupendra is progressing well towards his goals. Current goals remain appropriate. Goals to be updated as necessary.     Pt prognosis is Good. Pt will continue to benefit from skilled outpatient speech and language therapy to address the deficits listed in the problem list on initial evaluation, provide pt/family education and to maximize pt's level of independence in the home and community environment.     Barriers to Therapy:   Pt's spiritual, cultural and educational needs considered and pt agreeable to plan of care and goals.    Plan:   Continue POC with focus on expressive language, receptive language, written expression, and reading comprehension.    Adamaris Carrion M.S., CCC-SLP   5/11/2023

## 2023-05-12 NOTE — PROGRESS NOTES
[facility]  Occupational Therapy Treatment Note    Date: 5/11/2023  Name: Bhupendra Park  Clinic Number: [id number]    Therapy Diagnosis:   Encounter Diagnosis   Name Primary?    Decreased right shoulder range of motion Yes     Physician: Casey Maldonado MD    Time In: 1500  Time Out: 1530  Total Billable Time: 30 minutes    SUBJECTIVE     Pt reports: no new issues to report at this time     OBJECTIVE     Objective Measures updated at progress report unless specified.    Treatment     Bhupendra received the treatments listed below:     Neuromuscular re-education activities to improve Kinesthetic for 30 minutes. The following activities were included:  - AAROM completed for forearm supination/pronation, wrist extension/flexion, and grasp and release of hand around nerf football.    Patient Education and Home Exercises      Education provided:   - Progress towards goals        Assessment     Pt tolerated session well. Patient demonstrated good movement for wrist extension from full flexion, actively reaching neutral prior to requiring Occupational Therapist assistance.     Bhupendra is progressing well towards his goals and there are no updates to goals at this time. Pt prognosis is Good.     Pt will continue to benefit from skilled outpatient occupational therapy to address the deficits listed in the problem list on initial evaluation provide pt/family education and to maximize pt's level of independence in the home and community environment.     Pt's spiritual, cultural and educational needs considered and pt agreeable to plan of care and goals.    Anticipated barriers to occupational therapy: severity of tone    PLAN     Continue with current plan of care     Kris Lopez OT

## 2023-05-16 NOTE — PROGRESS NOTES
[facility]  Occupational Therapy Treatment Note    Date: 5/16/2023  Name: Bhupendra Park  Clinic Number: [id number]    Therapy Diagnosis:   No diagnosis found.    Physician: Casey Maldonado MD    Time In: 1430  Time Out: 1500  Total Billable Time: 30 minutes    SUBJECTIVE     Pt reports: pain in R foot area due to wearing of AFO prior to Occupational Therapist session. Occupational Therapist assisted Patient in doffing AFO to relieve the pain and provided skin check with mild tenderness and redness to medial side of foot.     OBJECTIVE     Objective Measures updated at progress report unless specified.    Treatment     Bhupendra received the treatments listed below:     Neuromuscular re-education activities to improve Kinesthetic for 30 minutes. The following activities were included:  - AAROM completed for shoulder flexion, elbow flexion/extension, forearm supination/pronation, wrist extension/flexion, and grasp and release of hand around nerf football.  - Occupational Therapist provided passive extension stretching of hand following session     Patient Education and Home Exercises      Education provided:   - Progress towards goals        Assessment     Pt tolerated session well. Patient presented with increased tone, resulting in poor grasp and release of football with no active observation by Occupational Therapist or Patient.      Bhupendra is progressing well towards his goals and there are no updates to goals at this time. Pt prognosis is Good.     Pt will continue to benefit from skilled outpatient occupational therapy to address the deficits listed in the problem list on initial evaluation provide pt/family education and to maximize pt's level of independence in the home and community environment.     Pt's spiritual, cultural and educational needs considered and pt agreeable to plan of care and goals.    Anticipated barriers to occupational therapy: severity of tone    PLAN      Continue with current plan of care     Kris Lopez, OT

## 2023-05-16 NOTE — PROGRESS NOTES
Speech and Language Therapy Outpatient Progress Note  Date:  5/16/2023     Name: Bhupendra Park   MRN: [id number]   Therapy Diagnosis:   Encounter Diagnosis   Name Primary?    Combined receptive and expressive aphasia Yes     Physician: Casey Maldonado MD  Physician Orders: eval and treat  Medical Diagnosis: CVA    Visit #/Visits authorized: 24/36  Date of Evaluation: 3/8/23  Insurance Authorization Period: 3/14/23-6/6/23  Plan of Care Expiration Date: 6/6/23  Extended POC: TBD     Time In:  2:00  Time Out:  2:30  Total Billable Time: 30       Subjective:   Pt ambulated to SLP office w/ straight cane. Pt pleasant and in good spirits.       Response to previous treatment: good     Pain Scale:  0/10 on VAS currently.   Pain Location: none expressed  Objective:        UNTIMED  Procedure Min.   {Speech- Language- Voice Therapy  30        Total Untimed Units: 1  Charges Billed/# of units: 1    Long Term Goals (12 weeks):   The pt will improve expressive and receptive language skills to communicate w/ friends and family.    Short Term Goals: 3x week/12 weeks Current Progress: initial   1) The pt will complete auto speech tasks (stating Feng and Aidan, sentence completion) w/ 90% acc Manish.  2) The pt will name x10 items in a concrete category w/I 1 minute Manish.  3) The pt will formulate sentences (subject+verb+object) to describe picture scenes w/ 80% acc Manish.  4) The pt will answer 3U YNQs w/ 90% acc Manish.  5) The pt will follow 2-step commands w/ 90% acc Manish.  6) The pt will read and comprehend phrases w/ 90% acc Manish.  7) The pt will write 1-2 syllable functional words w/ 80% acc Manish.  8) The pt will answer MU YNQs w/ 90% acc Manish.             Goal met 3/21/23            Goal met 4/4/23     Category cross out worksheet level hard completed w/ 70% acc I'ly. ModA to explain rationale for answers selected. Extra time required to complete task.    Overall good session.  Cont SLP POC.    Patient Education/Response:      Written Home Exercises Provided:  to be provided as appropriate throughout course of OP SLP services .  Exercises were reviewed and Bhupendra was able to demonstrate them prior to the end of the session.  Bhupendra demonstrated good  understanding of the education provided.     Assessment:   Bhupendra is progressing well towards his goals. Current goals remain appropriate. Goals to be updated as necessary.     Pt prognosis is Good. Pt will continue to benefit from skilled outpatient speech and language therapy to address the deficits listed in the problem list on initial evaluation, provide pt/family education and to maximize pt's level of independence in the home and community environment.     Barriers to Therapy:   Pt's spiritual, cultural and educational needs considered and pt agreeable to plan of care and goals.    Plan:   Continue POC with focus on expressive language, receptive language, written expression, and reading comprehension.    Adamaris Carrion M.S., CCC-SLP   5/16/2023

## 2023-05-17 NOTE — PROGRESS NOTES
[facility]  Occupational Therapy Treatment Note    Date: 5/17/2023  Name: Bhupendra Park  Clinic Number: [id number]    Therapy Diagnosis:   Encounter Diagnosis   Name Primary?    Decreased right shoulder range of motion Yes         Physician: Casey Maldonado MD    Time In: 1430  Time Out: 1500  Total Billable Time: 30 minutes    SUBJECTIVE     Pt reports: no new issues to report at this time    OBJECTIVE     Objective Measures updated at progress report unless specified.    Treatment     Bhupendra received the treatments listed below:     Neuromuscular re-education activities to improve Coordination and Kinesthetic for 30 minutes. The following activities were included:  - in supine  Patient completed AAROM shoulder flexion, shoulder abduction, elbow flexion/extension, and PNF D1 flexion and extension.    - in upright sitting, Patient completed AAROM shoulder flexion, elbow flexion/extension, and PNF D1 flexion and extension.-- Patient demonstrated challenges with placing arm in abduction to complete full D1 extension    The above activity promotes increased ROM for the Right upper extremity allowing Patient to gain increased functional use and increased independence with all ADL and IADL.     Patient Education and Home Exercises      Education provided:   - Progress towards goals        Assessment     Pt tolerated session well.    Bhupendra is progressing well towards his goals and there are no updates to goals at this time. Pt prognosis is Good.     Pt will continue to benefit from skilled outpatient occupational therapy to address the deficits listed in the problem list on initial evaluation provide pt/family education and to maximize pt's level of independence in the home and community environment.     Pt's spiritual, cultural and educational needs considered and pt agreeable to plan of care and goals.      PLAN     Continue with current plan of care       Kris Lopez OT

## 2023-05-17 NOTE — PROGRESS NOTES
Speech and Language Therapy Outpatient Progress Note  Date:  5/17/2023     Name: Bhupendra Park   MRN: [id number]   Therapy Diagnosis:   Encounter Diagnosis   Name Primary?    Combined receptive and expressive aphasia Yes     Physician: Casey Maldonado MD  Physician Orders: eval and treat  Medical Diagnosis: CVA    Visit #/Visits authorized: 25/36  Date of Evaluation: 3/8/23  Insurance Authorization Period: 3/14/23-6/6/23  Plan of Care Expiration Date: 6/6/23  Extended POC: TBD     Time In:  2:00  Time Out:  2:30  Total Billable Time: 30       Subjective:   Pt ambulated to SLP office w/ straight cane. Pt pleasant and in good spirits.  Pt's wife reports the pt w/ a headache all day today.     Response to previous treatment: good     Pain Scale:  0/10 on VAS currently.   Pain Location: none expressed  Objective:        UNTIMED  Procedure Min.   {Speech- Language- Voice Therapy  30        Total Untimed Units: 1  Charges Billed/# of units: 1    Long Term Goals (12 weeks):   The pt will improve expressive and receptive language skills to communicate w/ friends and family.    Short Term Goals: 3x week/12 weeks Current Progress: initial   1) The pt will complete auto speech tasks (stating Feng and Aidan, sentence completion) w/ 90% acc Manish.  2) The pt will name x10 items in a concrete category w/I 1 minute Manish.  3) The pt will formulate sentences (subject+verb+object) to describe picture scenes w/ 80% acc Manish.  4) The pt will answer 3U YNQs w/ 90% acc Manish.  5) The pt will follow 2-step commands w/ 90% acc Manish.  6) The pt will read and comprehend phrases w/ 90% acc Manish.  7) The pt will write 1-2 syllable functional words w/ 80% acc Manish.  8) The pt will answer MU YNQs w/ 90% acc Manish.             Goal met 3/21/23            Goal met 4/4/23     Analogies worksheet completed w/ 43% acc I'ly, increasing to 100% acc mod-maxA. Frequent cueing for spelling 1 and 2 syllable words.    Overall good session.  Cont SLP  POC.    Patient Education/Response:     Written Home Exercises Provided:  to be provided as appropriate throughout course of OP SLP services .  Exercises were reviewed and Bhupendra was able to demonstrate them prior to the end of the session.  Bhupendra demonstrated good  understanding of the education provided.     Assessment:   Bhupendra is progressing well towards his goals. Current goals remain appropriate. Goals to be updated as necessary.     Pt prognosis is Good. Pt will continue to benefit from skilled outpatient speech and language therapy to address the deficits listed in the problem list on initial evaluation, provide pt/family education and to maximize pt's level of independence in the home and community environment.     Barriers to Therapy:   Pt's spiritual, cultural and educational needs considered and pt agreeable to plan of care and goals.    Plan:   Continue POC with focus on expressive language, receptive language, written expression, and reading comprehension.    Adamaris Carrion M.S., CCC-SLP   5/17/2023

## 2023-05-18 NOTE — PROGRESS NOTES
[facility]  Speech Therapy Updated Plan of Care-Neurological Rehabilitation         Date: 2023   Name: Bhupendra Park  Clinic Number: [id number]    Therapy Diagnosis:   Encounter Diagnosis   Name Primary?    Combined receptive and expressive aphasia Yes     Physician: Casey Maldonado MD    Physician Orders: eval and treat  Medical Diagnosis: CVA    Visit #/ Visits Authorized:     Evaluation Date: 3/8/23  Insurance Authorization Period:  3/14/23-23  Plan of Care Expiration:  23  New POC Certification Period: TBD    Total Visits Received: 26    Precautions:Standard     Subjective     Update: Pt remains pleasant, cooperative, motivated, and w/ good attendance. SLP providing tasks to complete at home w/ wife in order to promote language stimulation and carryover.    Objective     MS Aphasia Screening Test: The MAST assesses both expressive and receptive language abilities for 90 language domains which include: Naming, Automatic Speech, Repetition, Yes/No Accuracy, Object Recognition from a Field of Five, Following Verbal Instructions, Reading Instructions, Verbal Fluency, and Writing/Spelling to Dictation. The test yields 9 subtest scores and 2 index scores. The MAST Total Score ranges from 0 to 100 points.     Expressive Index:  Naming: 10/10  Automatic Speech: 10/10  Repetition: 10/10  Writin/10  Verbal Fluency: 5/10    Receptive Index:   Yes/No Accuracy: 18/20  Object Recognition: 10/10  Following Instructions: 8/10  Reading Instructions: 4/10    Expressive Subscale: 39/50   Receptive Subscale: 40/50     Total Score: 79/100      Assessment     Update: Bhupendra Park presents to [facility] status post medical diagnosis of CVA. Demonstrates impairments including limitations as described in the problem list. Positive prognostic factors include family support and attendance. Negative prognostic factors include n/a. He presents with aphasia characterized  by impaired verbal expression, reading comprehension and written expression. No barriers to therapy identified.. Patient will benefit from skilled, outpatient rehabilitation speech therapy.    Rehab Potential: good   Pt's spiritual, cultural, and educational needs considered and patient agreeable to plan of care and goals.    Education: Plan of Care, role of SLP in care, and insurance limitations / visit limit      Previous Short Term Goals Status:      1) The pt will complete auto speech tasks (stating Feng and Aidan, sentence completion) w/ 90% acc Manish.  2) The pt will name x10 items in a concrete category w/I 1 minute Manish.  3) The pt will formulate sentences (subject+verb+object) to describe picture scenes w/ 80% acc Manish.  4) The pt will answer 3U YNQs w/ 90% acc Manish.  5) The pt will follow 2-step commands w/ 90% acc Manish.  6) The pt will read and comprehend phrases w/ 90% acc Manish.  7) The pt will write 1-2 syllable functional words w/ 80% acc Manish.  8) The pt will answer MU YNQs w/ 90% acc Manish.  Goal met                 Goal met 3/21/23                 Goal met 4/4/23        Short Term Goals:  5 weeks  The pt will name x10 items in a concrete category w/I 1 minute Manish.  The pt will formulate sentences (subject+verb+object) to describe picture scenes w/ 90% acc Manish.  The pt will follow 2-step commands w/ 90% acc Manish.  The pt will read and comprehend phrases w/ 90% acc Manish.  The pt will write 1-2 syllable functional words w/ 80% acc Manish.    Long Term Goal:  10 weeks  The pt will improve expressive and receptive language skills to communicate w/ friends and family.    Goals Previously Met:  The pt will answer 3U YNQs w/ 90% acc Manish.  The pt will answer MU YNQs w/ 90% acc Manish.  The pt will complete auto speech tasks (stating Feng and Aidan, sentence completion) w/ 90% acc Manish.    Reasons for Recertification of Therapy: Pt continues to exhibit verbal expression, reading comprehension, auditory comprehension,  and written expression deficits. Prior to recent CVA, pt was able to read and write in order to read newspaper and magazine articles. Pt currently has difficulty reading and sending text messages to communicate w/ friends and family, which is a task that was easy to complete prior. Pt continues to have communication goals to reach and speech therapy services remain warranted in order to promote a return to PLOF. Pt wishes to continue rehab services at the outpatient level. Pt has made progress as indicated w/ goals met above, and would benefit from continued services 3x/week.    Plan     Updated Certification Period: 5/18/2023 to D    Recommended Treatment Plan: Patient will participate in the [facility] rehabilitation program for speech therapy 3 times per week to address his Communication deficits, to educate patient and their family, and to participate in a home exercise program.     Other recommendations: n/a     Therapist's Name:  Adamaris Carrion CCC-SLP   5/18/2023      I CERTIFY THE NEED FOR THESE SERVICES FURNISHED UNDER THIS PLAN OF TREATMENT AND WHILE UNDER MY CARE      Physician Name: _______________________________    Physician Signature: ____________________________

## 2023-05-18 NOTE — PROGRESS NOTES
[facility]   Physical Therapy Treatment Note     Name: Bhupendra Park  Clinic Number: [id number]    Therapy Diagnosis:   Encounter Diagnoses   Name Primary?    Impaired strength of lower extremity Yes    Impairment of balance     Abnormality of gait following cerebrovascular accident          Physician: Casey Maldonado MD    Physician Orders: PT eval and treat  Medical Diagnosis from Referral:  Physician Orders: PT eval and treat   Medical Diagnosis from Referral: G81.11 Right spastic hemiplegia   Evaluation Date: 3/8/2023  Authorization Period Expiration: 5/25/23  Plan of Care Expiration: 5/05/23  Reassessment / POC Due: Completed 4/5/23, 4/25/23  Visit # / Visits authorized: 1/12  PTA Visit: 1/5    Visit Date: 5/18/2023    Time In: 1500  Time Out: 1530  Total Billable Time: 30 minutes    SUBJECTIVE     Pt reports: that AFO was adjusted last week at [facility]. Had to stop wearing due to a pressure point around the ankle.    He was compliant with home exercise program.  Response to previous treatment: n/a  Functional change: n/a    Pain: 0/10  Location: n/a     OBJECTIVE     N/A    Treatment     Bhupendra received the treatments listed below:      therapeutic exercises to develop strength, endurance, and ROM for 20 minutes including:    neuromuscular re-education activities to improve: Balance and Coordination for 0 minutes. The following activities were included:    gait training to improve functional mobility and safety for 10  minutes, including:    Therapeutic Exercise Grid     Exercise 1  Exercise 2  Exercise 3  Exercise 4    Exercise :    B LE: Standing: 3 way hip Sit to stands R LE: Step-ups: forward Step-downs   Repetition/Time :    20   2 x10   Fwd- 10 (6 inch step)        Resist or Assist :          2 x 10        Comment :    Flexion, abduction      Bilateral      Done :      YES                    Exercise 5  Exercise 6  Exercise 7  Exercise 8    Exercise :    Wt shift with  step over on Right    squats   Amb with Hurrycane  in field (unlevel surfaces)   Gait on indoor surface with straight cane and AFO      Repetition/Time :    X 10   10       x 150 ft   Resist or Assist :             CGA to SBA     Comment :             Moderate VC     Done :       yes                     Exercise 9  Exercise 10  Exercise 11  Exercise 12    Exercise :    Up / down 4 steps with one handrail   Bridging   - single leg left    SLR  S/L HIP ABD   Wt shift with step thru      Repetition/Time :       3 x10   2 x10   X 10     Resist or Assist :    SBA      Assist with SL hip abd        Comment :    Cues for sequencing          Bilateral      Done :                            Exercise 13  Exercise 14  Exercise 15  Exercise 16    Exercise :    Seated Ham curls Seated Hip Hurdles   Seated LAQ Hip Hurdles   -long sitting    Repetition/Time :    2 x 10  2 x 10  2 x 10 2 x 10    Resist or Assist :    Red TB Single  Red TB Single    Comment :    Yes  Yes   Yes  Yes        Patient Education and Home Exercises     Home Exercises Provided and Patient Education Provided     Education provided: Educated pt on importance of HEP and encouraged pt to continue daily    ASSESSMENT      Bhupendra tolerated treatment well today completing all exercises with good effort and requiring moderate verbal and minimal physical cueing for proper exercise execution. Focused on hip strengthening to facilitate improved swing phase on right . Noted moderate fatigue following exercises. Patient continues to have foot drag on right with increased ambulation distance secondary to fatigue.     Bhupendra Is progressing well towards his goals.   Pt prognosis is Good.     Pt will continue to benefit from skilled outpatient physical therapy to address the deficits listed in the problem list box on initial evaluation, provide pt/family education and to maximize pt's level of independence in the home and community environment.     Pt's spiritual, cultural  and educational needs considered and pt agreeable to plan of care and goals.     Anticipated barriers to physical therapy: previous CVA with R LE hemiparesis, R UE hemiplegia, expressive aphasia    Goals:   Short Term Goals: (4 weeks)  1. 5xSTS will decrease to 20 sec or less for improved LE strength and transfer safety.- MET  2. Patient will improve balance as evidenced by MCTSIB score of 2/4 for reduced fall risk.- In Progress   3. Patient will be able to perform HEP with minimal cues for improved carryover at home.- In Progress   4. Improve Tinetti balance score to >/= 10/16 - MET (21/28)  5. Improve R hip and knee strength to >/= 4/5  muscle grade for improved safety during household mobility.-  MET throughout except hip ext.     Long Term Goals: (8 weeks)  1. Patient will be able to ascend/descend 4 steps with 1 rail foot over foot modified independent.- MET  2. Patient will be able to take symmetrical steps with consistent foot clearance for household ambulation.- In PROGRESS  3. 5xSTS will decrese to 15 sec or less for independent transfers.- MET  4. Patient will be independent in HEP in order to continue after discharge.- IN PROGRESS  5. Patient will improve Tinetti balance and gait score to >/= 22/28. IN PROGRESS (21/28)  6. B LE strength will improve to 4+/5 or > throughout for independent mobility. IN PROGRESS  7. Patient will improve FOTO score to >/= 63 % to increase overall mobility and function at home. - IN PROGRESS    PLAN     Continue with current Plan of Care and progress per pt's tolerance 2 times per week.       Mustapha Aleman, PT

## 2023-05-18 NOTE — PROGRESS NOTES
[facility]  Occupational Therapy Treatment Note    Date: 5/18/2023  Name: Bhupendra Park  Clinic Number: [id number]    Therapy Diagnosis:   Encounter Diagnosis   Name Primary?    Decreased right shoulder range of motion Yes     Physician: Casey Maldonado MD    Time In: 1430  Time Out: 1500  Total Billable Time: 30 minutes    SUBJECTIVE     Pt reports: no new issues to report at this time     OBJECTIVE     Objective Measures updated at progress report unless specified.    Treatment     Bhupendra received the treatments listed below:     Neuromuscular re-education activities to improve Kinesthetic for 30 minutes. The following activities were included:  - AAROM completed for forearm supination/pronation, wrist extension/flexion, and grasp and release of hand around nerf football.-- increased control of release aspect for in hand activity.     Patient Education and Home Exercises      Education provided:   - Progress towards goals        Assessment     Pt tolerated session well.     Bhupendra is progressing well towards his goals and there are no updates to goals at this time. Pt prognosis is Good.     Pt will continue to benefit from skilled outpatient occupational therapy to address the deficits listed in the problem list on initial evaluation provide pt/family education and to maximize pt's level of independence in the home and community environment.     Pt's spiritual, cultural and educational needs considered and pt agreeable to plan of care and goals.    Anticipated barriers to occupational therapy: severity of tone    PLAN     Continue with current plan of care     Kris Lopez OT

## 2023-05-23 NOTE — PROGRESS NOTES
Speech and Language Therapy Outpatient Progress Note  Date:  5/23/2023     Name: Bhupendra Park   MRN: [id number]   Therapy Diagnosis:   Encounter Diagnosis   Name Primary?    Combined receptive and expressive aphasia Yes     Physician: Casey Maldonado MD  Physician Orders: eval and treat  Medical Diagnosis: CVA    Visit #/Visits authorized: 25/36  Date of Evaluation: 3/8/23  Insurance Authorization Period: 3/14/23-6/6/23  Plan of Care Expiration Date: 6/6/23  Extended POC: TBD     Time In:  2:00  Time Out:  2:30  Total Billable Time: 30       Subjective:   Pt ambulated to SLP office w/ straight cane. Pt pleasant and in good spirits.  Pt's wife reports the pt w/ a headache all day today.     Response to previous treatment: good     Pain Scale:  0/10 on VAS currently.   Pain Location: none expressed  Objective:        UNTIMED  Procedure Min.   {Speech- Language- Voice Therapy  30        Total Untimed Units: 1  Charges Billed/# of units: 1    Long Term Goals (12 weeks):   The pt will improve expressive and receptive language skills to communicate w/ friends and family.    Short Term Goals: 3x week/12 weeks Current Progress: initial   1) The pt will complete auto speech tasks (stating Feng and Aidan, sentence completion) w/ 90% acc Manish.  2) The pt will name x10 items in a concrete category w/I 1 minute Manish.  3) The pt will formulate sentences (subject+verb+object) to describe picture scenes w/ 80% acc Manish.  4) The pt will answer 3U YNQs w/ 90% acc Manish.  5) The pt will follow 2-step commands w/ 90% acc Manish.  6) The pt will read and comprehend phrases w/ 90% acc Manish.  7) The pt will write 1-2 syllable functional words w/ 80% acc Manish.  8) The pt will answer MU YNQs w/ 90% acc Manish.             Goal met 3/21/23            Goal met 4/4/23     Reading comprehension via news article classified. Pt completed task w/ 33% acc I'ly, increasing to 100% acc modA. Decreased initiation noted w/ reading and answering Qs,  "therefore SLP providing frequent verbal cueing. Pt described this activity as "hard" and reports difficulty w/ processing and initiation s/p CVA.    Overall good session. Continued SLP services warranted to target goals.  Cont SLP POC.    Patient Education/Response:     Written Home Exercises Provided:  to be provided as appropriate throughout course of OP SLP services .  Exercises were reviewed and Bhupendra was able to demonstrate them prior to the end of the session.  Bhupendra demonstrated good  understanding of the education provided.     Assessment:   Bhupendra is progressing well towards his goals. Current goals remain appropriate. Goals to be updated as necessary.     Pt prognosis is Good. Pt will continue to benefit from skilled outpatient speech and language therapy to address the deficits listed in the problem list on initial evaluation, provide pt/family education and to maximize pt's level of independence in the home and community environment.     Barriers to Therapy:   Pt's spiritual, cultural and educational needs considered and pt agreeable to plan of care and goals.    Plan:   Continue POC with focus on expressive language, receptive language, written expression, and reading comprehension.    Adamaris Carrion M.S., CCC-SLP   5/23/2023            "

## 2023-05-23 NOTE — PLAN OF CARE
[facility]  Physical Therapy Plan of Care Note    Name: Bhupendra Park  Clinic Number: [id number]    Therapy Diagnosis:   Encounter Diagnoses   Name Primary?    Impaired strength of lower extremity Yes    Impairment of balance     Abnormality of gait following cerebrovascular accident      Physician: Casey Maldonado MD    Visit Date: 5/23/2023  Time In: 1430  Time Out: 1500  Total billable minutes: 30    Physician Orders: PT eval and treat  Medical Diagnosis from Referral:  Physician Orders: PT eval and treat   Medical Diagnosis from Referral: G81.11 Right spastic hemiplegia   Evaluation Date: 3/8/2023  Authorization Period Expiration: 5/25/23  Plan of Care Expiration: 5/05/23  Reassessment / POC Due: Completed 4/5/23, 4/25/23, 5/23/23  Visit # / Visits authorized: 2/12  Evaluation Date:3/08/23    Precautions: Standard and Fall  Functional Level Prior to Evaluation:   Minimal assist with functional activities    SUBJECTIVE     Pt reports: that he is doing well at home. Denies recent fall.   He was compliant with home exercise program.  Response to previous treatment: good   Functional change: none    Functional Stroke LE FOTO score: 57 (5/23/23) improved from previous 54% (4/19/23)    Pain: 0/10  Location: N/A    OBJECTIVE     Update:   RANGE OF MOTION :   - Upper Extremity : Right = limited due to moderate flexor tone                                       Left = WNL  - Lower Extremity : Right = WFL                                           Left= WNL      STRENGTH:        Right Left   Hip Flexion: 4+/5 5/5   Hip ABD: 4/5 5/5   Hip ADD: 4/5 5/5   Hip Extension: 2/5 5/5   Knee Ext: 5/5 5/5   Knee Flex: 4+/5 5/5      GAIT:   - ambulates with standard cane with 4 point quad base on level and unlevel surfaces   - decreased step ht on Right with minimal foot drag   - normal base of support   - mild path deviation , no LOB noted   Gait speed: .65 m/s with SC with 4 point quad base   "    STEPS:    - UP : reciprocal pattern leading with affected leg utilizing hand rails   - DWN: reciprocal pattern leading with unaffected leg utilizing hand rails      Functional mobility:   - rolling right and left: IND  - supine to sit : MOD IND  -sit to supine : IND   - sit to stand : IND     Special test:   - Tinnetti :               -balance : 15/16              - gait:   - 5XSTS: 15 sec   - TU secs    Treatment:   Therapeutic Exercise Grid     Exercise 1  Exercise 2  Exercise 3  Exercise 4    Exercise :    B LE: Standing: 3 way hip Sit to stands R LE: Step-ups: forward, lateral Step-downs   Repetition/Time :    20   15   Fwd- 20 (8 inch step)  Lat-10 (4 inch step)      Resist or Assist :       Foam mat, low surface           Comment :    Flexion, abduction      Forward only on 6" step today x 10 reps      Done :                           Exercise 5  Exercise 6  Exercise 7  Exercise 8    Exercise :    Single leg stance   squats   Amb with LBQC in field (unlevel surfaces)   Gait on indoor surface with LBQC and AFO      Repetition/Time :       20      100'     Resist or Assist :             CGA due to poor right foot clearance     Comment :                  Done :                           Exercise 9  Exercise 10  Exercise 11  Exercise 12    Exercise :    Up / down 4 steps with one handrail              Repetition/Time :                  Resist or Assist :    SBA              Comment :                  Done :                               ASSESSMENT     Update:   - Bhupendra continues to show progress with therapy as demonstrated by improved functional strength in bilateral lower extremities. Gait pattern was improved today with decreased foot drag on right side. Patient completed TUG in 19 seconds with Standard cane and 4 point base and no use if UE to get out of the chair. Bhupendra completes all assigned exercises with good effort and requires moderate verbal cueing and minimal tactile cues for proper " exercise execution.     Anticipated Barriers for therapy: multiple strokes     Goals:    Short Term Goals: (4 weeks)  1. 5xSTS will decrease to 20 sec or less for improved LE strength and transfer safety.- MET  2. Patient will improve balance as evidenced by MCTSIB score of 2/4 for reduced fall risk.- In Progress   3. Patient will be able to perform HEP with minimal cues for improved carryover at home.- In Progress   4. Improve Tinetti balance score to >/= 10/16 - MET (21/28)  5. Improve R hip and knee strength to >/= 4/5  muscle grade for improved safety during household mobility.-  MET throughout except hip ext.     Long Term Goals: (8 weeks)  1. Patient will be able to ascend/descend 4 steps with 1 rail foot over foot modified independent.- MET  2. Patient will be able to take symmetrical steps with consistent foot clearance for household ambulation.- In PROGRESS  3. 5xSTS will decrese to 15 sec or less for independent transfers.- MET  4. Patient will be independent in HEP in order to continue after discharge.- IN PROGRESS  5. Patient will improve Tinetti balance and gait score to >/= 22/28. MET Tinetti= 23/28  6. B LE strength will improve to 4+/5 or > throughout for independent mobility. IN PROGRESS  7. Patient will improve FOTO score to >/= 63 % to increase overall mobility and function at home. - IN PROGRESS (score = 57)    Reasons for Recertification : Reassessment only today. Will review plan of care at later date.     PLAN     Recommended Treatment Plan: Continue 2 times per week to include :  Gait Training, Patient Education, Therapeutic Activities, and Therapeutic Exercise  Other Recommendations: Advanced balance and gait     Mustapha Aleman, PT  Mustapha Aleman, PT

## 2023-05-23 NOTE — PROGRESS NOTES
[facility]  Occupational Therapy Treatment Note    Date: 5/23/2023  Name: Bhupendra Park  Clinic Number: [id number]    Therapy Diagnosis:   Encounter Diagnosis   Name Primary?    Decreased right shoulder range of motion Yes       Physician: Casey Maldonado MD    Time In: 1500  Time Out: 1530  Total Billable Time: 30 minutes    SUBJECTIVE     Pt reports: no new issues to report at this time.     OBJECTIVE     Objective Measures updated at progress report unless specified.    Treatment     Bhupendra received the treatments listed below:     Neuromuscular re-education activities to improve Kinesthetic for 30 minutes. The following activities were included:  With use of yoga ball, Patient completed weightbearing and stretching of shoulder capsule by rolling ball out to end range with paused stretch for 3x10 reps.-- Patient tolerated ~105 degrees of flexion for end range of stretch  - AAROM of forearm pronation/supination, wrist extension/flexion, and grasp/release around nerf football.         Patient Education and Home Exercises      Education provided:   - Progress towards goals     Assessment     Pt tolerated session well.     Bhupendra is progressing well towards his goals and there are no updates to goals at this time. Pt prognosis is Fair.     Pt will continue to benefit from skilled outpatient occupational therapy to address the deficits listed in the problem list on initial evaluation provide pt/family education and to maximize pt's level of independence in the home and community environment.     Pt's spiritual, cultural and educational needs considered and pt agreeable to plan of care and goals.      PLAN     Continue with current plan of care       Kris Huizar OT

## 2023-05-24 NOTE — PROGRESS NOTES
Speech and Language Therapy Outpatient Progress Note  Date:  5/24/2023     Name: Bhupendra Park   MRN: [id number]   Therapy Diagnosis:   Encounter Diagnosis   Name Primary?    Combined receptive and expressive aphasia Yes     Physician: Casey Maldonado MD  Physician Orders: eval and treat  Medical Diagnosis: CVA    Visit #/Visits authorized: 26/36  Date of Evaluation: 3/8/23  Insurance Authorization Period: 3/14/23-6/6/23  Plan of Care Expiration Date: 6/6/23  Extended POC: TBD     Time In:  2:00  Time Out:  2:30  Total Billable Time: 30       Subjective:   Pt ambulated to SLP office w/ straight cane. Pt pleasant and in good spirits.       Response to previous treatment: good     Pain Scale:  0/10 on VAS currently.   Pain Location: none expressed  Objective:        UNTIMED  Procedure Min.   {Speech- Language- Voice Therapy  30        Total Untimed Units: 1  Charges Billed/# of units: 1    Long Term Goals (12 weeks):   The pt will improve expressive and receptive language skills to communicate w/ friends and family.    Short Term Goals: 3x week/12 weeks Current Progress: initial   1) The pt will complete auto speech tasks (stating Feng and Aidan, sentence completion) w/ 90% acc Manish.  2) The pt will name x10 items in a concrete category w/I 1 minute Manish.  3) The pt will formulate sentences (subject+verb+object) to describe picture scenes w/ 80% acc Manish.  4) The pt will answer 3U YNQs w/ 90% acc Manish.  5) The pt will follow 2-step commands w/ 90% acc Manish.  6) The pt will read and comprehend phrases w/ 90% acc Manish.  7) The pt will write 1-2 syllable functional words w/ 80% acc Manish.  8) The pt will answer MU YNQs w/ 90% acc Manish.             Goal met 3/21/23            Goal met 4/4/23     Provide a category member given first letter worksheet completed targeting word finding, initiation and written expression. Word finding completed w/ 33% acc I'ly, increasing to 100% acc modA. Written expression completed w/  50% acc I'ly, increasing to 83% acc maxA.    Overall good session. Continued SLP services warranted to target goals.  Cont SLP POC.    Patient Education/Response:     Written Home Exercises Provided:  to be provided as appropriate throughout course of OP SLP services .  Exercises were reviewed and Bhupendra was able to demonstrate them prior to the end of the session.  Bhupendra demonstrated good  understanding of the education provided.     Assessment:   Bhupendra is progressing well towards his goals. Current goals remain appropriate. Goals to be updated as necessary.     Pt prognosis is Good. Pt will continue to benefit from skilled outpatient speech and language therapy to address the deficits listed in the problem list on initial evaluation, provide pt/family education and to maximize pt's level of independence in the home and community environment.     Barriers to Therapy:   Pt's spiritual, cultural and educational needs considered and pt agreeable to plan of care and goals.    Plan:   Continue POC with focus on expressive language, receptive language, written expression, and reading comprehension.    Adamaris Carrion M.S., CCC-SLP   5/24/2023

## 2023-05-25 NOTE — PROGRESS NOTES
[facility]   Physical Therapy Treatment Note     Name: Bhupendra Park  Clinic Number: [id number]    Therapy Diagnosis:   Encounter Diagnoses   Name Primary?    Impaired strength of lower extremity Yes    Impairment of balance     Abnormality of gait following cerebrovascular accident          Physician: Casey Maldonado MD    Physician Orders: PT eval and treat  Medical Diagnosis from Referral:  Physician Orders: PT eval and treat   Medical Diagnosis from Referral: G81.11 Right spastic hemiplegia   Evaluation Date: 3/8/2023  Authorization Period Expiration: 07/06/23  Plan of Care Expiration: 5/05/23  Reassessment / POC Due: Completed 4/5/23, 4/25/23, 5/23/23  Visit # / Visits authorized: 4 /12  PTA Visit: 1/5    Visit Date: 5/25/2023    Time In: 1507  Time Out: 1530  Total Billable Time: 23 minutes    SUBJECTIVE     Pt reports: that he is doing well today. States he is late because he was napping. No  recent falls.   He was compliant with home exercise program.  Response to previous treatment: good   Functional change: improved stride on Right     Pain: 0/10  Location: n/a     OBJECTIVE     N/A    Treatment     Bhupendra received the treatments listed below:      therapeutic exercises to develop strength, endurance, and ROM for 23 minutes including:    neuromuscular re-education activities to improve: Balance and Coordination for 0 minutes. The following activities were included:    gait training to improve functional mobility and safety for 0  minutes, including:    Therapeutic Exercise Grid     Exercise 1  Exercise 2  Exercise 3  Exercise 4    Exercise :    B LE: Standing: 3 way hip Sit to stands R LE: Step-ups: forward Step-downs   Repetition/Time :    20   2 x10   Fwd- 10 (6 inch step)        Resist or Assist :          2 x 10        Comment :    Flexion, abduction      Bilateral      Done :      YES                    Exercise 5  Exercise 6  Exercise 7  Exercise 8    Exercise :     Stretch Bilateral Hamstrings  squats   Amb with Hurrycane  in field (unlevel surfaces)   Gait on indoor surface with straight cane and AFO      Repetition/Time :    X 60 sec 10       x 150 ft   Resist or Assist :             CGA to SBA     Comment :             Moderate VC     Done :    Yes    yes                     Exercise 9  Exercise 10  Exercise 11  Exercise 12    Exercise :    Up / down 4 steps with one handrail   Bridging   - single leg left    SLR  S/L HIP ABD   Step Taps  Fwd- 45 deg    Repetition/Time :       2 x10   2 x10   2 x 10    Resist or Assist :    SBA      Assist with SL hip abd   Min A  Right knee     Comment :    Cues for sequencing          Left     Done :     Yes      YES                  Exercise 13  Exercise 14  Exercise 15  Exercise 16    Exercise :    Seated Ham curls Seated Hip Hurdles   Seated LAQ Hip Hurdles   -long sitting    Repetition/Time :    2 x 10  2 x 10  2 x 10 2 x 10    Resist or Assist :    Red TB Single  Red TB Single    Comment :    Yes       Yes        Patient Education and Home Exercises     Home Exercises Provided and Patient Education Provided     Education provided: Educated pt on importance of HEP and encouraged pt to continue daily    ASSESSMENT      Bhupendra tolerated treatment well today with no reports of discomfort. Patient had mild difficulty with hip exercises. Added step taps with right knee stabilization to facilitate wt shift. Good transition to ambulation and improved stride.     Bhupendra Is progressing well towards his goals.   Pt prognosis is Good.     Pt will continue to benefit from skilled outpatient physical therapy to address the deficits listed in the problem list box on initial evaluation, provide pt/family education and to maximize pt's level of independence in the home and community environment.     Pt's spiritual, cultural and educational needs considered and pt agreeable to plan of care and goals.     Anticipated barriers to physical therapy:  previous CVA with R LE hemiparesis, R UE hemiplegia, expressive aphasia    Goals:   Short Term Goals: (4 weeks)  1. 5xSTS will decrease to 20 sec or less for improved LE strength and transfer safety.- MET  2. Patient will improve balance as evidenced by MCTSIB score of 2/4 for reduced fall risk.- In Progress   3. Patient will be able to perform HEP with minimal cues for improved carryover at home.- In Progress   4. Improve Tinetti balance score to >/= 10/16 - MET (21/28)  5. Improve R hip and knee strength to >/= 4/5  muscle grade for improved safety during household mobility.-  MET throughout except hip ext.     Long Term Goals: (8 weeks)  1. Patient will be able to ascend/descend 4 steps with 1 rail foot over foot modified independent.- MET  2. Patient will be able to take symmetrical steps with consistent foot clearance for household ambulation.- In PROGRESS  3. 5xSTS will decrese to 15 sec or less for independent transfers.- MET  4. Patient will be independent in HEP in order to continue after discharge.- IN PROGRESS  5. Patient will improve Tinetti balance and gait score to >/= 22/28. IN PROGRESS (21/28)  6. B LE strength will improve to 4+/5 or > throughout for independent mobility. IN PROGRESS  7. Patient will improve FOTO score to >/= 63 % to increase overall mobility and function at home. - IN PROGRESS    PLAN     Continue with current Plan of Care and progress per pt's tolerance 2 times per week.       Mustapha Aleman, PT

## 2023-05-26 NOTE — PROGRESS NOTES
[facility]  Occupational Therapy Treatment Note    Date: 5/25/2023  Name: Bhupendra Park  Clinic Number: [id number]    Therapy Diagnosis:   Encounter Diagnosis   Name Primary?    Decreased right shoulder range of motion Yes         Physician: Casey Maldonado MD    Time In: 1430  Time Out: 1500  Total Billable Time: 30 minutes    SUBJECTIVE     Pt reports: no new issues to report at this time    OBJECTIVE     Objective Measures updated at progress report unless specified.    Treatment     Bhupendra received the treatments listed below:     Neuromuscular re-education activities to improve Coordination and Kinesthetic for 30 minutes. The following activities were included:  - in supine  Patient completed AAROM shoulder flexion, shoulder abduction, elbow flexion/extension, and PNF D1 flexion and extension. Air cast placed at elbow region for shoulder flexion and shoulder abduction.    The above activity promotes increased ROM for the Right upper extremity allowing Patient to gain increased functional use and increased independence with all ADL and IADL.     Patient Education and Home Exercises      Education provided:   - Progress towards goals        Assessment     Pt tolerated session well.    Bhupendra is progressing well towards his goals and there are no updates to goals at this time. Pt prognosis is Good.     Pt will continue to benefit from skilled outpatient occupational therapy to address the deficits listed in the problem list on initial evaluation provide pt/family education and to maximize pt's level of independence in the home and community environment.     Pt's spiritual, cultural and educational needs considered and pt agreeable to plan of care and goals.      PLAN     Continue with current plan of care       Kris Huizar OT

## 2023-05-30 NOTE — PROGRESS NOTES
[facility]   Physical Therapy Treatment Note     Name: Bhupendra Park  Clinic Number: [id number]    Therapy Diagnosis:   Encounter Diagnoses   Name Primary?    Impaired strength of lower extremity Yes    Impairment of balance     Abnormality of gait following cerebrovascular accident          Physician: Casey Maldonado MD    Physician Orders: PT eval and treat  Medical Diagnosis from Referral:  Physician Orders: PT eval and treat   Medical Diagnosis from Referral: G81.11 Right spastic hemiplegia   Evaluation Date: 3/8/2023  Authorization Period Expiration: 07/06/23  Plan of Care Expiration: 5/05/23  Reassessment / POC Due: Completed 4/5/23, 4/25/23, 5/23/23  Visit # / Visits authorized: 5 /12  PTA Visit: 1/5    Visit Date: 5/30/2023    Time In: 1430  Time Out: 1500  Total Billable Time: 30 minutes    SUBJECTIVE     Pt reports: that he is doing well today. No  recent falls.   He was compliant with home exercise program.  Response to previous treatment: good   Functional change: improved stride on Right     Pain: 0/10  Location: n/a     OBJECTIVE     N/A    Treatment     Bhupendra received the treatments listed below:      therapeutic exercises to develop strength, endurance, and ROM for 23 minutes including:    neuromuscular re-education activities to improve: Balance and Coordination for 0 minutes. The following activities were included:    gait training to improve functional mobility and safety for 0  minutes, including:    Therapeutic Exercise Grid     Exercise 1  Exercise 2  Exercise 3  Exercise 4    Exercise :    B LE: Standing: 3 way hip Sit to stands R LE: Step-ups: forward Step-downs   Repetition/Time :    20   2 x10   Fwd- 10 (6 inch step)        Resist or Assist :          2 x 10        Comment :    Flexion, abduction      Bilateral      Done :      YES                    Exercise 5  Exercise 6  Exercise 7  Exercise 8    Exercise :    Stretch Bilateral Hamstrings  squats   Amb  with Hurrycane  in field (unlevel surfaces)   Gait on indoor surface with straight cane and AFO      Repetition/Time :    X 60 sec 10       x 150 ft   Resist or Assist :             CGA to SBA     Comment :             Moderate VC     Done :    Yes    yes                     Exercise 9  Exercise 10  Exercise 11  Exercise 12    Exercise :    Up / down 4 steps with one handrail   Bridging   - single leg left    SLR  S/L HIP ABD   Step Taps  Fwd- 45 deg    Repetition/Time :       2 x10   2 x10   2 x 10    Resist or Assist :    SBA      Assist with SL hip abd   Min A  Right knee     Comment :    Cues for sequencing          Left     Done :     Yes      YES                  Exercise 13  Exercise 14  Exercise 15  Exercise 16    Exercise :    Seated Ham curls Seated Hip Hurdles   Seated LAQ Hip Hurdles   -long sitting    Repetition/Time :    2 x 10  2 x 10  2 x 10 2 x 10    Resist or Assist :    Red TB Single  Red TB Single    Comment :    Yes       Yes        Patient Education and Home Exercises     Home Exercises Provided and Patient Education Provided     Education provided: Educated pt on importance of HEP and encouraged pt to continue daily    ASSESSMENT      Bhupendra tolerated treatment well today with no reports of discomfort.  Continued with step-ups to improve weight shift durnig ambulation with good tolerance. Moderate verbal and tactile cues required for form.     Bhupendra Is progressing well towards his goals.   Pt prognosis is Good.     Pt will continue to benefit from skilled outpatient physical therapy to address the deficits listed in the problem list box on initial evaluation, provide pt/family education and to maximize pt's level of independence in the home and community environment.     Pt's spiritual, cultural and educational needs considered and pt agreeable to plan of care and goals.     Anticipated barriers to physical therapy: previous CVA with R LE hemiparesis, R UE hemiplegia, expressive  aphasia    Goals:   Short Term Goals: (4 weeks)  1. 5xSTS will decrease to 20 sec or less for improved LE strength and transfer safety.- MET  2. Patient will improve balance as evidenced by MCTSIB score of 2/4 for reduced fall risk.- In Progress   3. Patient will be able to perform HEP with minimal cues for improved carryover at home.- In Progress   4. Improve Tinetti balance score to >/= 10/16 - MET (21/28)  5. Improve R hip and knee strength to >/= 4/5  muscle grade for improved safety during household mobility.-  MET throughout except hip ext.     Long Term Goals: (8 weeks)  1. Patient will be able to ascend/descend 4 steps with 1 rail foot over foot modified independent.- MET  2. Patient will be able to take symmetrical steps with consistent foot clearance for household ambulation.- In PROGRESS  3. 5xSTS will decrese to 15 sec or less for independent transfers.- MET  4. Patient will be independent in HEP in order to continue after discharge.- IN PROGRESS  5. Patient will improve Tinetti balance and gait score to >/= 22/28. IN PROGRESS (21/28)  6. B LE strength will improve to 4+/5 or > throughout for independent mobility. IN PROGRESS  7. Patient will improve FOTO score to >/= 63 % to increase overall mobility and function at home. - IN PROGRESS    PLAN     Continue with current Plan of Care and progress per pt's tolerance 2 times per week.       Holland Vivas, PTA

## 2023-05-30 NOTE — PLAN OF CARE
Outpatient Therapy Updated Plan of Care     Visit Date: 5/30/2023  Name: Bhupendra Park  Clinic Number: [id number]    Therapy Diagnosis:   Encounter Diagnosis   Name Primary?    Decreased right shoulder range of motion Yes     Physician: Casey Maldonado MD    Physician Orders: eval and treat  Medical Diagnosis: G81.11-- Right upper extremity Hypertonicity  Evaluation Date: 3/8/23    Total Visits Received: 25  Cancelled Visits: 1   No Show Visits: 0    Current Certification Period:  5/2/23 to 6/13/23  Visits from Evaluation Date:  25  Functional Level Prior to Evaluation:  use of Right upper extremity as a stabilizer     Precautions: Fall    Subjective     Update: Patient agreeable with Occupational Therapist that there has no longer been progress with the Right upper extremity ROM. Patient's Right upper extremity presents back at baseline     Patient reports: no improvement in symptoms since initial evaluation/last OT assessment.       Patient's spiritual, cultural and educational needs considered and patient agreeable to plan of care and goals.    Objective     Update:      Joint Evaluation  AROM  3/8/2023 AROM   4/11/23 AROM  5/30/23 PROM   3/8/2023 PROM   4/11/23 PROM  5/30/23     Right Right Right Right Right Right   Shoulder flex 0-180 45 ~ 15 degrees  40 degrees 95 90 90 degrees    Shoulder Abd 0-180 trace ~20 degrees 0 degrees  40 No change 45 degrees    Elbow flex/ext 0-150 Flexion:90  Extension: 150 Flexion: 85  Extension: 130 Flexion: 90 degrees  Extension: 150 110 Flexion: 119  Extension: 153 110 degrees of flexion  80 degrees of flexion at rest    Wrist flex 0-80 trace    80 Unable to assess wrist due to tone    Wrist ext 0-70 Neutral from flexed position   ~10 degrees  70             Tone:  Modified Keron Scale:   3 Considerable increase in muscle tone, passive movement difficult    Assessment     Update: No change noted for increased AROM of the Right upper extremity due to continued hyper  tonicity. Despite use of botox and dynasplint Patient presents with no progress. Patient is prepared for discharge upon finishing this authorization.     Patient prognosis is Fair.     Patient's spiritual, cultural and educational needs considered and pt agreeable to plan of care and goals.    Anticipated barriers to physical therapy: severity of tone     Goals:   LTG GOALS:  Time frame: 6  - Pt will improve FOTO limitation score to less than or equal to 45% for improved self perception of functional performance with daily activities. -- Ongoing, progressing     STG Goals:  Time frame: 3  - Pt will increase active shoulder flexion to 90 degrees with RUE to improve participation with dressing, showering, and IADL tasks.-- ongoing, progressing  - Gross grasp will improve so that patient can perform tasks such as hold cup for drinking, open door, turn on faucet, and place water bottle on shelf with affected UE. -- ongoing, progressing    Plan     Updated Certification Period: 5/30/2023 to 6/13/23  Recommended Treatment Plan: 2 times per week for 2 more weeks: Neuromuscular Re-ed, Therapeutic Activities, and Therapeutic Exercise  Other Recommendations: continue with current plan of care with plan to discharge week of 6/13/23    Kris Huizar OT  5/30/2023

## 2023-05-30 NOTE — PROGRESS NOTES
See plan of care for Re-Assessment    Following Re-Assessment, Patient completed PROM of shoulder flexion, abduction, elbow flexion/extension, and forearm pronation.

## 2023-06-01 NOTE — PROGRESS NOTES
[facility]  Occupational Therapy Treatment Note    Date: 6/1/2023  Name: Bhupendra Park  Clinic Number: [id number]    Therapy Diagnosis:   Encounter Diagnosis   Name Primary?    Decreased right shoulder range of motion Yes       Physician: Casey Maldonado MD    Time In: 1500  Time Out: 1530  Total Billable Time: 30 minutes    SUBJECTIVE     Pt reports: no new issues to report at this time.     OBJECTIVE     Objective Measures updated at progress report unless specified.    Treatment     Bhupendra received the treatments listed below:     Neuromuscular re-education activities to improve Kinesthetic for 30 minutes. The following activities were included:  With use of yoga ball, Patient completed weightbearing and stretching of shoulder capsule by rolling ball out to end range with paused stretch for 3x10 reps.  - AAROM of forearm pronation/supination, wrist extension/flexion, and grasp/release around nerf football.         Patient Education and Home Exercises      Education provided:   - Progress towards goals     Assessment     Pt tolerated session well.     Bhupendra is progressing well towards his goals and there are no updates to goals at this time. Pt prognosis is Fair.     Pt will continue to benefit from skilled outpatient occupational therapy to address the deficits listed in the problem list on initial evaluation provide pt/family education and to maximize pt's level of independence in the home and community environment.     Pt's spiritual, cultural and educational needs considered and pt agreeable to plan of care and goals.      PLAN     Continue with current plan of care       Kris Huizar OT

## 2023-06-01 NOTE — PROGRESS NOTES
[facility]   Physical Therapy Treatment Note     Name: Bhupendra Park  Clinic Number: [id number]    Therapy Diagnosis:   Encounter Diagnoses   Name Primary?    Impaired strength of lower extremity Yes    Impairment of balance     Abnormality of gait following cerebrovascular accident          Physician: Casey Maldonado MD    Physician Orders: PT eval and treat  Medical Diagnosis from Referral:  Physician Orders: PT eval and treat   Medical Diagnosis from Referral: G81.11 Right spastic hemiplegia   Evaluation Date: 3/8/2023  Authorization Period Expiration: 07/06/23  Plan of Care Expiration: 5/05/23  Reassessment / POC Due: Completed 4/5/23, 4/25/23, 5/23/23  Visit # / Visits authorized: 6 /12  PTA Visit: 1/5    Visit Date: 6/1/2023    Time In: 1430  Time Out: 1500  Total Billable Time: 30 minutes    SUBJECTIVE     Pt reports: that his walking is improving. Feels like he is not dragging his right foot as much.   He was compliant with home exercise program.  Response to previous treatment: good   Functional change: improved step height on R     Pain: 0/10  Location: n/a     OBJECTIVE     N/A    Treatment     Bhupendra received the treatments listed below:      therapeutic exercises to develop strength, endurance, and ROM for 23 minutes including:    neuromuscular re-education activities to improve: Balance and Coordination for 7 minutes. The following activities were included:    gait training to improve functional mobility and safety for 0  minutes, including:    Therapeutic Exercise Grid     Exercise 1  Exercise 2  Exercise 3  Exercise 4    Exercise :    B LE: Standing: 3 way hip Sit to stands R LE: Step-ups: -forward  -Lateral  Step-downs   Repetition/Time :    20   2 x10   Fwd- 10 (6 inch step)        Resist or Assist :          2 x 10        Comment :    Flexion, abduction            Done :      Yes                     Exercise 5  Exercise 6  Exercise 7  Exercise 8    Exercise :     Stretch Bilateral Hamstrings  squats   Amb with Hurrycane  in field (unlevel surfaces)   Gait on indoor surface with straight cane and AFO      Repetition/Time :    X 60 sec 10        2 x 150 ft   Resist or Assist :             SBA    Comment :             Moderate VC     Done :    Yes    yes                     Exercise 9  Exercise 10  Exercise 11  Exercise 12    Exercise :    Soccer ball rolls  Bridging   - single leg left    SLR  S/L HIP ABD   Soccer kicks    Repetition/Time :    2 x 10  2 x10   2 x10   2 x 10    Resist or Assist :        Assist with SL hip abd      Comment :    Using L lower extremity to roll ball while wt shifting to L        Wt shift to R lower extremity    Done :    Yes  Yes      YES                   Exercise 13  Exercise 14  Exercise 15  Exercise 16    Exercise :    Seated Ham curls Seated Hip Hurdles   Seated LAQ Hip Hurdles   -long sitting    Repetition/Time :    2 x 10  2 x 10  2 x 10 2 x 10    Resist or Assist :    Red TB Single  Red TB Single    Comment :    Yes       Yes        Patient Education and Home Exercises     Home Exercises Provided and Patient Education Provided     Education provided: Educated pt on importance of HEP and encouraged pt to continue daily    ASSESSMENT      Bhupendra tolerated treatment well today requiring moderate VC and physical assist to maintain balance and complete wt shifts to right lower extremity. Noted improved swing phase on right with minimal foot drag during gait training. Foot drag occurred during the end phase of walk due to fatigue. Patient was able to correct with proper cues.     Bhupendra Is progressing well towards his goals.   Pt prognosis is Good.     Pt will continue to benefit from skilled outpatient physical therapy to address the deficits listed in the problem list box on initial evaluation, provide pt/family education and to maximize pt's level of independence in the home and community environment.     Pt's spiritual, cultural and  educational needs considered and pt agreeable to plan of care and goals.     Anticipated barriers to physical therapy: previous CVA with R LE hemiparesis, R UE hemiplegia, expressive aphasia    Goals:   Short Term Goals: (4 weeks)  1. 5xSTS will decrease to 20 sec or less for improved LE strength and transfer safety.- MET  2. Patient will improve balance as evidenced by MCTSIB score of 2/4 for reduced fall risk.- In Progress   3. Patient will be able to perform HEP with minimal cues for improved carryover at home.- In Progress   4. Improve Tinetti balance score to >/= 10/16 - MET (21/28)  5. Improve R hip and knee strength to >/= 4/5  muscle grade for improved safety during household mobility.-  MET throughout except hip ext.     Long Term Goals: (8 weeks)  1. Patient will be able to ascend/descend 4 steps with 1 rail foot over foot modified independent.- MET  2. Patient will be able to take symmetrical steps with consistent foot clearance for household ambulation.- In PROGRESS  3. 5xSTS will decrese to 15 sec or less for independent transfers.- MET  4. Patient will be independent in HEP in order to continue after discharge.- IN PROGRESS  5. Patient will improve Tinetti balance and gait score to >/= 22/28. IN PROGRESS (21/28)  6. B LE strength will improve to 4+/5 or > throughout for independent mobility. IN PROGRESS  7. Patient will improve FOTO score to >/= 63 % to increase overall mobility and function at home. - IN PROGRESS    PLAN     Continue with current Plan of Care and progress per pt's tolerance 2 times per week.       Mustapha Aleman, PT

## 2023-06-06 NOTE — PROGRESS NOTES
[facility]  Occupational Therapy Treatment Note    Date: 6/6/2023  Name: Bhupendra Park  Clinic Number: [id number]    Therapy Diagnosis:   Encounter Diagnosis   Name Primary?    Decreased right shoulder range of motion Yes       Physician: Casey Maldonado MD    Time In: 1130  Time Out: 1200  Total Billable Time: 30 minutes    SUBJECTIVE     Pt reports: no new issues to report at this time.     OBJECTIVE     Objective Measures updated at progress report unless specified.    Treatment     Bhupendra received the treatments listed below:     Neuromuscular re-education activities to improve Kinesthetic for 30 minutes. The following activities were included:  - AAROM of forearm pronation/supination, wrist extension/flexion, and grasp/release around nerf football.         Patient Education and Home Exercises      Education provided:   - Progress towards goals     Assessment     Pt tolerated session well.     Bhupendra is progressing well towards his goals and there are no updates to goals at this time. Pt prognosis is Fair.     Pt will continue to benefit from skilled outpatient occupational therapy to address the deficits listed in the problem list on initial evaluation provide pt/family education and to maximize pt's level of independence in the home and community environment.     Pt's spiritual, cultural and educational needs considered and pt agreeable to plan of care and goals.      PLAN     Continue with current plan of care       Kris Huizar, OT

## 2023-06-06 NOTE — PROGRESS NOTES
Speech and Language Therapy Outpatient Progress Note  Date:  6/6/2023     Name: Bhupendra Park   MRN: [id number]   Therapy Diagnosis:   Encounter Diagnosis   Name Primary?    Combined receptive and expressive aphasia Yes     Physician: Casey Maldonado MD  Physician Orders: eval and treat  Medical Diagnosis: CVA    Visit #/Visits authorized: 29/36  Date of Evaluation: 3/8/23  Insurance Authorization Period: 3/14/23-6/6/23  Plan of Care Expiration Date: 6/6/23  Extended POC: TBD     Time In:  11:00  Time Out:  11:30  Total Billable Time: 30       Subjective:   Pt ambulated to SLP office w/ straight cane. Pt pleasant and in good spirits.       Response to previous treatment: good     Pain Scale:  0/10 on VAS currently.   Pain Location: none expressed  Objective:        UNTIMED  Procedure Min.   {Speech- Language- Voice Therapy  30        Total Untimed Units: 1  Charges Billed/# of units: 1    Long Term Goals (12 weeks):   The pt will improve expressive and receptive language skills to communicate w/ friends and family.    Short Term Goals: 3x week/12 weeks Current Progress: initial   1) The pt will complete auto speech tasks (stating Feng and Aidan, sentence completion) w/ 90% acc Manish.  2) The pt will name x10 items in a concrete category w/I 1 minute Manish.  3) The pt will formulate sentences (subject+verb+object) to describe picture scenes w/ 80% acc Manish.  4) The pt will answer 3U YNQs w/ 90% acc Manish.  5) The pt will follow 2-step commands w/ 90% acc Manish.  6) The pt will read and comprehend phrases w/ 90% acc Manish.  7) The pt will write 1-2 syllable functional words w/ 80% acc Manish.  8) The pt will answer MU YNQs w/ 90% acc Manish.             Goal met 3/21/23            Goal met 4/4/23     maxA to recall and describe details from PT session this date.  Describe nouns and actions worksheet completed verbally given max verbal cueing to utilize descriptors and for task initiation. Pt demonstrated increased  difficulty w/ this structured task. SLP instructed pt to utilize visualization strategy to assist w/ word finding.    Overall good session. Today was last visit in current POC. UPOC was submitted and now awaiting auth to continue to target goals.  Cont SLP POC.    Patient Education/Response:     Written Home Exercises Provided:  to be provided as appropriate throughout course of OP SLP services .  Exercises were reviewed and Bhupendra was able to demonstrate them prior to the end of the session.  Bhupendra demonstrated good  understanding of the education provided.     Assessment:   Bhupendra is progressing well towards his goals. Current goals remain appropriate. Goals to be updated as necessary.     Pt prognosis is Good. Pt will continue to benefit from skilled outpatient speech and language therapy to address the deficits listed in the problem list on initial evaluation, provide pt/family education and to maximize pt's level of independence in the home and community environment.     Barriers to Therapy:   Pt's spiritual, cultural and educational needs considered and pt agreeable to plan of care and goals.    Plan:   Continue POC with focus on expressive language, receptive language, written expression, and reading comprehension.  Awaiting auth to continue POC.    Adamaris Carrion M.S., CCC-SLP   6/6/2023

## 2023-06-06 NOTE — PROGRESS NOTES
[facility]   Physical Therapy Treatment Note     Name: Bhupendra Park  Clinic Number: [id number]    Therapy Diagnosis:   Encounter Diagnoses   Name Primary?    Impaired strength of lower extremity Yes    Impairment of balance     Abnormality of gait following cerebrovascular accident          Physician: Casey Maldonado MD    Physician Orders: PT eval and treat  Medical Diagnosis from Referral:  Physician Orders: PT eval and treat   Medical Diagnosis from Referral: G81.11 Right spastic hemiplegia   Evaluation Date: 3/8/2023  Authorization Period Expiration: 07/06/23  Plan of Care Expiration: 5/05/23  Reassessment / POC Due: Completed 4/5/23, 4/25/23, 5/23/23  Visit # / Visits authorized: 6 /12  PTA Visit: 1/5    Visit Date: 6/6/2023    Time In: 1037  Time Out: 1400  Total Billable Time: 23 minutes    SUBJECTIVE     Pt reports: that he is doing well today. No issues to report  He was compliant with home exercise program.  Response to previous treatment: good   Functional change: improved hip flexion     Pain: 0/10  Location: n/a     OBJECTIVE     N/A    Treatment     Bhupendra received the treatments listed below:      therapeutic exercises to develop strength, endurance, and ROM for 23 minutes including:    neuromuscular re-education activities to improve: Balance and Coordination for 0 minutes. The following activities were included:    gait training to improve functional mobility and safety for 0  minutes, including:    Therapeutic Exercise Grid     Exercise 1  Exercise 2  Exercise 3  Exercise 4    Exercise :    B LE: Standing: 3 way hip Sit to stands R LE: Step-ups: -forward  -Lateral  Step-taps  -R LE    Repetition/Time :    20   2 x10   Fwd- 10 (6 inch step)   X 10     Resist or Assist :          2 x 10        Comment :    Flexion, abduction   2nd set right lower extremity on small stool   Fwd only       Done :     YES  Yes   YES                   Exercise 5  Exercise 6  Exercise 7   Exercise 8    Exercise :    Stretch Bilateral Hamstrings  squats   Amb with Hurrycane  in field (unlevel surfaces)   Gait on indoor surface with straight cane and AFO      Repetition/Time :    X 60 sec 10        2 x 150 ft   Resist or Assist :                Comment :             Moderate VC     Done :        yes                     Exercise 9  Exercise 10  Exercise 11  Exercise 12    Exercise :    Soccer ball rolls  Bridging   - single leg left    SLR  S/L HIP ABD   Soccer kicks    Repetition/Time :    2 x 10  2 x10   2 x10   2 x 10    Resist or Assist :        Assist with SL hip abd      Comment :    Using L lower extremity to roll ball while wt shifting to L        Wt shift to R lower extremity    Done :                         Exercise 13  Exercise 14  Exercise 15  Exercise 16    Exercise :    Seated Ham curls Seated Hip Hurdles   Seated LAQ Hip Hurdles   -long sitting    Repetition/Time :    2 x 10  2 x 10  2 x 10 2 x 10    Resist or Assist :    Red TB Single  Red TB Single    Comment :      YES             Patient Education and Home Exercises     Home Exercises Provided and Patient Education Provided     Education provided: Educated pt on importance of HEP and encouraged pt to continue daily    ASSESSMENT      Bhupendra tolerated treatment well today completing all assigned exercises with good effort . Patient experienced moderate fatigue in right hip flexors during ambulation. Patient demonstrated improved stride and step height at the beginning of gait training.     Bhupendra Is progressing well towards his goals.   Pt prognosis is Good.     Pt will continue to benefit from skilled outpatient physical therapy to address the deficits listed in the problem list box on initial evaluation, provide pt/family education and to maximize pt's level of independence in the home and community environment.     Pt's spiritual, cultural and educational needs considered and pt agreeable to plan of care and goals.     Anticipated  barriers to physical therapy: previous CVA with R LE hemiparesis, R UE hemiplegia, expressive aphasia    Goals:   Short Term Goals: (4 weeks)  1. 5xSTS will decrease to 20 sec or less for improved LE strength and transfer safety.- MET  2. Patient will improve balance as evidenced by MCTSIB score of 2/4 for reduced fall risk.- In Progress   3. Patient will be able to perform HEP with minimal cues for improved carryover at home.- In Progress   4. Improve Tinetti balance score to >/= 10/16 - MET (21/28)  5. Improve R hip and knee strength to >/= 4/5  muscle grade for improved safety during household mobility.-  MET throughout except hip ext.     Long Term Goals: (8 weeks)  1. Patient will be able to ascend/descend 4 steps with 1 rail foot over foot modified independent.- MET  2. Patient will be able to take symmetrical steps with consistent foot clearance for household ambulation.- In PROGRESS  3. 5xSTS will decrese to 15 sec or less for independent transfers.- MET  4. Patient will be independent in HEP in order to continue after discharge.- IN PROGRESS  5. Patient will improve Tinetti balance and gait score to >/= 22/28. IN PROGRESS (21/28)  6. B LE strength will improve to 4+/5 or > throughout for independent mobility. IN PROGRESS  7. Patient will improve FOTO score to >/= 63 % to increase overall mobility and function at home. - IN PROGRESS    PLAN     Continue with current Plan of Care and progress per pt's tolerance 2 times per week.       Mustapha Aleman, PT

## 2023-06-08 NOTE — PROGRESS NOTES
See plan of care for Discharge]    Patient completed 30 minutes PROM of Right upper extremity for further stretching of shoulder flexion, shoulder abduction, elbow flexion and extension, forearm pronation/supination, and wrist flexion/extension.   Patient was limited throughout stretching due to significant tone throughout Right upper extremity.

## 2023-06-08 NOTE — PLAN OF CARE
[facility]  Occupational Therapy Discharge Summary     Name: Bhupendra Park  Clinic Number: [id number]    Therapy Diagnosis:   Encounter Diagnosis   Name Primary?    Decreased right shoulder range of motion Yes     Physician: Casey Maldonado MD    Physician Orders: eval and treat  Medical Diagnosis: G81.11-- Right upper extremity Hypertonicity   Evaluation Date: 3/8/23  Authorization Period Expiration: 6/13/23  Plan of Care Certification Period: 5/30/23  Visit #/Visits authorized: 12/12     Precautions: Fall    Time In:  1500  Time Out:  1530  Total Billable minutes  30    Date of Last visit: 6/8/23  Total Visits Received: 28  Cancelled Visits: 1  No Show Visits: 0    Past medical history: No past medical history on file.    Subjective   Bhupendra Park has been seen by Occupational Therapy for limited ROM of Right upper extremity, decreased functional use, and increase in tone following CVA. Treatments have consisted of therapeutic exercise, functional activity training/ADLs, and  strengthening.    Functional change since beginning Occupational Therapy: initially, Patient presented with decreased tone throughout Right upper extremity compared to previous episode of care following initial stroke. Due to increased movement, Patient demonstrated good progress of increased ROM and functional use of Right upper extremity. However with time, Patient presented with return of hypertonicity, limiting his ROM despite Occupational Therapist efforts with combination of botox. At this time, Patient presents at baseline with is limited to use of Right upper extremity as a stabilizer versus functional use due to tone.     Patient's spiritual, cultural and educational needs considered and patient agreeable to plan of care and goals.    Objective    Update:      Joint Evaluation  AROM  3/8/2023 AROM   4/11/23 AROM  5/30/23 PROM   3/8/2023 PROM   4/11/23 PROM  5/30/23     Right Right Right Right  Right Right   Shoulder flex 0-180 45 ~ 15 degrees  40 degrees 95 90 90 degrees    Shoulder Abd 0-180 trace ~20 degrees 0 degrees  40 No change 45 degrees    Elbow flex/ext 0-150 Flexion:90  Extension: 150 Flexion: 85  Extension: 130 Flexion: 90 degrees  Extension: 150 110 Flexion: 119  Extension: 153 110 degrees of flexion  80 degrees of flexion at rest    Wrist flex 0-80 trace     80 Unable to assess wrist due to tone     Wrist ext 0-70 Neutral from flexed position   ~10 degrees  70           ** Patient has presented with no change since previous assessment on 5/30/23.     Tone:  Modified Keron Scale:   3 Considerable increase in muscle tone, passive movement difficult       Assessment    Patient has made minimal progress toward goals since initial evaluation. Prior to eval Occupational Therapist discussed Patient's minimal progress with Patient, whom agreed on discharge due to his return to previous baseline.   Remaining goals have not been met due to significant limitations due to hypertonicity.    Goals:  LTG GOALS:  Time frame: 6  - Pt will improve FOTO limitation score to less than or equal to 45% for improved self perception of functional performance with daily activities. -- unable to meet     STG Goals:  Time frame: 3  - Pt will increase active shoulder flexion to 90 degrees with RUE to improve participation with dressing, showering, and IADL tasks.-- unable to meet  - Gross grasp will improve so that patient can perform tasks such as hold cup for drinking, open door, turn on faucet, and place water bottle on shelf with affected UE. -- unable to meet    Discharge reason: Patient has reached the maximum rehab potential for the present time and Patient requested discharge    Plan   This patient is discharged from Occupational Therapy. Patient instructed to follow up with referring provider as needed.     Kris Huizar, KYLAH  6/8/2023

## 2023-06-08 NOTE — PROGRESS NOTES
[facility]   Physical Therapy Treatment Note     Name: Bhupendra Park  Clinic Number: [id number]    Therapy Diagnosis:   Encounter Diagnoses   Name Primary?    Impaired strength of lower extremity Yes    Impairment of balance     Abnormality of gait following cerebrovascular accident          Physician: Casey Maldonado MD    Physician Orders: PT eval and treat  Medical Diagnosis from Referral:  Physician Orders: PT eval and treat   Medical Diagnosis from Referral: G81.11 Right spastic hemiplegia   Evaluation Date: 3/8/2023  Authorization Period Expiration: 07/06/23  Plan of Care Expiration: 5/05/23  Reassessment / POC Due: Completed 4/5/23, 4/25/23, 5/23/23  Visit # / Visits authorized: 8 /12  PTA Visit: 1/5    Visit Date: 6/8/2023    Time In: 1430  Time Out: 1455  Total Billable Time: 25 minutes    SUBJECTIVE     Pt reports: that he is doing well today. No issues to report Denies any falls.  He was compliant with home exercise program.  Response to previous treatment: good   Functional change: improved hip flexion     Pain: 0/10  Location: n/a     OBJECTIVE     N/A    Treatment     Bhupendra received the treatments listed below:      therapeutic exercises to develop strength, endurance, and ROM for 25 minutes including:    neuromuscular re-education activities to improve: Balance and Coordination for 0 minutes. The following activities were included:    gait training to improve functional mobility and safety for 0  minutes, including:    Therapeutic Exercise Grid     Exercise 1  Exercise 2  Exercise 3  Exercise 4    Exercise :    B LE: Standing: 3 way hip Sit to stands R LE: Step-ups: -forward  -Lateral  Step-taps  -R LE    Repetition/Time :    20   2 x10   Fwd- 10 (6 inch step)   X 10     Resist or Assist :          2 x 10        Comment :    Flexion, abduction   2nd set right lower extremity on small stool   Fwd only       Done :     YES  Yes   YES                   Exercise 5   Exercise 6  Exercise 7  Exercise 8    Exercise :    Stretch Bilateral Hamstrings  squats   Amb with Hurrycane  in field (unlevel surfaces)   Gait on indoor surface with straight cane and AFO      Repetition/Time :    X 60 sec 10        2 x 150 ft   Resist or Assist :                Comment :             Moderate VC     Done :        yes                     Exercise 9  Exercise 10  Exercise 11  Exercise 12    Exercise :    Soccer ball rolls  Bridging   - single leg left    SLR  S/L HIP ABD   Soccer kicks    Repetition/Time :    2 x 10  2 x10   2 x10   2 x 10    Resist or Assist :        Assist with SL hip abd      Comment :    Using L lower extremity to roll ball while wt shifting to L        Wt shift to R lower extremity    Done :                         Exercise 13  Exercise 14  Exercise 15  Exercise 16    Exercise :    Seated Ham curls Seated Hip Hurdles   Seated LAQ Hip Hurdles   -long sitting    Repetition/Time :    2 x 10  2 x 10  2 x 10 2 x 10    Resist or Assist :    Red TB Single  Red TB Single    Comment :      YES             Patient Education and Home Exercises     Home Exercises Provided and Patient Education Provided     Education provided: Educated pt on importance of HEP and encouraged pt to continue daily    ASSESSMENT      Bhupendra has inconsistent performance when ambulating, having good hip flexion and no toe drag for a step or two, then having toe drag interfere for several steps. No loss of balance during ambulation despite toe drag.     Bhupendra Is progressing well towards his goals.   Pt prognosis is Good.     Pt will continue to benefit from skilled outpatient physical therapy to address the deficits listed in the problem list box on initial evaluation, provide pt/family education and to maximize pt's level of independence in the home and community environment.     Pt's spiritual, cultural and educational needs considered and pt agreeable to plan of care and goals.     Anticipated barriers to  physical therapy: previous CVA with R LE hemiparesis, R UE hemiplegia, expressive aphasia    Goals:   Short Term Goals: (4 weeks)  1. 5xSTS will decrease to 20 sec or less for improved LE strength and transfer safety.- MET  2. Patient will improve balance as evidenced by MCTSIB score of 2/4 for reduced fall risk.- In Progress   3. Patient will be able to perform HEP with minimal cues for improved carryover at home.- In Progress   4. Improve Tinetti balance score to >/= 10/16 - MET (21/28)  5. Improve R hip and knee strength to >/= 4/5  muscle grade for improved safety during household mobility.-  MET throughout except hip ext.     Long Term Goals: (8 weeks)  1. Patient will be able to ascend/descend 4 steps with 1 rail foot over foot modified independent.- MET  2. Patient will be able to take symmetrical steps with consistent foot clearance for household ambulation.- In PROGRESS  3. 5xSTS will decrese to 15 sec or less for independent transfers.- MET  4. Patient will be independent in HEP in order to continue after discharge.- IN PROGRESS  5. Patient will improve Tinetti balance and gait score to >/= 22/28. IN PROGRESS (21/28)  6. B LE strength will improve to 4+/5 or > throughout for independent mobility. IN PROGRESS  7. Patient will improve FOTO score to >/= 63 % to increase overall mobility and function at home. - IN PROGRESS    PLAN     Continue with current Plan of Care and progress per pt's tolerance 2 times per week.       Holland Vivas, PTA

## 2023-06-13 NOTE — PROGRESS NOTES
[facility]   Reassessment / Physical Therapy Treatment Note     Name: Bhupendra Park  Clinic Number: [id number]    Therapy Diagnosis:   Encounter Diagnoses   Name Primary?    Impaired strength of lower extremity Yes    Impairment of balance     Abnormality of gait following cerebrovascular accident      Physician: Casey Maldonado MD    Physician Orders: PT eval and treat  Medical Diagnosis from Referral:  Physician Orders: PT eval and treat   Medical Diagnosis from Referral: G81.11 Right spastic hemiplegia   Evaluation Date: 3/8/2023  Authorization Period Expiration: 07/06/23  Plan of Care Expiration: 7/6/23  Reassessment / POC Due: Completed 4/5/23, 4/25/23, 5/23/23, 6/13/2023  Visit # / Visits authorized: 9 /12    PTA Visit: 1/5    Visit Date: 6/13/2023    Time In: 1030  Time Out: 1105  Total Billable Time: 35 minutes    SUBJECTIVE     Pt reports: no falls, wife present and reports pt does not wear R AFO, minimally uses R UE resting splints, or do exercises outside of therapy and she is worried about his R ankle turning inward and pt decline once not attending PT. Pt complains AFO is uncomfortable and hurts his medial malleolus  He was not compliant with home exercise program.  Response to previous treatment: good   Functional change: improved hip flexion     Functional stroke LE FOTO: 55 improved from 45 on eval    Pain: 0/10  Location: n/a     OBJECTIVE     Update:   RANGE OF MOTION :   - Lower Extremity : Right = WFL                                           Left= WNL      STRENGTH:        Right Left   Hip Flexion: 4-/5 5/5   Hip ABD: 4/5 5/5   Hip ADD: 4/5 5/5   Hip Extension: 2+/5 5/5   Knee Ext: 5/5 5/5   Knee Flex: 4+/5 5/5      GAIT: modified Independent  - ambulates with LBQC on level and unlevel surfaces   -does not wear AFO outside of therapy allowing for inversion of R ankle  - decreased step height on Right with continued R toe drag   - normal base of support   - mild  path deviation , no LOB noted       STEPS:    - UP / down : reciprocal pattern using single rail for support      Functional mobility: Independent throughout     Special test:   - Tinnetti :               -balance : 15/16              - gait:   - 5XSTS: 16.78 sec without use of UE   - TU secs  MCTSIB :  (30,30,30,30)    Treatment     Bhupendra received the treatments listed below:      therapeutic exercises to develop strength, endurance, and ROM for 15 minutes including:    neuromuscular re-education activities to improve: Balance and Coordination for 20 minutes. The following activities were included:    gait training to improve functional mobility and safety for 0  minutes, including:    Therapeutic Exercise Grid     Exercise 1  Exercise 2  Exercise 3  Exercise 4    Exercise :    B LE: Standing: 3 way hip Sit to stands R LE: Step-ups: -forward  -Lateral  Step-taps  -R LE    Repetition/Time :    20   20   Fwd- 20 (8 inch step)   X 10     Resist or Assist :          2 lb on R ankle        Comment :    Flexion, abduction      Fwd only       Done :    no yes Yes   no                  Exercise 5  Exercise 6  Exercise 7  Exercise 8    Exercise :    Stretch Bilateral Hamstrings  squats   Amb with Hurrycane  in field (unlevel surfaces)   Gait on indoor surface with straight cane and AFO      Repetition/Time :    X 60 sec 20        150 ft   Resist or Assist :       On foam pad         Comment :             Without AD or AFO picking objects up off ground   Done :    no  yes no yes                     Exercise 9  Exercise 10  Exercise 11  Exercise 12    Exercise :    Soccer ball rolls  Bridging   - single leg left    SLR  S/L HIP ABD   Soccer kicks    Repetition/Time :    2 x 10  2 x10   2 x10   2 x 10    Resist or Assist :        Assist with SL hip abd      Comment :    Using L lower extremity to roll ball while wt shifting to L        Wt shift to R lower extremity    Done :    no no no no                   Exercise 13  Exercise 14  Exercise 15  Exercise 16    Exercise :    Seated Ham curls Seated Hip Hurdles   Seated LAQ Hip Hurdles   -long sitting    Repetition/Time :    2 x 10  2 x 10  2 x 10 2 x 10    Resist or Assist :    Red TB Single  Red TB Single    Comment :    no  no no no       Patient Education and Home Exercises     Home Exercises Provided and Patient Education Provided     Education provided: Educated pt on importance of HEP and encouraged pt to continue daily    ASSESSMENT     Pt progressing well with overall functional mobility since initial eval now at modified Independent level with and without use of LBQC and R AFO which is his PLOF prior to second CVA. Discussed safety concerns of not wearing R AFO due to R ankle instability and recommended getting AFO adjusted again or talk to  about getting a different kind of AFO he can tolerate wearing. Discussed importance of continuing with HEP outside of therapy and once discharged recommending pt join community fitness center. Pt has not had any falls even though he continues with gait abnormalities and balance deficits. Pt capable to performing increased R hip flexion to allow for R foot clearance and avoid dragging R toe however unable to perform consistently when not reminded. Pt to continue with current POC, progress per pt's tolerance, prepare for upcoming discharge from PT with HEP at end of authorization period     Bhupendra Is progressing well towards his goals.   Pt prognosis is Good.     Pt will continue to benefit from skilled outpatient physical therapy to address the deficits listed in the problem list box on initial evaluation, provide pt/family education and to maximize pt's level of independence in the home and community environment.     Pt's spiritual, cultural and educational needs considered and pt agreeable to plan of care and goals.     Anticipated barriers to physical therapy: previous CVA with R LE hemiparesis, R UE hemiplegia,  expressive aphasia    Goals:   Short Term Goals: (4 weeks)  1. 5xSTS will decrease to 20 sec or less for improved LE strength and transfer safety.- MET (16.78 secs without UE)  2. Patient will improve balance as evidenced by MCTSIB score of 2/4 for reduced fall risk.- met (4/4 30,30,30,30)  3. Patient will be able to perform HEP with minimal cues for improved carryover at home.- In Progress   4. Improve Tinetti balance score to >/= 10/16 - MET (23/28)  5. Improve R hip and knee strength to >/= 4/5  muscle grade for improved safety during household mobility.-  progressing (hip flexion 4-/5, extension 2+/5 MMT)     Long Term Goals: (8 weeks)  1. Patient will be able to ascend/descend 4 steps with 1 rail foot over foot modified independent.- MET (using single rail for support)  2. Patient will be able to take symmetrical steps with consistent foot clearance for household ambulation.- In PROGRESS  3. 5xSTS will decrese to 15 sec or less for independent transfers.-progressing (16.78 secs without UE)  4. Patient will be independent in HEP in order to continue after discharge.- IN PROGRESS )pt's wife reports noncompliance with HEP outside of therapy)  5. Patient will improve Tinetti balance and gait score to >/= 22/28. met (23/28)  6. B LE strength will improve to 4+/5 or > throughout for independent mobility. IN PROGRESS (L LE 5/5 MMT throughout, R LE >4/5 MMT except hip flexion 4-/5, extension 2+/5 MMT)  7. Patient will improve FOTO score to >/= 63 % to increase overall mobility and function at home. - IN PROGRESS (55 improved from 45 on eval)    PLAN     Pt to continue with current POC, progress per pt's tolerance, prepare for upcoming discharge from PT with HEP at end of authorization period.       Ayleen Moe, PT

## 2023-06-15 NOTE — PROGRESS NOTES
[facility]   Reassessment / Physical Therapy Treatment Note     Name: Bhupendra Park  Clinic Number: [id number]    Therapy Diagnosis:   Encounter Diagnoses   Name Primary?    Impaired strength of lower extremity Yes    Impairment of balance     Abnormality of gait following cerebrovascular accident      Physician: Casey Maldonado MD    Physician Orders: PT eval and treat  Medical Diagnosis from Referral:  Physician Orders: PT eval and treat   Medical Diagnosis from Referral: G81.11 Right spastic hemiplegia   Evaluation Date: 3/8/2023  Authorization Period Expiration: 07/06/23  Plan of Care Expiration: 7/6/23  Reassessment / POC Due: Completed 4/5/23, 4/25/23, 5/23/23, 6/13/2023  Visit # / Visits authorized: 10 /12    PTA Visit: 1/5    Visit Date: 6/15/2023    Time In: 1400  Time Out: 1430  Total Billable Time: 30 minutes    SUBJECTIVE     Pt reports: that he forget his AFO at home today. Wife states that patient does not like wearing AFO and does not wear it around the house.   He was not compliant with home exercise program.  Response to previous treatment: good   Functional change: improved swing phase R    Functional stroke LE FOTO: 55 improved from 45 on eval    Pain: 0/10  Location: n/a     OBJECTIVE     N/A  Treatment     Bhupendra received the treatments listed below:      therapeutic exercises to develop strength, endurance, and ROM for 10 minutes including:    neuromuscular re-education activities to improve: Balance and Coordination for 10 minutes. The following activities were included:    gait training to improve functional mobility and safety for 10 minutes, including:    Therapeutic Exercise Grid     Exercise 1  Exercise 2  Exercise 3  Exercise 4    Exercise :    B LE: Standing: 3 way hip Sit to stands R LE: Step-ups: -forward  -Lateral  Step-taps  -R LE    Repetition/Time :    20   20   Fwd- 20 (8 inch step)   X 10     Resist or Assist :          2 lb on R ankle         Comment :    Flexion, abduction      Fwd only       Done :    no N  n   no                  Exercise 5  Exercise 6  Exercise 7  Exercise 8    Exercise :    Stretch Bilateral Hamstrings  squats   Amb with Hurrycane  in field (unlevel surfaces)   Gait on indoor surface with straight cane and AFO      Repetition/Time :    X 60 sec 20        150 ft   Resist or Assist :       On foam pad         Comment :           With WBQC No AFO   Done :    YES  n no yes                     Exercise 9  Exercise 10  Exercise 11  Exercise 12    Exercise :    Soccer ball rolls  Bridging   - single leg left    SLR  S/L HIP ABD   Soccer kicks    Repetition/Time :    2 x 10  2 x10   2 x10   2 x 10    Resist or Assist :        Assist with SL hip abd      Comment :    Using L lower extremity to roll ball while wt shifting to L        Wt shift to R lower extremity    Done :    no YES  no no                  Exercise 13  Exercise 14  Exercise 15  Exercise 16    Exercise :    Seated Ham curls Seated Hip Hurdles   Seated LAQ R hip Mobs    Repetition/Time :    2 x 10  2 x 10  2 x 10 5 mins    Resist or Assist :    Red TB Single  Red TB All planes    Comment :    YES   YES  no YES        Patient Education and Home Exercises     Home Exercises Provided and Patient Education Provided     Education provided: Educated pt on importance of HEP and encouraged pt to continue daily    ASSESSMENT     Bhupendra tolerated treatment well today completing all exercises with good effort and requiring minimal to moderate VC specifically for swing phase of gait. Patient did not have AFO for R ankle today and demonstrated improved swing thru on R with minimal episodes of foot drag. Patient appeared to be more focused during gait leading to improved pattern. Added R hip joint mobilization today which yielded good results.     Bhupendra Is progressing well towards his goals.   Pt prognosis is Good.     Pt will continue to benefit from skilled outpatient physical therapy to  address the deficits listed in the problem list box on initial evaluation, provide pt/family education and to maximize pt's level of independence in the home and community environment.     Pt's spiritual, cultural and educational needs considered and pt agreeable to plan of care and goals.     Anticipated barriers to physical therapy: previous CVA with R LE hemiparesis, R UE hemiplegia, expressive aphasia    Goals:   Short Term Goals: (4 weeks)  1. 5xSTS will decrease to 20 sec or less for improved LE strength and transfer safety.- MET (16.78 secs without UE)  2. Patient will improve balance as evidenced by MCTSIB score of 2/4 for reduced fall risk.- met (4/4 30,30,30,30)  3. Patient will be able to perform HEP with minimal cues for improved carryover at home.- In Progress   4. Improve Tinetti balance score to >/= 10/16 - MET (23/28)  5. Improve R hip and knee strength to >/= 4/5  muscle grade for improved safety during household mobility.-  progressing (hip flexion 4-/5, extension 2+/5 MMT)     Long Term Goals: (8 weeks)  1. Patient will be able to ascend/descend 4 steps with 1 rail foot over foot modified independent.- MET (using single rail for support)  2. Patient will be able to take symmetrical steps with consistent foot clearance for household ambulation.- In PROGRESS  3. 5xSTS will decrese to 15 sec or less for independent transfers.-progressing (16.78 secs without UE)  4. Patient will be independent in HEP in order to continue after discharge.- IN PROGRESS )pt's wife reports noncompliance with HEP outside of therapy)  5. Patient will improve Tinetti balance and gait score to >/= 22/28. met (23/28)  6. B LE strength will improve to 4+/5 or > throughout for independent mobility. IN PROGRESS (L LE 5/5 MMT throughout, R LE >4/5 MMT except hip flexion 4-/5, extension 2+/5 MMT)  7. Patient will improve FOTO score to >/= 63 % to increase overall mobility and function at home. - IN PROGRESS (55 improved from 45 on  eval)    PLAN     Pt to continue with current POC, progress per pt's tolerance, prepare for upcoming discharge from PT with HEP at end of authorization period.       Mustapha Aleman, PT

## 2023-06-15 NOTE — PROGRESS NOTES
Speech and Language Therapy Outpatient Progress Note  Date:  6/15/2023     Name: Bhupendra Park   MRN: [id number]   Therapy Diagnosis:   Encounter Diagnosis   Name Primary?    Combined receptive and expressive aphasia Yes     Physician: Casey Maldonado MD  Physician Orders: eval and treat  Medical Diagnosis: CVA    Visit #/Visits authorized: 1/36  Date of Evaluation: 3/8/23  Insurance Authorization Period: 6/15/23-8/31/23  Plan of Care Expiration Date: 8/31/23  Extended POC: TBD     Time In:  2:30  Time Out: 3:00  Total Billable Time: 30       Subjective:   Pt ambulated to SLP office w/ straight cane. Pt pleasant and in good spirits.       Response to previous treatment: good     Pain Scale:  0/10 on VAS currently.   Pain Location: none expressed  Objective:        UNTIMED  Procedure Min.   {Speech- Language- Voice Therapy  30        Total Untimed Units: 1  Charges Billed/# of units: 1    Long Term Goals (12 weeks):   The pt will improve expressive and receptive language skills to communicate w/ friends and family.    Short Term Goals: 3x week/12 weeks Current Progress: initial   1) The pt will complete auto speech tasks (stating Feng and Aidan, sentence completion) w/ 90% acc Manish.  2) The pt will name x10 items in a concrete category w/I 1 minute Manish.  3) The pt will formulate sentences (subject+verb+object) to describe picture scenes w/ 80% acc Manish.  4) The pt will answer 3U YNQs w/ 90% acc Manish.  5) The pt will follow 2-step commands w/ 90% acc Manish.  6) The pt will read and comprehend phrases w/ 90% acc Manish.  7) The pt will write 1-2 syllable functional words w/ 80% acc Manish.  8) The pt will answer MU YNQs w/ 90% acc Manish.             Goal met 3/21/23            Goal met 4/4/23     Pt demonstrated difficulty recalling details from recent sessions at [facility].  [facility] therapy iPad allan utilized to target word finding and categorization via say words in a category. Task completed given mod-max  verbal cueing for initiation, word finding and categorization.    Overall good session.  Cont SLP POC.    Patient Education/Response:     Written Home Exercises Provided:  to be provided as appropriate throughout course of OP SLP services .  Exercises were reviewed and Bhupendra was able to demonstrate them prior to the end of the session.  Bhupendra demonstrated good  understanding of the education provided.     Assessment:   Bhupendra is progressing well towards his goals. Current goals remain appropriate. Goals to be updated as necessary.     Pt prognosis is Good. Pt will continue to benefit from skilled outpatient speech and language therapy to address the deficits listed in the problem list on initial evaluation, provide pt/family education and to maximize pt's level of independence in the home and community environment.     Barriers to Therapy:   Pt's spiritual, cultural and educational needs considered and pt agreeable to plan of care and goals.    Plan:   Continue POC with focus on expressive language, receptive language, written expression, and reading comprehension.  Pt to be seen 2x/week      Adamaris Carrion M.S., CCC-SLP   6/15/2023

## 2023-06-19 NOTE — PROGRESS NOTES
[facility]   Physical Therapy Treatment Note     Name: Bhupendra Park  [facility] Number: [id number]    Therapy Diagnosis:   Encounter Diagnoses   Name Primary?    Impaired strength of lower extremity Yes    Impairment of balance     Abnormality of gait following cerebrovascular accident      Physician: Casey Maldonado MD    Physician Orders: PT eval and treat  Medical Diagnosis from Referral:  Physician Orders: PT eval and treat   Medical Diagnosis from Referral: G81.11 Right spastic hemiplegia   Evaluation Date: 3/8/2023  Authorization Period Expiration: 07/06/23  Plan of Care Expiration: 7/6/23  Reassessment / POC Due: Completed 4/5/23, 4/25/23, 5/23/23, 6/13/2023  Visit # / Visits authorized: 11 /12    PTA Visit: 1/5    Visit Date: 6/20/2023    Time In: 1044  Time Out: 1100  Total Billable Time: 16 minutes    SUBJECTIVE     Pt reports: no pain in R hip since last session. Denies any falls, and is wearing AFO today.   He was not compliant with home exercise program.  Response to previous treatment: good   Functional change: improved swing phase R        Pain: 0/10  Location: n/a     OBJECTIVE     N/A  Treatment     Bhupendra received the treatments listed below:      therapeutic exercises to develop strength, endurance, and ROM for 16 minutes including:    neuromuscular re-education activities to improve: Balance and Coordination for 0 minutes. The following activities were included:    gait training to improve functional mobility and safety for 10 minutes, including:    Therapeutic Exercise Grid     Exercise 1  Exercise 2  Exercise 3  Exercise 4    Exercise :    B LE: Standing: 3 way hip Sit to stands R LE: Step-ups: -forward  -Lateral  Step-taps  -R LE    Repetition/Time :    20   20   Fwd- 20 (8 inch step)   X 10     Resist or Assist :          2 lb on R ankle        Comment :    Flexion, abduction      Fwd only       Done :    no N  n   no                  Exercise 5  Exercise 6   Exercise 7  Exercise 8    Exercise :    Stretch Bilateral Hamstrings  squats   Amb with Hurrycane  in field (unlevel surfaces)   Gait on indoor surface with straight cane and AFO      Repetition/Time :    X 60 sec 20        150 ft   Resist or Assist :       On foam pad         Comment :           With WBQC No AFO   Done :    YES  n no yes                     Exercise 9  Exercise 10  Exercise 11  Exercise 12    Exercise :    Soccer ball rolls  Bridging   - single leg left    SLR  S/L HIP ABD   Soccer kicks    Repetition/Time :    2 x 10  2 x10   2 x10   2 x 10    Resist or Assist :        Assist with SL hip abd      Comment :    Using L lower extremity to roll ball while wt shifting to L        Wt shift to R lower extremity    Done :    no YES  no no                  Exercise 13  Exercise 14  Exercise 15  Exercise 16    Exercise :    Seated Ham curls Seated Hip Hurdles   Seated LAQ R hip Mobs    Repetition/Time :    2 x 10  2 x 10  2 x 10 5 mins    Resist or Assist :    Red TB Single  Red TB All planes    Comment :    YES   YES  no YES        Patient Education and Home Exercises     Home Exercises Provided and Patient Education Provided     Education provided: Educated pt on importance of HEP and encouraged pt to continue daily    ASSESSMENT     Bhupendra tolerated treatment well today completing all exercises with good effort and requiring minimal to moderate VC. Patient arriver 14 min late for appointment and could not complete entire exercises regiment.     Bhupendra Is progressing well towards his goals.   Pt prognosis is Good.     Pt will continue to benefit from skilled outpatient physical therapy to address the deficits listed in the problem list box on initial evaluation, provide pt/family education and to maximize pt's level of independence in the home and community environment.     Pt's spiritual, cultural and educational needs considered and pt agreeable to plan of care and goals.     Anticipated barriers to  physical therapy: previous CVA with R LE hemiparesis, R UE hemiplegia, expressive aphasia    Goals:   Short Term Goals: (4 weeks)  1. 5xSTS will decrease to 20 sec or less for improved LE strength and transfer safety.- MET (16.78 secs without UE)  2. Patient will improve balance as evidenced by MCTSIB score of 2/4 for reduced fall risk.- met (4/4 30,30,30,30)  3. Patient will be able to perform HEP with minimal cues for improved carryover at home.- In Progress   4. Improve Tinetti balance score to >/= 10/16 - MET (23/28)  5. Improve R hip and knee strength to >/= 4/5  muscle grade for improved safety during household mobility.-  progressing (hip flexion 4-/5, extension 2+/5 MMT)     Long Term Goals: (8 weeks)  1. Patient will be able to ascend/descend 4 steps with 1 rail foot over foot modified independent.- MET (using single rail for support)  2. Patient will be able to take symmetrical steps with consistent foot clearance for household ambulation.- In PROGRESS  3. 5xSTS will decrese to 15 sec or less for independent transfers.-progressing (16.78 secs without UE)  4. Patient will be independent in HEP in order to continue after discharge.- IN PROGRESS )pt's wife reports noncompliance with HEP outside of therapy)  5. Patient will improve Tinetti balance and gait score to >/= 22/28. met (23/28)  6. B LE strength will improve to 4+/5 or > throughout for independent mobility. IN PROGRESS (L LE 5/5 MMT throughout, R LE >4/5 MMT except hip flexion 4-/5, extension 2+/5 MMT)  7. Patient will improve FOTO score to >/= 63 % to increase overall mobility and function at home. - IN PROGRESS (55 improved from 45 on eval)    PLAN     Pt to continue with current POC, progress per pt's tolerance, prepare for upcoming discharge from PT with HEP at end of authorization period.       Holland Vivas, PTA

## 2023-06-20 NOTE — PROGRESS NOTES
Speech and Language Therapy Outpatient Progress Note  Date:  6/20/2023     Name: Bhupendra Park   MRN: [id number]   Therapy Diagnosis:   Encounter Diagnosis   Name Primary?    Combined receptive and expressive aphasia Yes     Physician: Casey Maldonado MD  Physician Orders: eval and treat  Medical Diagnosis: CVA    Visit #/Visits authorized: 2/36  Date of Evaluation: 3/8/23  Insurance Authorization Period: 6/15/23-8/31/23  Plan of Care Expiration Date: 8/31/23  Extended POC: TBD     Time In:  11:00  Time Out: 11:35  Total Billable Time: 35       Subjective:   Pt ambulated to SLP office w/ straight cane. Pt pleasant and in good spirits.       Response to previous treatment: good     Pain Scale:  0/10 on VAS currently.   Pain Location: none expressed  Objective:        UNTIMED  Procedure Min.   {Speech- Language- Voice Therapy  30        Total Untimed Units: 1  Charges Billed/# of units: 1    Long Term Goals (12 weeks):   The pt will improve expressive and receptive language skills to communicate w/ friends and family.    Short Term Goals: 3x week/12 weeks Current Progress: initial   1) The pt will complete auto speech tasks (stating Feng and Aidan, sentence completion) w/ 90% acc Manish.  2) The pt will name x10 items in a concrete category w/I 1 minute Manish.  3) The pt will formulate sentences (subject+verb+object) to describe picture scenes w/ 80% acc Manish.  4) The pt will answer 3U YNQs w/ 90% acc Manish.  5) The pt will follow 2-step commands w/ 90% acc Manish.  6) The pt will read and comprehend phrases w/ 90% acc Manish.  7) The pt will write 1-2 syllable functional words w/ 80% acc Manish.  8) The pt will answer MU YNQs w/ 90% acc Manish.             Goal met 3/21/23            Goal met 4/4/23     Constant therapy iPad allan utilized to target word finding and categorization via say words in a category in given Fo3 auditory stimuli. Task completed given max verbal cueing for initiation, word finding and  "categorization.  SLP provided pt and his wife w/ login information to complete "homework" via constant therapy allan at home.    Overall good session.  Cont SLP POC.    Patient Education/Response:     Written Home Exercises Provided:  to be provided as appropriate throughout course of OP SLP services .  Exercises were reviewed and Bhupendra was able to demonstrate them prior to the end of the session.  Bhupendra demonstrated good  understanding of the education provided.     Assessment:   Bhupendra is progressing well towards his goals. Current goals remain appropriate. Goals to be updated as necessary.     Pt prognosis is Good. Pt will continue to benefit from skilled outpatient speech and language therapy to address the deficits listed in the problem list on initial evaluation, provide pt/family education and to maximize pt's level of independence in the home and community environment.     Barriers to Therapy:   Pt's spiritual, cultural and educational needs considered and pt agreeable to plan of care and goals.    Plan:   Continue POC with focus on expressive language, receptive language, written expression, and reading comprehension.  Pt to be seen 2x/week      Adamaris Carrion M.S., CCC-SLP   6/20/2023        "

## 2023-06-21 NOTE — PROGRESS NOTES
[facility] OUTPATIENT THERAPY AND [facility]LL[facility]    Physical Therapy Treatment Note     Name: Bhupendra Park  Clinic Number: [id number]    Therapy Diagnosis:   Encounter Diagnoses   Name Primary?    Impaired strength of lower extremity Yes    Impairment of balance     Abnormality of gait following cerebrovascular accident      Physician: Casey Maldonado MD    Physician Orders: PT eval and treat  Medical Diagnosis from Referral:  Physician Orders: PT eval and treat   Medical Diagnosis from Referral: G81.11 Right spastic hemiplegia   Evaluation Date: 3/8/2023  Authorization Period Expiration: 07/06/23  Plan of Care Expiration: 7/6/23  Reassessment / POC Completed: 6/13/2023  Visit # / Visits authorized: 12 /12    PTA Visit: 2/5    Visit Date: 6/21/2023    Time In: 1430  Time Out: 1500  Total Billable Time: 30 minutes    SUBJECTIVE     Pt reports: continues with no pain in L hip. Denies any falls, and is wearing AFO today.   He was not compliant with home exercise program.  Response to previous treatment: good   Functional change: improved swing phase R        Pain: 0/10  Location: n/a     OBJECTIVE     N/A  Treatment     Bhupendra received the treatments listed below:      therapeutic exercises to develop strength, endurance, and ROM for 16 minutes including:    neuromuscular re-education activities to improve: Balance and Coordination for 0 minutes. The following activities were included:    gait training to improve functional mobility and safety for 10 minutes, including:    Therapeutic Exercise Grid     Exercise 1  Exercise 2  Exercise 3  Exercise 4    Exercise :    B LE: Standing: 3 way hip Sit to stands R LE: Step-ups: -forward  -Lateral  Step-taps  -R LE    Repetition/Time :    20   20   Fwd- 20 (8 inch step)   X 10     Resist or Assist :          2 lb on R ankle        Comment :    Flexion, abduction      Fwd only       Done :    no N  n   no                  Exercise 5  Exercise 6  Exercise 7  Exercise 8    Exercise :     Stretch Bilateral Hamstrings  squats   Amb with Hurrycane  in field (unlevel surfaces)   Gait on indoor surface with straight cane and AFO      Repetition/Time :    X 60 sec 20        150 ft   Resist or Assist :       On foam pad         Comment :           With WBQC No AFO   Done :    YES  n no yes                     Exercise 9  Exercise 10  Exercise 11  Exercise 12    Exercise :    Soccer ball rolls  Bridging   - single leg left    SLR  S/L HIP ABD   Soccer kicks    Repetition/Time :    2 x 10  2 x10   2 x10   2 x 10    Resist or Assist :        Assist with SL hip abd      Comment :    Using L lower extremity to roll ball while wt shifting to L        Wt shift to R lower extremity    Done :    no YES  no no                  Exercise 13  Exercise 14  Exercise 15  Exercise 16    Exercise :    Seated Ham curls Seated Hip Hurdles   Seated LAQ R hip Mobs    Repetition/Time :    2 x 10  2 x 10  2 x 10 5 mins    Resist or Assist :    Red TB Single  Red TB All planes    Comment :    YES   YES  no YES        Patient Education and Home Exercises     Home Exercises Provided and Patient Education Provided     Education provided: Educated pt on importance of HEP and encouraged pt to continue daily    ASSESSMENT     Bhupendra tolerated treatment well today completing all exercises with good effort and requiring minimal to moderate VC. Continues to have trouble with toe drag while wearing this AFO.    Bhupendra Is progressing well towards his goals.   Pt prognosis is Good.     Pt will continue to benefit from skilled outpatient physical therapy to address the deficits listed in the problem list box on initial evaluation, provide pt/family education and to maximize pt's level of independence in the home and community environment.     Pt's spiritual, cultural and educational needs considered and pt agreeable to plan of care and goals.     Anticipated barriers to physical therapy: previous CVA with R LE hemiparesis, R UE hemiplegia,  expressive aphasia    Goals:   Short Term Goals: (4 weeks)  1. 5xSTS will decrease to 20 sec or less for improved LE strength and transfer safety.- MET (16.78 secs without UE)  2. Patient will improve balance as evidenced by MCTSIB score of 2/4 for reduced fall risk.- met (4/4 30,30,30,30)  3. Patient will be able to perform HEP with minimal cues for improved carryover at home.- In Progress   4. Improve Tinetti balance score to >/= 10/16 - MET (23/28)  5. Improve R hip and knee strength to >/= 4/5  muscle grade for improved safety during household mobility.-  progressing (hip flexion 4-/5, extension 2+/5 MMT)     Long Term Goals: (8 weeks)  1. Patient will be able to ascend/descend 4 steps with 1 rail foot over foot modified independent.- MET (using single rail for support)  2. Patient will be able to take symmetrical steps with consistent foot clearance for household ambulation.- In PROGRESS  3. 5xSTS will decrese to 15 sec or less for independent transfers.-progressing (16.78 secs without UE)  4. Patient will be independent in HEP in order to continue after discharge.- IN PROGRESS )pt's wife reports noncompliance with HEP outside of therapy)  5. Patient will improve Tinetti balance and gait score to >/= 22/28. met (23/28)  6. B LE strength will improve to 4+/5 or > throughout for independent mobility. IN PROGRESS (L LE 5/5 MMT throughout, R LE >4/5 MMT except hip flexion 4-/5, extension 2+/5 MMT)  7. Patient will improve FOTO score to >/= 63 % to increase overall mobility and function at home. - IN PROGRESS (55 improved from 45 on eval)    PLAN     Pt to continue with current POC, progress per pt's tolerance, prepare for upcoming discharge from PT with HEP at end of authorization period.       Holland Vivas, PTA

## 2023-06-27 PROBLEM — R26.89 IMPAIRMENT OF BALANCE: Status: RESOLVED | Noted: 2023-01-01 | Resolved: 2023-01-01

## 2023-06-27 NOTE — PROGRESS NOTES
Speech and Language Therapy Outpatient Progress Note  Date:  6/27/2023     Name: Bhupendra Park   MRN: [id number]   Therapy Diagnosis:   Encounter Diagnosis   Name Primary?    Combined receptive and expressive aphasia Yes     Physician: Casey Maldonado MD  Physician Orders: eval and treat  Medical Diagnosis: CVA    Visit #/Visits authorized: 3/36  Date of Evaluation: 3/8/23  Insurance Authorization Period: 6/15/23-8/31/23  Plan of Care Expiration Date: 8/31/23  Extended POC: TBD     Time In:  11:00  Time Out: 11:30  Total Billable Time: 35       Subjective:   Pt ambulated to SLP office w/ straight cane. Pt pleasant and in good spirits.       Response to previous treatment: good     Pain Scale:  0/10 on VAS currently.   Pain Location: none expressed  Objective:        UNTIMED  Procedure Min.   {Speech- Language- Voice Therapy  30        Total Untimed Units: 1  Charges Billed/# of units: 1    Long Term Goals (12 weeks):   The pt will improve expressive and receptive language skills to communicate w/ friends and family.    Short Term Goals: 3x week/12 weeks Current Progress: initial   1) The pt will complete auto speech tasks (stating Feng and Aidan, sentence completion) w/ 90% acc Manish.  2) The pt will name x10 items in a concrete category w/I 1 minute Manish.  3) The pt will formulate sentences (subject+verb+object) to describe picture scenes w/ 80% acc Manish.  4) The pt will answer 3U YNQs w/ 90% acc Manish.  5) The pt will follow 2-step commands w/ 90% acc Manish.  6) The pt will read and comprehend phrases w/ 90% acc Manish.  7) The pt will write 1-2 syllable functional words w/ 80% acc Manish.  8) The pt will answer MU YNQs w/ 90% acc Manish.             Goal met 3/21/23            Goal met 4/4/23     Pt was engaged in structured conversational tasks regarding recent events and details of upcoming trip. Extra time required for processing and for word finding in order to convey thoughts. Multiple choice provided for pt  to state category of museum visited previously w/ [facility]. PT able to state museum's location given increased time and min verbal cueing. Pt answered Qs w/ increased precision and decreased time required regarding upcoming trip to [place] w/ wife and grandson.  SLP discussed recent allan provided to pt and pt's wife. Pt stated he lost the paper w/ login information, therefore SLP provided information again and located email for pt to access the allan and download.    Overall good session.  Cont SLP POC.    Patient Education/Response:     Written Home Exercises Provided:  to be provided as appropriate throughout course of OP SLP services .  Exercises were reviewed and Bhupendra was able to demonstrate them prior to the end of the session.  Bhupendra demonstrated good  understanding of the education provided.     Assessment:   Bhupendra is progressing well towards his goals. Current goals remain appropriate. Goals to be updated as necessary.     Pt prognosis is Good. Pt will continue to benefit from skilled outpatient speech and language therapy to address the deficits listed in the problem list on initial evaluation, provide pt/family education and to maximize pt's level of independence in the home and community environment.     Barriers to Therapy:   Pt's spiritual, cultural and educational needs considered and pt agreeable to plan of care and goals.    Plan:   Continue POC with focus on expressive language, receptive language, written expression, and reading comprehension.  Pt to be seen 2x/week      Adamaris Carrion M.S., CCC-SLP   6/27/2023

## 2023-06-27 NOTE — PLAN OF CARE
[facility]  Physical Therapy Discharge Note    Name: Bhupendra Park  Clinic Number: [id number]    Therapy Diagnosis:   Encounter Diagnoses   Name Primary?    Impaired strength of lower extremity Yes    Impairment of balance     Abnormality of gait following cerebrovascular accident      Physician: Casey Maldonado MD    Physician Orders: PT eval and treat  Medical Diagnosis from Referral: G81.11 Right spastic hemiplegia   Evaluation Date: 3/8/2023    Date of Last visit: 23  Total Visits Received: 31    Objective :     Update:   RANGE OF MOTION :   - Lower Extremity : Right = WFL                                           Left= WNL      STRENGTH:        Right Left   Hip Flexion: 4-/5 5/5   Hip ABD: 4/5 5/5   Hip ADD: 4/5 5/5   Hip Extension: 2+/5 5/5   Knee Ext: 5/5 5/5   Knee Flex: 4+/5 5/5      GAIT: modified Independent  - ambulates with LBQC on level and unlevel surfaces   -does not wear AFO outside of therapy allowing for inversion of R ankle  - decreased step height on Right with continued R toe drag   - normal base of support   - mild path deviation , no LOB noted       STEPS:    - UP / down : reciprocal pattern using single rail for support      Functional mobility: Independent throughout     Special test:   - Tinnetti :               -balance : 1516              - gait:   - 5XSTS: 16 sec without use of UE   - TU secs with WBQC and AFO  MCTSIB :  (30,30,30,30)     Treatment      Bhupendra received the treatments listed below:       therapeutic exercises to develop strength, endurance, and ROM for 15 minutes including:     neuromuscular re-education activities to improve: Balance and Coordination for 20 minutes. The following activities were included:     gait training to improve functional mobility and safety for 0  minutes, including:     Therapeutic Exercise Grid     Exercise 1  Exercise 2  Exercise 3  Exercise 4    Exercise :    B LE: Standing: 3 way hip  Sit to stands R LE: Step-ups: -forward  -Lateral  Step-taps  -R LE    Repetition/Time :    20   10   Fwd- 20 (8 inch step)    X 10     Resist or Assist :          2 lb on R ankle        Comment :    Flexion, abduction      Fwd only       Done :     yes                      Exercise 5  Exercise 6  Exercise 7  Exercise 8    Exercise :    Stretch Bilateral Hamstrings  squats   Amb with Hurrycane  in field (unlevel surfaces)   Gait on indoor surface with straight cane and AFO      Repetition/Time :    X 60 sec 20        150 ft   Resist or Assist :       On foam pad          Comment :             Without AD or AFO picking objects up off ground   Done :        yes                      Exercise 9  Exercise 10  Exercise 11  Exercise 12    Exercise :    Soccer ball rolls  Bridging   - single leg left    SLR  S/L HIP ABD   Soccer kicks    Repetition/Time :    2 x 10  2 x10   2 x10   2 x 10    Resist or Assist :         Assist with SL hip abd       Comment :    Using L lower extremity to roll ball while wt shifting to L        Wt shift to R lower extremity    Done :                           Exercise 13  Exercise 14  Exercise 15  Exercise 16    Exercise :    Seated Ham curls Seated Hip Hurdles   Seated LAQ Hip Hurdles   -long sitting    Repetition/Time :    2 x 10  2 x 10  2 x 10 2 x 10    Resist or Assist :    Red TB Single  Red TB Single    Comment :             ASSESSMENT      Bhupendra Park present to physical therapy today for final assessment an discharge.  Bhupendra has met 4/12 goals set at initial evaluation, unless listed below, and will continue to work at home towards personal goal(s) at of: safe ambulation community distances.  Bhupendra is independent with home exercise program but partially compliant in completion at home and was given handouts throughout this episode of care to reference for continued wellness and physical fitness . Contact information was given to patient in case any questions arise in the future  or if therapy is needed.      Discharge reason: Patient has reached the maximum rehab potential for the present time    Discharge FOTO Score: 55     Goals: Goals:   Short Term Goals: (4 weeks)  1. 5xSTS will decrease to 20 sec or less for improved LE strength and transfer safety.- MET (16.78 secs without UE)  2. Patient will improve balance as evidenced by MCTSIB score of 2/4 for reduced fall risk.- met (4/4 30,30,30,30)  3. Patient will be able to perform HEP with minimal cues for improved carryover at home.- Partially compliant per spouse   4. Improve Tinetti balance score to >/= 10/16 - MET (23/28)  5. Improve R hip and knee strength to >/= 4/5  muscle grade for improved safety during household mobility.-  progressing (hip flexion 4-/5, extension 2+/5 MMT)     Long Term Goals: (8 weeks)  1. Patient will be able to ascend/descend 4 steps with 1 rail foot over foot modified independent.- MET (using single rail for support)  2. Patient will be able to take symmetrical steps with consistent foot clearance for household ambulation.-  Inconsistent foot clearance on Right   3. 5xSTS will decrese to 15 sec or less for independent transfers.-progressing (16.78 secs without UE)  4. Patient will be independent in HEP in order to continue after discharge.- Partially MET (pt's wife reports noncompliance with HEP outside of therapy)  5. Patient will improve Tinetti balance and gait score to >/= 22/28. met (23/28)  6. B LE strength will improve to 4+/5 or > throughout for independent mobility. IN PROGRESS (L LE 5/5 MMT throughout, R LE >4/5 MMT except hip flexion 4-/5, extension 2+/5 MMT)  7. Patient will improve FOTO score to >/= 63 % to increase overall mobility and function at home. - Not MET (55 improved from 45 on eval)      PLAN   This patient is discharged from Physical Therapy. Patient provided written HEP and instructions       Mustapha Aleman, PT

## 2023-06-29 NOTE — PROGRESS NOTES
Speech and Language Therapy Outpatient Progress Note  Date:  6/29/2023     Name: Bhupendra Prak   MRN: [id number]   Therapy Diagnosis:   Encounter Diagnosis   Name Primary?    Combined receptive and expressive aphasia Yes     Physician: Casey Maldonado MD  Physician Orders: eval and treat  Medical Diagnosis: CVA    Visit #/Visits authorized: 4/36  Date of Evaluation: 3/8/23  Insurance Authorization Period: 6/15/23-8/31/23  Plan of Care Expiration Date: 8/31/23  Extended POC: TBD     Time In:  2:30  Time Out: 3:00  Total Billable Time: 30       Subjective:   Pt ambulated to SLP office w/ straight cane. Pt pleasant and in good spirits.       Response to previous treatment: good     Pain Scale:  0/10 on VAS currently.   Pain Location: none expressed  Objective:        UNTIMED  Procedure Min.   {Speech- Language- Voice Therapy  30        Total Untimed Units: 1  Charges Billed/# of units: 1    Long Term Goals (12 weeks):   The pt will improve expressive and receptive language skills to communicate w/ friends and family.    Short Term Goals: 3x week/12 weeks Current Progress: initial   1) The pt will complete auto speech tasks (stating Feng and Aidan, sentence completion) w/ 90% acc Manish.  2) The pt will name x10 items in a concrete category w/I 1 minute Manish.  3) The pt will formulate sentences (subject+verb+object) to describe picture scenes w/ 80% acc Manish.  4) The pt will answer 3U YNQs w/ 90% acc Manish.  5) The pt will follow 2-step commands w/ 90% acc Manish.  6) The pt will read and comprehend phrases w/ 90% acc Manish.  7) The pt will write 1-2 syllable functional words w/ 80% acc Manish.  8) The pt will answer MU YNQs w/ 90% acc Manish.             Goal met 3/21/23    Goal met 6/29/23        Goal met 4/4/23     Pt participated in simple conversational task regarding recent outing ([place]e., [place] w/ wife and [facility]). Semantic and phonemic cueing provided in order to effectively participate in  conversation.  Read everyday things level 1 via constant therapy iPad allan completed w/ 83% acc I'ly. Improvement noted w/ reading comprehension at the phrase and sentence level.    Overall good session.  Cont SLP POC.    Patient Education/Response:     Written Home Exercises Provided:  to be provided as appropriate throughout course of OP SLP services .  Exercises were reviewed and Bhupendra was able to demonstrate them prior to the end of the session.  Bhupendra demonstrated good  understanding of the education provided.     Assessment:   Bhupendra is progressing well towards his goals. Current goals remain appropriate. Goals to be updated as necessary.     Pt prognosis is Good. Pt will continue to benefit from skilled outpatient speech and language therapy to address the deficits listed in the problem list on initial evaluation, provide pt/family education and to maximize pt's level of independence in the home and community environment.     Barriers to Therapy:   Pt's spiritual, cultural and educational needs considered and pt agreeable to plan of care and goals.    Plan:   Continue POC with focus on expressive language, receptive language, written expression, and reading comprehension.  Pt to be seen 2x/week      Adamaris Carrion M.S., CCC-SLP   6/29/2023

## 2023-07-13 NOTE — PROGRESS NOTES
Speech and Language Therapy Outpatient Progress Note  Date:  7/13/2023     Name: Bhupendra Park   MRN: [id number]   Therapy Diagnosis:   Encounter Diagnosis   Name Primary?    Combined receptive and expressive aphasia Yes     Physician: Casey Maldonado MD  Physician Orders: eval and treat  Medical Diagnosis: CVA    Visit #/Visits authorized: 5/36  Date of Evaluation: 3/8/23  Insurance Authorization Period: 6/15/23-8/31/23  Plan of Care Expiration Date: 8/31/23  Extended POC: TBD     Time In:  2:30  Time Out: 3:00  Total Billable Time: 30       Subjective:   Pt ambulated to SLP office w/ straight cane. Pt pleasant and in good spirits.       Response to previous treatment: good     Pain Scale:  0/10 on VAS currently.   Pain Location: none expressed  Objective:        UNTIMED  Procedure Min.   {Speech- Language- Voice Therapy  30        Total Untimed Units: 1  Charges Billed/# of units: 1    Long Term Goals (12 weeks):   The pt will improve expressive and receptive language skills to communicate w/ friends and family.    Short Term Goals: 3x week/12 weeks Current Progress: initial   1) The pt will complete auto speech tasks (stating Feng and Aidan, sentence completion) w/ 90% acc Manish.  2) The pt will name x10 items in a concrete category w/I 1 minute Manish.  3) The pt will formulate sentences (subject+verb+object) to describe picture scenes w/ 80% acc Manish.  4) The pt will answer 3U YNQs w/ 90% acc Manish.  5) The pt will follow 2-step commands w/ 90% acc Manish.  6) The pt will read and comprehend phrases w/ 90% acc Manish.  7) The pt will write 1-2 syllable functional words w/ 80% acc Manish.  8) The pt will answer MU YNQs w/ 90% acc Manish.             Goal met 3/21/23    Goal met 6/29/23        Goal met 4/4/23     Pt participated in simple conversational tasks regarding recent trip to [place] and outing w/ [facility]. Pt requiring increased time for processing and gathering thoughts in attempts to convey  thoughts/ideas. Pt w/ difficulty recalling name of city visited, as well as stating favorite event from trip. SLP providing multiple choices for pt to communicate specifics. SLP provided pt w/ whiteboard to target written expression due to anomia, though pt unable to write down information despite maxA.    Overall good session.  Cont SLP POC.    Patient Education/Response:     Written Home Exercises Provided:  to be provided as appropriate throughout course of OP SLP services .  Exercises were reviewed and Bhupendra was able to demonstrate them prior to the end of the session.  Bhupendra demonstrated good  understanding of the education provided.     Assessment:   Bhupendra is progressing well towards his goals. Current goals remain appropriate. Goals to be updated as necessary.     Pt prognosis is Good. Pt will continue to benefit from skilled outpatient speech and language therapy to address the deficits listed in the problem list on initial evaluation, provide pt/family education and to maximize pt's level of independence in the home and community environment.     Barriers to Therapy:   Pt's spiritual, cultural and educational needs considered and pt agreeable to plan of care and goals.    Plan:   Continue POC with focus on expressive language, receptive language, written expression, and reading comprehension.  Pt to be seen 2x/week      Adamaris Carrion M.S., CCC-SLP   7/13/2023

## 2023-07-21 NOTE — PROGRESS NOTES
Speech and Language Therapy Outpatient Progress Note  Date:  7/20/2023     Name: Bhupendra Park   MRN: [id number]   Therapy Diagnosis:   Encounter Diagnosis   Name Primary?    Combined receptive and expressive aphasia Yes     Physician: Casey Maldonado MD  Physician Orders: eval and treat  Medical Diagnosis: CVA    Visit #/Visits authorized: 6/36  Date of Evaluation: 3/8/23  Insurance Authorization Period: 6/15/23-8/31/23  Plan of Care Expiration Date: 8/31/23  Extended POC: TBD     Time In:  3:25  Time Out: 3:55  Total Billable Time: 30       Subjective:   Pt ambulated to SLP office w/ straight cane. Pt pleasant and in good spirits.       Response to previous treatment: good     Pain Scale:  0/10 on VAS currently.   Pain Location: none expressed  Objective:        UNTIMED  Procedure Min.   {Speech- Language- Voice Therapy  30        Total Untimed Units: 1  Charges Billed/# of units: 1    Long Term Goals (12 weeks):   The pt will improve expressive and receptive language skills to communicate w/ friends and family.    Short Term Goals: 3x week/12 weeks Current Progress: initial   1) The pt will complete auto speech tasks (stating Feng and Aidan, sentence completion) w/ 90% acc Manish.  2) The pt will name x10 items in a concrete category w/I 1 minute Manish.  3) The pt will formulate sentences (subject+verb+object) to describe picture scenes w/ 80% acc Manish.  4) The pt will answer 3U YNQs w/ 90% acc Manish.  5) The pt will follow 2-step commands w/ 90% acc Manish.  6) The pt will read and comprehend phrases w/ 90% acc Manish.  7) The pt will write 1-2 syllable functional words w/ 80% acc Manish.  8) The pt will answer MU YNQs w/ 90% acc Manish.  9) Patient will read and comprehend paragraph length information w/ 90% accuracy Manish.             Goal met 3/21/23    Goal met 6/29/23        Goal met 4/4/23     Patient read aloud short paragraphs and then answered Qs w/ 50% accuracy Independently, increasing to 85% accuracy  modA.  Mod verbal cueing to recall specific details related to recent events (I.e., out and about w/ [facility]).  Handoff to Patient's wife in regards to session and examples of tasks to continue to target at home in order to promote enhanced language skills.    Overall good session.  Cont SLP POC.    Patient Education/Response:     Written Home Exercises Provided:  to be provided as appropriate throughout course of OP SLP services .  Exercises were reviewed and Bhupendra was able to demonstrate them prior to the end of the session.  Bhupendra demonstrated good  understanding of the education provided.     Assessment:   Bhupendra is progressing well towards his goals. Current goals remain appropriate. Goals to be updated as necessary.     Pt prognosis is Good. Pt will continue to benefit from skilled outpatient speech and language therapy to address the deficits listed in the problem list on initial evaluation, provide pt/family education and to maximize pt's level of independence in the home and community environment.     Barriers to Therapy:   Pt's spiritual, cultural and educational needs considered and pt agreeable to plan of care and goals.    Plan:   Continue POC with focus on expressive language, receptive language, written expression, and reading comprehension.  Pt to be seen 2x/week      Adamaris Carrion M.S., CCC-SLP   7/20/2023

## 2023-07-25 NOTE — PROGRESS NOTES
Speech and Language Therapy Outpatient Progress Note  Date:  7/25/2023     Name: Bhupendra Park   MRN: [id number]   Therapy Diagnosis:   Encounter Diagnosis   Name Primary?    Combined receptive and expressive aphasia Yes     Physician: Casey Maldonado MD  Physician Orders: eval and treat  Medical Diagnosis: CVA    Visit #/Visits authorized: 7/36  Date of Evaluation: 3/8/23  Insurance Authorization Period: 6/15/23-8/31/23  Plan of Care Expiration Date: 8/31/23  Extended POC: TBD     Time In:  11:00  Time Out: 11:30  Total Billable Time: 30       Subjective:   Pt ambulated to SLP office w/ straight cane. Pt pleasant and in good spirits.       Response to previous treatment: good     Pain Scale:  0/10 on VAS currently.   Pain Location: none expressed  Objective:        UNTIMED  Procedure Min.   {Speech- Language- Voice Therapy  30        Total Untimed Units: 1  Charges Billed/# of units: 1    Long Term Goals (12 weeks):   The pt will improve expressive and receptive language skills to communicate w/ friends and family.    Short Term Goals: 3x week/12 weeks Current Progress: initial   1) The pt will complete auto speech tasks (stating Feng and Aidan, sentence completion) w/ 90% acc Manish.  2) The pt will name x10 items in a concrete category w/I 1 minute Manish.  3) The pt will formulate sentences (subject+verb+object) to describe picture scenes w/ 80% acc Manish.  4) The pt will answer 3U YNQs w/ 90% acc Manish.  5) The pt will follow 2-step commands w/ 90% acc Manish.  6) The pt will read and comprehend phrases w/ 90% acc Manish.  7) The pt will write 1-2 syllable functional words w/ 80% acc Manish.  8) The pt will answer MU YNQs w/ 90% acc Manish.  9) Patient will read and comprehend paragraph length information w/ 90% accuracy Manish.             Goal met 3/21/23    Goal met 6/29/23        Goal met 4/4/23     Patient participated in structured conversational task regarding recent events given modA and extra time for  processing and word finding.  SPEECH-LANGUAGE PATHOLOGIST presented nouns 1 AAT and pt was instructed to provide 3 descriptions for slp to guess the noun. Task completed given mod-max verbal cueing. Slp instructed pt w/ word finding strategies to increase descriptors provided.    Overall good session.  Cont SLP POC.    Patient Education/Response:     Written Home Exercises Provided:  to be provided as appropriate throughout course of OP SLP services .  Exercises were reviewed and Bhupendra was able to demonstrate them prior to the end of the session.  Bhupendra demonstrated good  understanding of the education provided.     Assessment:   Bhupendra is progressing well towards his goals. Current goals remain appropriate. Goals to be updated as necessary.     Pt prognosis is Good. Pt will continue to benefit from skilled outpatient speech and language therapy to address the deficits listed in the problem list on initial evaluation, provide pt/family education and to maximize pt's level of independence in the home and community environment.     Barriers to Therapy:   Pt's spiritual, cultural and educational needs considered and pt agreeable to plan of care and goals.    Plan:   Continue POC with focus on expressive language, receptive language, written expression, and reading comprehension.  Pt to be seen 2x/week      Adamaris Carrion M.S., CCC-SLP   7/25/2023

## 2023-07-26 NOTE — PROGRESS NOTES
Speech and Language Therapy Outpatient Progress Note  Date:  7/26/2023     Name: Bhupendra Park   MRN: [id number]   Therapy Diagnosis:   Encounter Diagnosis   Name Primary?    Combined receptive and expressive aphasia Yes     Physician: Casey Maldonado MD  Physician Orders: eval and treat  Medical Diagnosis: CVA    Visit #/Visits authorized: 8/36  Date of Evaluation: 3/8/23  Insurance Authorization Period: 6/15/23-8/31/23  Plan of Care Expiration Date: 8/31/23  Extended POC: TBD     Time In:  11:00  Time Out: 11:30  Total Billable Time: 30       Subjective:   Pt ambulated to SLP office w/ straight cane. Pt pleasant and in good spirits.       Response to previous treatment: good     Pain Scale:  0/10 on VAS currently.   Pain Location: none expressed  Objective:        UNTIMED  Procedure Min.   {Speech- Language- Voice Therapy  30        Total Untimed Units: 1  Charges Billed/# of units: 1    Long Term Goals (12 weeks):   The pt will improve expressive and receptive language skills to communicate w/ friends and family.    Short Term Goals: 3x week/12 weeks Current Progress: initial   1) The pt will complete auto speech tasks (stating Feng and Aidan, sentence completion) w/ 90% acc Manish.  2) The pt will name x10 items in a concrete category w/I 1 minute Manish.  3) The pt will formulate sentences (subject+verb+object) to describe picture scenes w/ 80% acc Manish.  4) The pt will answer 3U YNQs w/ 90% acc Manish.  5) The pt will follow 2-step commands w/ 90% acc Manish.  6) The pt will read and comprehend phrases w/ 90% acc Manish.  7) The pt will write 1-2 syllable functional words w/ 80% acc Manish.  8) The pt will answer MU YNQs w/ 90% acc Manish.  9) Patient will read and comprehend paragraph length information w/ 90% accuracy Manish.             Goal met 3/21/23    Goal met 6/29/23        Goal met 4/4/23     SPEECH-LANGUAGE PATHOLOGIST presented nouns 1 AAT and pt was instructed to provide 3 descriptions for slp to guess the  noun. Task completed given mod-max verbal cueing. Slp instructed pt w/ word finding strategies to increase descriptors provided. Increased time required across task for processing and word finding.    Overall good session.  Cont SLP POC.    Patient Education/Response:     Written Home Exercises Provided:  to be provided as appropriate throughout course of OP SLP services .  Exercises were reviewed and Bhupendra was able to demonstrate them prior to the end of the session.  Bhupendra demonstrated good  understanding of the education provided.     Assessment:   Bhupendra is progressing well towards his goals. Current goals remain appropriate. Goals to be updated as necessary.     Pt prognosis is Good. Pt will continue to benefit from skilled outpatient speech and language therapy to address the deficits listed in the problem list on initial evaluation, provide pt/family education and to maximize pt's level of independence in the home and community environment.     Barriers to Therapy:   Pt's spiritual, cultural and educational needs considered and pt agreeable to plan of care and goals.    Plan:   Continue POC with focus on expressive language, receptive language, written expression, and reading comprehension.  Pt to be seen 2x/week      Adamaris Carrion M.S., CCC-SLP   7/26/2023

## 2023-08-01 NOTE — PROGRESS NOTES
Speech and Language Therapy Outpatient Progress Note  Date:  8/1/2023     Name: Bhupendra Park   MRN: [id number]   Therapy Diagnosis:   Encounter Diagnosis   Name Primary?    Combined receptive and expressive aphasia Yes     Physician: Casey Maldonado MD  Physician Orders: eval and treat  Medical Diagnosis: CVA    Visit #/Visits authorized: 9/36  Date of Evaluation: 3/8/23  Insurance Authorization Period: 6/15/23-8/31/23  Plan of Care Expiration Date: 8/31/23  Extended POC: TBD     Time In:  11:00  Time Out: 11:30  Total Billable Time: 30       Subjective:   Pt ambulated to SLP office w/ straight cane. Pt pleasant and in good spirits.       Response to previous treatment: good     Pain Scale:  0/10 on VAS currently.   Pain Location: none expressed  Objective:        UNTIMED  Procedure Min.   {Speech- Language- Voice Therapy  30        Total Untimed Units: 1  Charges Billed/# of units: 1    Long Term Goals (12 weeks):   The pt will improve expressive and receptive language skills to communicate w/ friends and family.    Short Term Goals: 3x week/12 weeks Current Progress: initial   1) The pt will complete auto speech tasks (stating Feng and Aidan, sentence completion) w/ 90% acc Manish.  2) The pt will name x10 items in a concrete category w/I 1 minute Manish.  3) The pt will formulate sentences (subject+verb+object) to describe picture scenes w/ 80% acc Manish.  4) The pt will answer 3U YNQs w/ 90% acc Manish.  5) The pt will follow 2-step commands w/ 90% acc Manish.  6) The pt will read and comprehend phrases w/ 90% acc Manish.  7) The pt will write 1-2 syllable functional words w/ 80% acc Manish.  8) The pt will answer MU YNQs w/ 90% acc Manish.  9) Patient will read and comprehend paragraph length information w/ 90% accuracy Manish.             Goal met 3/21/23    Goal met 6/29/23        Goal met 4/4/23     Sentence completion worksheet level hard completed w/ 50% accuracy Independently, increasing to 100% accuracy  modA.  Patient participated in structured conversational tasks regarding recent and upcoming events. Phonemic and semantic cueing provided to assist w/ word finding in order to engage in conversation w/ familiar listener.     Overall good session.  Cont SLP POC.    Patient Education/Response:     Written Home Exercises Provided:  to be provided as appropriate throughout course of OP SLP services .  Exercises were reviewed and Bhupendra was able to demonstrate them prior to the end of the session.  Bhupendra demonstrated good  understanding of the education provided.     Assessment:   Bhupendra is progressing well towards his goals. Current goals remain appropriate. Goals to be updated as necessary.     Pt prognosis is Good. Pt will continue to benefit from skilled outpatient speech and language therapy to address the deficits listed in the problem list on initial evaluation, provide pt/family education and to maximize pt's level of independence in the home and community environment.     Barriers to Therapy:   Pt's spiritual, cultural and educational needs considered and pt agreeable to plan of care and goals.    Plan:   Continue POC with focus on expressive language, receptive language, written expression, and reading comprehension.  Pt to be seen 2x/week      Adamaris Carrion M.S., CCC-SLP   8/1/2023

## 2023-08-03 NOTE — PROGRESS NOTES
Speech and Language Therapy Outpatient Progress Note  Date:  8/3/2023     Name: Bhupendra Park   MRN: [id number]   Therapy Diagnosis:   Encounter Diagnosis   Name Primary?    Combined receptive and expressive aphasia Yes     Physician: Casey Maldonado MD  Physician Orders: eval and treat  Medical Diagnosis: CVA    Visit #/Visits authorized: 10/36  Date of Evaluation: 3/8/23  Insurance Authorization Period: 6/15/23-8/31/23  Plan of Care Expiration Date: 8/31/23  Extended POC: TBD     Time In:  11:00  Time Out: 11:30  Total Billable Time: 30       Subjective:   Pt ambulated to SLP office w/ straight cane. Pt pleasant and in good spirits.       Response to previous treatment: good     Pain Scale:  0/10 on VAS currently.   Pain Location: none expressed  Objective:        UNTIMED  Procedure Min.   {Speech- Language- Voice Therapy  30        Total Untimed Units: 1  Charges Billed/# of units: 1    Long Term Goals (12 weeks):   The pt will improve expressive and receptive language skills to communicate w/ friends and family.    Short Term Goals: 3x week/12 weeks Current Progress: initial   1) The pt will complete auto speech tasks (stating Feng and Aidan, sentence completion) w/ 90% acc Manish.  2) The pt will name x10 items in a concrete category w/I 1 minute Manish.  3) The pt will formulate sentences (subject+verb+object) to describe picture scenes w/ 80% acc Manish.  4) The pt will answer 3U YNQs w/ 90% acc Manish.  5) The pt will follow 2-step commands w/ 90% acc Manish.  6) The pt will read and comprehend phrases w/ 90% acc Manish.  7) The pt will write 1-2 syllable functional words w/ 80% acc Manish.  8) The pt will answer MU YNQs w/ 90% acc Manish.  9) Patient will read and comprehend paragraph length information w/ 90% accuracy Manish.             Goal met 3/21/23    Goal met 6/29/23        Goal met 4/4/23     Following directions level 2 via Constant [employer] iPad allan completed w/ 25% accuracy Independently, increasing to 100%  accuracy given modA. Patient demonstrated difficulty w/ spatial relations during task.  Patient participated in structured conversation regarding recent events which required phonemic and semantic cueing to recall and express information.    Overall good session.  Cont SLP POC.    Patient Education/Response:     Written Home Exercises Provided:  to be provided as appropriate throughout course of OP SLP services .  Exercises were reviewed and Bhupendra was able to demonstrate them prior to the end of the session.  Bhupendra demonstrated good  understanding of the education provided.     Assessment:   Bhupendra is progressing well towards his goals. Current goals remain appropriate. Goals to be updated as necessary.     Pt prognosis is Good. Pt will continue to benefit from skilled outpatient speech and language therapy to address the deficits listed in the problem list on initial evaluation, provide pt/family education and to maximize pt's level of independence in the home and community environment.     Barriers to Therapy:   Pt's spiritual, cultural and educational needs considered and pt agreeable to plan of care and goals.    Plan:   Continue POC with focus on expressive language, receptive language, written expression, and reading comprehension.  Pt to be seen 2x/week      Adamaris Carrion M.S., CCC-SLP   8/3/2023

## 2023-08-08 NOTE — PROGRESS NOTES
Speech and Language Therapy Outpatient Progress Note  Date:  8/8/2023     Name: Bhupendra Park   MRN: [id number]   Therapy Diagnosis:   Encounter Diagnosis   Name Primary?    Combined receptive and expressive aphasia Yes     Physician: Casey Maldonado MD  Physician Orders: eval and treat  Medical Diagnosis: CVA    Visit #/Visits authorized: 11/36  Date of Evaluation: 3/8/23  Insurance Authorization Period: 6/15/23-8/31/23  Plan of Care Expiration Date: 8/31/23  Extended POC: TBD     Time In:  1:00  Time Out: 1:30  Total Billable Time: 30       Subjective:   Pt ambulated to SLP office w/ straight cane. Pt pleasant and in good spirits.       Response to previous treatment: good     Pain Scale:  0/10 on VAS currently.   Pain Location: none expressed  Objective:        UNTIMED  Procedure Min.   {Speech- Language- Voice Therapy  30        Total Untimed Units: 1  Charges Billed/# of units: 1    Long Term Goals (12 weeks):   The pt will improve expressive and receptive language skills to communicate w/ friends and family.    Short Term Goals: 3x week/12 weeks Current Progress: initial   1) The pt will complete auto speech tasks (stating Feng and Aidan, sentence completion) w/ 90% acc Manish.  2) The pt will name x10 items in a concrete category w/I 1 minute Manish.  3) The pt will formulate sentences (subject+verb+object) to describe picture scenes w/ 80% acc Manish.  4) The pt will answer 3U YNQs w/ 90% acc Manish.  5) The pt will follow 2-step commands w/ 90% acc Manish.  6) The pt will read and comprehend phrases w/ 90% acc Manihs.  7) The pt will write 1-2 syllable functional words w/ 80% acc Manish.  8) The pt will answer MU YNQs w/ 90% acc Manish.  9) Patient will read and comprehend paragraph length information w/ 90% accuracy Manish.             Goal met 3/21/23    Goal met 6/29/23        Goal met 4/4/23     Read and comprehend recipe and answer Qs. Patient requires mod-maxA to initiate reading recipe card and Qs, and then  "cueing to locate information and answer aloud accurately. When asked what continues to be a noticeable deficit related to speech therapy (speaking, writing, reading, understanding) pt reports, "Speech."    Overall good session.  Cont SLP POC.    Patient Education/Response:     Written Home Exercises Provided:  to be provided as appropriate throughout course of OP SLP services .  Exercises were reviewed and Bhupendra was able to demonstrate them prior to the end of the session.  Bhupendra demonstrated good  understanding of the education provided.     Assessment:   Bhupendra is progressing well towards his goals. Current goals remain appropriate. Goals to be updated as necessary.     Pt prognosis is Good. Pt will continue to benefit from skilled outpatient speech and language therapy to address the deficits listed in the problem list on initial evaluation, provide pt/family education and to maximize pt's level of independence in the home and community environment.     Barriers to Therapy:   Pt's spiritual, cultural and educational needs considered and pt agreeable to plan of care and goals.    Plan:   Continue POC with focus on expressive language, receptive language, written expression, and reading comprehension.  Pt to be seen 2x/week      Adamaris Carrion M.S., CCC-SLP   8/8/2023          "

## 2023-08-09 NOTE — PROGRESS NOTES
Speech and Language Therapy Outpatient Progress Note  Date:  8/9/2023     Name: Bhupendra Park   MRN: [id number]   Therapy Diagnosis:   Encounter Diagnosis   Name Primary?    Combined receptive and expressive aphasia Yes     Physician: Casey Maldonado MD  Physician Orders: eval and treat  Medical Diagnosis: CVA    Visit #/Visits authorized: 12/36  Date of Evaluation: 3/8/23  Insurance Authorization Period: 6/15/23-8/31/23  Plan of Care Expiration Date: 8/31/23  Extended POC: TBD     Time In:  11:10  Time Out: 11:30  Total Billable Time: 30       Subjective:   Pt ambulated to SLP office w/ straight cane. Pt pleasant and in good spirits.       Response to previous treatment: good     Pain Scale:  0/10 on VAS currently.   Pain Location: none expressed  Objective:        UNTIMED  Procedure Min.   {Speech- Language- Voice Therapy  20        Total Untimed Units: 1  Charges Billed/# of units: 1    Long Term Goals (12 weeks):   The pt will improve expressive and receptive language skills to communicate w/ friends and family.    Short Term Goals: 3x week/12 weeks Current Progress: initial   1) The pt will complete auto speech tasks (stating Feng and Aidan, sentence completion) w/ 90% acc Manish.  2) The pt will name x10 items in a concrete category w/I 1 minute Manish.  3) The pt will formulate sentences (subject+verb+object) to describe picture scenes w/ 80% acc Manish.  4) The pt will answer 3U YNQs w/ 90% acc Manish.  5) The pt will follow 2-step commands w/ 90% acc Manish.  6) The pt will read and comprehend phrases w/ 90% acc Manish.  7) The pt will write 1-2 syllable functional words w/ 80% acc Manish.  8) The pt will answer MU YNQs w/ 90% acc Manish.  9) Patient will read and comprehend paragraph length information w/ 90% accuracy Manish.             Goal met 3/21/23    Goal met 6/29/23        Goal met 4/4/23     Divergent naming via categories given first letter. Task completed w/ 58% accuracy Independently, increasing to 83%  accuracy modA.    Overall good session.  Cont SLP POC.    Patient Education/Response:     Written Home Exercises Provided:  to be provided as appropriate throughout course of OP SLP services .  Exercises were reviewed and Bhupendra was able to demonstrate them prior to the end of the session.  Bhupendra demonstrated good  understanding of the education provided.     Assessment:   Bhupendra is progressing well towards his goals. Current goals remain appropriate. Goals to be updated as necessary.     Pt prognosis is Good. Pt will continue to benefit from skilled outpatient speech and language therapy to address the deficits listed in the problem list on initial evaluation, provide pt/family education and to maximize pt's level of independence in the home and community environment.     Barriers to Therapy:   Pt's spiritual, cultural and educational needs considered and pt agreeable to plan of care and goals.    Plan:   Continue POC with focus on expressive language, receptive language, written expression, and reading comprehension.  Pt to be seen 2x/week      Adamaris Carrion M.S., CCC-SLP   8/9/2023

## 2023-08-16 NOTE — PROGRESS NOTES
[facility]  Speech Therapy Updated Plan of Care-Neurological Rehabilitation         Date: 2023   Name: Bhupendra Park  Clinic Number: [id number]    Therapy Diagnosis:   Encounter Diagnosis   Name Primary?    Combined receptive and expressive aphasia Yes     Physician: Casey Maldonado MD    Physician Orders: eval and treat  Medical Diagnosis: CVA    Visit #/ Visits Authorized:     Evaluation Date: 3/8/23  Insurance Authorization Period: 6/15/23-23  Plan of Care Expiration:    23  New POC Certification Period:  TBD    Total Visits Received: 13    Precautions:Standard     Subjective     Patient ambulated to SPEECH-LANGUAGE PATHOLOGIST office w/ can. Patient's wife reports that Patient had a headache and was not himself yesterday (didn't speak, confused). Patient's wife reports symptoms subsided and Patient is back to baseline today. She reports she will continue to monitor him and take him to Dr if needed.    Objective     MS Aphasia Screening Test: The MAST assesses both expressive and receptive language abilities for 90 language domains which include: Naming, Automatic Speech, Repetition, Yes/No Accuracy, Object Recognition from a Field of Five, Following Verbal Instructions, Reading Instructions, Verbal Fluency, and Writing/Spelling to Dictation. The test yields 9 subtest scores and 2 index scores.  The Mast Total Score ranges from 0 to 100 points.     Expressive Index:  Naming: 10  Automatic Speech: 10  Repetition: 10  Writin  Verbal Fluency: 0    Receptive Index:   Yes/No Accuracy: 18  Object Recognition: 10  Following Instructions: 4  Reading Instructions: 0    Total Index:   Expressive: 30/50  Receptive: 32/50    Total Score: 62/50    *MAST was given on 23 to which Patient scored 79/80. As scored indicate, Patient did get a lower score across some areas of the test. This could be related to events that took place yesterday (see subjective for  details).    Assessment     Update: Bhupendra Park presents to [facility] status post medical diagnosis of CVA. Demonstrates impairments including limitations as described in the problem list. Positive prognostic factors include attendance, motivation and family support. Negative prognostic factors include n/a. He presents with aphasia characterized by impaired verbal expression, written expression, auditory comprehension, and reading comprehension.  No barriers to therapy identified.. Patient will benefit from skilled, outpatient rehabilitation speech therapy.    Rehab Potential: good   Pt's spiritual, cultural, and educational needs considered and patient agreeable to plan of care and goals.    Education: Plan of Care, role of SLP in care, and insurance limitations / visit limit      Previous Short Term Goals Status:   Short Term Goals:  Current Progress:   1) The pt will complete auto speech tasks (stating Feng and Aidan, sentence completion) w/ 90% acc Manish.  2) The pt will name x10 items in a concrete category w/I 1 minute Manish.  3) The pt will formulate sentences (subject+verb+object) to describe picture scenes w/ 80% acc Manish.  4) The pt will answer 3U YNQs w/ 90% acc Manish.  5) The pt will follow 2-step commands w/ 90% acc Manish.  6) The pt will read and comprehend phrases w/ 90% acc Manish.  7) The pt will write 1-2 syllable functional words w/ 80% acc Manish.  8) The pt will answer MU YNQs w/ 90% acc Manish.  9) Patient will read and comprehend paragraph length information w/ 90% accuracy Manish.  Goal met                 Goal met 3/21/23     Goal met 6/29/23           Goal met 4/4/23     New Short Term Goals: 6 weeks  Patient will name x10 items in a concrete category w/I 1 minute Manish.  Patient will formulate sentences (subject+verb+object) to describe picture scenes w/ 90% accuracy Manish.  Patient will follow 2-step commands w/ 90% accuracy Manish.  Patient will write 1-2 syllable functional words  w/ 90% accuracy Manish.  Patient will read and comprehend sentence level information w/ 90% accuracy Manish.     Long Term Goal:  12 weeks  The pt will improve expressive and receptive language skills to communicate w/ friends and family.    Goals Previously Met:  4 goals previously met. Patient was progressing towards goals, though some regression was noted today during assessment.      Reasons for Recertification of Therapy:  Patient continues to exhibit verbal expression, reading comprehension, auditory comprehension, and written expression deficits. Prior to recent CVA, pt was able to read and was actively reading newspaper and magazine articles. Patient currently has difficulty reading and sending text messages to communicate w/ friends and family, which is a task that was easy to complete prior. [facility] and has a supportive wife. Patient has shown some regression from today's administration of the MAST vs 5/18/23 administration, which could be lingering effects from headache and confusion yesterday, though uncertain to definitively state. Patient continues to have communication goals to reach and speech therapy services remain warranted [facility] Pt wishes to continue rehab services at the outpatient level. Pt has made progress as indicated w/ goals met above, and would benefit from continued services 2x/week. Patient's wife agreeable to above stated plan.    Plan     Updated Certification Period: 8/16/2023 to D    Recommended Treatment Plan: Patient will participate in the [facility] rehabilitation program for speech therapy 2 times per week to address his Communication deficits, to educate patient and their family, and to participate in a home exercise program.     Other recommendations: n/a     Therapist's Name:  Adamaris Carrion CCC-SLP   8/16/2023      I CERTIFY THE NEED FOR THESE SERVICES FURNISHED UNDER THIS PLAN OF  TREATMENT AND WHILE UNDER MY CARE      Physician Name: _______________________________    Physician Signature: ____________________________

## 2023-08-17 NOTE — PROGRESS NOTES
Speech and Language Therapy Outpatient Progress Note  Date:  8/17/2023     Name: Bhupendra Park   MRN: [id number]   Therapy Diagnosis:   Encounter Diagnosis   Name Primary?    Combined receptive and expressive aphasia Yes     Physician: Casey Maldonado MD  Physician Orders: eval and treat  Medical Diagnosis: CVA    Visit #/Visits authorized: 13/36  Date of Evaluation: 3/8/23  Insurance Authorization Period: 6/15/23-8/31/23  Plan of Care Expiration Date: 8/31/23  Extended POC: TBD     Time In:  11:00  Time Out: 11:30  Total Billable Time: 30       Subjective:   Pt ambulated to SLP office w/ straight cane. Pt pleasant and in good spirits.       Response to previous treatment: good     Pain Scale:  0/10 on VAS currently.   Pain Location: none expressed  Objective:        UNTIMED  Procedure Min.   {Speech- Language- Voice Therapy  30        Total Untimed Units: 1  Charges Billed/# of units: 1    Long Term Goals (12 weeks):   The pt will improve expressive and receptive language skills to communicate w/ friends and family.    Short Term Goals: 3x week/12 weeks Current Progress: initial   1) The pt will complete auto speech tasks (stating Feng and Aidan, sentence completion) w/ 90% acc Manish.  2) The pt will name x10 items in a concrete category w/I 1 minute Manish.  3) The pt will formulate sentences (subject+verb+object) to describe picture scenes w/ 80% acc Manish.  4) The pt will answer 3U YNQs w/ 90% acc Manish.  5) The pt will follow 2-step commands w/ 90% acc Manish.  6) The pt will read and comprehend phrases w/ 90% acc Manish.  7) The pt will write 1-2 syllable functional words w/ 80% acc Manish.  8) The pt will answer MU YNQs w/ 90% acc Manish.  9) Patient will read and comprehend paragraph length information w/ 90% accuracy Manish.             Goal met 3/21/23    Goal met 6/29/23        Goal met 4/4/23     Following 2-step commands completed w/ 20% accuracy Independently, increasing to 90 accuracy modA. Patient  demonstrated difficulty w/ breakdown of 2 steps and connecting auditory stimuli w/ actions.  Reading Qs and answering accurately. modA for reading sentences. Patient able to answer 4/5 Qs Independently and accurately given assist w/ reading.    Overall good session.  Cont SLP POC.    Patient Education/Response:     Written Home Exercises Provided:  to be provided as appropriate throughout course of OP SLP services .  Exercises were reviewed and Bhupendra was able to demonstrate them prior to the end of the session.  Bhupendra demonstrated good  understanding of the education provided.     Assessment:   Bhupendra is progressing well towards his goals. Current goals remain appropriate. Goals to be updated as necessary.     Pt prognosis is Good. Pt will continue to benefit from skilled outpatient speech and language therapy to address the deficits listed in the problem list on initial evaluation, provide pt/family education and to maximize pt's level of independence in the home and community environment.     Barriers to Therapy:   Pt's spiritual, cultural and educational needs considered and pt agreeable to plan of care and goals.    Plan:   Continue POC with focus on expressive language, receptive language, written expression, and reading comprehension.  Pt to be seen 2x/week      Adamaris Carrion M.S., CCC-SLP   8/17/2023

## 2023-08-22 NOTE — PROGRESS NOTES
Speech and Language Therapy Outpatient Progress Note  Date:  8/22/2023     Name: Bhupendra Park   MRN: [id number]   Therapy Diagnosis:   Encounter Diagnosis   Name Primary?    Combined receptive and expressive aphasia Yes     Physician: Casey Maldonado MD  Physician Orders: eval and treat  Medical Diagnosis: CVA    Visit #/Visits authorized: 15/36  Date of Evaluation: 3/8/23  Insurance Authorization Period: 6/15/23-8/31/23  Plan of Care Expiration Date: 8/31/23  Extended POC: TBD     Time In:  11:00  Time Out: 11:30  Total Billable Time: 30       Subjective:   Pt ambulated to SLP office w/ straight cane. Pt pleasant and in good spirits.       Response to previous treatment: good     Pain Scale:  0/10 on VAS currently.   Pain Location: none expressed  Objective:        UNTIMED  Procedure Min.   {Speech- Language- Voice Therapy  30        Total Untimed Units: 1  Charges Billed/# of units: 1    Long Term Goals (12 weeks):   The pt will improve expressive and receptive language skills to communicate w/ friends and family.    Short Term Goals: 3x week/12 weeks Current Progress: initial   1) The pt will complete auto speech tasks (stating Feng and Aidan, sentence completion) w/ 90% acc Manish.  2) The pt will name x10 items in a concrete category w/I 1 minute Manish.  3) The pt will formulate sentences (subject+verb+object) to describe picture scenes w/ 80% acc Manish.  4) The pt will answer 3U YNQs w/ 90% acc Manish.  5) The pt will follow 2-step commands w/ 90% acc Manish.  6) The pt will read and comprehend phrases w/ 90% acc Manish.  7) The pt will write 1-2 syllable functional words w/ 80% acc Manish.  8) The pt will answer MU YNQs w/ 90% acc Manish.  9) Patient will read and comprehend paragraph length information w/ 90% accuracy Manish.             Goal met 3/21/23    Goal met 6/29/23        Goal met 4/4/23     Newspaper article worksheet targeting attention, reading comprehension and problem solving. Min-modA to verbally read  Qs, and Tammy to locate information on article in order to answer Qs. Increased time to complete task.    Overall good session.  Cont SLP POC.    Patient Education/Response:     Written Home Exercises Provided:  to be provided as appropriate throughout course of OP SLP services .  Exercises were reviewed and Bhupendra was able to demonstrate them prior to the end of the session.  Bhupendra demonstrated good  understanding of the education provided.     Assessment:   Bhupendra is progressing well towards his goals. Current goals remain appropriate. Goals to be updated as necessary.     Pt prognosis is Good. Pt will continue to benefit from skilled outpatient speech and language therapy to address the deficits listed in the problem list on initial evaluation, provide pt/family education and to maximize pt's level of independence in the home and community environment.     Barriers to Therapy:   Pt's spiritual, cultural and educational needs considered and pt agreeable to plan of care and goals.    Plan:   Continue POC with focus on expressive language, receptive language, written expression, and reading comprehension.  Pt to be seen 2x/week      Adamaris Carrion M.S., CCC-SLP   8/22/2023           Ambulatory

## 2023-08-23 NOTE — PROGRESS NOTES
Speech and Language Therapy Outpatient Progress Note  Date:  8/23/2023     Name: Bhupendra Park   MRN: [id number]   Therapy Diagnosis:   Encounter Diagnosis   Name Primary?    Combined receptive and expressive aphasia Yes     Physician: Casey Maldonado MD  Physician Orders: eval and treat  Medical Diagnosis: CVA    Visit #/Visits authorized: 16/36  Date of Evaluation: 3/8/23  Insurance Authorization Period: 6/15/23-8/31/23  Plan of Care Expiration Date: 8/31/23  Extended POC: TBD     Time In:  11:30  Time Out: 12:00  Total Billable Time: 30       Subjective:   Pt ambulated to SLP office w/ straight cane. Pt pleasant and in good spirits.       Response to previous treatment: good     Pain Scale:  0/10 on VAS currently.   Pain Location: none expressed  Objective:        UNTIMED  Procedure Min.   {Speech- Language- Voice Therapy  30        Total Untimed Units: 1  Charges Billed/# of units: 1    Long Term Goals (12 weeks):   The pt will improve expressive and receptive language skills to communicate w/ friends and family.    Short Term Goals: 3x week/12 weeks Current Progress: initial   1) The pt will complete auto speech tasks (stating Feng and Aidan, sentence completion) w/ 90% acc Manish.  2) The pt will name x10 items in a concrete category w/I 1 minute Manish.  3) The pt will formulate sentences (subject+verb+object) to describe picture scenes w/ 80% acc Manish.  4) The pt will answer 3U YNQs w/ 90% acc Manish.  5) The pt will follow 2-step commands w/ 90% acc Manish.  6) The pt will read and comprehend phrases w/ 90% acc Manish.  7) The pt will write 1-2 syllable functional words w/ 80% acc Manish.  8) The pt will answer MU YNQs w/ 90% acc Manish.  9) Patient will read and comprehend paragraph length information w/ 90% accuracy Manish.             Goal met 3/21/23    Goal met 6/29/23        Goal met 4/4/23     Following 2 step commands: 20% accuracy Independently, increasing to 80% accuracy maxA. Suspect limb apraxia.    Patient participated in conversation regarding upcoming plans. X1 word finding error, though Patient able to state name given extra time and w/ SPEECH-LANGUAGE PATHOLOGIST providing verbal cueing.     Overall good session.  Cont SLP POC.    Patient Education/Response:     Written Home Exercises Provided:  to be provided as appropriate throughout course of OP SLP services .  Exercises were reviewed and Bhupendra was able to demonstrate them prior to the end of the session.  Bhupendra demonstrated good  understanding of the education provided.     Assessment:   Bhupendra is progressing well towards his goals. Current goals remain appropriate. Goals to be updated as necessary.     Pt prognosis is Good. Pt will continue to benefit from skilled outpatient speech and language therapy to address the deficits listed in the problem list on initial evaluation, provide pt/family education and to maximize pt's level of independence in the home and community environment.     Barriers to Therapy:   Pt's spiritual, cultural and educational needs considered and pt agreeable to plan of care and goals.    Plan:   Continue POC with focus on expressive language, receptive language, written expression, and reading comprehension.  Pt to be seen 2x/week      Adamaris Carrion M.S., CCC-SLP   8/23/2023

## 2023-08-29 NOTE — PROGRESS NOTES
Speech and Language Therapy Outpatient Progress Note  Date:  8/29/2023     Name: Bhupendra Park   MRN: [id number]   Therapy Diagnosis:   Encounter Diagnosis   Name Primary?    Combined receptive and expressive aphasia Yes     Physician: Casey Maldonado MD  Physician Orders: eval and treat  Medical Diagnosis: CVA    Visit #/Visits authorized: 17/36  Date of Evaluation: 3/8/23  Insurance Authorization Period: 6/15/23-8/31/23  Plan of Care Expiration Date: 8/31/23  Extended POC: TBD     Time In:  11:00  Time Out: 11:30  Total Billable Time: 30       Subjective:   Pt ambulated to SLP office w/ straight cane. Pt pleasant and in good spirits.       Response to previous treatment: good     Pain Scale:  0/10 on VAS currently.   Pain Location: none expressed  Objective:        UNTIMED  Procedure Min.   {Speech- Language- Voice Therapy  30        Total Untimed Units: 1  Charges Billed/# of units: 1    Long Term Goals (12 weeks):   The pt will improve expressive and receptive language skills to communicate w/ friends and family.    Short Term Goals: 3x week/12 weeks Current Progress: initial   1) The pt will complete auto speech tasks (stating Feng and Aidan, sentence completion) w/ 90% acc Manish.  2) The pt will name x10 items in a concrete category w/I 1 minute Manish.  3) The pt will formulate sentences (subject+verb+object) to describe picture scenes w/ 80% acc Manish.  4) The pt will answer 3U YNQs w/ 90% acc Manish.  5) The pt will follow 2-step commands w/ 90% acc Manish.  6) The pt will read and comprehend phrases w/ 90% acc Manish.  7) The pt will write 1-2 syllable functional words w/ 80% acc Manish.  8) The pt will answer MU YNQs w/ 90% acc Manish.  9) Patient will read and comprehend paragraph length information w/ 90% accuracy Manish.             Goal met 3/21/23    Goal met 6/29/23        Goal met 4/4/23     Patient participated in sentence completion worksheet level hard given max verbal and visual cueing throughout.  "Decreased initiation across structured task which required frequent cueing to attempt to answer Qs. Increased time required to participate in trials as well. Patient reports this task was "hard."    Overall good session.  Cont SLP POC.    Patient Education/Response:     Written Home Exercises Provided:  to be provided as appropriate throughout course of OP SLP services .  Exercises were reviewed and Bhupendra was able to demonstrate them prior to the end of the session.  Bhupendra demonstrated good  understanding of the education provided.     Assessment:   Bhupendra is progressing well towards his goals. Current goals remain appropriate. Goals to be updated as necessary.     Pt prognosis is Good. Pt will continue to benefit from skilled outpatient speech and language therapy to address the deficits listed in the problem list on initial evaluation, provide pt/family education and to maximize pt's level of independence in the home and community environment.     Barriers to Therapy:   Pt's spiritual, cultural and educational needs considered and pt agreeable to plan of care and goals.    Plan:   Continue POC with focus on expressive language, receptive language, written expression, and reading comprehension.  Pt to be seen 2x/week      Adamaris Carrion M.S., CCC-SLP   8/29/2023          "

## 2023-08-31 NOTE — PROGRESS NOTES
Speech and Language Therapy Outpatient Progress Note  Date:  8/31/2023     Name: Bhupendra Park   MRN: [id number]   Therapy Diagnosis:   Encounter Diagnosis   Name Primary?    Combined receptive and expressive aphasia Yes     Physician: Casey Maldonado MD  Physician Orders: eval and treat  Medical Diagnosis: CVA    Visit #/Visits authorized: 18/36  Date of Evaluation: 3/8/23  Insurance Authorization Period: 6/15/23-8/31/23  Plan of Care Expiration Date: 8/31/23  Extended POC: TBD     Time In:  11:05  Time Out: 11:35  Total Billable Time: 30       Subjective:   Pt ambulated to SLP office w/ straight cane. Patient's wife reports Patient did not sleep much last night and is very sleepy today. Verbal cueing to pick R leg up as it was frequently dragging on the floor.        Response to previous treatment: good     Pain Scale:  0/10 on VAS currently.   Pain Location: none expressed  Objective:        UNTIMED  Procedure Min.   {Speech- Language- Voice Therapy  30        Total Untimed Units: 1  Charges Billed/# of units: 1    Long Term Goals (12 weeks):   The pt will improve expressive and receptive language skills to communicate w/ friends and family.    Short Term Goals: 3x week/12 weeks Current Progress: initial   1) The pt will complete auto speech tasks (stating Feng and Aidan, sentence completion) w/ 90% acc Manish.  2) The pt will name x10 items in a concrete category w/I 1 minute Manish.  3) The pt will formulate sentences (subject+verb+object) to describe picture scenes w/ 80% acc Manish.  4) The pt will answer 3U YNQs w/ 90% acc Manish.  5) The pt will follow 2-step commands w/ 90% acc Manish.  6) The pt will read and comprehend phrases w/ 90% acc Manish.  7) The pt will write 1-2 syllable functional words w/ 80% acc Manish.  8) The pt will answer MU YNQs w/ 90% acc Manish.  9) Patient will read and comprehend paragraph length information w/ 90% accuracy Mansih.             Goal met 3/21/23    Goal met 6/29/23        Goal  met 4/4/23     Structured conversation regarding individual and group sessions [facility] [place] yesterday. Max verbal cueing to provide information on given topic.  SPEECH-LANGUAGE PATHOLOGIST presented picture scenes and Patient provided complex sentence given max verbal cueing. Phonemic cueing beneficial to assist w/ word finding.      Overall fair session.  Cont SLP POC.    Patient Education/Response:     Written Home Exercises Provided:  to be provided as appropriate throughout course of OP SLP services .  Exercises were reviewed and Bhupendra was able to demonstrate them prior to the end of the session.  Bhupendra demonstrated good  understanding of the education provided.     Assessment:   Bhupendra is progressing well towards his goals. Current goals remain appropriate. Goals to be updated as necessary.     Pt prognosis is Good. Pt will continue to benefit from skilled outpatient speech and language therapy to address the deficits listed in the problem list on initial evaluation, provide pt/family education and to maximize pt's level of independence in the home and community environment.     Barriers to Therapy:   Pt's spiritual, cultural and educational needs considered and pt agreeable to plan of care and goals.    Plan:   Continue POC with focus on expressive language, receptive language, written expression, and reading comprehension.  Pt to be seen 2x/week.      Adamaris Carrion M.S., CCC-SLP   8/31/2023

## 2023-09-05 NOTE — PROGRESS NOTES
Speech and Language Therapy Outpatient Progress Note  Date:  9/5/2023     Name: Bhupendra Park   MRN: [id number]   Therapy Diagnosis:   Encounter Diagnosis   Name Primary?    Combined receptive and expressive aphasia Yes     Physician: Casey Maldonado MD  Physician Orders: eval and treat  Medical Diagnosis: CVA    Visit #/Visits authorized: 1/24  Date of Evaluation: 3/8/23  Insurance Authorization Period: 96/4/23-11/27/23  Plan of Care Expiration Date: 11/27/23  Extended POC: TBD     Time In:  11:00  Time Out: 11:30  Total Billable Time: 30       Subjective:   Pt ambulated to SLP office w/ straight cane. Patient pleasant and in good spirits.        Response to previous treatment: good     Pain Scale:  0/10 on VAS currently.   Pain Location: none expressed  Objective:        UNTIMED  Procedure Min.   {Speech- Language- Voice Therapy  30        Total Untimed Units: 1  Charges Billed/# of units: 1    Long Term Goals (12 weeks):   The pt will improve expressive and receptive language skills to communicate w/ friends and family.    Short Term Goals: 3x week/12 weeks Current Progress: initial   1) The pt will complete auto speech tasks (stating Feng and Aidan, sentence completion) w/ 90% acc Manish.  2) The pt will name x10 items in a concrete category w/I 1 minute Manish.  3) The pt will formulate sentences (subject+verb+object) to describe picture scenes w/ 80% acc Manish.  4) The pt will answer 3U YNQs w/ 90% acc Manish.  5) The pt will follow 2-step commands w/ 90% acc Manish.  6) The pt will read and comprehend phrases w/ 90% acc Manish.  7) The pt will write 1-2 syllable functional words w/ 80% acc Manish.  8) The pt will answer MU YNQs w/ 90% acc Manish.  9) Patient will read and comprehend paragraph length information w/ 90% accuracy Manish.             Goal met 3/21/23    Goal met 6/29/23        Goal met 4/4/23     Convergent naming worksheet completed verbally targeting word finding and reading comprehension at  phrase/sentence level. Task completed w/ 50% accuracy Independently, increasing to 100% accuracy modA. Increased time for reading comprehension.       Overall good session.  Cont SLP POC.    Patient Education/Response:     Written Home Exercises Provided:  to be provided as appropriate throughout course of OP SLP services .  Exercises were reviewed and Bhupendra was able to demonstrate them prior to the end of the session.  Bhupendra demonstrated good  understanding of the education provided.     Assessment:   Bhupendra is progressing well towards his goals. Current goals remain appropriate. Goals to be updated as necessary.     Pt prognosis is Good. Pt will continue to benefit from skilled outpatient speech and language therapy to address the deficits listed in the problem list on initial evaluation, provide pt/family education and to maximize pt's level of independence in the home and community environment.     Barriers to Therapy:   Pt's spiritual, cultural and educational needs considered and pt agreeable to plan of care and goals.    Plan:   Continue POC with focus on expressive language, receptive language, written expression, and reading comprehension.  Pt to be seen 2x/week.      Adamaris Carrion M.S., CCC-SLP   9/5/2023

## 2023-09-09 NOTE — H&P
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 Wife at bedside was able to provide history. Per wife, last known normal was around 8 pm last night when she went to bed. She woke up at 8:30 this am and noticed by around 10:50 am that patient had new onset right sided facial droop and decreased responsiveness. Patient had also urinated on himself, but he is incontinent at baseline. Wife states patient walks with cane at baseline. In ED, CT head revealed large parenchymal hematoma centered in the left frontal lobe measuring up to 7-8 cm. There is 3-4 mm of shift towards the right. LVO protocol activated. Patient is not on blood thinners. Keppra was started in ED but then wife adamantly refused it (because it give pt severe headaches). He follows with Dr. Figueroa outpatient. Neurosurgery states no surgical interventions at this time. ICU called to admit patient for hourly neuro checks and blood pressure control.     Hospital Course/Significant events:      24 Hour Interval History:      Past Medical History:   Diagnosis Date    Stroke        No past surgical history on file.    Social History     Socioeconomic History    Marital status:      Social Determinants of Health     Financial Resource Strain: Low Risk  (2/10/2023)    Overall Financial Resource Strain (CARDIA)     Difficulty of Paying Living Expenses: Not hard at all   Food Insecurity: Unknown  (2/10/2023)    Hunger Vital Sign     Worried About Running Out of Food in the Last Year: Never true   Transportation Needs: Unknown (2/10/2023)    PRAPARE - Transportation     Lack of Transportation (Medical): No   Physical Activity: Inactive (2/10/2023)    Exercise Vital Sign     Days of Exercise per Week: 0 days     Minutes of Exercise per Session: 0 min   Stress: No Stress Concern Present (2/10/2023)    [employer] [place] [employer] - Occupational Stress Questionnaire     Feeling of Stress : Only a little   Social Connections: Socially Integrated (2/10/2023)    Social Connection and Isolation Panel [NHANES]     Frequency of Communication with Friends and Family: More than three times a week     Frequency of Social Gatherings with Friends and Family: More than three times a week     Attends Buddhism Services: 1 to 4 times per year     Active Member of Clubs or Organizations: No     Attends Club or Organization Meetings: 1 to 4 times per year     Marital Status:    Housing Stability: Low Risk  (2/10/2023)    Housing Stability Vital Sign     Unable to Pay for Housing in the Last Year: No     Number of Places Lived in the Last Year: 1     Unstable Housing in the Last Year: No           Current Outpatient Medications   Medication Instructions    baclofen (LIORESAL) 10 mg, Oral, 3 times daily    famotidine (PEPCID) 20 mg, Oral, 2 times daily    lacosamide (VIMPAT) 150 mg, Oral, Nightly    lacosamide (VIMPAT) 50 mg, Oral, Every morning    levothyroxine (SYNTHROID) 88 mcg, Oral, Every morning    naproxen (NAPROSYN) 250 mg, Oral, 2 times daily    rosuvastatin (CRESTOR) 40 mg, Oral, Nightly    tamsulosin (FLOMAX) 0.4 mg, Oral, Daily       Current Inpatient Medications      Current Intravenous Infusions        Review of Systems   Unable to perform ROS: Mental acuity          Objective:       Intake/Output Summary (Last 24 hours) at 9/9/2023 1346  Last data filed at 9/9/2023 1237  Gross per 24 hour    Intake 6.67 ml   Output --   Net 6.67 ml         Vital Signs (Most Recent):  Temp: 97.5 °F (36.4 °C) (09/09/23 1156)  Pulse: 63 (09/09/23 1332)  Resp: 12 (09/09/23 1332)  BP: (!) 141/71 (Dr Renee aware-instructed to notify if SBP>145) (09/09/23 1332)  SpO2: 100 % (09/09/23 1332)  There is no height or weight on file to calculate BMI.    Vital Signs (24h Range):  Temp:  [97.5 °F (36.4 °C)] 97.5 °F (36.4 °C)  Pulse:  [53-63] 63  Resp:  [12-19] 12  SpO2:  [95 %-100 %] 100 %  BP: (122-149)/(66-86) 141/71     Physical Exam  Vitals reviewed.   Constitutional:       General: He is not in acute distress.     Comments: Rouses easily, then drifts off to sleep. Aphasic, speaking 1 word sentences. Follows commands.    HENT:      Head: Normocephalic and atraumatic.      Nose: Nose normal.      Mouth/Throat:      Mouth: Mucous membranes are moist.   Eyes:      Extraocular Movements: Extraocular movements intact.      Pupils: Pupils are equal, round, and reactive to light.   Cardiovascular:      Rate and Rhythm: Normal rate and regular rhythm.      Pulses: Normal pulses.   Pulmonary:      Effort: Pulmonary effort is normal. No respiratory distress.      Breath sounds: Normal breath sounds.   Abdominal:      General: Abdomen is flat. Bowel sounds are normal.      Palpations: Abdomen is soft.      Tenderness: There is no abdominal tenderness.   Musculoskeletal:      Right lower leg: No edema.      Left lower leg: No edema.   Skin:     General: Skin is warm and dry.   Neurological:      Comments: Right facial droop. 1/5 strength RUE with spasticity, 1/6 strength RLE  5/5 strength LLE, LUE           Lines/Drains/Airways       Peripheral Intravenous Line  Duration                  Peripheral IV - Single Lumen 09/09/23 1155 18 G Anterior;Distal;Left Upper Arm <1 day                    Significant Labs:    Lab Results   Component Value Date    WBC 5.55 09/09/2023    HGB 13.7 (L) 09/09/2023    HCT 39.4 (L) 09/09/2023    MCV 85.1  "09/09/2023     (L) 09/09/2023           BMP  Lab Results   Component Value Date     (H) 09/09/2023    K 4.3 09/09/2023    CHLORIDE 118 (H) 09/09/2023    CO2 25 09/09/2023    BUN 20.7 09/09/2023    CREATININE 1.62 (H) 09/09/2023    CALCIUM 10.2 (H) 09/09/2023    AGAP 7.0 03/06/2023    ESTGFRAFRICA 103 02/05/2023    EGFRNONAA >60 05/17/2018         ABG  No results for input(s): "PH", "PO2", "PCO2", "HCO3", "POCBASEDEF" in the last 168 hours.    Mechanical Ventilation Support:         Significant Imaging:  I have reviewed the pertinent imaging within the past 24 hours.        Assessment/Plan:     Assessment  Large left frontal parenchymal hematoma with 3-4 mm of midline shift   intraventricular hemorrhage with ventricular enlargement       Plan  - Neurology following, appreciate recs.   - Patient is not a candidate for thrombolysis  - -150 (Goal BP) for the first 24 hours, then SBP less than 140. Cleviprex or hydralazine push if needed. Patient is not on htn meds at home  - Q1 hour neuro checks  - avoid antiplatelet or anticoagulation at this time  - seizure precautions . Notify neurology of any seizure-like activity   - repeat CT tomorrow AM. MRI brain when able  - neurosurgery consult placed, awaiting recs  - NPO. Swallow study ordered. Speech therapy to additionally eval for aphasia/dysarthria    - PT/OT once able  - LR @100 cc/hr  - Keppra was started in ED but then wife adamantly refused it (2/2 severe headaches). Awaiting Neurosurgery recommendation on alternatives.       DVT Prophylaxis: SCDs  GI Prophylaxis: famotidine     32 minutes of critical care was time spent personally by me on the following activities: development of treatment plan with patient or surrogate and bedside caregivers, discussions with consultants, evaluation of patient's response to treatment, examination of patient, ordering and performing treatments and interventions, ordering and review of laboratory studies, ordering " and review of radiographic studies, pulse oximetry, re-evaluation of patient's condition.  This critical care time did not overlap with that of any other provider or involve time for any procedures.     Missy Mosher MD  Pulmonary Critical Care Medicine  [facility] - Emergency Dept

## 2023-09-09 NOTE — PLAN OF CARE
Problem: Adult Inpatient Plan of Care  Goal: Plan of Care Review  Outcome: Ongoing, Progressing  Goal: Patient-Specific Goal (Individualized)  Outcome: Ongoing, Progressing  Goal: Absence of Hospital-Acquired Illness or Injury  Outcome: Ongoing, Progressing  Goal: Optimal Comfort and Wellbeing  Outcome: Ongoing, Progressing  Goal: Readiness for Transition of Care  Outcome: Ongoing, Progressing     Problem: Adjustment to Stroke  Goal: Optimal Adjustment to Stroke  Outcome: Ongoing, Progressing     Problem: Bladder Management (Stroke)  Goal: Effective Urinary Elimination/Continence  Outcome: Ongoing, Progressing     Problem: BADL (Basic Activities of Daily Living) Impairment (Stroke)  Goal: Optimal Safe BADL Performance  Outcome: Ongoing, Not Progressing     Problem: Bowel Management (Stroke)  Goal: Effective Bowel Elimination/Continence  Outcome: Ongoing, Not Progressing     Problem: Cognitive Impairment (Stroke)  Goal: Optimal Cognitive Function  Outcome: Ongoing, Not Progressing     Problem: Communication Impairment (Stroke)  Goal: Effective Communication Skills  Outcome: Ongoing, Not Progressing

## 2023-09-09 NOTE — ED PROVIDER NOTES
"Encounter Date: 9/9/2023    SCRIBE #1 NOTE: I, Minh Starr, am scribing for, and in the presence of,  Eduardo Clemons IV, MD. I have scribed the following portions of the note - Other sections scribed: HPI, ROS, PE.       History     Chief Complaint   Patient presents with    Medical problem     Wife reports the patient was found with urinary incontinence, decreased responsiveness, and a R facial droop. [facility] 9/8/2023 at 2000. Hx of CVA with right sided deficits. Baseline only says "Yes" and aphasic. .       62 y/o male with a history of CVA with residual right-sided weakness presents to the ED with increased confusion and lethargy per wife. Wife reports finding the pt with a right facial droop. Pt is aphasic at baseline and will give one-word responses at baseline. [facility] 9/8/2023 20:00. LVO protocol activated.    The history is provided by the spouse. No  was used.     Review of patient's allergies indicates:   Allergen Reactions    Keppra [levetiracetam] Other (See Comments)     Headache, depression      Past Medical History:   Diagnosis Date    Stroke      No past surgical history on file.  No family history on file.     Review of Systems   Unable to perform ROS: Patient nonverbal   Neurological:  Positive for facial asymmetry.   Psychiatric/Behavioral:  Positive for confusion.        Physical Exam     Initial Vitals [09/09/23 1156]   BP Pulse Resp Temp SpO2   122/66 (!) 53 19 97.5 °F (36.4 °C) 98 %      MAP       --         Physical Exam    Nursing note and vitals reviewed.  Constitutional: He is not diaphoretic. No distress.   Cardiovascular:  Normal rate and regular rhythm.           Pulmonary/Chest: No respiratory distress.   Abdominal: Abdomen is soft. He exhibits no distension. There is no abdominal tenderness.     Neurological: He is alert.   Awake, alert, aphasic   Following commands, nonverbal  Right facial droop  1/5 strength RUE, RLE   3/5 strength LLE, LUE          ED Course "   Critical Care    Date/Time: 9/9/2023 2:12 PM    Performed by: Eduardo Clemons IV, MD  Authorized by: Eduardo Clemons IV, MD  Total critical care time (exclusive of procedural time) : 45 minutes  Critical care time was exclusive of separately billable procedures and treating other patients.  Critical care was necessary to treat or prevent imminent or life-threatening deterioration of the following conditions: CNS failure or compromise.  Critical care was time spent personally by me on the following activities: development of treatment plan with patient or surrogate, blood draw for specimens, discussions with consultants, evaluation of patient's response to treatment, interpretation of cardiac output measurements, examination of patient, obtaining history from patient or surrogate, ordering and performing treatments and interventions, ordering and review of laboratory studies, ordering and review of radiographic studies, pulse oximetry, re-evaluation of patient's condition and review of old charts.        Labs Reviewed   COMPREHENSIVE METABOLIC PANEL - Abnormal; Notable for the following components:       Result Value    Sodium Level 151 (*)     Chloride 118 (*)     Creatinine 1.62 (*)     Calcium Level Total 10.2 (*)     Globulin 3.6 (*)     All other components within normal limits   LIPID PANEL - Abnormal; Notable for the following components:    HDL Cholesterol 32 (*)     Triglyceride 152 (*)     LDL Cholesterol 47.00 (*)     All other components within normal limits   CBC WITH DIFFERENTIAL - Abnormal; Notable for the following components:    RBC 4.63 (*)     Hgb 13.7 (*)     Hct 39.4 (*)     Platelet 117 (*)     All other components within normal limits   ISTAT CHEM8 - Abnormal; Notable for the following components:    POC Glucose 111 (*)     POC Creatinine 1.8 (*)     POC Sodium 151 (*)     POC Chloride 114 (*)     All other components within normal limits   PROTIME-INR - Normal   TSH - Normal   CBC W/ AUTO  DIFFERENTIAL    Narrative:     The following orders were created for panel order CBC W/ AUTO DIFFERENTIAL.  Procedure                               Abnormality         Status                     ---------                               -----------         ------                     CBC with Differential[[id number]]       Abnormal            Final result                 Please view results for these tests on the individual orders.   POCT GLUCOSE, HAND-HELD DEVICE   POCT GLUCOSE   ISTAT PROCEDURE          Imaging Results              CTA STROKE MULTI-PHASE (Final result)  Result time 09/09/23 12:42:11   Procedure changed from CTA Head and Neck (xpd)     Final result by Sesar Cazares MD (09/09/23 12:42:11)                   Impression:        No large vessel occlusion or obstruction or aneurysm seen    Large intracranial hemorrhage seen as outlined above overall unchanged since prior examination      Electronically signed by: Sesar Cazares  Date:    09/09/2023  Time:    12:42               Narrative:    EXAMINATION:  CTA STROKE MULTI-PHASE    CLINICAL HISTORY:  Neuro deficit, acute, stroke suspected;    TECHNIQUE:  The CT angiogram was performed from the level of the brayan to the top of the head following the IV administration 100 cc of Isovue 370 contrast .  Sagittal and coronal reconstructions and maximum intensity projection reconstructions were performed. Arterial stenosis percentages are based on NASCET measurement criteria.  Additional multiplanar reconstructions were performed on post processed imaging.  Automatic exposure control (AEC) is utilized to reduce patient radiation exposure.    COMPARISON:  None    FINDINGS:  Source images: There is a large intracranial hemorrhage seen centered around the left frontal lobe but also extending into the parietal region and basal ganglia.  There is intraventricular blood seen as well.  There is significant mass effect on the left lateral ventricle secondary  to the 2nd cytotoxic edema.  There is a midline shift from left to right that measures approximately 4 mm.  Findings are similar to the previous examination..  No cervical mass or lesion is seen.  No abnormal lymphadenopathy seen.  Thyroid appears normal.  Larynx appears normal.  Trachea is midline.  Thoracic inlet appears normal.    Vascular images: The aortic arch is normal in caliber.  Some atherosclerotic plaque is seen.  There is a 3 vessel arch seen.  There is significant amount of artifact which precludes adequate evaluation of the proximal common carotid artery on the left side.  The mid and distal common carotid artery on the left side is widely patent.  A mild amount of plaque is seen at the carotid bulb and proximal internal carotid arteries bilaterally but no significant stenosis is seen.  The distal internal carotid arteries are widely patent.  They are seen to the petrous ridge and clinoid and supraclinoid region.  The MCAs are patent.  M1 segments, M2 segments M3 segments are patent.  No aneurysm is seen.  A1 segments are patent.  Anterior communicating arteries patent.  Anterior cerebral arteries are patent.  No aneurysm or obstruction is seen.    Both vertebral arteries are patent.  They are normal in caliber.  The basilar artery appears normal.  Posterior cerebral artery on the left side is normal with no occlusion or aneurysm.  There is persistent fetal circulation of the right posterior cerebral artery which is a normal variant.  It appears normal.    .                                       CT HEAD FOR STROKE (Final result)  Result time 09/09/23 12:18:34   Procedure changed from CT Head Without Contrast     Final result by Mario Blood MD (09/09/23 12:18:34)                   Impression:      Large left frontal parenchymal hematoma with 3-4 mm of midline shift.  Additionally there is intraventricular hemorrhage with ventricular enlargement suspicious for developing hydrocephalus.    Findings  discussed with ED Dr. Clemons at 1215 on 9/9/2023.      Electronically signed by: Mario Blood  Date:    09/09/2023  Time:    12:18               Narrative:    EXAMINATION:  CT HEAD FOR STROKE    CLINICAL HISTORY:  Neuro deficit, acute, stroke suspected;    TECHNIQUE:  CT imaging of the head performed from the skull base to the vertex without intravenous contrast.  mGycm. Automatic exposure control, adjustment of mA/kV or iterative reconstruction technique was used to reduce radiation.    COMPARISON:  22 May 2016    FINDINGS:  There is large parenchymal hematoma centered in the left frontal lobe measuring up to 7-8 cm.  There is 3-4 mm of shift towards the right.  There are some intraventricular blood products.  The ventricles are enlarged.  There is right frontal encephalomalacia with volume loss.    Visualized paranasal sinuses and mastoid air cells are clear.                                       Medications   sodium chloride 0.9% flush 10 mL (has no administration in time range)   iohexoL (OMNIPAQUE 350) injection 90 mL (90 mLs Intravenous Given 9/9/23 1214)   levETIRAcetam in NaCl (iso-os) IVPB 1,000 mg (0 mg Intravenous Stopped 9/9/23 1237)   hydrALAZINE injection 10 mg (10 mg Intravenous Given 9/9/23 1251)     Medical Decision Making  Problems Addressed:  Altered mental status, unspecified altered mental status type: acute illness or injury that poses a threat to life or bodily functions  Facial droop: acute illness or injury that poses a threat to life or bodily functions  Intracranial hemorrhage: acute illness or injury that poses a threat to life or bodily functions  Stroke: acute illness or injury    Amount and/or Complexity of Data Reviewed  Labs: ordered.  Radiology: ordered.    Risk  Prescription drug management.  Decision regarding hospitalization.            Scribe Attestation:   Scribe #1: I performed the above scribed service and the documentation accurately describes the services I  performed. I attest to the accuracy of the note.    Attending Attestation:           Physician Attestation for Scribe:  Physician Attestation Statement for Scribe #1: I, reviewed documentation, as scribed by Minh Starr in my presence, and it is both accurate and complete.         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  Some of these are deemed to be lower likelihood and some more likely based on my physical exam and history combined with possible lab work and/or imaging studies.   Please see the pertinent studies, and refer to the HPI.  Some of these diagnoses will take further evaluation to fully rule out, perhaps as an outpatient and the patient was encouraged to follow up when discharged for more comprehensive evaluation.    Metabolic abnormality, intoxication, toxic ingestion, CVA, infection, structural (SAH, ICH, trauma, neoplastic), seizure/postictal, polypharmacy       ED management:   IV hydralazine  NSGY consultation  ICU consultation and admission     My independent radiology interpretation:   Large L parenchymal bleed with midline shift     Amount and/or Complexity of Data Reviewed  Independent historian: spouse    Summary of history: Per wife, pt presents to the ED with increased confusion and lethargy. She reports finding the pt with a right facial droop. Pt is aphasic at baseline and will give  one-word responses at baseline. [facility] 9/8/2023 20:00.  External data reviewed: notes from previous admissions, notes from previous ED visits, notes from clinic visits, prior labs, prior imaging, and prescription medications   Risk and benefits of testing: discussed   Labs: ordered and reviewed  Radiology: ordered and independent interpretation performed (see above or ED course)  Discussion of management or test interpretation with external provider(s): discussed with neurosurgery Dr. Andrew and ICU MD consultant   Summary of discussion: see ED course     Risk  Drug therapy requiring intense monitoring for toxicity   Decision regarding hospitalization    Critical Care  30-74 minutes     Eduardo NAJERA MD personally performed the history, PE, MDM, and procedures as documented above and agree with the scribe's documentation.        ED Course as of 09/09/23 1412   Sat Sep 09, 2023   1215 Dr. Andrew paged [JG]   1318 Admitted to ICU [AC]   1331 Dr. Apley recommends ICU admission blood pressure control he does not recommend surgery at this time.  I discussed patient's intolerance to Keppra he reports that no seizure prophylaxis is necessary at this time she seizures can be treated p.r.n. if they occur [AC]      ED Course User Index  [AC] Eduardo Clemons IV, MD  [JG] Minh Starr                      Clinical Impression:   Final diagnoses:  [I63.9] Stroke  [I62.9] Intracranial hemorrhage (Primary)  [R29.810] Facial droop  [R41.82] Altered mental status, unspecified altered mental status type        ED Disposition Condition    Admit Stable                Eudardo Clemons IV, MD  09/09/23 1412

## 2023-09-09 NOTE — Clinical Note
Diagnosis: Stroke [230752]   Admitting Provider:: FARNAZ WHITMORE [[id number]]   Future Attending Provider: FARNAZ WHITMORE [[id number]]   Reason for IP Medical Treatment  (Clinical interventions that can only be accomplished in the IP setting? ) :: bp control, nsgy   I certify that Inpatient services for greater than or equal to 2 midnights are medically necessary:: Yes   Plans for Post-Acute care--if anticipated (pick the single best option):: A. No post acute care anticipated at this time   Special Needs:: No Special Needs [1]

## 2023-09-09 NOTE — PROGRESS NOTES
Pharmacist Renal Dose Adjustment Note    Bhupendra Park is a 63 y.o. male being treated with the medication famotidine    Patient Data:    Vital Signs (Most Recent):  Temp: 97.5 °F (36.4 °C) (09/09/23 1156)  Pulse: 64 (09/09/23 1445)  Resp: 12 (09/09/23 1445)  BP: 122/74 (09/09/23 1445)  SpO2: 99 % (09/09/23 1445) Vital Signs (72h Range):  Temp:  [97.5 °F (36.4 °C)]   Pulse:  [53-67]   Resp:  [12-19]   BP: (113-149)/(66-86)   SpO2:  [95 %-100 %]      Recent Labs   Lab 09/09/23  1208   CREATININE 1.62*     Creatinine clearance cannot be calculated (Unknown ideal weight.)    Medication:famotidine dose: 20mg frequency q12h will be changed to medication:famotidine dose:20mg frequency:q24h based on: wt= 71.3kg on 03/06/23; SrCl = 1.62 mg/dL on 09/09/23  [place] CrCl = 47.1 mL.min.    Pharmacist's Name: Philip Robins  Pharmacist's Extension: [phone]

## 2023-09-09 NOTE — NURSING
Nurses Note -- 4 Eyes      9/9/2023   6:26 PM      Skin assessed during: Daily Assessment      [x] No Altered Skin Integrity Present    [x]Prevention Measures Documented      [] Yes- Altered Skin Integrity Present or Discovered   [] LDA Added if Not in Epic (Describe Wound)   [] New Altered Skin Integrity was Present on Admit and Documented in LDA   [] Wound Image Taken    Wound Care Consulted? No    Attending Nurse: SARA Barkley     Second RN/Staff Member:   SARA Bundy

## 2023-09-09 NOTE — CONSULTS
[facility]  [facility] Consultation    Reason for Consultation:  Left frontal ICH  Consulted by:  ER physician  Date of Consultation: 09/09/2023        SUBJECTIVE:     Chief Complaint     History of Present Illness:   Patient is a 63-year-old gentleman with a history of multiple previous strokes who presented with a new onset right facial droop and decreased responsiveness.  The patient does have baseline aphasia and dense spastic right hemiparesis.  CT showed a large dual ICH but also evidence of previous infarcts and encephalomalacia.    Patient Active Problem List    Diagnosis Date Noted    Muscle hypertonicity 03/22/2023    Decreased right shoulder range of motion 03/22/2023    Combined receptive and expressive aphasia 03/09/2023    Impaired strength of lower extremity 03/08/2023    Abnormality of gait following cerebrovascular accident 03/08/2023    Intraparenchymal hemorrhage of brain 02/09/2023    Hypothyroidism 02/09/2023    BPH (benign prostatic hyperplasia) 02/09/2023       Past Medical History:   Diagnosis Date    Stroke      No past surgical history on file.  Medications Prior to Admission   Medication Sig Dispense Refill Last Dose    baclofen (LIORESAL) 10 MG tablet Take 10 mg by mouth 3 (three) times daily.       famotidine (PEPCID) 20 MG tablet Take 1 tablet (20 mg total) by mouth 2 (two) times daily. 60 tablet 0     lacosamide (VIMPAT) 150 mg Tab tablet Take 150 mg by mouth every evening.       lacosamide (VIMPAT) 50 mg Tab Take 50 mg by mouth every morning.       levothyroxine (SYNTHROID) 88 MCG tablet Take 1 tablet (88 mcg total) by mouth every morning. 30 tablet 0     naproxen (NAPROSYN) 250 MG tablet Take 250 mg by mouth 2 (two) times daily.       rosuvastatin (CRESTOR) 40 MG Tab Take 1 tablet (40 mg total) by mouth every evening. 30 tablet 0     tamsulosin (FLOMAX) 0.4 mg Cap Take 1 capsule (0.4 mg total) by mouth once daily. 30 capsule 0      Review of patient's  allergies indicates:   Allergen Reactions    Keppra [levetiracetam] Other (See Comments)     Headache, depression      Social History     Tobacco Use    Smoking status: Not on file    Smokeless tobacco: Not on file   Substance Use Topics    Alcohol use: Not on file     No family history on file.    Vital Signs  Temp: 97.5 °F (36.4 °C)  Pulse: 64  Resp: 12  SpO2: 99 %  Device (Oxygen Therapy): room air  BP: 122/74]    ROS:  Review of Systems    OBJECTIVE:     Vital signs in last 24 hours:  Temp:  [98.6 °F (37 °C)] 98.6 °F (37 °C)  Pulse:  [49-89] 49  Resp:  [13-18] 14  SpO2:  [96 %-99 %] 99 %  BP: (128-162)/(68-93) 128/86    On exam he is sleeping, but arouses easily.  He has significant expressive aphasia, but decent comprehension.  He can follow some complex commands on the left side.  He can name objects.  There is dense spastic right hemiplegia.  Right facial droop.    ASSESSMENT/PLAN:     Problem List Items Addressed This Visit    None  Visit Diagnoses       Intracranial hemorrhage    -  Primary    Stroke        Relevant Orders    ECG 12 lead (Completed)    Facial droop        Altered mental status, unspecified altered mental status type              IMPRESSION:   1. Large left frontal ICH   2. Multiple recurrent CVAs    RECOMMENDATIONS:   1. No surgical intervention is necessary at this point.  We will continue to observe with you, but very unlikely to develop a need for surgery at this point     Jose Andrew MD FACS SUSHIL  [facility]

## 2023-09-09 NOTE — CONSULTS
[facility] - Emergency Dept  Neurology  Consult Note        Bhupendra Park is a 63 y.o. male who presented to [facility] on 2023 with reports of right facial droop, less responsive, lethargic . Onset of symptoms was sudden, not related to any specific activity and exact time of onset unknown, last normal 8 pm on . He was lethargic the morning of , when his wife checked on him at 10:50 am he was found to be incontinent with right sided facial droop . Stroke risk factors include  hx of strokle . Prior stroke history: yes exact type of CVA unknown, residual right sided weakness and aphasia.       Past Medical History:   Diagnosis Date    Stroke      No past surgical history on file.  No family history on file.      Review of patient's allergies indicates:   Allergen Reactions    Keppra [levetiracetam] Other (See Comments)     Headache, depression          STROKE DOCUMENTATION   Acute Stroke Times   Symptom Onset Date: 23  Stroke alert called at 11:57  Stroke NP responded 11:59  CT head interpretation 1215  NIH Scale:            Neurological Exam:   LOC: alert and does not follow requests  Language: Global aphasia  Speech: Aphasic   Orientation: Aphasic  Pupils (CN III, IV, VI): PERRL  Facial Movement (CN VII): lower weakness right lower  Motor*: Arm Left:  Normal (5/5), Leg Left:   Normal (5/5), Arm Right:   Paretic:  1/5, Leg Right:   Paretic:  2/5    NIHSS:  1a  Level of consciousness: 1=not alert but arousable by minor stimulation to obey, answer or respond   1b. LOC questions:  2=Answers neither task correctly   1c. LOC commands: 2=Answers neither task correctly   2.  Best Gaze: 0=normal   3.  Visual: 0=No visual loss   4. Facial Palsy: 1=Minor paralysis (flattened nasolabial fold, asymmetric on smiling)   5a.  Motor left arm: 0=No drift, limb holds 90 (or 45) degrees for full 10 seconds   5b.  Motor right arm: 3=No effort against gravity, limb falls   6a. motor left le=No drift,  limb holds 90 (or 45) degrees for full 10 seconds   6b  Motor right leg:  3=No effort against gravity, limb falls   7. Limb Ataxia: 0=Absent   8.  Sensory: 0=Normal; no sensory loss   9. Best Language:  3=Mute, global aphasia; no usable speech or auditory comprehension   10. Dysarthria: 2=Severe; patient speech is so slurred as to be unintelligible in the absence of or our of proportion to any dysphagia, or is mute/anarthric   11. Extinction and Inattention: 0=No abnormality         Total:   17       Laboratory:  BMP:   Lab Results   Component Value Date    GLUCOSE 113 09/09/2023     (H) 09/09/2023     02/05/2023    K 4.3 09/09/2023    K 3.5 02/05/2023     02/05/2023    CO2 25 09/09/2023    CO2 27 02/05/2023    BUN 20.7 09/09/2023    BUN 20 02/05/2023    CREATININE 1.62 (H) 09/09/2023    CREATININE 0.71 02/05/2023    CALCIUM 10.2 (H) 09/09/2023    CALCIUM 8.4 (L) 02/05/2023     CBC:   Lab Results   Component Value Date    WBC 5.55 09/09/2023    RBC 4.63 (L) 09/09/2023    HGB 13.7 (L) 09/09/2023    HCT 39.4 (L) 09/09/2023    HCT 41 09/09/2023     (L) 09/09/2023    MCV 85.1 09/09/2023    MCH 29.6 09/09/2023    MCHC 34.8 09/09/2023     Lipid Panel:   Lab Results   Component Value Date    CHOL 109 09/09/2023    CHOL 196 09/01/2020    LDLCALC 129 09/01/2020    HDL 32 (L) 09/09/2023    HDL 31 (L) 09/01/2020    TRIG 152 (H) 09/09/2023    TRIG 179 (H) 09/01/2020     Coagulation:   Lab Results   Component Value Date    INR 1.1 09/09/2023    INR 1.1 09/09/2023     Hgb A1C:   Lab Results   Component Value Date    HGBA1C 5.7 09/03/2020     TSH:   Lab Results   Component Value Date    TSH 0.581 09/09/2023    TSH 1.101 01/29/2023       Diagnostic Results  Brain Imaging:   -CTh: Large left frontal parenchymal hematoma with 3-4 mm of midline shift.  Additionally there is intraventricular hemorrhage with ventricular enlargement suspicious for developing hydrocephalus.  Cerebrovascular Imaging:   -CTA h/n: no  LVO or flow limiting stenosis   Cardiac Evaluation:   -EKG/Telemetry: NSR        Discussed with neurologist on call:   Discussed finding with Dr. Montez  Thrombolysis Candidate? No, CT findings (ICH, SAH, Large core infarct)   -Delays to Thrombolysis?  Not Applicable          PLAN:    -Recommend -150 (Goal BP) for the first 24 hours, then SBP less than 140  -Q1 hour neuro checks  -avoid antiplatelet or anticoagulation at this time  -seizure precautions ... notify neurology of any seizure-like activity   -consider repeat imaging (including an MRI when neurologically stable)  -follow with Dr. Figueroa outpatient   -neurosurgery consult         Thank you for your consult.   Further recommendations to follow froM MD Janee Meehan, NP  Neurology  [facility] - Emergency Dept

## 2023-09-10 NOTE — PT/OT/SLP EVAL
[facility]  Speech Language Pathology Department  Clinical Swallow Evaluation    Patient Name:  Bhupendra Park   MRN:  [id number]    Recommendations:     General recommendations:  Modified Barium Swallow Study  Diet recommendations:   to follow MBS  Medications: crushed in puree  Precautions: Standard, aspiration    History:     Bhupendra Park is a/n 63 y.o. 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  Diagnostics confirmed acute left frontal parenchymal hematoma with intraventricular hemorrhage.    Past Medical History:   Diagnosis Date    Stroke      No past surgical history on file.      Home Diet: Soft & Bite-sized  Current Method of Nutrition: NPO    Patient complaint: None    Imaging   No results found for this or any previous visit.    No results found for this or any previous visit.    No results found for this or any previous visit.    Subjective     Patient awake and confused.    Patient goals: Unable to state, but wife said he was eating chopped foods fine before this new stroke.     Spiritual/Cultural/Sabianist Beliefs/Practices that affect care: no  Pain/Comfort: Pain Rating 1: 0/10        Objective:     ORAL MUSCULATURE  Dentition: own teeth  Secretion Management: adequate  Mucosal Quality: good  Facial Movement: reduced right  Buccal Strength & Mobility: impaired  Mandibular Strength & Mobility: WFL  Oral Labial Strength & Mobility: WFL  Lingual Strength & Mobility:  pt did not follow commands to assess lingua or velar strength.  Residual right facial weakness from prior CVA.      Consistency Fed By Oral Symptoms Pharyngeal Symptoms   Thin liquid by cup Self None None   Puree SLP None None   Ice chips SLP None None      Assessment:     No overt s/sx of aspiration with PO trials, however pt unable to vocalize upon command therefore vocal quality is unreliable, and with new hematoma.  Instrumental assessment of swallow is indicated.    Goals:     Multidisciplinary Problems       SLP Goals          Problem: SLP    Goal Priority Disciplines Outcome   SLP Goal     SLP    Description: LTG: Pt will tolerate least restrictive diet with no clinical s/sx of aspiration.                     Patient Education:     Patient and spouse provided with verbal education regarding swallow function.  Understanding was verbalized.    Plan:     [facility]  Meds crushed in rox[place] ok     Time Tracking:     SLP Treatment Date:   09/10/23  Speech Start Time:  1145  Speech Stop Time:  1200     Speech Total Time (min):  15 min    Billable minutes:  Swallow and Oral Function Evaluation, 15 minutes     09/10/2023

## 2023-09-10 NOTE — NURSING
Nurses Note -- 4 Eyes      9/10/2023   5:14 PM      Skin assessed during: Daily Assessment      [x] No Altered Skin Integrity Present    [x]Prevention Measures Documented      [] Yes- Altered Skin Integrity Present or Discovered   [] LDA Added if Not in Epic (Describe Wound)   [] New Altered Skin Integrity was Present on Admit and Documented in LDA   [] Wound Image Taken    Wound Care Consulted? No    Attending Nurse:  SARA Barkley     Second RN/Staff Member:   SARA Bundy

## 2023-09-10 NOTE — PLAN OF CARE
Problem: Adult Inpatient Plan of Care  Goal: Plan of Care Review  Outcome: Ongoing, Progressing  Goal: Patient-Specific Goal (Individualized)  Outcome: Ongoing, Progressing  Goal: Absence of Hospital-Acquired Illness or Injury  Outcome: Ongoing, Progressing  Goal: Optimal Comfort and Wellbeing  Outcome: Ongoing, Progressing  Goal: Readiness for Transition of Care  Outcome: Ongoing, Progressing     Problem: Adjustment to Stroke  Goal: Optimal Adjustment to Stroke  Outcome: Ongoing, Progressing     Problem: BADL (Basic Activities of Daily Living) Impairment (Stroke)  Goal: Optimal Safe BADL Performance  Outcome: Ongoing, Progressing     Problem: Bowel Management (Stroke)  Goal: Effective Bowel Elimination/Continence  Outcome: Ongoing, Progressing     Problem: Cognitive Impairment (Stroke)  Goal: Optimal Cognitive Function  Outcome: Ongoing, Progressing     Problem: Communication Impairment (Stroke)  Goal: Effective Communication Skills  Outcome: Ongoing, Progressing     Problem: IADL (Instrumental Activities of Daily Living) Impairment (Stroke)  Goal: Optimal Safe IADL Performance  Outcome: Ongoing, Progressing     Problem: Functional Mobility Impairment (Stroke)  Goal: Optimal Mobility [place] and Safety  Outcome: Ongoing, Progressing     Problem: Movement and Motor Control Impairment (Stroke Rehabilitation)  Goal: Optimal Movement and Motor Control  Outcome: Ongoing, Progressing     Problem: Sensory Perceptual Impairment (Stroke)  Goal: Optimal Sensory Perceptual Status  Outcome: Ongoing, Progressing     Problem: Sexuality and Intimacy (Stroke Rehabilitation)  Goal: Maintains Intimacy/Sexual Expression  Outcome: Ongoing, Progressing     Problem: Spasticity Management (Stroke)  Goal: Effective Spasticity Management  Outcome: Ongoing, Progressing     Problem: Eating and Swallowing Impairment (Stroke)  Goal: Optimal Oral Motor and Swallow Ability  Outcome: Ongoing, Progressing     Problem: Bladder Management  (Stroke)  Goal: Effective Urinary Elimination/Continence  Outcome: Ongoing, Progressing     Problem: Skin Injury Risk Increased  Goal: Skin Health and Integrity  Outcome: Ongoing, Progressing     Problem: Fall Injury Risk  Goal: Absence of Fall and Fall-Related Injury  Outcome: Ongoing, Progressing

## 2023-09-10 NOTE — PT/OT/SLP EVAL
Physical Therapy Evaluation    Patient Name:  Bhupendra Park   MRN:  [id number]    Recommendations:     Discharge Recommendations: nursing facility, skilled, rehabilitation facility   Discharge Equipment Recommendations: to be determined by next level of care   Barriers to discharge: Impaired mobility and Ongoing medical needs    Assessment:     Bhupendra Park is a 63 y.o. male admitted with a medical diagnosis of <principal problem not specified>.  He presents with the following impairments/functional limitations: weakness, impaired endurance, impaired functional mobility, impaired cognition .    Rehab Prognosis: Good; patient would benefit from acute skilled PT services to address these deficits and reach maximum level of function.    Recent Surgery: * No surgery found *      Plan:     During this hospitalization, patient to be seen 6 x/week (6x to BID) to address the identified rehab impairments via gait training, therapeutic activities, therapeutic exercises, neuromuscular re-education and progress toward the following goals:    Plan of Care Expires:  10/10/23    Subjective     Chief Complaint: Unable to verbalize  Patient/Family Comments/goals: To get better and go home  Pain/Comfort:       Patients cultural, spiritual, Tenriism conflicts given the current situation: no    Living Environment:  Pt lives with his wife, previous strokes and impairments required her to assist with ADLs and used a quad cane for gait.     Equipment used at home: cane, quad.  DME owned (not currently used): none.  Upon discharge, patient will have assistance from his wife and family.    Objective:     Communicated with nurse prior to session.  Patient found HOB elevated with castellanos catheter, blood pressure cuff, pulse ox (continuous), telemetry, SCD  upon PT entry to room.    General Precautions: Standard, other (see comments) (SBP<140)  Orthopedic Precautions:    Braces:    Respiratory Status: Room air  Blood Pressure:  129/71  HR 56  Sp02 100%      Exams:  Sensation:    -       Unable to verbalize  RLE ROM: WFL except DF  RLE Strength: 0/5, follows commands but unable to tell if truly 0/5 or lack of comprehension  LLE ROM: WFL  LLE Strength: 3+/5 grossly  Skin integrity: Visible skin intact      Functional Mobility:  Bed Mobility:     Bridging: moderate assistance  Supine to Sit: moderate assistance  Sit to Supine: moderate assistance  Transfers:     Sit to Stand:  moderate assistance with hand-held assist. PT blocking at knee of R LE Unable to achieve full hip/knee extension, pt fatigues after second attempt.       AM-PAC 6 CLICK MOBILITY  Total Score:        Treatment & Education:      Patient provided with verbal education regarding POC.  Additional teaching is warranted.     Patient left right sidelying with all lines intact, call button in reach, and nurse notified.    GOALS:   Multidisciplinary Problems       Physical Therapy Goals          Problem: Physical Therapy    Goal Priority Disciplines Outcome Goal Variances Interventions   Physical Therapy Goal     PT, PT/OT Ongoing, Progressing     Description: Goals to be met by: 10/10/23     Patient will increase functional independence with mobility by performing:    Supine to sit with Modified [place]  Sit to stand transfer with Modified [place]                         History:     Past Medical History:   Diagnosis Date    Stroke        No past surgical history on file.    Time Tracking:     PT Received On: 09/10/23  PT Start Time: 0950     PT Stop Time: 1025  PT Total Time (min): 35 min     Billable Minutes: Evaluation 35      09/10/2023

## 2023-09-10 NOTE — PROGRESS NOTES
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 Wife at bedside was able to provide history. Per wife, last known normal was around 8 pm last night when she went to bed. She woke up at 8:30 this am and noticed by around 10:50 am that patient had new onset right sided facial droop and decreased responsiveness. Patient had also urinated on himself, but he is incontinent at baseline. Wife states patient walks with cane at baseline. In ED, CT head revealed large parenchymal hematoma centered in the left frontal lobe measuring up to 7-8 cm. There is 3-4 mm of shift towards the right. LVO protocol activated. Patient is not on blood thinners. Keppra was started in ED but then wife adamantly refused it (because it give pt severe headaches). He follows with Dr. Figueroa outpatient. Neurosurgery states no surgical interventions at this time. ICU called to admit patient for hourly neuro checks and blood pressure control.       24 Hour Interval History:  CT head this morning pending official read. He remains with aphasia and is not verbalizing on my exam. He has right sided weakness but slightly gross movement of RUE. He follows commands with the left side.     Past Medical History:   Diagnosis Date    Stroke        No past surgical history on file.    Social History     Socioeconomic History    Marital status:      Social Determinants of Health     Financial  Resource Strain: Low Risk  (2/10/2023)    Overall Financial Resource Strain (CARDIA)     Difficulty of Paying Living Expenses: Not hard at all   Food Insecurity: Unknown (2/10/2023)    Hunger Vital Sign     Worried About Running Out of Food in the Last Year: Never true   Transportation Needs: Unknown (2/10/2023)    PRAPARE - Transportation     Lack of Transportation (Medical): No   Physical Activity: Inactive (2/10/2023)    Exercise Vital Sign     Days of Exercise per Week: 0 days     Minutes of Exercise per Session: 0 min   Stress: No Stress Concern Present (2/10/2023)    [employer] [employer] - Occupational Stress Questionnaire     Feeling of Stress : Only a little   Social Connections: Socially Integrated (2/10/2023)    Social Connection and Isolation Panel [NHANES]     Frequency of Communication with Friends and Family: More than three times a week     Frequency of Social Gatherings with Friends and Family: More than three times a week     Attends Jewish Services: 1 to 4 times per year     Active Member of Clubs or Organizations: No     Attends Club or Organization Meetings: 1 to 4 times per year     Marital Status:    Housing Stability: Low Risk  (2/10/2023)    Housing Stability Vital Sign     Unable to Pay for Housing in the Last Year: No     Number of Places Lived in the Last Year: 1     Unstable Housing in the Last Year: No           Current Outpatient Medications   Medication Instructions    baclofen (LIORESAL) 10 mg, Oral, 3 times daily    famotidine (PEPCID) 20 mg, Oral, 2 times daily    lacosamide (VIMPAT) 150 mg, Oral, Nightly    lacosamide (VIMPAT) 50 mg, Oral, Every morning    levothyroxine (SYNTHROID) 88 mcg, Oral, Every morning    naproxen (NAPROSYN) 250 mg, Oral, 2 times daily    rosuvastatin (CRESTOR) 40 mg, Oral, Nightly    tamsulosin (FLOMAX) 0.4 mg, Oral, Daily         Review of Systems   Unable to perform ROS: Mental acuity          Objective:       Intake/Output  Summary (Last 24 hours) at 9/10/2023 1006  Last data filed at 9/10/2023 0600  Gross per 24 hour   Intake 1706.67 ml   Output 1250 ml   Net 456.67 ml           Vital Signs (Most Recent):  Temp: 98.3 °F (36.8 °C) (09/10/23 0400)  Pulse: (!) 50 (09/10/23 0500)  Resp: (!) 7 (09/10/23 0500)  BP: 138/78 (09/10/23 0500)  SpO2: 98 % (09/10/23 0500)  Body mass index is 21.92 kg/m².  Weight: 71.3 kg (157 lb 3 oz) Vital Signs (24h Range):  Temp:  [97.5 °F (36.4 °C)-98.4 °F (36.9 °C)] 98.3 °F (36.8 °C)  Pulse:  [50-76] 50  Resp:  [6-19] 7  SpO2:  [95 %-100 %] 98 %  BP: (113-162)/(66-95) 138/78     Physical Exam  Vitals reviewed.   Constitutional:       General: He is not in acute distress.     Comments: Arouses easily, then drifts off to sleep. Aphasic. Follows commands.    HENT:      Head: Normocephalic and atraumatic.      Nose: Nose normal.      Mouth/Throat:      Mouth: Mucous membranes are moist.   Eyes:      Extraocular Movements: Extraocular movements intact.      Pupils: Pupils are equal, round, and reactive to light.   Cardiovascular:      Rate and Rhythm: Normal rate and regular rhythm.      Pulses: Normal pulses.   Pulmonary:      Effort: Pulmonary effort is normal. No respiratory distress.      Breath sounds: Normal breath sounds.   Abdominal:      General: Abdomen is flat. Bowel sounds are normal.      Palpations: Abdomen is soft.      Tenderness: There is no abdominal tenderness.   Musculoskeletal:      Right lower leg: No edema.      Left lower leg: No edema.   Skin:     General: Skin is warm and dry.   Neurological:      Comments: Right facial droop. 1/5 strength RUE with spasticity, 0/5 strength RLE  5/5 strength LLE, LUE             Lines/Drains/Airways       Drain  Duration             Male External Urinary Catheter 09/09/23 1600 Medium <1 day              Peripheral Intravenous Line  Duration                  Peripheral IV - Single Lumen 09/09/23 1155 18 G Anterior;Distal;Left Upper Arm <1 day               "      Significant Labs:    Lab Results   Component Value Date    WBC 6.32 09/10/2023    HGB 11.9 (L) 09/10/2023    HCT 34.9 (L) 09/10/2023    MCV 84.3 09/10/2023     (L) 09/10/2023           BMP  Lab Results   Component Value Date     (H) 09/10/2023    K 3.4 (L) 09/10/2023    CHLORIDE 121 (H) 09/10/2023    CO2 22 (L) 09/10/2023    BUN 18.3 09/10/2023    CREATININE 1.25 (H) 09/10/2023    CALCIUM 9.3 09/10/2023    AGAP 7.0 03/06/2023    ESTGFRAFRICA 103 02/05/2023    EGFRNONAA >60 05/17/2018         ABG  No results for input(s): "PH", "PO2", "PCO2", "HCO3", "POCBASEDEF" in the last 168 hours.      Significant Imaging:  CT Head Without Contrast  Narrative: EXAMINATION:  CT HEAD WITHOUT CONTRAST    CLINICAL HISTORY:  Stroke, follow up;    TECHNIQUE:  Multiple axial images were obtained from the base of the brain to the vertex without contrast administration.  Sagittal and coronal reconstructions were performed. .Automatic exposure control  (AEC) is utilized to reduce patient radiation exposure.    COMPARISON:  09/09/2023    FINDINGS:  There is a large intracranial hemorrhage seen centered around the left frontal lobe and the left basal ganglia.  There is significant amount of cytotoxic edema seen.  There is a midline shift from left to right that measures 5 mm.  There is intraventricular blood seen as well.  There is areas of ischemia involving the frontal lobes bilaterally which was seen on the prior examination as well.  There is some blood seen along the sylvian fissure on the right side.  This is a new finding.    Carotid general and basilar cisterns appear grossly unremarkable.  There is blood seen in the posterior fossa which is new.  This is seen on the right side on images number 40 through 44.  There is a small amount of subdural blood and maximum dimension is 4.5 mm.  Impression: Extensive intracranial hemorrhage seen as outlined above with new areas of subdural blood seen in the posterior fossa " on the right side and some blood seen along the sylvian fissure on the right side.  These are new findings.    Otherwise unchanged    Electronically signed by: Sesar Cazares  Date:    09/10/2023  Time:    10:08        Assessment/Plan:     Assessment  Large left frontal parenchymal hematoma with intraventricular extension  Hx of right frontal intraparenchymal hemorrhage (CT from [facility] 1/30/23)      Plan  - Neurology following, appreciate recs, MRI pending   - -150 (Goal BP) for the first 24 hours, then SBP less than 140. Cleviprex or hydralazine push if needed. Patient is not on htn meds at home  - avoid antiplatelet or anticoagulation at this time  - seizure precautions   - NPO. Swallow study ordered. Speech therapy to additionally eval for aphasia/dysarthria    - PT/OT/ST once able  - OK to downgrade when cleared by neurology and neurosurgery      DVT Prophylaxis: SCDs  GI Prophylaxis: famotidine     32 minutes of critical care was time spent personally by me on the following activities: development of treatment plan with patient or surrogate and bedside caregivers, discussions with consultants, evaluation of patient's response to treatment, examination of patient, ordering and performing treatments and interventions, ordering and review of laboratory studies, ordering and review of radiographic studies, pulse oximetry, re-evaluation of patient's condition.  This critical care time did not overlap with that of any other provider or involve time for any procedures.     CRISTINE Patricio  Pulmonary Critical Care Medicine  [facility] - Emergency Dept

## 2023-09-10 NOTE — PROGRESS NOTES
More awake  Continues to be appropriate and follows commands  No indication for surgical intervention at this point  We will sign off, but reconsult if needed

## 2023-09-10 NOTE — PROGRESS NOTES
[facility] - [facility] ICU  Neurology  Progress Note    Patient Name: Bhupendra Park  MRN: [id number]  Admission Date: 9/9/2023  Hospital Length of Stay: 1 days  Code Status: Full Code   Attending Provider: Eliu Cruz MD  Primary Care Physician: Kelly Meadows MD   Principal Problem:<principal problem not specified>      Subjective:     Interval History:   No new issues, appears slightly improved compared to yesterday. Wife at bedside, she reports they see Dr. Figueroa in [place] as an outpatient. He has had an ICH in 2020 and 2021. Most recent MRI brain in August shows right frontal intraparenchymal hematoma with minimal mass effect as well as similar sequela of prior left frontoparietal intraparenchymal hemorrhage and remote SAH. She reports his blood pressure is well controlled at home. She he was diagnosed with amyloid angiopathy by Dr. Figueroa and was told the hemorrhages will continue to happen.     Patient goes to the outpatient aphasia support group through .     Current Neurological Medications:     Current Facility-Administered Medications   Medication Dose Route Frequency Provider Last Rate Last Admin    famotidine (PF) injection 20 mg  20 mg Intravenous Daily Casey Ames MD   20 mg at 09/10/23 0847    hydrALAZINE injection 10 mg  10 mg Intravenous Q6H PRN Missy Mosher MD   10 mg at 09/09/23 2015    lacosamide (VIMPAT) 150 mg in sodium chloride 0.9% 100 mL IVPB  150 mg Intravenous QHS Missy Mosher MD   Stopped at 09/09/23 2101    lacosamide (VIMPAT) 50 mg in sodium chloride 0.9% 100 mL IVPB  50 mg Intravenous QAM Missy Mosher MD   Stopped at 09/10/23 0632    lactated ringers infusion   Intravenous Continuous Missy Mosher  mL/hr at 09/10/23 0330 New Bag at 09/10/23 0330    potassium phosphate 30 mmol in dextrose 5 % (D5W) 500 mL infusion  30 mmol Intravenous Once Casey Ames MD 83.3 mL/hr at 09/10/23 1137 30 mmol at 09/10/23 1137    sodium chloride 0.9% flush 10  mL  10 mL Intravenous PRN Missy Mosher MD           Review of Systems   Unable to perform ROS: Other (aphasia)     Objective:     Vital Signs (Most Recent):  Temp: 98 °F (36.7 °C) (09/10/23 0800)  Pulse: (!) 55 (09/10/23 1100)  Resp: 17 (09/10/23 1100)  BP: (!) 143/81 (09/10/23 1100)  SpO2: 99 % (09/10/23 1100) Vital Signs (24h Range):  Temp:  [97.5 °F (36.4 °C)-98.4 °F (36.9 °C)] 98 °F (36.7 °C)  Pulse:  [50-76] 55  Resp:  [6-19] 17  SpO2:  [95 %-100 %] 99 %  BP: (113-162)/(66-95) 143/81     Weight: 71.3 kg (157 lb 3 oz)  Body mass index is 21.92 kg/m².     Physical Exam  Vitals and nursing note reviewed.   Constitutional:       General: He is not in acute distress.     Appearance: He is not ill-appearing or toxic-appearing.   HENT:      Head: Normocephalic.   Eyes:      General: No visual field deficit.     Pupils: Pupils are equal, round, and reactive to light.   Pulmonary:      Effort: Pulmonary effort is normal.   Musculoskeletal:         General: Normal range of motion.      Cervical back: Normal range of motion.      Right lower leg: No edema.      Left lower leg: No edema.   Skin:     General: Skin is warm and dry.      Capillary Refill: Capillary refill takes less than 2 seconds.   Neurological:      Mental Status: He is alert.      Cranial Nerves: Dysarthria and facial asymmetry (right facial droop) present.      Motor: Weakness and abnormal muscle tone (increased tone RUE) present. No seizure activity.      Comments:     He is lying in bed with wife at bedside  No gaze preference  Appears to be following some commands  Able to identify and verbalize 2 fingers on the left hand  LUE, LLE 5/5, RUE, RLE 1/5   Increased tone to RUE  No overt seizure activity            NEUROLOGICAL EXAMINATION:     CRANIAL NERVES     CN III, IV, VI   Pupils are equal, round, and reactive to light.      Significant Labs:   Recent Lab Results  (Last 5 results in the past 24 hours)        09/10/23  0047   09/09/23  1208    09/09/23  1157   09/09/23  1156   09/09/23  1152        Albumin/Globulin Ratio 1.3   1.1             Albumin 3.5   3.9             Alkaline Phosphatase 50   65             ALT 16   18             AST 16   21             Baso # 0.03   0.03             Basophil % 0.5   0.5             BILIRUBIN TOTAL 0.6   0.6             BUN 18.3   20.7             Calcium 9.3   10.2             Chloride 121   118             Cholesterol   109             CO2 22   25             Creatinine 1.25   1.62             eGFR >60   47             Eos # 0.11   0.19             Eosinophil % 1.7   3.4             Globulin, Total 2.7   3.6             Glucose 103   113             HDL   32             Hematocrit 34.9   39.4             Hemoglobin 11.9   13.7             Immature Grans (Abs) 0.01   0.01             Immature Granulocytes 0.2   0.2             INR   1.1             LDL Cholesterol External   47.00             Lymph # 0.88   0.84             LYMPH % 13.9   15.1             Magnesium  2.10               MCH 28.7   29.6             MCHC 34.1   34.8             MCV 84.3   85.1             Mono # 0.51   0.36             Mono % 8.1   6.5             MPV 10.6   10.1             Neut # 4.78   4.12             Neut % 75.6   74.3             nRBC 0.0   0.0             Phosphorus 2.1               Platelets 103   117             POC Anion Gap     15           POC BUN     22           POC Chloride     114           POC Creatinine     1.8           POC Glucose     111           POC Hematocrit     41           POC HEMOGLOBIN     14           POC Ionized Calcium     1.35           Potassium, Blood Gas     4.3           POC PTINR       1.1         POC PTWBT       13.6         Sodium, Blood Gas     151           POC TCO2 (MEASURED)     27           POCT Glucose         110       Potassium 3.4   4.3             PROTEIN TOTAL 6.2   7.5             Protime   13.9             RBC 4.14   4.63             RDW 13.4   13.2             Sample     VENOUS    VENOUS         Sodium 152   151             Thyroid Stimulating Hormone   0.581             Total Cholesterol/HDL Ratio   3             Triglycerides   152             Very Low Density Lipoprotein   30             WBC 6.32   5.55                                    Significant Imaging: I have reviewed all pertinent imaging results/findings within the past 24 hours.    Assessment and Plan:     Intraparenchymal hemorrhage of brain  Large left frontal IPH    Etiology amyloid angiopathy   Q4 hour neuro checks  Ok to downgrade to the floor  Therapy evals  Allergy to Keppra (severe HA, depression)  Seizure precautions  Avoid antiplatelet or anticoagulation    Discussed Mri brain with wife at this time, currently would not change medical management for amyloid, will cancel MRI brain    Further recommendations to follow from MD    VTE Risk Mitigation (From admission, onward)         Ordered     Place sequential compression device  Until discontinued         09/09/23 1530     IP VTE LOW RISK PATIENT  Once         09/09/23 1349                Janee Meehan NP  Neurology  [facility] - [facility] ICU

## 2023-09-10 NOTE — ASSESSMENT & PLAN NOTE
Large left frontal IPH    Etiology amyloid angiopathy   Q4 hour neuro checks  Ok to downgrade to the floor  Therapy evals  Allergy to Keppra (severe HA, depression)  Seizure precautions  Avoid antiplatelet or anticoagulation    Discussed Mri brain with wife at this time, currently would not change medical management for amyloid, will cancel MRI brain

## 2023-09-10 NOTE — SUBJECTIVE & OBJECTIVE
Subjective:     Interval History:   No new issues, appears slightly improved compared to yesterday. Wife at bedside, she reports they see Dr. Figueroa in [place] as an outpatient. He has had an ICH in 2020 and 2021. Most recent MRI brain in August shows right frontal intraparenchymal hematoma with minimal mass effect as well as similar sequela of prior left frontoparietal intraparenchymal hemorrhage and remote SAH. She reports his blood pressure is well controlled at home. She he was diagnosed with amyloid angiopathy by Dr. Figueroa and was told the hemorrhages will continue to happen.     Patient goes to the outpatient aphasia support group through .     Current Neurological Medications:     Current Facility-Administered Medications   Medication Dose Route Frequency Provider Last Rate Last Admin    famotidine (PF) injection 20 mg  20 mg Intravenous Daily Casey Ames MD   20 mg at 09/10/23 0847    hydrALAZINE injection 10 mg  10 mg Intravenous Q6H PRN Missy Mosher MD   10 mg at 09/09/23 2015    lacosamide (VIMPAT) 150 mg in sodium chloride 0.9% 100 mL IVPB  150 mg Intravenous QHS Missy Mosher MD   Stopped at 09/09/23 2101    lacosamide (VIMPAT) 50 mg in sodium chloride 0.9% 100 mL IVPB  50 mg Intravenous QAM Missy Mosher MD   Stopped at 09/10/23 0632    lactated ringers infusion   Intravenous Continuous Missy Mosher  mL/hr at 09/10/23 0330 New Bag at 09/10/23 0330    potassium phosphate 30 mmol in dextrose 5 % (D5W) 500 mL infusion  30 mmol Intravenous Once Casey Ames MD 83.3 mL/hr at 09/10/23 1137 30 mmol at 09/10/23 1137    sodium chloride 0.9% flush 10 mL  10 mL Intravenous PRN Missy Mosher MD           Review of Systems   Unable to perform ROS: Other (aphasia)     Objective:     Vital Signs (Most Recent):  Temp: 98 °F (36.7 °C) (09/10/23 0800)  Pulse: (!) 55 (09/10/23 1100)  Resp: 17 (09/10/23 1100)  BP: (!) 143/81 (09/10/23 1100)  SpO2: 99 % (09/10/23 1100) Vital Signs (24h Range):  Temp:   [97.5 °F (36.4 °C)-98.4 °F (36.9 °C)] 98 °F (36.7 °C)  Pulse:  [50-76] 55  Resp:  [6-19] 17  SpO2:  [95 %-100 %] 99 %  BP: (113-162)/(66-95) 143/81     Weight: 71.3 kg (157 lb 3 oz)  Body mass index is 21.92 kg/m².     Physical Exam  Vitals and nursing note reviewed.   Constitutional:       General: He is not in acute distress.     Appearance: He is not ill-appearing or toxic-appearing.   HENT:      Head: Normocephalic.   Eyes:      General: No visual field deficit.     Pupils: Pupils are equal, round, and reactive to light.   Pulmonary:      Effort: Pulmonary effort is normal.   Musculoskeletal:         General: Normal range of motion.      Cervical back: Normal range of motion.      Right lower leg: No edema.      Left lower leg: No edema.   Skin:     General: Skin is warm and dry.      Capillary Refill: Capillary refill takes less than 2 seconds.   Neurological:      Mental Status: He is alert.      Cranial Nerves: Dysarthria and facial asymmetry (right facial droop) present.      Motor: Weakness and abnormal muscle tone (increased tone RUE) present. No seizure activity.      Comments:     He is lying in bed with wife at bedside  No gaze preference  Appears to be following some commands  Able to identify and verbalize 2 fingers on the left hand  LUE, LLE 5/5, RUE, RLE 1/5   Increased tone to RUE  No overt seizure activity            NEUROLOGICAL EXAMINATION:     CRANIAL NERVES     CN III, IV, VI   Pupils are equal, round, and reactive to light.      Significant Labs:   Recent Lab Results  (Last 5 results in the past 24 hours)        09/10/23  0047   09/09/23  1208   09/09/23  1157   09/09/23  1156   09/09/23  1152        Albumin/Globulin Ratio 1.3   1.1             Albumin 3.5   3.9             Alkaline Phosphatase 50   65             ALT 16   18             AST 16   21             Baso # 0.03   0.03             Basophil % 0.5   0.5             BILIRUBIN TOTAL 0.6   0.6             BUN 18.3   20.7              Calcium 9.3   10.2             Chloride 121   118             Cholesterol   109             CO2 22   25             Creatinine 1.25   1.62             eGFR >60   47             Eos # 0.11   0.19             Eosinophil % 1.7   3.4             Globulin, Total 2.7   3.6             Glucose 103   113             HDL   32             Hematocrit 34.9   39.4             Hemoglobin 11.9   13.7             Immature Grans (Abs) 0.01   0.01             Immature Granulocytes 0.2   0.2             INR   1.1             LDL Cholesterol External   47.00             Lymph # 0.88   0.84             LYMPH % 13.9   15.1             Magnesium  2.10               MCH 28.7   29.6             MCHC 34.1   34.8             MCV 84.3   85.1             Mono # 0.51   0.36             Mono % 8.1   6.5             MPV 10.6   10.1             Neut # 4.78   4.12             Neut % 75.6   74.3             nRBC 0.0   0.0             Phosphorus 2.1               Platelets 103   117             POC Anion Gap     15           POC BUN     22           POC Chloride     114           POC Creatinine     1.8           POC Glucose     111           POC Hematocrit     41           POC HEMOGLOBIN     14           POC Ionized Calcium     1.35           Potassium, Blood Gas     4.3           POC PTINR       1.1         POC PTWBT       13.6         Sodium, Blood Gas     151           POC TCO2 (MEASURED)     27           POCT Glucose         110       Potassium 3.4   4.3             PROTEIN TOTAL 6.2   7.5             Protime   13.9             RBC 4.14   4.63             RDW 13.4   13.2             Sample     VENOUS   VENOUS         Sodium 152   151             Thyroid Stimulating Hormone   0.581             Total Cholesterol/HDL Ratio   3             Triglycerides   152             Very Low Density Lipoprotein   30             WBC 6.32   5.55                                    Significant Imaging: I have reviewed all pertinent imaging results/findings within the  past 24 hours.

## 2023-09-10 NOTE — PLAN OF CARE
Problem: Physical Therapy  Goal: Physical Therapy Goal  Description: Goals to be met by: 10/10/23     Patient will increase functional independence with mobility by performing:    Supine to sit with Modified Dresher  Sit to stand transfer with Modified Dresher    Outcome: Ongoing, Progressing

## 2023-09-10 NOTE — PROCEDURES
[facility]  Speech Language Pathology Department  Modified Barium Swallow (MBS) Study    Patient Name:  Bhupendra Park   MRN:  [id number]    Recommendations:     General recommendations:  Speech/Language and Cognitive Evaluation  Repeat MBS study: not indicated  Diet recommendations:  Soft & Bite-sized solids (IDDSI 6) and thin liquids (IDDSI 0)  Medications: whole 1 at a time  Swallow strategies/precautions: alternate solids/liquids and upright for PO intake  General precautions: Standard, aspiration, aphasia    History/Reason for Referral:       Past Medical History:   Diagnosis Date    Stroke      No past surgical history on file.  A MBS Study was completed to assess the efficiency of his swallow function, rule out aspiration and make recommendations regarding safe dietary consistencies, effective compensatory strategies, and safe eating environment.       Home Diet: Soft & Bite-sized  Current Method of Nutrition: NPO    Patient complaint: None, aphasic    Imaging   No results found for this or any previous visit.    No results found for this or any previous visit.    No results found for this or any previous visit.    Subjective:     Patient awake, alert, and cooperative.    Spiritual/Cultural/Caodaism Beliefs/Practices that affect care: no  Pain/Comfort: Pain Rating 1: 0/10    Respiratory Status: room air  Restraints/positioning devices: none    Fluoroscopic Results:     Oral Musculature  Dentition: own teeth  Secretion Management: adequate  Mucosal Quality: good  Facial Movement: reduced right  Buccal Strength & Mobility: low tone  Mandibular Strength & Mobility: WFL  Oral Labial Strength & Mobility: impaired retraction  Lingual Strength & Mobility: WFL      Positioning: upright in bed  Visualization  Patient was seen in the lateral view  Cervical hardware present    Oral Phase:   Adequate lip closure  Adequate bolus formation    Pharyngeal Phase:   Reduced base of tongue  retraction  Reduced hyolaryngeal excursion  Consistency Laryngeal Penetration Aspiration Residue   Mildly thick liquid by spoon None None    Puree None None Severe  Valleculae  Cleared with liquid wash  Trace post. phryg. wall residue   Thin liquid by cup None None Mild  Valleculae  Unable to elicit swallow to clear   Chewable solid None None Severe  Valleculae  Cleared with liquid wash       Cervical Esophageal Phase:   UES appeared to accommodate all bolus types without stasis or retrograde movement visualized      Assessment:     Pt exhibited mild-moderate oropharyngeal dysphagia characterized by the findings noted above.  No laryngeal penetration/aspiration was visualized during this study.  Swallow safety is preserved; however, swallow efficiency is impaired.    Patient appears to be at low risk for aspiration related pneumonia when considering cognitive impairments and hx of ACDF .  Prognosis for behavioral swallow rehabilitation is fair.    Goals:     Multidisciplinary Problems       SLP Goals          Problem: SLP    Goal Priority Disciplines Outcome   SLP Goal     SLP    Description: LTG: Pt will tolerate least restrictive diet with no clinical s/sx of aspiration.                     Patient Education:     Patient provided with verbal education regarding swallow function.  Understanding was verbalized.    Plan:     Soft/bite-sized diet  Thin liquids  Meds whole, 1 at a time  Alternate bites/sips to clear pharyngeal residue  Supervision with meals to assist with alternating bites/sips  SLCE    Time Tracking:     SLP Treatment Date:   09/10/23  Speech Start Time:  1300  Speech Stop Time:  1330     Speech Total Time (min):  30 min    Billable minutes:   Motion Fluoroscopic Evaluation, Video Recording, 30 minutes     09/10/2023

## 2023-09-10 NOTE — H&P
"[facility] Hospital Medicine History & Physical Examination       Patient Name: Bhupendra Park  MRN: [id number]  Patient Class: IP- Inpatient   Admission Date: 09/10/2023   Admitting Service: Hospital Medicine   Length of Stay: 1  Attending Physician: Dr. Cruz  Primary Care Provider: Kelly Meadows MD  Face-to-Face encounter date: 09/10/2023  Code Status: Full  Chief Complaint: Medical problem (Wife reports the patient was found with urinary incontinence, decreased responsiveness, and a R facial droop. LKN 9/8/2023 at 2000. Hx of CVA with right sided deficits. Baseline only says "Yes" and aphasic. .  )      Present at Bedside: Spouse  Patient information was obtained from patient, patient's family, past medical records and ER records.      MD addendum -  Pt is a 62 yo male with h/o Cerebral amyloid angiopathy (CAA) and recurrent brain bleed with ICH in 2020, 2021, remote SAH,  Rt frontal IPH with minimal mass effect in 8/2023 and prior left frontoparietal IPH with right sided deficit , expressive and receptive aphasia , additional medical history of HTN, GERD, Hypothyroidism, Former smoker, Back and cervical spine  surgery was brought into the ED for altered mental status with decreased responsiveness , urinary incontinence, new Rt facial droop.  CT head showed a large left frontal parenchymal hematoma with 3 to 4 mm a midline shift, intraventricular hemorrhage with ventricular enlargement suspicious for developing hydrocephalus.  CTA head/neck negative for LVO.  Neurology and neurosurgery services were consulted and  admitted to ICU.   Neurosurgery suggested no surgical intervention not warranted at this time.  Neurology  recommended -150 for 1st 24 hours then less than 140; avoid antiplatelets or anticoagulation; seizure precautions (patient was  initially given Keppra, but is allergic so was placed on Vimpat). Follow up CT head on 09/10/2023 showed  extensive " intracranial hemorrhage with new areas of subdural blood seen in the posterior fossa on the right side and some blood along the sylvian fissure on the right side, otherwise unchanged.  He was deemed stable for downgraded to the floor today  09/10/2023.  Hospital medicine consulted for assumption of care and further medical management.     On exam,  pt is awake, able to follow simple commands and remains with aphasia and right sided dense hemiplegia with muscle strength 0/5 Rt U/L ext     SLP cleared pt for soft, bite sized diet and thin liquid today. Neurosurgery signed off today as no indication for surgical intervention at this point. PT/OT consulted and SNF vs rehab placement suggested     A/P  Cerebral Amyloid angiopathy   Recurrent brain bleed with new large  left frontal parenchymal hematoma with 3 to 4 mm a midline shift,  Right sided hemiplegia   Aphasia   Hypernatremia    DOE- improving   Hypophosphatemia -replete     No neurosurgical intervention indicated   No antiplatelet and anticoagulant   BP control   Statin   Increase free water once cleared for oral intake   Replete and correct electrolyte as indicated         HISTORY OF PRESENT ILLNESS:   Bhupendra Park is a 63 y.o. male with a PMHx of HTN, history of bilateral frontal IPH with residual right-sided deficits, expressive and receptive aphasia, history of spinal cord stimulator, GERD, hypothyroidism who presented to [facility] on 9/9/2023 with c/o increased confusion and lethargy as reported by the patient's wife.  Patient's wife reports that she found the patient with a decreased level of consciousness and a new right-sided facial droop.  Last known normal time was on 09/08/2023 at 8:00 p.m. labs were notable for sodium 151, chloride 118, creatinine 1.62, calcium 10.2, HDL 32, triglycerides 152, LDL 47.  CT head demonstrated a large left frontal parenchymal hematoma with 3 to 4 mm a midline shift, intraventricular hemorrhage with ventricular  enlargement suspicious for developing hydrocephalus.  CTA head/neck negative for LVO.  Neurology and neurosurgery services were consulted.  He was admitted to ICU.  Seen by Neurosurgery, surgical intervention not warranted.  Seen by Neurology, recommended -150 for 1st 24 hours then less than 140; avoid antiplatelets or anticoagulation; seizure precautions (patient initially given Keppra, but is allergic so was placed on Vimpat.  CT head on 09/10/2023 demonstrated extensive intracranial hemorrhage with new areas of subdural blood seen in the posterior fossa on the right side and some blood along the sylvian fissure on the right side, otherwise unchanged.  He was downgraded to the floor on 09/10/2023.  Hospital medicine consulted for assumption of care and further medical management.      REVIEW OF SYSTEMS:   Except as documented, all other systems reviewed and negative     PAST MEDICAL HISTORY:   Essential hypertension  History of Bilateral Frontal IPH with residual right-sided deficits, expressive and receptive aphasia  GERD   Hypothyroidism    PAST SURGICAL HISTORY:   Spinal cord stimulator  Cervical spine surgery  Back surgery    FAMILY HISTORY:   Reviewed and negative    SOCIAL HISTORY:   Denied alcohol, tobacco or illicit drug use.  Former smoker.    ALLERGIES:   Keppra [levetiracetam]    HOME MEDICATIONS:     Prior to Admission medications    Medication Sig Start Date End Date Taking? Authorizing Provider   baclofen (LIORESAL) 10 MG tablet Take 10 mg by mouth 3 (three) times daily.   Yes Provider, Historical   famotidine (PEPCID) 20 MG tablet Take 1 tablet (20 mg total) by mouth 2 (two) times daily. 3/6/23 9/9/23 Yes Reyes, Thairy G, DO   lacosamide (VIMPAT) 150 mg Tab tablet Take 150 mg by mouth every evening.   Yes Provider, Historical   lacosamide (VIMPAT) 50 mg Tab Take 50 mg by mouth every morning.   Yes Provider, Historical   levothyroxine (SYNTHROID) 88 MCG tablet Take 1 tablet (88 mcg total) by  mouth every morning. 3/6/23 9/9/23 Yes Reyes, Thairy G, DO   naproxen (NAPROSYN) 250 MG tablet Take 250 mg by mouth 2 (two) times daily.   Yes Provider, Historical   rosuvastatin (CRESTOR) 40 MG Tab Take 1 tablet (40 mg total) by mouth every evening. 3/6/23 9/9/23 Yes Reyes, Thairy G, DO   tamsulosin (FLOMAX) 0.4 mg Cap Take 1 capsule (0.4 mg total) by mouth once daily. 3/7/23 9/9/23 Yes Reyes, Thairy G, DO     ________________________________________________________________________  INPATIENT LIST OF MEDICATIONS     Current Facility-Administered Medications:     famotidine (PF) injection 20 mg, 20 mg, Intravenous, Daily, Casey Ames MD, 20 mg at 09/10/23 0847    hydrALAZINE injection 10 mg, 10 mg, Intravenous, Q6H PRN, Missy Mosher MD, 10 mg at 09/09/23 2015    lacosamide (VIMPAT) 150 mg in sodium chloride 0.9% 100 mL IVPB, 150 mg, Intravenous, QHS, Missy Mosher MD, Stopped at 09/09/23 2101    lacosamide (VIMPAT) 50 mg in sodium chloride 0.9% 100 mL IVPB, 50 mg, Intravenous, QAM, Missy Mosher MD, Stopped at 09/10/23 0632    lactated ringers infusion, , Intravenous, Continuous, Missy Mosher MD, Last Rate: 100 mL/hr at 09/10/23 0330, New Bag at 09/10/23 0330    potassium phosphate 30 mmol in dextrose 5 % (D5W) 500 mL infusion, 30 mmol, Intravenous, Once, Casey Ames MD, Last Rate: 83.3 mL/hr at 09/10/23 1137, 30 mmol at 09/10/23 1137    sodium chloride 0.9% flush 10 mL, 10 mL, Intravenous, PRN, Missy Mosher MD    Scheduled Meds:   famotidine (PF)  20 mg Intravenous Daily    lacosamide (VIMPAT) IVPB  150 mg Intravenous QHS    lacosamide (VIMPAT) IVPB  50 mg Intravenous QAM    potassium phosphate IVPB  30 mmol Intravenous Once     Continuous Infusions:   lactated ringers 100 mL/hr at 09/10/23 0330     PRN Meds:.hydrALAZINE, sodium chloride 0.9%    PHYSICAL EXAM:     VITAL SIGNS: 24 HRS MIN & MAX LAST   Temp  Min: 97.5 °F (36.4 °C)  Max: 98.4 °F (36.9 °C) 98 °F (36.7 °C)   BP  Min: 113/83  Max: 162/72 (!)  143/81   Pulse  Min: 50  Max: 76  (!) 55   Resp  Min: 6  Max: 19 17   SpO2  Min: 95 %  Max: 100 % 99 %       General appearance: Elderly  male in no apparent distress.  HENT: Atraumatic head. Moist mucous membranes of oral cavity.  Eyes: Normal extraocular movements.   Neck: Supple.   Lungs: Clear to auscultation bilaterally.   Heart: Regular rate and rhythm. S1 and S2 present. No pedal edema.  Abdomen: Soft, non-distended, non-tender.  Extremities: No cyanosis, clubbing  Skin: No Rash.   Neuro: Aphasic, right sided facial droop, right sided weakness  Psych/mental status: Appropriate mood and affect. Responds appropriately to questions.     LABS AND IMAGING:     Recent Labs   Lab 09/09/23  1157 09/09/23  1208 09/10/23  0047   WBC  --  5.55 6.32   RBC  --  4.63* 4.14*   HGB  --  13.7* 11.9*   HCT 41 39.4* 34.9*   MCV  --  85.1 84.3   MCH  --  29.6 28.7   MCHC  --  34.8 34.1   RDW  --  13.2 13.4   PLT  --  117* 103*   MPV  --  10.1 10.6*       Recent Labs   Lab 09/09/23  1208 09/10/23  0047   * 152*   K 4.3 3.4*   CO2 25 22*   BUN 20.7 18.3   CREATININE 1.62* 1.25*   CALCIUM 10.2* 9.3   MG  --  2.10   ALBUMIN 3.9 3.5   ALKPHOS 65 50   ALT 18 16   AST 21 16   BILITOT 0.6 0.6       Microbiology Results (last 7 days)       ** No results found for the last 168 hours. **             CT Head Without Contrast  Narrative: EXAMINATION:  CT HEAD WITHOUT CONTRAST    CLINICAL HISTORY:  Stroke, follow up;    TECHNIQUE:  Multiple axial images were obtained from the base of the brain to the vertex without contrast administration.  Sagittal and coronal reconstructions were performed. .Automatic exposure control  (AEC) is utilized to reduce patient radiation exposure.    COMPARISON:  09/09/2023    FINDINGS:  There is a large intracranial hemorrhage seen centered around the left frontal lobe and the left basal ganglia.  There is significant amount of cytotoxic edema seen.  There is a midline shift from left to right that  measures 5 mm.  There is intraventricular blood seen as well.  There is areas of ischemia involving the frontal lobes bilaterally which was seen on the prior examination as well.  There is some blood seen along the sylvian fissure on the right side.  This is a new finding.    Carotid general and basilar cisterns appear grossly unremarkable.  There is blood seen in the posterior fossa which is new.  This is seen on the right side on images number 40 through 44.  There is a small amount of subdural blood and maximum dimension is 4.5 mm.  Impression: Extensive intracranial hemorrhage seen as outlined above with new areas of subdural blood seen in the posterior fossa on the right side and some blood seen along the sylvian fissure on the right side.  These are new findings.    Otherwise unchanged    Electronically signed by: Sesar Cazares  Date:    09/10/2023  Time:    10:08        ASSESSMENT & PLAN:     Acute left frontal parenchymal hematoma with intraventricular hemorrhage  Hx of Bilateral ICH with residual right sided deficits, expressive and receptive aphasia  Hx of GERD, Hypothyroidism, SCS    Plan:  Neurology following, appreciate recommendations  Neurosurgery following, appreciate recommendations  Neuro checks per Neurology/Neurosurgery recs   SBP goal less than 140   Avoid antiplatelets or anticoagulations   Seizure precautions  Continue IV Vimpat   PT/OT/ST  Labs in AM      VTE Prophylaxis: SCDs    Discharge Planning and Disposition: TBD    I, Florence Gonzalez, NP have reviewed and discussed the case with Dr. Cruz.  Please see the attending MD's addendum for further assessment and plan.    Florence Gonzalez, AGACNP-BC  09/10/2023    _______________________________________________________________________________  MD Addendum:  I, Dr.Shameem Cruz , assumed care of this patient today   For the patient encounter, I performed the substantive portion of the visit, I reviewed the NP/PA documentation, treatment  plan, and medical decision making.  I had face to face time with this patient       All diagnosis and differential diagnosis have been reviewed; assessment and plan has been documented; I have personally reviewed the labs and test results that are presently available; I have reviewed the patients medication list; I have reviewed the consulting providers response and recommendations. I have reviewed or attempted to review medical records based upon their availability.    All of the patient and family questions have been addressed and answered. Patient's is agreeable to the above stated plan. I will continue to monitor closely and make adjustments to medical management as needed.      09/10/2023

## 2023-09-11 NOTE — PT/OT/SLP EVAL
[facility]  Speech Language Pathology Department  Cognitive-Communication Evaluation    Patient Name:  Bhupendra Park   MRN:  [id number]    Recommendations:     General recommendations:  Speech/Language and Cognitive Evaluation  Communication strategies:  provide increased time to answer and go to room if call light pushed  Discharge recommendations:    rehab  Barriers to safe discharge: safety awareness    History:     Bhupendra Park is a/n 63 y.o. male admitted history of bilateral frontal IPH with residual right-sided deficits, expressive and receptive aphasia, history of spinal cord stimulator, GERD, hypothyroidism who presented to [facility] on 9/9/2023 with c/o increased confusion and lethargy as reported by the patient's wife.  Diagnostics confirmed acute left frontal parenchymal hematoma with intraventricular hemorrhage    Past Medical History:   Diagnosis Date    Stroke      Previous level of Function  Glasses: yes  Subjective     Patient awake, alert, and oriented x4 .  Spiritual/Cultural/Restoration Beliefs/Practices that affect care: no  Pain/Comfort:  0/10    Objective:     ORAL MUSCULATURE  Dentition: own teeth  Facial Movement: WFL      SPEECH PRODUCTION  Phoneme Production: adequate  Voice Quality: adequate  Voice Production: adequate  Speech Rate: appropriate    Speech Intelligibility  Known Context: Greater that 90%  Unknown Context: Greater that 90%    AUDITORY COMPREHENSION  Identification:  Body parts: 0%  Objects: 0%  Following Directions:  1-Step: 0%  2-Step: 0  Yes/No Questions:  Biographical: 0  Environmental: 0  Simple: 0%  Complex: 0%    VERBAL EXPRESSION  Automatic Speech:  Days of the week: 0  Months of the year: 0  Confrontation Naming  Body Parts: 0  Objects: 0  Wh- Questions:  Object name: 0  Object function: 0    Assessment:     Pt presents with aphasia requiring skilled SLP intervention.     Goals:     Multidisciplinary Problems       SLP Goals           Problem: SLP    Goal Priority Disciplines Outcome   SLP Goal     SLP    Description: LT. Pt will tolerate least restrictive diet with no clinical s/sx of aspiration.  2. Increase expressive/receptive language skills to enhance  functional communication with less than a 10% breakdown.  ST. Easy to chew trials without clinical signs/sx of aspiration.   2. Answer yes/No questions with 70% accuracy.  3. Follow 1-Step directions with 70% accuracy.   4. Complete automatic sequence tasks with 70% accuracy.  5. Complete simple phrases with 50% accuracy.                          Patient Education:     Patient provided with verbal education regarding POC.  Understanding was verbalized.    Plan:     SLP Follow-Up:  Yes   Patient to be seen:  5 x/week   Plan of Care expires:  23  Plan of Care reviewed with:  patient      Time Tracking:     SLP Treatment Date:    23  Speech Start Time:   845  Speech Stop Time:     900   Speech Total Time (min):   15    Billable minutes:  Evaluation of Speech Sound Production with Comprehension and Expression, 15 minutes     2023

## 2023-09-11 NOTE — NURSING
Nurses Note -- 4 Eyes      9/11/2023   4:59 PM      Skin assessed during: Daily Assessment      [x] No Altered Skin Integrity Present    [x]Prevention Measures Documented      [] Yes- Altered Skin Integrity Present or Discovered   [] LDA Added if Not in Epic (Describe Wound)   [] New Altered Skin Integrity was Present on Admit and Documented in LDA   [] Wound Image Taken    Wound Care Consulted? No    Attending Nurse:  Rubia Joshua RN/Staff Member:   SARA French

## 2023-09-11 NOTE — PROGRESS NOTES
[facility] Hospital Medicine Progress Note        Chief Complaint: Inpatient Follow-up for stroke     HPI:   Pt is a 64 yo male with h/o Cerebral amyloid angiopathy (CAA) and recurrent brain bleed with ICH in 2020, 2021, remote SAH,  Rt frontal IPH with minimal mass effect in 8/2023 and prior left frontoparietal IPH with right sided deficit , expressive and receptive aphasia , additional medical history of HTN, GERD, Hypothyroidism, Former smoker, Back and cervical spine  surgery was brought into the ED for altered mental status with decreased responsiveness , urinary incontinence, new Rt facial droop.  CT head showed a large left frontal parenchymal hematoma with 3 to 4 mm a midline shift, intraventricular hemorrhage with ventricular enlargement suspicious for developing hydrocephalus.  CTA head/neck negative for LVO.  Neurology and neurosurgery services were consulted and  admitted to ICU. Neurosurgery suggested no surgical intervention warranted at this time.  Neurology  recommended -150 for 1st 24 hours then less than 140; avoid antiplatelets or anticoagulation; seizure precautions (patient was  initially given Keppra, but is allergic so was placed on Vimpat). Follow up CT head on 09/10/2023 showed  extensive intracranial hemorrhage with new areas of subdural blood seen in the posterior fossa on the right side and some blood along the sylvian fissure on the right side, otherwise unchanged.  He was deemed stable for downgraded to the floor today  09/10/2023.  Hospital medicine consulted for assumption of care and further medical management.        SLP cleared pt for soft, bite sized solid and thin liquid today. Neurosurgery signed off on 9/10/23 as no indication for surgical intervention at this point. PT/OT consulted and SNF vs rehab placement suggested       Interval Hx:   Pt is sitting in the bedside chair today . Nephew in the room. Pt is awake, alert and only able to  answer yes or no to questions.     Vitals are stable with BP at goal.   Mild bradycardia noted . SR on monitor . Bradycardia is possible with brain bleed.     Labs today- WBC 5.8, Hgb 12.2, Plt 113, Na improved to 148>152, K 3.2- replaced, CO2 22, Cr improved to 1.1>1.6    Case was discussed with patient's nurse and  on the floor.    Objective/physical exam:  General: In no acute distress, afebrile  Chest: Clear to auscultation bilaterally  Heart: Bradycardia, +S1, S2, no appreciable murmur  Abdomen: Soft, nontender, BS +  MSK: Warm, no lower extremity edema, no clubbing or cyanosis  Neurologic: Awake, oriented to self, Follow simple commands, (+) aphasia, MS RUE 0/5, RLE 1/5, LUE/LLE 4/5    VITAL SIGNS: 24 HRS MIN & MAX LAST   Temp  Min: 97.5 °F (36.4 °C)  Max: 98.8 °F (37.1 °C) 97.8 °F (36.6 °C)   BP  Min: 112/68  Max: 153/72 112/68   Pulse  Min: 49  Max: 72  68   Resp  Min: 9  Max: 21 20   SpO2  Min: 95 %  Max: 100 % 99 %     I have reviewed the following labs:  Recent Labs   Lab 09/09/23  1208 09/10/23  0047 09/11/23  0057   WBC 5.55 6.32 5.84   RBC 4.63* 4.14* 4.14*   HGB 13.7* 11.9* 12.2*   HCT 39.4* 34.9* 35.0*   MCV 85.1 84.3 84.5   MCH 29.6 28.7 29.5   MCHC 34.8 34.1 34.9   RDW 13.2 13.4 13.2   * 103* 113*   MPV 10.1 10.6* 10.0     Recent Labs   Lab 09/09/23  1208 09/10/23  0047 09/11/23  0057   * 152* 148*   K 4.3 3.4* 3.2*   CO2 25 22* 22*   BUN 20.7 18.3 18.0   CREATININE 1.62* 1.25* 1.10   CALCIUM 10.2* 9.3 9.2   MG  --  2.10 2.00   ALBUMIN 3.9 3.5 3.6   ALKPHOS 65 50 48   ALT 18 16 14   AST 21 16 14   BILITOT 0.6 0.6 1.1     Microbiology Results (last 7 days)       ** No results found for the last 168 hours. **             See below for Radiology    Scheduled Med:   [START ON 9/12/2023] amLODIPine  5 mg Oral Daily    atorvastatin  40 mg Oral Daily    baclofen  10 mg Oral TID    famotidine  20 mg Oral BID    lacosamide (VIMPAT) IVPB  150 mg Intravenous QHS    lacosamide (VIMPAT)  IVPB  50 mg Intravenous QAM    levothyroxine  88 mcg Oral QAM    mupirocin   Nasal BID    tamsulosin  0.4 mg Oral Daily      Continuous Infusions:     PRN Meds:  hydrALAZINE, sodium chloride 0.9%     Assessment/Plan:  Cerebral Amyloid angiopathy   Recurrent brain bleed with new large  left frontal parenchymal hematoma with 3 to 4 mm a midline shift,  Right sided hemiplegia   Aphasia   Normocytic anemia   Thrombocytopenia - unknown chronicity   Hypernatremia, POA- improving   DOE-resolved   Hypophosphatemia -replete as indicated     Plan-  Q4h neuro check   Seizure precaution   No neurosurgical intervention indicated   No antiplatelet and anticoagulant   BP control with goal under 140/90  Continue statin   Increase free water once cleared for oral intake   Replete and correct electrolyte as indicated   Continue SLP service   Pt is cleared for soft and bite sized solid and thin liquid  Continue PT/OT service   Disposition - SNF vs Rehab pending progress       VTE prophylaxis: SCDs    Patient condition:  Fair    Anticipated discharge and Disposition:     SNF vs Rehab pending progress     All diagnosis and differential diagnosis have been reviewed; assessment and plan has been documented; I have personally reviewed the labs and test results that are presently available; I have reviewed the patients medication list; I have reviewed the consulting providers response and recommendations. I have reviewed or attempted to review medical records based upon their availability    All of the patient's questions have been  addressed and answered. Patient's is agreeable to the above stated plan. I will continue to monitor closely and make adjustments to medical management as needed.  _____________________________________________________________________    Nutrition Status:    Radiology:  I have personally reviewed the following imaging and agree with the radiologist.     Fl Modified Barium Swallow Speech  See procedure notes from  Speech Pathologist.    This procedure was auto-finalized.  CT Head Without Contrast  Narrative: EXAMINATION:  CT HEAD WITHOUT CONTRAST    CLINICAL HISTORY:  Stroke, follow up;    TECHNIQUE:  Multiple axial images were obtained from the base of the brain to the vertex without contrast administration.  Sagittal and coronal reconstructions were performed. .Automatic exposure control  (AEC) is utilized to reduce patient radiation exposure.    COMPARISON:  09/09/2023    FINDINGS:  There is a large intracranial hemorrhage seen centered around the left frontal lobe and the left basal ganglia.  There is significant amount of cytotoxic edema seen.  There is a midline shift from left to right that measures 5 mm.  There is intraventricular blood seen as well.  There is areas of ischemia involving the frontal lobes bilaterally which was seen on the prior examination as well.  There is some blood seen along the sylvian fissure on the right side.  This is a new finding.    Carotid general and basilar cisterns appear grossly unremarkable.  There is blood seen in the posterior fossa which is new.  This is seen on the right side on images number 40 through 44.  There is a small amount of subdural blood and maximum dimension is 4.5 mm.  Impression: Extensive intracranial hemorrhage seen as outlined above with new areas of subdural blood seen in the posterior fossa on the right side and some blood seen along the sylvian fissure on the right side.  These are new findings.    Otherwise unchanged    Electronically signed by: Sesar Cazares  Date:    09/10/2023  Time:    10:08      Eliu Cruz MD   09/11/2023

## 2023-09-11 NOTE — PT/OT/SLP PROGRESS
Physical Therapy Treatment    Patient Name:  Bhupendra Park   MRN:  [id number]    Recommendations:     Discharge Recommendations: rehabilitation facility  Discharge Equipment Recommendations: to be determined by next level of care  Barriers to discharge: Impaired mobility and Ongoing medical needs    Assessment:     Bhupendra Park is a 63 y.o. male admitted with a medical diagnosis of large L frontal parenchymal hematoma, intraventricular hemorrhage  Hx: CVA c R weakness.  He presents with the following impairments/functional limitations: weakness, gait instability, decreased upper extremity function, decreased lower extremity function, decreased ROM, impaired coordination, impaired balance, impaired endurance, decreased safety awareness, impaired cognition, impaired fine motor, impaired muscle length, impaired joint extensibility, impaired self care skills, impaired functional mobility.    Rehab Prognosis: Good; patient would benefit from acute skilled PT services to address these deficits and reach maximum level of function.    Recent Surgery: * No surgery found *      Plan:     During this hospitalization, patient to be seen 6 x/week to address the identified rehab impairments via gait training, therapeutic activities, therapeutic exercises, neuromuscular re-education and progress toward the following goals:    Plan of Care Expires:  10/10/23    Subjective     Chief Complaint: none stated (expressive aphasia)  Patient/Family Comments/goals: none stated  Pain/Comfort:  Pain Rating 1: 0/10      Objective:     Communicated with RN prior to session.  Patient found up in chair with pulse ox (continuous), blood pressure cuff, telemetry, PureWick upon PT entry to room.     General Precautions: Standard, fall, aphasia  Orthopedic Precautions: N/A  Braces: N/A  Respiratory Status: Room air  Vitals:   Blood pressure: 143/67  HR: 59  SPO2: 99%  Skin Integrity: Visible skin intact      Functional Mobility:  Bed  Mobility:    Sit to supine with mod x 2 assist; crossed LLE under RLE to assist into bed  Transfers:    Sit<->stand x 3 trials  Mod assist  R knee blocked to prevent buckling  Assist to achieve erect posture, pt able to maintain fairly well  Stand pivot with max assist x 2  Standing Balance/Pregait:   Alt weight shifts x 10 reps with improving RLE quad control and posture with intermittent TC's to improve gluteal contraction  Attempts to advance LLE however noted difficulty following verbal and visual commands therefore unable to complete side step or posterior step towards bed     Therapeutic Activities/Exercises:  Pt able to lift RLE with cues    Education:  Patient provided with verbal education regarding POC.  Understanding was verbalized, however additional teaching warranted.     Patient left HOB elevated with wedge, SCD's and RLE PRAFO in place and all lines intact and call button in reach..    GOALS:   Multidisciplinary Problems       Physical Therapy Goals          Problem: Physical Therapy    Goal Priority Disciplines Outcome Goal Variances Interventions   Physical Therapy Goal     PT, PT/OT Ongoing, Progressing     Description: Goals to be met by: 10/10/23     Patient will increase functional independence with mobility by performing:    Supine to sit with Modified [place]  Sit to stand transfer with Modified [place]                         Time Tracking:     PT Received On: 09/11/23  PT Start Time: 1420     PT Stop Time: 1443  PT Total Time (min): 23 min     Billable Minutes: Therapeutic Activity 2 units    Treatment Type: Treatment  PT/PTA: PTA     Number of PTA visits since last PT visit: 1 09/11/2023

## 2023-09-11 NOTE — PROGRESS NOTES
"Inpatient Nutrition Evaluation    Admit Date: 9/9/2023   Total duration of encounter: 2 days    Nutrition Recommendation/Prescription     Continue current diet as tolerated per SLP recs for consistency.   Add Boost Plus (provides 360 kcal, 14 g protein per serving) TID.    Nutrition Assessment     Chart Review    Reason Seen: physician consult for assess dietary needs    Malnutrition Screening Tool Results   Have you recently lost weight without trying?: No  Have you been eating poorly because of a decreased appetite?: No   MST Score: 0     Diagnosis:  Acute left frontal parenchymal hematoma with intraventricular hemorrhage    Relevant Medical History: HTN, GERD, hypothyroidism, Bilateral Frontal IPH with residual right-sided deficits, expressive and receptive aphasia    Nutrition-Related Medications: D5-1/2NS @ 100ml/hr    Nutrition-Related Labs:  9/11 Na 148, K 3.2, Cl 119    Diet Order: Diet Soft & Bite Sized (IDDSI Level 6)  Oral Supplement Order: none  Appetite/Oral Intake: good/% of meals  Factors Affecting Nutritional Intake: none identified  Food/Hinduism/Cultural Preferences: unable to obtain  Food Allergies: none reported       Wound(s):   none noted per EMR    Comments    9/11/23: Unable to verify subjective info with pt. Per RN, pt with ~75% po intake of AM meal. Does tend to have little po intake for AM and noon meal PTA (eats large PM meal). Will send ONS for added kcal and protein. Noted wt loss since 2019. Unable to verify if wt loss recent or not. Consistent wt from March of this year.     Anthropometrics    Height: 5' 11" (180.3 cm)    Last Weight: 71.3 kg (157 lb 3 oz) (09/09/23 1700) Weight Method: Bed Scale  BMI (Calculated): 21.9  BMI Classification: normal (BMI 18.5-24.9)        Ideal Body Weight (IBW), Male: 172 lb     % Ideal Body Weight, Male (lb): 91.39 %                          Usual Weight Provided By: EMR weight history    Wt Readings from Last 5 Encounters:   09/09/23 71.3 kg " (157 lb 3 oz)   03/06/23 71.3 kg (157 lb 3 oz)   04/05/19 89 kg (196 lb 3.4 oz)     Weight Change(s) Since Admission:  Admit Weight: 71.3 kg (157 lb 3 oz) (09/09/23 1700)    Patient Education    Not applicable.    Monitoring & Evaluation     Dietitian will monitor energy intake.  Nutrition Risk/Follow-Up: low (follow-up in 5-7 days)  Patients assigned 'low nutrition risk' status do not qualify for a full nutritional assessment but will be monitored and re-evaluated in a 5-7 day time period. Please consult if re-evaluation needed sooner.

## 2023-09-11 NOTE — PLAN OF CARE
Problem: Adult Inpatient Plan of Care  Goal: Plan of Care Review  Outcome: Ongoing, Progressing  Goal: Patient-Specific Goal (Individualized)  Outcome: Ongoing, Progressing  Goal: Absence of Hospital-Acquired Illness or Injury  Outcome: Ongoing, Progressing  Goal: Optimal Comfort and Wellbeing  Outcome: Ongoing, Progressing  Goal: Readiness for Transition of Care  Outcome: Ongoing, Progressing     Problem: Adjustment to Stroke  Goal: Optimal Adjustment to Stroke  Outcome: Ongoing, Progressing     Problem: BADL (Basic Activities of Daily Living) Impairment (Stroke)  Goal: Optimal Safe BADL Performance  Outcome: Ongoing, Progressing     Problem: Bowel Management (Stroke)  Goal: Effective Bowel Elimination/Continence  Outcome: Ongoing, Progressing     Problem: Cognitive Impairment (Stroke)  Goal: Optimal Cognitive Function  Outcome: Ongoing, Progressing     Problem: Communication Impairment (Stroke)  Goal: Effective Communication Skills  Outcome: Ongoing, Progressing     Problem: IADL (Instrumental Activities of Daily Living) Impairment (Stroke)  Goal: Optimal Safe IADL Performance  Outcome: Ongoing, Progressing

## 2023-09-11 NOTE — PLAN OF CARE
09/11/23 1531   Discharge Assessment   Assessment Type Discharge Planning Assessment   Confirmed/corrected address, phone number and insurance Yes   Confirmed Demographics Correct on Facesheet   Source of Information family   When was your last doctors appointment?   (Patient's spouse reports March of this year.)   Communicated ABRAHAM with patient/caregiver Yes   Reason For Admission Intracranial Hemmorage   People in Home grandchild(maite);spouse   Do you expect to return to your current living situation? Yes   Do you have help at home or someone to help you manage your care at home? Yes   Who are your caregiver(s) and their phone number(s)? Spouse: Sharon Park: [phone]   Prior to hospitilization cognitive status: Unable to Assess   Current cognitive status: Alert/Oriented   Walking or Climbing Stairs ambulation difficulty, requires equipment   Home Layout Able to live on 1st floor   Equipment Currently Used at Home cane, quad;hospital bed;wheelchair   Readmission within 30 days? No   Patient currently being followed by outpatient case management? No   Do you currently have service(s) that help you manage your care at home? No   Do you take prescription medications? Yes   Do you have prescription coverage? Yes   Coverage [employer]   Do you have any problems affording any of your prescribed medications? No   Is the patient taking medications as prescribed? yes   Who is going to help you get home at discharge? Patient's spouse.   How do you get to doctors appointments? family or friend will provide   Are you on dialysis? No   Do you take coumadin? No   DME Needed Upon Discharge  none   Discharge Plan discussed with: Spouse/sig other   Name(s) and Number(s) Spouse: [phone]   Transition of Care Barriers None   Discharge Plan A Skilled Nursing Facility   Discharge Plan B Home with family;New Nursing Home placement - [facility] care facility   OTHER   Name(s) of People in Home Patient resides in a  "home with his wife and 15y.o grandson       Patient's spouse reports patient has "Mixed Aphasia".     SW completed Discharge Assessment with patient's spouse via phone.  Per patient's spouse, patient's PCP is Vel PERALTA MD.  Patient's spouse reports she is unable to care for patient at home alone. She became tearful and noted she has been caring for patient since his stroke. SW discussed SNF in which spouse reports patient is known at [facility]. SW also discussed group home placement in which patient's spouse stated she is unable to afford nursing home placement and her only means of income is from patient etc. Following, SW discussed sitter service and noted she will provide a list of sitter services tomorrow during visitation.  SW sent referral to [facility] via [facility].  "

## 2023-09-11 NOTE — PLAN OF CARE
LT. Pt will tolerate least restrictive diet with no clinical s/sx of aspiration.  2. Increase expressive/receptive language skills to enhance  functional communication with less than a 10% breakdown.  ST. Easy to chew trials without clinical signs/sx of aspiration.   2. Answer yes/No questions with 70% accuracy.  3. Follow 1-Step directions with 70% accuracy.   4. Complete automatic sequence tasks with 70% accuracy.  5. Complete simple phrases with 50% accuracy.

## 2023-09-11 NOTE — PT/OT/SLP EVAL
Occupational Therapy  Evaluation    Name: Bhupendra Park  MRN: [id number]  Admitting Diagnosis: large L frontal parenchymal hematoma, intraventricular hemorrhage  Hx: CVA c R weakness  Recent Surgery: * No surgery found *      Recommendations:     Discharge Recommendations: rehabilitation facility  Discharge Equipment Recommendations:  to be determined by next level of care  Barriers to discharge:  Other (Comment) (Severity of deficits)    Assessment:     Bhupendra Park is a 63 y.o. male with a medical diagnosis of large L frontal parenchymal hematoma, intraventricular hemorrhage  Hx: CVA c R weakness. Performance deficits affecting function: weakness, impaired functional mobility, impaired endurance, gait instability, impaired balance, decreased upper extremity function, abnormal tone, impaired self care skills, decreased ROM, decreased lower extremity function. Pt noted to have expressive aphasia during eval but was able provide one word answers (yes/no) and followed all simple commands. RN provided information re:PLOF and stated pt was ambulating c quad cane prior to admission. PLOF c ADLS unknown- will continue to gather information as able. Pt would benefit from rehab placement at d/c.     Rehab Prognosis: Good; patient would benefit from acute skilled OT services to address these deficits and reach maximum level of function.       Plan:     Patient to be seen 5 x/week to address the above listed problems via self-care/home management, therapeutic activities, therapeutic exercises  Plan of Care Expires: 09/25/23  Plan of Care Reviewed with: patient    Subjective     Chief Complaint: None verbalized   Patient/Family Comments/goals: Pt unable to state     Occupational Profile:  RN reported information re: living environment and PLOF. Stated pt was living c his spouse and ambulating INDly c a quad cane. Information re: PLOF c  most ADLs unknown, but pt was able to feed himself INDly. Will gather information  as able.   Equipment Used at Home: cane, quad      Pain/Comfort:  None     Patients cultural, spiritual, Bahai conflicts given the current situation: no    Objective:     Communicated with: RN prior to session.  Patient found HOB elevated with blood pressure cuff, SCD, pulse ox (continuous), telemetry upon OT entry to room.    General Precautions: Standard, aphasia, fall (<140)  Orthopedic Precautions: N/A  Braces: N/A  Respiratory Status: Room air  Vital Signs: Blood Pressure: 119/69  HR: 59 bpm  Sp02: 97%    Occupational Performance:    Bed Mobility:    Patient completed Scooting/Bridging with maximal assistance  Patient completed Supine to Sit with maximal assistance    Functional Mobility/Transfers:  Patient completed Sit <> Stand Transfer with moderate assistance  with  gait belt.   Patient completed Bed <> Chair Transfer using Stand Pivot technique with Mod A x2 with gait belt   Pt initially required Mod A for sitting balance, but required Max A c fatigue.   Activities of Daily Living:  Grooming: stand by assistance Pt washed face using LUE c set up for task   Upper Body Dressing: maximal assistance Doff/don gown   Lower Body Dressing: total assistance    Toileting: total assistance condom cath    Cognitive/Visual Perceptual:  Cognitive/Psychosocial Skills:     -       Follows Commands/attention:Follows one-step commands  -       Mood/Affect/Coping skills/emotional control: Cooperative    Physical Exam:  Upper Extremity Strength:    -       Right Upper Extremity: RUE in flexion contracture; appears to be baseline  -       Left Upper Extremity: WNL    Therapeutic Positioning  Risk for acquired pressure injuries is increased due to impaired mobility.    OT interventions performed during the course of today's session in an effort to prevent and/or reduce acquired pressure injuries:   Therapeutic positioning completed     Skin assessment: all bony prominences were assessed    Findings: no redness or breakdown  noted    OT recommendations for therapeutic positioning throughout hospitalization:   Follow [facility] Pressure Injury Prevention Protocol      Patient Education:  Patient provided with verbal education regarding OT role/goals/POC.  Understanding was verbalized.     Patient left up in chair with all lines intact and call button in reach    GOALS:   Multidisciplinary Problems       Occupational Therapy Goals          Problem: Occupational Therapy    Goal Priority Disciplines Outcome Interventions   Occupational Therapy Goal     OT, PT/OT Ongoing, Progressing    Description: LTG: Pt will perform basic ADLs and ADL transfers with SPV using LRAD by discharge.    STG: to be met in 2 weeks    Pt will complete grooming standing at sink with LRAD with min A.  Pt will complete UB dressing with min A.  Pt will complete LB dressing with min A using LRAD.  Pt will complete toileting with min A using LRAD.  Pt will complete t/f to bsc with min A using LRAD.                          History:     Past Medical History:   Diagnosis Date    Stroke        No past surgical history on file.    Time Tracking:     OT Date of Treatment: 09/11/23  OT Start Time: 1047  OT Stop Time: 1106  OT Total Time (min): 19 min    Billable Minutes:Evaluation Moderate complexity     9/11/2023

## 2023-09-11 NOTE — PLAN OF CARE
Problem: Occupational Therapy  Goal: Occupational Therapy Goal  Description: LTG: Pt will perform basic ADLs and ADL transfers with SPV using LRAD by discharge.    STG: to be met in 2 weeks    Pt will complete grooming standing at sink with LRAD with min A.  Pt will complete UB dressing with min A.  Pt will complete LB dressing with min A using LRAD.  Pt will complete toileting with min A using LRAD.  Pt will complete t/f to bsc with min A using LRAD.     Outcome: Ongoing, Progressing

## 2023-09-12 NOTE — PROGRESS NOTES
[facility] Hospital Medicine Progress Note        Chief Complaint: Inpatient Follow-up for  stroke     HPI:     Pt is a 64 yo male with h/o Cerebral amyloid angiopathy (CAA) and recurrent brain bleed with ICH in 2020, 2021, remote SAH,  Rt frontal IPH with minimal mass effect in 8/2023 and prior left frontoparietal IPH with right sided deficit , expressive and receptive aphasia , additional medical history of HTN, GERD, Hypothyroidism, Former smoker, Back and cervical spine  surgery was brought into the ED for altered mental status with decreased responsiveness , urinary incontinence, new Rt facial droop and worsening Rt sided weakness. CT head showed a large left frontal parenchymal hematoma with 3 to 4 mm  midline shift, intraventricular hemorrhage with ventricular enlargement suspicious for developing hydrocephalus.  CTA head/neck negative for LVO.  Neurology and Neurosurgery services were consulted and  admitted to ICU.     Neurosurgery suggested no surgical intervention warranted at this time.  Neurology  recommended -150 for 1st 24 hours then less than 140; avoid antiplatelets or anticoagulation; seizure precautions (patient was  initially given Keppra, but is allergic so was placed on Vimpat). Follow up CT head on 09/10/2023 showed  extensive intracranial hemorrhage with new areas of subdural blood seen in the posterior fossa on the right side and some blood along the sylvian fissure on the right side, otherwise unchanged.  He was deemed stable for downgraded to the floor today  09/10/2023. Hospital medicine consulted for assumption of care and further medical management.         SLP cleared pt for soft, bite sized solid and thin liquid on 9/10/23. Neurosurgery signed off on 9/10/23 as no indication for surgical intervention at this point. PT/OT consulted and SNF vs rehab placement suggested       Interval Hx:   Pt was somnolent this morning , however nursing reports per  family pt takes nap in the morning after breakfast and is not unusual for him. He arouses easily. Only answers yes or no to questions , follows simple commands . Able to lift RLE off bed slightly , no movements  noted to RUE.     Overnight needed prn Hydralazine   Will increase Amlodipine to 5 mg bid   Assess BP response       Labs resulted Na 147, K 3.3 ( replaced) , CO2 21, Cr 1.1, Mg 2.1    Case was discussed with patient's nurse and  on the floor.    Objective/physical exam:  General: In no acute distress, afebrile  Chest: Clear to auscultation bilaterally  Heart: Bradycardia, +S1, S2, no appreciable murmur  Abdomen: Soft, nontender, BS +  MSK: Warm, no lower extremity edema, no clubbing or cyanosis  Neurologic: Awake, oriented to self, Follow simple commands, (+) aphasia, MS RUE 0/5, RLE 1/5, LUE/LLE 4/5    VITAL SIGNS: 24 HRS MIN & MAX LAST   Temp  Min: 97.5 °F (36.4 °C)  Max: 98.7 °F (37.1 °C) 98.6 °F (37 °C)   BP  Min: 113/57  Max: 150/87 135/72   Pulse  Min: 53  Max: 75  75   Resp  Min: 9  Max: 20 15   SpO2  Min: 94 %  Max: 100 % 97 %     I have reviewed the following labs:  Recent Labs   Lab 09/09/23  1208 09/10/23  0047 09/11/23  0057   WBC 5.55 6.32 5.84   RBC 4.63* 4.14* 4.14*   HGB 13.7* 11.9* 12.2*   HCT 39.4* 34.9* 35.0*   MCV 85.1 84.3 84.5   MCH 29.6 28.7 29.5   MCHC 34.8 34.1 34.9   RDW 13.2 13.4 13.2   * 103* 113*   MPV 10.1 10.6* 10.0     Recent Labs   Lab 09/09/23  1208 09/10/23  0047 09/11/23  0057 09/12/23  0219   * 152* 148* 147*   K 4.3 3.4* 3.2* 3.3*   CO2 25 22* 22* 21*   BUN 20.7 18.3 18.0 18.7   CREATININE 1.62* 1.25* 1.10 1.11   CALCIUM 10.2* 9.3 9.2 8.8   MG  --  2.10 2.00 2.10   ALBUMIN 3.9 3.5 3.6  --    ALKPHOS 65 50 48  --    ALT 18 16 14  --    AST 21 16 14  --    BILITOT 0.6 0.6 1.1  --      Microbiology Results (last 7 days)       ** No results found for the last 168 hours. **             See below for Radiology    Scheduled Med:   amLODIPine  5 mg Oral  BID    atorvastatin  40 mg Oral Daily    baclofen  10 mg Oral TID    famotidine  20 mg Oral BID    lacosamide  150 mg Oral QHS    lacosamide  50 mg Oral QAM    levothyroxine  88 mcg Oral QAM    mupirocin   Nasal BID    tamsulosin  0.4 mg Oral Daily      Continuous Infusions:     PRN Meds:  acetaminophen, hydrALAZINE, HYDROcodone-acetaminophen, sodium chloride 0.9%     Assessment/Plan:  Cerebral Amyloid angiopathy   Recurrent brain bleed with new large  left frontal parenchymal hematoma with 3 to 4 mm a midline shift, and intraventricular hemorrhage   Right sided hemiplegia , spastic   Aphasia - expressive and receptive   Normocytic anemia   Thrombocytopenia - unknown chronicity   Hypernatremia, due to free water deficit related to decreased oral intake, POA- improving   DOE, POA -resolved   Hypophosphatemia -replete as indicated      Plan-  Q4h neuro check   Seizure precaution   Continue Vimpat   No neurosurgical intervention indicated   No antiplatelet and anticoagulant   BP control with goal under 140/90  Increase Amlodipine to 5 mg bid and assess BP response   Continue statin   Encourage oral  free water intake   Replete and correct electrolyte as indicated   Continue SLP service   Pt is cleared for soft and bite sized solid and thin liquid  Continue PT/OT service   Disposition - SNF vs Rehab pending progress   CM consulted to assist with DC planning     VTE prophylaxis: SCDs    Patient condition:  Fair     Anticipated discharge and Disposition:     Rehab  vs SNF    All diagnosis and differential diagnosis have been reviewed; assessment and plan has been documented; I have personally reviewed the labs and test results that are presently available; I have reviewed the patients medication list; I have reviewed the consulting providers response and recommendations. I have reviewed or attempted to review medical records based upon their availability    All of the patient's questions have been  addressed and answered.  Patient's is agreeable to the above stated plan. I will continue to monitor closely and make adjustments to medical management as needed.        Eliu Cruz MD   09/12/2023

## 2023-09-12 NOTE — PT/OT/SLP PROGRESS
Physical Therapy Treatment    Patient Name:  Bhupendra Park   MRN:  [id number]    Recommendations:     Discharge Recommendations: rehabilitation facility  Discharge Equipment Recommendations: to be determined by next level of care  Barriers to discharge: Impaired mobility and Ongoing medical needs    Assessment:     Bhupendra Park is a 63 y.o. male admitted with a medical diagnosis of large L frontal parenchymal hematoma, intraventricular hemorrhage  Hx: CVA c R weakness.  He presents with the following impairments/functional limitations: weakness, gait instability, decreased upper extremity function, decreased lower extremity function, decreased ROM, impaired coordination, impaired balance, impaired endurance, decreased safety awareness, impaired cognition, impaired fine motor, impaired muscle length, impaired joint extensibility, impaired self care skills, impaired functional mobility.    First attempt, nurse arranging for pt to transport from ICU to the floor. Second attempt, pt on 10th floor alone in room in sitting in recliner. Arousable with verbal stimulation however less interactive and participatory in activities. Appeared drowsy, nursing staff report pt have his days/nights mixed up. Unable to progress mobility 2/2 drowsiness and decreased strength noted today.     Rehab Prognosis: Good; patient would benefit from acute skilled PT services to address these deficits and reach maximum level of function.    Recent Surgery: * No surgery found *      Plan:     During this hospitalization, patient to be seen 6 x/week to address the identified rehab impairments via gait training, therapeutic activities, therapeutic exercises, neuromuscular re-education and progress toward the following goals:    Plan of Care Expires:  10/10/23    Subjective     Chief Complaint: none stated (expressive aphasia)  Patient/Family Comments/goals: none stated  Pain/Comfort:  Pain Rating 1: 0/10      Objective:     Communicated  with RN prior to session.  Patient found up in chair with pulse ox (continuous), telemetry, Condom Catheter upon PT entry to room.     General Precautions: Standard, fall, aphasia  Orthopedic Precautions: N/A  Braces: N/A  Respiratory Status: Room air  Vitals:   Blood pressure: 119/70  HR: 75  Skin Integrity: Visible skin intact      Functional Mobility:  Bed Mobility:    Sit to supine with total assist  Transfers:    Sit<->stand x 2 trials with KEAGAN knees blocked  Max assist x 2; total  Unable to stand fully erect   Squat pivot with total assist    Therapeutic Activities/Exercises:  Able to follow a few commands. Did not make any attempts to verbalize or nod his head to answer questions.     Education:  Patient provided with verbal education regarding POC.  Understanding was verbalized, however additional teaching warranted.     Patient left HOB elevated and all lines intact and call button in reach. Activated bed alarm. Spoke with nurse post tx session.     GOALS:   Multidisciplinary Problems       Physical Therapy Goals          Problem: Physical Therapy    Goal Priority Disciplines Outcome Goal Variances Interventions   Physical Therapy Goal     PT, PT/OT Ongoing, Progressing     Description: Goals to be met by: 10/10/23     Patient will increase functional independence with mobility by performing:    Supine to sit with Modified Edwards  Sit to stand transfer with Modified Edwards                         Time Tracking:     PT Received On: 09/12/23  PT Start Time: 1404     PT Stop Time: 1417  PT Total Time (min): 13 min     Billable Minutes: Therapeutic Activity 1 unit    Treatment Type: Treatment  PT/PTA: PTA     Number of PTA visits since last PT visit: 2     09/12/2023

## 2023-09-13 NOTE — PROGRESS NOTES
[facility] Neuro  Neurology  Progress Note    Patient Name: Bhupendra Park  MRN: [id number]  Admission Date: 9/9/2023  Hospital Length of Stay: 4 days  Code Status: Full Code   Attending Provider: Eliu Cruz MD  Primary Care Physician: Kelly Meadows MD   Principal Problem:<principal problem not specified>      Subjective:     Interval History:   Code fast called at 8:27 am for increased lethargy. Nursing reports this is different form yesterday afternoon when she received the patient from ICU. Wife at bedside reported increase in lethargy.       Current Neurological Medications:     Current Facility-Administered Medications   Medication Dose Route Frequency Provider Last Rate Last Admin    acetaminophen tablet 650 mg  650 mg Oral Q6H PRN Eliu Cruz MD   650 mg at 09/11/23 2045    amLODIPine tablet 10 mg  10 mg Oral Daily Nikita Murray MD        atorvastatin tablet 40 mg  40 mg Oral Daily Eliu Cruz MD   40 mg at 09/12/23 0910    baclofen tablet 10 mg  10 mg Oral TID Eliu Cruz MD   10 mg at 09/12/23 2112    famotidine tablet 20 mg  20 mg Oral BID Eliu Cruz MD   20 mg at 09/12/23 2112    hydrALAZINE injection 10 mg  10 mg Intravenous Q6H PRN Missy Mosher MD   10 mg at 09/12/23 0600    hydrALAZINE tablet 25 mg  25 mg Oral Q8H Nikita Murray MD        HYDROcodone-acetaminophen 5-325 mg per tablet 1 tablet  1 tablet Oral Q6H PRN Eliu Cruz MD        lacosamide tablet 150 mg  150 mg Oral QHS Eliu Cruz MD   150 mg at 09/12/23 2112    lacosamide tablet 50 mg  50 mg Oral Eliu Wilhelm MD   50 mg at 09/13/23 0522    levothyroxine tablet 88 mcg  88 mcg Oral Eliu Wilhelm MD   88 mcg at 09/13/23 0522    mupirocin 2 % ointment   Nasal BID Eliu Cruz MD   Given at 09/12/23 2112    sodium chloride 0.9% flush 10 mL  10 mL Intravenous PRN Missy Mosher MD        tamsulosin 24 hr capsule 0.4 mg  0.4 mg Oral Daily Anthony  MD Eliu   0.4 mg at 09/12/23 0910       Review of Systems   Unable to perform ROS: Acuity of condition     Objective:     Vital Signs (Most Recent):  Temp: 98.2 °F (36.8 °C) (09/13/23 0727)  Pulse: 69 (09/13/23 0832)  Resp: 18 (09/13/23 0832)  BP: 128/78 (09/13/23 0832)  SpO2: 95 % (09/13/23 0727) Vital Signs (24h Range):  Temp:  [97.6 °F (36.4 °C)-99.9 °F (37.7 °C)] 98.2 °F (36.8 °C)  Pulse:  [63-83] 69  Resp:  [14-20] 18  SpO2:  [95 %-98 %] 95 %  BP: (117-161)/(61-81) 128/78     Weight: 71.3 kg (157 lb 3 oz)  Body mass index is 21.92 kg/m².     Physical Exam  Vitals and nursing note reviewed.   Musculoskeletal:      Right lower leg: No edema.      Left lower leg: No edema.   Skin:     General: Skin is warm and dry.      Capillary Refill: Capillary refill takes less than 2 seconds.   Neurological:      Comments:     Very limited PE   No spontaneous movement BUE, BLE  Obtunded   Chronic right sided weakness and baseline aphasia  Withdraws to painful stim LUE, LLE           Significant Labs:   Recent Lab Results         09/13/23 0822        Anion Gap 8.0       Baso # 0.03       Basophil % 0.5       BUN 19.6       BUN/CREAT RATIO 19       Calcium 9.2       Chloride 113       CO2 24       Creatinine 1.04       eGFR >60       Eos # 0.02       Eosinophil % 0.3       Glucose 126       Hematocrit 37.7       Hemoglobin 13.5       Immature Grans (Abs) 0.02       Immature Granulocytes 0.3       Lymph # 0.71       LYMPH % 11.4       MCH 29.7       MCHC 35.8       MCV 82.9       Mono # 0.53       Mono % 8.5       MPV 10.0       Neut # 4.93       Neut % 79.0       nRBC 0.0       Platelets 132       Potassium 3.5       RBC 4.55       RDW 12.9       Sodium 145       WBC 6.24               Significant Imaging: I have reviewed all pertinent imaging results/findings within the past 24 hours.    Assessment and Plan:     Intraparenchymal hemorrhage of brain  Large left frontal IPH    Etiology amyloid angiopathy   Awaiting CT head  read from stroke alert this am     -Discussion with wife who reports she want to keep him comfortable, she does not want heroic measures, he never wanted surgery  -Will consult palliative care  -supportive care for family       Allergy to Keppra (severe HA, depression)  Continue Vimpat   Seizure precautions  Avoid antiplatelet or anticoagulation        Stroke alert called at 8:27 am   Stroke NP responded at 8:31 am   Not a candidate for TNK 2/2 hemorrhage, not a candidate for thrombectomy no LVO, symptoms likely 2/2 extending hemorrhage/shift    Further recommendations to follow from MD        VTE Risk Mitigation (From admission, onward)         Ordered     Place sequential compression device  Until discontinued         09/09/23 1530     IP VTE LOW RISK PATIENT  Once         09/09/23 1349                Janee Meehan NP  Neurology  [facility]

## 2023-09-13 NOTE — DISCHARGE SUMMARY
[facility] Hospital Medicine Discharge Summary    Admit Date: 9/9/2023  Discharge Date and Time: 9/13/202312:06 PM  Admitting Physician:  Team  Discharging Physician: Nikita Murray MD.  Primary Care Physician: Kelly Meadows MD  Consults: Hospital Medicine, Neurology, Neurosurgery, and Pulmonary/Intensive care    Discharge Diagnoses:  Cerebral Amyloid angiopathy   Recurrent brain bleed with new large  left frontal parenchymal hematoma with 3 to 4 mm a midline shift, and intraventricular hemorrhage   Right sided hemiplegia , spastic   Aphasia - expressive and receptive   Normocytic anemia   Thrombocytopenia - unknown chronicity   Hypernatremia, due to free water deficit related to decreased oral intake, POA- improving   DOE, POA -resolved   Hypophosphatemia -replete as indicated        Hospital Course:   Chief Complaint: Inpatient Follow-up for  stroke      HPI:      Pt is a 62 yo male with h/o Cerebral amyloid angiopathy (CAA) and recurrent brain bleed with ICH in 2020, 2021, remote SAH,  Rt frontal IPH with minimal mass effect in 8/2023 and prior left frontoparietal IPH with right sided deficit , expressive and receptive aphasia , additional medical history of HTN, GERD, Hypothyroidism, Former smoker, Back and cervical spine  surgery was brought into the ED for altered mental status with decreased responsiveness , urinary incontinence, new Rt facial droop and worsening Rt sided weakness. CT head showed a large left frontal parenchymal hematoma with 3 to 4 mm  midline shift, intraventricular hemorrhage with ventricular enlargement suspicious for developing hydrocephalus.  CTA head/neck negative for LVO.  Neurology and Neurosurgery services were consulted and  admitted to ICU.      Neurosurgery suggested no surgical intervention warranted at this time.  Neurology  recommended -150 for 1st 24 hours then less than 140; avoid antiplatelets or anticoagulation; seizure precautions  (patient was  initially given Keppra, but is allergic so was placed on Vimpat). Follow up CT head on 09/10/2023 showed  extensive intracranial hemorrhage with new areas of subdural blood seen in the posterior fossa on the right side and some blood along the sylvian fissure on the right side, otherwise unchanged.  He was deemed stable for downgraded to the floor today  09/10/2023. Hospital medicine consulted for assumption of care and further medical management.         SLP cleared pt for soft, bite sized solid and thin liquid on 9/10/23. Neurosurgery signed off on 9/10/23 as no indication for surgical intervention at this point. PT/OT consulted and SNF vs rehab placement suggested .    On 09/13 a rapid response was called on patient given extreme somnolence and was not responding.  Further discussions was had with wife at bedside.  Recommending palliative care with hospice given extremely poor prognoses and patients wishes of no surgery or heroic measures.      Palliative and hospice consult was placed.  Patient was being prepped for discharge to hospice medical facility.     Objective/physical exam:  General: In no acute distress, afebrile  Chest: Clear to auscultation bilaterally  Heart: Bradycardia, +S1, S2, no appreciable murmur  Abdomen: Soft, nontender, BS +  MSK: Warm, no lower extremity edema, no clubbing or cyanosis  Neurologic: Awake, oriented to self, Follow simple commands, (+) aphasia, MS RUE 0/5, RLE 1/5, LUE/LLE 4/5    Pt was seen and examined on the day of discharge  Vitals:  VITAL SIGNS: 24 HRS MIN & MAX LAST   Temp  Min: 97.6 °F (36.4 °C)  Max: 99.9 °F (37.7 °C) 98.2 °F (36.8 °C)   BP  Min: 117/61  Max: 161/81 128/78   Pulse  Min: 69  Max: 83  69   Resp  Min: 18  Max: 19 18   SpO2  Min: 95 %  Max: 98 % 98 %         Procedures Performed: No admission procedures for hospital encounter.     Significant Diagnostic Studies: See Full reports for all details    Recent Labs   Lab 09/10/23  0047  09/11/23 0057 09/13/23  0822   WBC 6.32 5.84 6.24   RBC 4.14* 4.14* 4.55*   HGB 11.9* 12.2* 13.5*   HCT 34.9* 35.0* 37.7*   MCV 84.3 84.5 82.9   MCH 28.7 29.5 29.7   MCHC 34.1 34.9 35.8   RDW 13.4 13.2 12.9   * 113* 132   MPV 10.6* 10.0 10.0       Recent Labs   Lab 09/09/23  1208 09/10/23  0047 09/11/23 0057 09/12/23  0219 09/13/23  0822   * 152* 148* 147* 145   K 4.3 3.4* 3.2* 3.3* 3.5   CO2 25 22* 22* 21* 24   BUN 20.7 18.3 18.0 18.7 19.6   CREATININE 1.62* 1.25* 1.10 1.11 1.04   CALCIUM 10.2* 9.3 9.2 8.8 9.2   MG  --  2.10 2.00 2.10  --    ALBUMIN 3.9 3.5 3.6  --   --    ALKPHOS 65 50 48  --   --    ALT 18 16 14  --   --    AST 21 16 14  --   --    BILITOT 0.6 0.6 1.1  --   --         Microbiology Results (last 7 days)       ** No results found for the last 168 hours. **             CT HEAD FOR STROKE  Narrative: EXAMINATION:  CT HEAD FOR STROKE    CLINICAL HISTORY:  Mental status change, unknown cause;    TECHNIQUE:  CT images of the head without IV contrast. Axial, coronal and sagittal images reviewed. Dose length product 963 mGycm. Automatic exposure control, adjustment of mA/kV or iterative reconstruction technique used to limit radiation dose.    COMPARISON:  CT 09/10/2023    FINDINGS:  Left-sided parenchymal hemorrhage has similar size with comparable left-to-right midline shift of about 6 mm.  Continued small volume intraventricular hemorrhage with stable ventricular size.  Basal cisterns are patent with normal positioning of the cerebellar tonsils.  Impression: Little overall change since 09/10/2023.    Report called to Janee Meehan NP at the time of dictation.    Electronically signed by: Wang Tyson  Date:    09/13/2023  Time:    09:44         Medication List        START taking these medications      amLODIPine 10 MG tablet  Commonly known as: NORVASC  Take 1 tablet (10 mg total) by mouth once daily.  Start taking on: September 14, 2023     hydrALAZINE 25 MG tablet  Commonly  known as: APRESOLINE  Take 1 tablet (25 mg total) by mouth every 8 (eight) hours.            CONTINUE taking these medications      baclofen 10 MG tablet  Commonly known as: LIORESAL     famotidine 20 MG tablet  Commonly known as: PEPCID  Take 1 tablet (20 mg total) by mouth 2 (two) times daily.     * lacosamide 150 mg Tab tablet  Commonly known as: VIMPAT     * lacosamide 50 mg Tab  Commonly known as: VIMPAT     levothyroxine 88 MCG tablet  Commonly known as: SYNTHROID  Take 1 tablet (88 mcg total) by mouth every morning.     rosuvastatin 40 MG Tab  Commonly known as: CRESTOR  Take 1 tablet (40 mg total) by mouth every evening.     tamsulosin 0.4 mg Cap  Commonly known as: FLOMAX  Take 1 capsule (0.4 mg total) by mouth once daily.           * This list has 2 medication(s) that are the same as other medications prescribed for you. Read the directions carefully, and ask your doctor or other care provider to review them with you.                STOP taking these medications      naproxen 250 MG tablet  Commonly known as: NAPROSYN               Where to Get Your Medications        These medications were sent to [facility] [address] [address]      Phone: [phone]   amLODIPine 10 MG tablet  hydrALAZINE 25 MG tablet          Explained in detail to the patient about the discharge plan, medications, and follow-up visits. Pt understands and agrees with the treatment plan  Discharge Disposition: Home or Self Care   Discharged Condition: stable  Diet-   Dietary Orders (From admission, onward)       Start     Ordered    09/11/23 0914  Dietary nutrition supplements Boost Plus Vanilla; TID  Continuous        Question Answer Comment   Select PO Supplement: Boost Plus Vanilla    Frequency: TID        09/11/23 0913 09/11/23 0620  Diet Soft & Bite Sized (IDDSI Level 6)  Diet effective now         09/11/23 0619                   Medications Per [place] med rec  Activities as  tolerated   Follow-up Information       Kelly Meadows MD Follow up.    Specialty: General Practice  Why: As needed  - now transitioning to hospice care  Contact information:  [address] [place] [address]  [phone]                           For further questions contact hospitalist office    Discharge time 33 minutes    For worsening symptoms, chest pain, shortness of breath, increased abdominal pain, high grade fever, stroke or stroke like symptoms, immediately go to the nearest Emergency Room or call 911 as soon as possible.      Nikita Jurado M.D on 9/13/2023. at 12:06 PM.

## 2023-09-13 NOTE — SUBJECTIVE & OBJECTIVE
Subjective:     Interval History:   Code fast called at 8:27 am for increased lethargy. Nursing reports this is different form yesterday afternoon when she received the patient from ICU. Wife at bedside reported increase in lethargy.       Current Neurological Medications:     Current Facility-Administered Medications   Medication Dose Route Frequency Provider Last Rate Last Admin    acetaminophen tablet 650 mg  650 mg Oral Q6H PRN Eliu Cruz MD   650 mg at 09/11/23 2045    amLODIPine tablet 10 mg  10 mg Oral Daily Nikita Murray MD        atorvastatin tablet 40 mg  40 mg Oral Daily Eliu Cruz MD   40 mg at 09/12/23 0910    baclofen tablet 10 mg  10 mg Oral TID Eliu Cruz MD   10 mg at 09/12/23 2112    famotidine tablet 20 mg  20 mg Oral BID Eliu Cruz MD   20 mg at 09/12/23 2112    hydrALAZINE injection 10 mg  10 mg Intravenous Q6H PRN Missy Mosher MD   10 mg at 09/12/23 0600    hydrALAZINE tablet 25 mg  25 mg Oral Q8H Nikita Murray MD        HYDROcodone-acetaminophen 5-325 mg per tablet 1 tablet  1 tablet Oral Q6H PRN Eliu Cruz MD        lacosamide tablet 150 mg  150 mg Oral QHS Eliu Cruz MD   150 mg at 09/12/23 2112    lacosamide tablet 50 mg  50 mg Oral Eliu Wilhelm MD   50 mg at 09/13/23 0522    levothyroxine tablet 88 mcg  88 mcg Oral Eliu Wilhelm MD   88 mcg at 09/13/23 0522    mupirocin 2 % ointment   Nasal BID Eliu Cruz MD   Given at 09/12/23 2112    sodium chloride 0.9% flush 10 mL  10 mL Intravenous PRN Missy Mosher MD        tamsulosin 24 hr capsule 0.4 mg  0.4 mg Oral Daily Eliu Cruz MD   0.4 mg at 09/12/23 0910       Review of Systems   Unable to perform ROS: Acuity of condition     Objective:     Vital Signs (Most Recent):  Temp: 98.2 °F (36.8 °C) (09/13/23 0727)  Pulse: 69 (09/13/23 0832)  Resp: 18 (09/13/23 0832)  BP: 128/78 (09/13/23 0832)  SpO2: 95 % (09/13/23 0727) Vital Signs (24h Range):  Temp:  [97.6 °F (36.4 °C)-99.9  °F (37.7 °C)] 98.2 °F (36.8 °C)  Pulse:  [63-83] 69  Resp:  [14-20] 18  SpO2:  [95 %-98 %] 95 %  BP: (117-161)/(61-81) 128/78     Weight: 71.3 kg (157 lb 3 oz)  Body mass index is 21.92 kg/m².     Physical Exam  Vitals and nursing note reviewed.   Musculoskeletal:      Right lower leg: No edema.      Left lower leg: No edema.   Skin:     General: Skin is warm and dry.      Capillary Refill: Capillary refill takes less than 2 seconds.   Neurological:      Comments:     Very limited PE   No spontaneous movement BUE, BLE  Obtunded   Chronic right sided weakness and baseline aphasia  Withdraws to painful stim LUE, LLE                Significant Labs:   Recent Lab Results         09/13/23  0822        Anion Gap 8.0       Baso # 0.03       Basophil % 0.5       BUN 19.6       BUN/CREAT RATIO 19       Calcium 9.2       Chloride 113       CO2 24       Creatinine 1.04       eGFR >60       Eos # 0.02       Eosinophil % 0.3       Glucose 126       Hematocrit 37.7       Hemoglobin 13.5       Immature Grans (Abs) 0.02       Immature Granulocytes 0.3       Lymph # 0.71       LYMPH % 11.4       MCH 29.7       MCHC 35.8       MCV 82.9       Mono # 0.53       Mono % 8.5       MPV 10.0       Neut # 4.93       Neut % 79.0       nRBC 0.0       Platelets 132       Potassium 3.5       RBC 4.55       RDW 12.9       Sodium 145       WBC 6.24               Significant Imaging: I have reviewed all pertinent imaging results/findings within the past 24 hours.

## 2023-09-13 NOTE — PLAN OF CARE
Spoke with Dr and with wife. Pt appears to me to be appropriate for hospice, wife is exhausted. She tells me she has been caring for him since January and is energy wise running on empty. She is a nurse with good understanding of his health issues. Pt is not appropriate for swing bed at this time.   He will eventually bleed at unknown time. There is nothing that can be done and nothing wife wants done based on converstations she had with  prior.   List of hospice given with recommendations we try for a hospice house so she can go home and rest. She selects [facility]. FOC signed  Mar in house and will talk with pts wife. Dr Murray is aware

## 2023-09-13 NOTE — PLAN OF CARE
Problem: Adult Inpatient Plan of Care  Goal: Plan of Care Review  Outcome: Ongoing, Progressing  Goal: Patient-Specific Goal (Individualized)  Outcome: Ongoing, Progressing  Goal: Absence of Hospital-Acquired Illness or Injury  Outcome: Ongoing, Progressing  Goal: Optimal Comfort and Wellbeing  Outcome: Ongoing, Progressing  Goal: Readiness for Transition of Care  Outcome: Ongoing, Progressing     Problem: Adjustment to Stroke  Goal: Optimal Adjustment to Stroke  Outcome: Ongoing, Progressing     Problem: BADL (Basic Activities of Daily Living) Impairment (Stroke)  Goal: Optimal Safe BADL Performance  Outcome: Ongoing, Progressing     Problem: Bowel Management (Stroke)  Goal: Effective Bowel Elimination/Continence  Outcome: Ongoing, Progressing     Problem: Cognitive Impairment (Stroke)  Goal: Optimal Cognitive Function  Outcome: Ongoing, Progressing     Problem: Communication Impairment (Stroke)  Goal: Effective Communication Skills  Outcome: Ongoing, Progressing     Problem: IADL (Instrumental Activities of Daily Living) Impairment (Stroke)  Goal: Optimal Safe IADL Performance  Outcome: Ongoing, Progressing     Problem: Functional Mobility Impairment (Stroke)  Goal: Optimal Mobility Ferguson and Safety  Outcome: Ongoing, Progressing     Problem: Movement and Motor Control Impairment (Stroke Rehabilitation)  Goal: Optimal Movement and Motor Control  Outcome: Ongoing, Progressing     Problem: Sensory Perceptual Impairment (Stroke)  Goal: Optimal Sensory Perceptual Status  Outcome: Ongoing, Progressing     Problem: Sexuality and Intimacy (Stroke Rehabilitation)  Goal: Maintains Intimacy/Sexual Expression  Outcome: Ongoing, Progressing     Problem: Spasticity Management (Stroke)  Goal: Effective Spasticity Management  Outcome: Ongoing, Progressing     Problem: Eating and Swallowing Impairment (Stroke)  Goal: Optimal Oral Motor and Swallow Ability  Outcome: Ongoing, Progressing     Problem: Bladder Management  (Stroke)  Goal: Effective Urinary Elimination/Continence  Outcome: Ongoing, Progressing     Problem: Skin Injury Risk Increased  Goal: Skin Health and Integrity  Outcome: Ongoing, Progressing     Problem: Fall Injury Risk  Goal: Absence of Fall and Fall-Related Injury  Outcome: Ongoing, Progressing     Problem: Adjustment to Illness (Stroke, Hemorrhagic)  Goal: Optimal Coping  Outcome: Ongoing, Progressing     Problem: Bowel Elimination Impaired (Stroke, Hemorrhagic)  Goal: Effective Bowel Elimination  Outcome: Ongoing, Progressing     Problem: Cerebral Tissue Perfusion (Stroke, Hemorrhagic)  Goal: Optimal Cerebral Tissue Perfusion  Outcome: Ongoing, Progressing     Problem: Cognitive Impairment (Stroke, Hemorrhagic)  Goal: Optimal Cognitive Function  Outcome: Ongoing, Progressing     Problem: Communication Impairment (Stroke, Hemorrhagic)  Goal: Effective Communication Skills  Outcome: Ongoing, Progressing     Problem: Functional Ability Impaired (Stroke, Hemorrhagic)  Goal: Optimal Functional Ability  Outcome: Ongoing, Progressing     Problem: Pain (Stroke, Hemorrhagic)  Goal: Acceptable Pain Control  Outcome: Ongoing, Progressing     Problem: Respiratory Compromise (Stroke, Hemorrhagic)  Goal: Effective Oxygenation and Ventilation  Outcome: Ongoing, Progressing     Problem: Sensorimotor Impairment (Stroke, Hemorrhagic)  Goal: Improved Sensorimotor Function  Outcome: Ongoing, Progressing     Problem: Swallowing Impairment (Stroke, Hemorrhagic)  Goal: Optimal Eating and Swallowing Without Aspiration  Outcome: Ongoing, Progressing     Problem: Urinary Elimination Impaired (Stroke, Hemorrhagic)  Goal: Effective Urinary Elimination  Outcome: Ongoing, Progressing

## 2023-09-13 NOTE — ASSESSMENT & PLAN NOTE
Large left frontal IPH    Etiology amyloid angiopathy   Awaiting CT head read from stroke alert this am     -Discussion with wife who reports she want to keep him comfortable, she does not want heroic measures, he never wanted surgery  -Will consult palliative care      Allergy to Keppra (severe HA, depression)  Continue Vimpat   Seizure precautions  Avoid antiplatelet or anticoagulation

## 2023-09-14 NOTE — PROGRESS NOTES
Outpatient Therapy Discharge Summary   Discharge Date: 9/14/2023   Name: Bhupendra Park  [facility] Number: [id number]  Therapy Diagnosis: No diagnosis found.  Physician: No ref. provider found  Physician Orders: eval and treat  Medical Diagnosis: CVA  Evaluation Date: 3/8/23    Date of Last visit: 9/5/23  Total Visits Received: 48  Cancelled Visits: 2  No Show Visits: 0    Assessment    Assessment of Current Status: Patient hospitalized on 9/9/23 due to another CVA. Patient's family has decided at this time to pursue hospice care. Continued SPEECH-LANGUAGE PATHOLOGIST services no longer warranted.       Short Term Goals: 3x week/12 weeks Current Progress: initial   1) The pt will complete auto speech tasks (stating Feng and Aidan, sentence completion) w/ 90% acc Manish.  2) The pt will name x10 items in a concrete category w/I 1 minute Manish.  3) The pt will formulate sentences (subject+verb+object) to describe picture scenes w/ 80% acc Manish.  4) The pt will answer 3U YNQs w/ 90% acc Manish.  5) The pt will follow 2-step commands w/ 90% acc Manish.  6) The pt will read and comprehend phrases w/ 90% acc Manish.  7) The pt will write 1-2 syllable functional words w/ 80% acc Manish.  8) The pt will answer MU YNQs w/ 90% acc Manish.  9) Patient will read and comprehend paragraph length information w/ 90% accuracy Manish.                   Goal met 3/21/23     Goal met 6/29/23           Goal met 4/4/23       Long Term Goals:  The pt will improve expressive and receptive language skills to communicate w/ friends and family.    Discharge reason: Patient has been hospitalized    Plan   This patient is discharged from Speech Therapy    Adamaris Carrion CCC-SLP   9/14/2023

## 2023-09-19 NOTE — PHYSICIAN QUERY
PT Name: Bhupendra Park  MR #: [id number]     DOCUMENTATION CLARIFICATION     CDS/:  Kevin Du RN, CDS                   Contact Information:  [email or web address]    This form is a permanent document in the medical record.    Query Date: September 19, 2023    By submitting this query, we are merely seeking further clarification of documentation.  Please utilize your independent clinical judgment when addressing the question(s) below.    The Medical Record contains the following:  Indicators Supporting Clinical Findings   Location in Medical Record   X Decreased LOC, neurological deficits brought into the ED for altered mental status with decreased responsiveness , urinary incontinence, new Rt facial droop and worsening Rt sided weakness.    NIH 17 DCS 9/13      Neuro Consult 9/9      Grahn Coma Scale (GCS)      Traumatic Injury     X Acute/Chronic Conditions Recurrent brain bleed with new large  left frontal parenchymal hematoma with 3 to 4 mm a midline shift, and intraventricular hemorrhage     Right sided hemiplegia , spastic   Aphasia - expressive and receptive   Cerebral Amyloid angiopathy   Hypernatremia   DCS 9/13   X Radiology Large left frontal parenchymal hematoma with 3-4 mm of midline shift.  Additionally there is intraventricular hemorrhage with ventricular enlargement suspicious for developing hydrocephalus  There is a large intracranial hemorrhage seen centered around the left frontal lobe but also extending into the parietal region and basal ganglia.  There is intraventricular blood seen as well.    There is significant mass effect on the left lateral ventricle secondary to the 2nd cytotoxic edema.  There is a midline shift from left to right that measures approximately 4 mm   CT head for stroke 9/9    CTA 9/9   X Treatment (i.e. IV Steroids, Mannitol, Surgery) levETIRAcetam in NaCl (iso-os) IVPB 1,000 mg ED 1 time  Vimpat   MAR   X Other On 09/13 a rapid response was called  on patient given extreme somnolence and was not responding.  Further discussions was had with wife at bedside.  Recommending palliative care with hospice given extremely poor prognoses and patients wishes of no surgery or heroic measures  Not a candidate for TNK 2/2 hemorrhage, not a candidate for thrombectomy no LVO, symptoms likely 2/2 extending hemorrhage/shift   DCS 9/13        Neuro PN 9/13       Provider, please specify diagnosis or diagnoses associated with above clinical findings.     [   ] Brain Compression 2/2 Cerebral Edema d/t IPH     [  x ] Brain compression 2/2 IPH     [   ] Other neurological diagnosis (please specify):________       Please document in your progress notes daily for the duration of treatment until resolved and include in your discharge summary.    Form No. [id number]

## 2024-02-09 NOTE — PT/OT/SLP PROGRESS
Physical Therapy Treatment Note           Name: Bhupendra Park    : 1960 (63 y.o.)  MRN: [id number]           TREATMENT SUMMARY AND RECOMMENDATIONS:    PT Received On: 23  PT Start Time: 1400     PT Stop Time: 1435  PT Total Time (min): 35 min     Subjective Assessment:   No complaints  Lethargic   x Awake, alert, cooperative  Uncooperative    Agitated  c/o pain    Appropriate  c/o fatigue    Confused  Treated at bedside     Emotionally labile x Treated in gym/dept.    Impulsive  Other:    Flat affect       Therapy Precautions:    Cognitive deficits  Spinal precautions    Collar - hard  Sternal precautions    Collar - soft   TLSO   x Fall risk  LSO    Hip precautions - posterior  Knee immobilizer    Hip precautions - anterior  WBAT    Impaired communication  Partial weightbearing    Oxygen  TTWB    PEG tube  NWB    Visual deficits  Other:    Hearing deficits          Treatment Objectives:     Mobility Training:   Assist level Comments    Bed mobility SBA Supine <> sit   Transfer CGA Stand pivot transfer bed <> w/c   Gait MIN A X50ft using LBQC on LUE; pt able to advance RLE, however required assistance to achieve longer strides and wider CAPO   Sit to stand transitions CGA 2x5 sit to stands using L hallway handrail on LUE   Sitting balance     Standing balance      Wheelchair mobility     Car transfer     Other:          Therapeutic Exercise:   Exercise Sets Reps Comments                               Additional Comments:      Assessment: Patient tolerated session well. Patient required less encouragement this PM, was more talkative with therapist, and tolerated session really well. He is currently requiring CGA-MIN A with mobility and has potential to achieve functional independence. He will continue to benefit from skilled PT services to achieve PLOF. If patient were to be prematurely dc this would place him for increased risk for falls and set him up for failure and functional decline.  Spouse called to check on pre-auth for patients Rx. Ptosha will check and call patient back.       PT Plan: continue POC  Revisions made to plan of care: No    GOALS:   Multidisciplinary Problems       Physical Therapy Goals          Problem: Physical Therapy    Goal Priority Disciplines Outcome Goal Variances Interventions   Physical Therapy Goal     PT, PT/OT Ongoing, Progressing     Description: Goals to be met by: Discharge     Patient will increase functional independence with mobility by performin. Supine to sit with Modified [place]  2. Sit to supine with Modified [place]  3. Sit to stand transfer with Supervision  4. Gait  x 150 feet with Contact Guard Assistance using Quad Cane vs LRAD.                          Skilled PT Minutes Provided: 35 minutes   Communication with Treatment Team:     Equipment recommendations:       At end of treatment, patient remained:   Up in chair     Up in wheelchair in room   x In bed   x With alarm activated   x Bed rails up   x Call bell in reach     Family/friends present    Restraints secured properly    In bathroom with CNA/RN notified    Nurse aware    In gym with therapist/tech    Other:

## 2024-02-27 NOTE — PROGRESS NOTES
[facility]  Occupational Therapy Treatment Note    Date: 10/27/2022  Name: Bhupendra Park  Clinic Number: [id number]    Therapy Diagnosis:   Encounter Diagnosis   Name Primary?    Right spastic hemiplegia Yes     Physician: Casey Maldonado MD    Time In: 0800  Time Out: 0830  Total Billable Time: 30 minutes    SUBJECTIVE     Pt reports: Pt reports soreness throughout R shoulder and forearm with poor knowledge as to the cause.     OBJECTIVE     Objective Measures updated at progress report unless specified.    Treatment     Bhupendra received the treatments listed below:     Neuromuscular re-education activities to improve Kinesthetic, Sense, and Proprioception for 30 minutes. The following activities were included:  In supine, OT provided passive stretching to shoulder flexion, shoulder abduction, elbow extension/flexion, forearm supination/pronation, and hand extension-- Pt presented with increased tonicity throughout RUE. Increased stretch required for elbow extension with release noted.         Patient Education and Home Exercises      Education provided:  - Progress towards goals      Assessment     Pt would continue to benefit from skilled OT. Progress noted in above treatment session.      Bhupendra is progressing well towards his goals and there are no updates to goals at this time. Pt prognosis is Good.     Pt will continue to benefit from skilled outpatient occupational therapy to address the deficits listed in the problem list on initial evaluation provide pt/family education and to maximize pt's level of independence in the home and community environment.     Pt's spiritual, cultural and educational needs considered and pt agreeable to plan of care and goals.      PLAN     Continue with current POC       Kris Lopez OT          Statement Selected